# Patient Record
Sex: FEMALE | Race: AMERICAN INDIAN OR ALASKA NATIVE | HISPANIC OR LATINO
[De-identification: names, ages, dates, MRNs, and addresses within clinical notes are randomized per-mention and may not be internally consistent; named-entity substitution may affect disease eponyms.]

---

## 2017-01-04 ENCOUNTER — APPOINTMENT (OUTPATIENT)
Dept: SPINE | Facility: CLINIC | Age: 67
End: 2017-01-04

## 2017-01-04 ENCOUNTER — OUTPATIENT (OUTPATIENT)
Dept: OUTPATIENT SERVICES | Facility: HOSPITAL | Age: 67
LOS: 1 days | End: 2017-01-04
Payer: COMMERCIAL

## 2017-01-04 VITALS
HEIGHT: 60 IN | DIASTOLIC BLOOD PRESSURE: 83 MMHG | BODY MASS INDEX: 31.41 KG/M2 | SYSTOLIC BLOOD PRESSURE: 136 MMHG | OXYGEN SATURATION: 98 % | WEIGHT: 160 LBS | HEART RATE: 83 BPM

## 2017-01-04 DIAGNOSIS — M54.2 CERVICALGIA: ICD-10-CM

## 2017-01-04 DIAGNOSIS — G89.29 CERVICALGIA: ICD-10-CM

## 2017-01-04 PROCEDURE — 72040 X-RAY EXAM NECK SPINE 2-3 VW: CPT

## 2017-01-04 PROCEDURE — 72040 X-RAY EXAM NECK SPINE 2-3 VW: CPT | Mod: 26

## 2017-01-21 ENCOUNTER — OUTPATIENT (OUTPATIENT)
Dept: OUTPATIENT SERVICES | Facility: HOSPITAL | Age: 67
LOS: 1 days | End: 2017-01-21
Payer: MEDICARE

## 2017-01-21 PROCEDURE — 72125 CT NECK SPINE W/O DYE: CPT | Mod: 26

## 2017-01-21 PROCEDURE — 72125 CT NECK SPINE W/O DYE: CPT

## 2017-01-21 PROCEDURE — 76377 3D RENDER W/INTRP POSTPROCES: CPT | Mod: 26

## 2017-02-21 ENCOUNTER — APPOINTMENT (OUTPATIENT)
Dept: SPINE | Facility: CLINIC | Age: 67
End: 2017-02-21

## 2017-02-21 VITALS — SYSTOLIC BLOOD PRESSURE: 130 MMHG | DIASTOLIC BLOOD PRESSURE: 86 MMHG | HEART RATE: 80 BPM

## 2017-02-21 DIAGNOSIS — G95.20 UNSPECIFIED CORD COMPRESSION: ICD-10-CM

## 2017-02-21 DIAGNOSIS — M25.78 OSTEOPHYTE, VERTEBRAE: ICD-10-CM

## 2017-02-21 DIAGNOSIS — M48.02 SPINAL STENOSIS, CERVICAL REGION: ICD-10-CM

## 2017-02-21 DIAGNOSIS — M54.12 RADICULOPATHY, CERVICAL REGION: ICD-10-CM

## 2018-01-01 ENCOUNTER — EMERGENCY (EMERGENCY)
Facility: HOSPITAL | Age: 68
LOS: 1 days | Discharge: HOME | End: 2018-01-01
Attending: EMERGENCY MEDICINE | Admitting: EMERGENCY MEDICINE
Payer: MEDICARE

## 2018-01-01 VITALS
SYSTOLIC BLOOD PRESSURE: 158 MMHG | RESPIRATION RATE: 16 BRPM | WEIGHT: 164.91 LBS | TEMPERATURE: 98 F | HEART RATE: 78 BPM | OXYGEN SATURATION: 96 % | DIASTOLIC BLOOD PRESSURE: 89 MMHG

## 2018-01-01 VITALS
HEART RATE: 72 BPM | OXYGEN SATURATION: 99 % | DIASTOLIC BLOOD PRESSURE: 77 MMHG | RESPIRATION RATE: 18 BRPM | SYSTOLIC BLOOD PRESSURE: 135 MMHG

## 2018-01-01 DIAGNOSIS — R42 DIZZINESS AND GIDDINESS: ICD-10-CM

## 2018-01-01 DIAGNOSIS — I10 ESSENTIAL (PRIMARY) HYPERTENSION: ICD-10-CM

## 2018-01-01 DIAGNOSIS — Z88.0 ALLERGY STATUS TO PENICILLIN: ICD-10-CM

## 2018-01-01 DIAGNOSIS — Z79.899 OTHER LONG TERM (CURRENT) DRUG THERAPY: ICD-10-CM

## 2018-01-01 LAB
ALBUMIN SERPL ELPH-MCNC: 4.2 G/DL — SIGNIFICANT CHANGE UP (ref 3.3–5)
ALP SERPL-CCNC: 76 U/L — SIGNIFICANT CHANGE UP (ref 40–120)
ALT FLD-CCNC: 21 U/L — SIGNIFICANT CHANGE UP (ref 10–45)
ANION GAP SERPL CALC-SCNC: 16 MMOL/L — SIGNIFICANT CHANGE UP (ref 5–17)
APTT BLD: 27 SEC — LOW (ref 27.5–37.4)
AST SERPL-CCNC: 28 U/L — SIGNIFICANT CHANGE UP (ref 10–40)
BASOPHILS NFR BLD AUTO: 0.4 % — SIGNIFICANT CHANGE UP (ref 0–2)
BILIRUB SERPL-MCNC: 0.2 MG/DL — SIGNIFICANT CHANGE UP (ref 0.2–1.2)
BUN SERPL-MCNC: 11 MG/DL — SIGNIFICANT CHANGE UP (ref 7–23)
CALCIUM SERPL-MCNC: 9.7 MG/DL — SIGNIFICANT CHANGE UP (ref 8.4–10.5)
CHLORIDE SERPL-SCNC: 92 MMOL/L — LOW (ref 96–108)
CK MB CFR SERPL CALC: 6.1 NG/ML — SIGNIFICANT CHANGE UP (ref 0–6.7)
CK SERPL-CCNC: 313 U/L — HIGH (ref 25–170)
CO2 SERPL-SCNC: 23 MMOL/L — SIGNIFICANT CHANGE UP (ref 22–31)
CREAT SERPL-MCNC: 0.57 MG/DL — SIGNIFICANT CHANGE UP (ref 0.5–1.3)
EOSINOPHIL NFR BLD AUTO: 2.6 % — SIGNIFICANT CHANGE UP (ref 0–6)
GLUCOSE SERPL-MCNC: 111 MG/DL — HIGH (ref 70–99)
HCT VFR BLD CALC: 38.6 % — SIGNIFICANT CHANGE UP (ref 34.5–45)
HGB BLD-MCNC: 12.9 G/DL — SIGNIFICANT CHANGE UP (ref 11.5–15.5)
INR BLD: 0.96 — SIGNIFICANT CHANGE UP (ref 0.88–1.16)
LIDOCAIN IGE QN: 42 U/L — SIGNIFICANT CHANGE UP (ref 7–60)
LYMPHOCYTES # BLD AUTO: 50.4 % — HIGH (ref 13–44)
MAGNESIUM SERPL-MCNC: 2.1 MG/DL — SIGNIFICANT CHANGE UP (ref 1.6–2.6)
MCHC RBC-ENTMCNC: 30.6 PG — SIGNIFICANT CHANGE UP (ref 27–34)
MCHC RBC-ENTMCNC: 33.4 G/DL — SIGNIFICANT CHANGE UP (ref 32–36)
MCV RBC AUTO: 91.5 FL — SIGNIFICANT CHANGE UP (ref 80–100)
MONOCYTES NFR BLD AUTO: 14.5 % — HIGH (ref 2–14)
NEUTROPHILS NFR BLD AUTO: 32.1 % — LOW (ref 43–77)
PLATELET # BLD AUTO: 327 K/UL — SIGNIFICANT CHANGE UP (ref 150–400)
POTASSIUM SERPL-MCNC: 3.5 MMOL/L — SIGNIFICANT CHANGE UP (ref 3.5–5.3)
POTASSIUM SERPL-SCNC: 3.5 MMOL/L — SIGNIFICANT CHANGE UP (ref 3.5–5.3)
PROT SERPL-MCNC: 7.6 G/DL — SIGNIFICANT CHANGE UP (ref 6–8.3)
PROTHROM AB SERPL-ACNC: 10.7 SEC — SIGNIFICANT CHANGE UP (ref 9.8–12.7)
RBC # BLD: 4.22 M/UL — SIGNIFICANT CHANGE UP (ref 3.8–5.2)
RBC # FLD: 13.1 % — SIGNIFICANT CHANGE UP (ref 10.3–16.9)
SODIUM SERPL-SCNC: 131 MMOL/L — LOW (ref 135–145)
TROPONIN T SERPL-MCNC: <0.01 NG/ML — SIGNIFICANT CHANGE UP (ref 0–0.01)
WBC # BLD: 2.3 K/UL — LOW (ref 3.8–10.5)
WBC # FLD AUTO: 2.3 K/UL — LOW (ref 3.8–10.5)

## 2018-01-01 PROCEDURE — 83735 ASSAY OF MAGNESIUM: CPT

## 2018-01-01 PROCEDURE — 93010 ELECTROCARDIOGRAM REPORT: CPT

## 2018-01-01 PROCEDURE — 99284 EMERGENCY DEPT VISIT MOD MDM: CPT | Mod: 25

## 2018-01-01 PROCEDURE — 80053 COMPREHEN METABOLIC PANEL: CPT

## 2018-01-01 PROCEDURE — 85025 COMPLETE CBC W/AUTO DIFF WBC: CPT

## 2018-01-01 PROCEDURE — 96374 THER/PROPH/DIAG INJ IV PUSH: CPT

## 2018-01-01 PROCEDURE — 82553 CREATINE MB FRACTION: CPT

## 2018-01-01 PROCEDURE — 99285 EMERGENCY DEPT VISIT HI MDM: CPT | Mod: 25

## 2018-01-01 PROCEDURE — 85610 PROTHROMBIN TIME: CPT

## 2018-01-01 PROCEDURE — 93005 ELECTROCARDIOGRAM TRACING: CPT

## 2018-01-01 PROCEDURE — 85730 THROMBOPLASTIN TIME PARTIAL: CPT

## 2018-01-01 PROCEDURE — 83690 ASSAY OF LIPASE: CPT

## 2018-01-01 PROCEDURE — 82550 ASSAY OF CK (CPK): CPT

## 2018-01-01 PROCEDURE — 36415 COLL VENOUS BLD VENIPUNCTURE: CPT

## 2018-01-01 PROCEDURE — 84484 ASSAY OF TROPONIN QUANT: CPT

## 2018-01-01 RX ORDER — ONDANSETRON 8 MG/1
4 TABLET, FILM COATED ORAL ONCE
Qty: 0 | Refills: 0 | Status: COMPLETED | OUTPATIENT
Start: 2018-01-01 | End: 2018-01-01

## 2018-01-01 RX ORDER — SODIUM CHLORIDE 9 MG/ML
1000 INJECTION INTRAMUSCULAR; INTRAVENOUS; SUBCUTANEOUS ONCE
Qty: 0 | Refills: 0 | Status: COMPLETED | OUTPATIENT
Start: 2018-01-01 | End: 2018-01-01

## 2018-01-01 RX ADMIN — SODIUM CHLORIDE 1000 MILLILITER(S): 9 INJECTION INTRAMUSCULAR; INTRAVENOUS; SUBCUTANEOUS at 04:08

## 2018-01-01 RX ADMIN — ONDANSETRON 4 MILLIGRAM(S): 8 TABLET, FILM COATED ORAL at 04:07

## 2018-01-01 NOTE — ED PROVIDER NOTE - PROGRESS NOTE DETAILS
BP improved without intervention, pt with steady gait, no focal neuro deficits, no chest pain. recommend PMD f/u  I have discussed the discharge plan with the patient. The patient agrees with the plan, as discussed.  The patient understands Emergency Department diagnosis is a preliminary diagnosis often based on limited information and that the patient must adhere to the follow-up plan as discussed.  The patient understands that if the symptoms worsen the patient may return to the Emergency Department at any time for further evaluation and treatment.

## 2018-01-01 NOTE — ED PROVIDER NOTE - OBJECTIVE STATEMENT
67F hx htn, c/o feeling lightheaded. pt states she awoke to noise and wasn't feeling well.  states was nauseate 67F hx htn, c/o feeling lightheaded. pt states she awoke to noise and wasn't feeling well.  states was nauseated.  checked her BP and the systolic was 160. pt states was nervous about her elevated BP.  no chest pain. no SOB. no vomiting. no HA.  no numbness/weakness.  states takes norvasc 5mg qam.

## 2018-01-01 NOTE — ED ADULT NURSE NOTE - CHPI ED SYMPTOMS NEG
no shortness of breath/no chills/no fever/no back pain/no chest pain/no syncope/no diaphoresis/no cough/no vomiting

## 2018-01-01 NOTE — ED PROVIDER NOTE - MEDICAL DECISION MAKING DETAILS
concern regarding elevated BP, no chest pain, no SOB, doubt ACS, lightheaded, nauseated, no abd pain  -check labs, ekg, ivf, zofran, /85

## 2018-01-01 NOTE — ED ADULT NURSE NOTE - OBJECTIVE STATEMENT
pt took her blood pressure before bed tonight and it was 160 systolic.  pt usually runs in the 120s.  pt also c/o dizziness and nausea.  no pain.  no syncope.

## 2018-12-28 ENCOUNTER — EMERGENCY (EMERGENCY)
Facility: HOSPITAL | Age: 68
LOS: 1 days | Discharge: HOME | End: 2018-12-28
Attending: EMERGENCY MEDICINE | Admitting: EMERGENCY MEDICINE
Payer: MEDICARE

## 2018-12-28 VITALS
SYSTOLIC BLOOD PRESSURE: 143 MMHG | RESPIRATION RATE: 16 BRPM | OXYGEN SATURATION: 98 % | HEART RATE: 86 BPM | TEMPERATURE: 98 F | DIASTOLIC BLOOD PRESSURE: 88 MMHG

## 2018-12-28 DIAGNOSIS — E87.1 HYPO-OSMOLALITY AND HYPONATREMIA: ICD-10-CM

## 2018-12-28 DIAGNOSIS — R42 DIZZINESS AND GIDDINESS: ICD-10-CM

## 2018-12-28 DIAGNOSIS — Z79.899 OTHER LONG TERM (CURRENT) DRUG THERAPY: ICD-10-CM

## 2018-12-28 DIAGNOSIS — I10 ESSENTIAL (PRIMARY) HYPERTENSION: ICD-10-CM

## 2018-12-28 DIAGNOSIS — Z88.0 ALLERGY STATUS TO PENICILLIN: ICD-10-CM

## 2018-12-28 LAB
BASOPHILS NFR BLD AUTO: 0.5 % — SIGNIFICANT CHANGE UP (ref 0–2)
EOSINOPHIL NFR BLD AUTO: 1.4 % — SIGNIFICANT CHANGE UP (ref 0–6)
HCT VFR BLD CALC: 39 % — SIGNIFICANT CHANGE UP (ref 34.5–45)
HGB BLD-MCNC: 12.8 G/DL — SIGNIFICANT CHANGE UP (ref 11.5–15.5)
LYMPHOCYTES # BLD AUTO: 29.2 % — SIGNIFICANT CHANGE UP (ref 13–44)
MCHC RBC-ENTMCNC: 30.6 PG — SIGNIFICANT CHANGE UP (ref 27–34)
MCHC RBC-ENTMCNC: 32.8 G/DL — SIGNIFICANT CHANGE UP (ref 32–36)
MCV RBC AUTO: 93.3 FL — SIGNIFICANT CHANGE UP (ref 80–100)
MONOCYTES NFR BLD AUTO: 8.6 % — SIGNIFICANT CHANGE UP (ref 2–14)
NEUTROPHILS NFR BLD AUTO: 60.3 % — SIGNIFICANT CHANGE UP (ref 43–77)
PLATELET # BLD AUTO: 378 K/UL — SIGNIFICANT CHANGE UP (ref 150–400)
RBC # BLD: 4.18 M/UL — SIGNIFICANT CHANGE UP (ref 3.8–5.2)
RBC # FLD: 13 % — SIGNIFICANT CHANGE UP (ref 10.3–16.9)
WBC # BLD: 4.3 K/UL — SIGNIFICANT CHANGE UP (ref 3.8–10.5)
WBC # FLD AUTO: 4.3 K/UL — SIGNIFICANT CHANGE UP (ref 3.8–10.5)

## 2018-12-28 PROCEDURE — 82550 ASSAY OF CK (CPK): CPT

## 2018-12-28 PROCEDURE — 71045 X-RAY EXAM CHEST 1 VIEW: CPT | Mod: 26

## 2018-12-28 PROCEDURE — 85025 COMPLETE CBC W/AUTO DIFF WBC: CPT

## 2018-12-28 PROCEDURE — 82553 CREATINE MB FRACTION: CPT

## 2018-12-28 PROCEDURE — 80053 COMPREHEN METABOLIC PANEL: CPT

## 2018-12-28 PROCEDURE — 84484 ASSAY OF TROPONIN QUANT: CPT

## 2018-12-28 PROCEDURE — 99285 EMERGENCY DEPT VISIT HI MDM: CPT | Mod: 25

## 2018-12-28 PROCEDURE — 93010 ELECTROCARDIOGRAM REPORT: CPT

## 2018-12-28 PROCEDURE — 99283 EMERGENCY DEPT VISIT LOW MDM: CPT | Mod: 25

## 2018-12-28 PROCEDURE — 36415 COLL VENOUS BLD VENIPUNCTURE: CPT

## 2018-12-28 PROCEDURE — 93005 ELECTROCARDIOGRAM TRACING: CPT

## 2018-12-28 NOTE — ED PROVIDER NOTE - CARE PLAN
Principal Discharge DX:	Essential hypertension Principal Discharge DX:	Essential hypertension  Secondary Diagnosis:	Hyponatremia

## 2018-12-28 NOTE — ED PROVIDER NOTE - PROGRESS NOTE DETAILS
mildly hyponatremic with mildly elevated CK, likely dehydration, will hydrate w 1 L NS and advise repeat testing this week by pcp . patient refusing to stay for NS, highly anxious, admits to large quantity of H20, advised decreasing quantity and f/u for B.P. and sodium recheck.

## 2018-12-28 NOTE — ED PROVIDER NOTE - NSFOLLOWUPINSTRUCTIONS_ED_ALL_ED_FT
As discussed f/u with your primary care doctor for blood pressure recheck and sodium recheck. As you have been drinking large quantities of water, cut amount in half as this may be contributing to low sodium.

## 2018-12-28 NOTE — ED ADULT NURSE NOTE - NSIMPLEMENTINTERV_GEN_ALL_ED
Implemented All Universal Safety Interventions:  [place] to call system. Call bell, personal items and telephone within reach. Instruct patient to call for assistance. Room bathroom lighting operational. Non-slip footwear when patient is off stretcher. Physically safe environment: no spills, clutter or unnecessary equipment. Stretcher in lowest position, wheels locked, appropriate side rails in place.

## 2018-12-28 NOTE — ED PROVIDER NOTE - MEDICAL DECISION MAKING DETAILS
Pt with hot and flushed feeling with HTN, no H/A no CP, now assymptomatic, high suspician pt was anxious during episode, B.P. improving, EKG wnl , if trop negative as expected will D/C , advised f/u with pcp for B.P. recheck next week and discussed emergent return instructions

## 2018-12-28 NOTE — ED PROVIDER NOTE - OBJECTIVE STATEMENT
69 yo , reports at home she took her B.P. and it was 160's/ 90's , she felt hot and flushed at the time, slightly lightheaded, no CP , no Headache, reports feeling anxious at the time, reports B.P. taken yesterday and it was in the 120's, denies eating salty foods, no cough or cold symptoms, no n/v/d , no dysuria, no black or bloody stools.

## 2018-12-28 NOTE — ED ADULT NURSE NOTE - OBJECTIVE STATEMENT
67 y/o female c/o HTN. Pt reports home blood pressure was 160/83. Pt was concerned called 911. pt denies dizziness, HA, numbness, change of vision. pt speaks clear, MAEx4, ambulates steady, denies cp or sob. unlabored breathing. Abd soft nt nd. Skin dry, warm.

## 2018-12-28 NOTE — ED ADULT NURSE NOTE - CARDIO WDL
Adequate: hears normal conversation without difficulty Normal rate, regular rhythm, normal S1, S2 heart sounds heard.

## 2018-12-29 VITALS
RESPIRATION RATE: 18 BRPM | SYSTOLIC BLOOD PRESSURE: 135 MMHG | OXYGEN SATURATION: 98 % | DIASTOLIC BLOOD PRESSURE: 78 MMHG | HEART RATE: 78 BPM

## 2018-12-29 LAB
ALBUMIN SERPL ELPH-MCNC: 4.4 G/DL — SIGNIFICANT CHANGE UP (ref 3.3–5)
ALP SERPL-CCNC: 79 U/L — SIGNIFICANT CHANGE UP (ref 40–120)
ALT FLD-CCNC: 15 U/L — SIGNIFICANT CHANGE UP (ref 10–45)
ANION GAP SERPL CALC-SCNC: 17 MMOL/L — SIGNIFICANT CHANGE UP (ref 5–17)
AST SERPL-CCNC: 17 U/L — SIGNIFICANT CHANGE UP (ref 10–40)
BILIRUB SERPL-MCNC: 0.2 MG/DL — SIGNIFICANT CHANGE UP (ref 0.2–1.2)
BUN SERPL-MCNC: 10 MG/DL — SIGNIFICANT CHANGE UP (ref 7–23)
CALCIUM SERPL-MCNC: 9.8 MG/DL — SIGNIFICANT CHANGE UP (ref 8.4–10.5)
CHLORIDE SERPL-SCNC: 89 MMOL/L — LOW (ref 96–108)
CK MB CFR SERPL CALC: 3.3 NG/ML — SIGNIFICANT CHANGE UP (ref 0–6.7)
CK SERPL-CCNC: 187 U/L — HIGH (ref 25–170)
CO2 SERPL-SCNC: 24 MMOL/L — SIGNIFICANT CHANGE UP (ref 22–31)
CREAT SERPL-MCNC: 0.51 MG/DL — SIGNIFICANT CHANGE UP (ref 0.5–1.3)
GLUCOSE SERPL-MCNC: 124 MG/DL — HIGH (ref 70–99)
POTASSIUM SERPL-MCNC: 3.9 MMOL/L — SIGNIFICANT CHANGE UP (ref 3.5–5.3)
POTASSIUM SERPL-SCNC: 3.9 MMOL/L — SIGNIFICANT CHANGE UP (ref 3.5–5.3)
PROT SERPL-MCNC: 7.3 G/DL — SIGNIFICANT CHANGE UP (ref 6–8.3)
SODIUM SERPL-SCNC: 130 MMOL/L — LOW (ref 135–145)
TROPONIN T SERPL-MCNC: <0.01 NG/ML — SIGNIFICANT CHANGE UP (ref 0–0.01)

## 2018-12-29 RX ORDER — SODIUM CHLORIDE 9 MG/ML
1000 INJECTION INTRAMUSCULAR; INTRAVENOUS; SUBCUTANEOUS ONCE
Qty: 0 | Refills: 0 | Status: DISCONTINUED | OUTPATIENT
Start: 2018-12-29 | End: 2018-12-29

## 2018-12-30 PROBLEM — I10 ESSENTIAL (PRIMARY) HYPERTENSION: Chronic | Status: ACTIVE | Noted: 2018-01-01

## 2018-12-31 NOTE — ED ADULT TRIAGE NOTE - NS ED NURSE BANDS TYPE
Continue same dose and recheck INR  In one week. Thank you  
Noted  Forwarded to [facility].    Anticoag Track is updated    
Notification received from [facility] 
Name band;

## 2019-01-03 ENCOUNTER — APPOINTMENT (OUTPATIENT)
Dept: OTOLARYNGOLOGY | Facility: CLINIC | Age: 69
End: 2019-01-03
Payer: MEDICARE

## 2019-01-03 VITALS
DIASTOLIC BLOOD PRESSURE: 83 MMHG | OXYGEN SATURATION: 95 % | RESPIRATION RATE: 16 BRPM | HEART RATE: 58 BPM | TEMPERATURE: 98.6 F | SYSTOLIC BLOOD PRESSURE: 136 MMHG

## 2019-01-03 DIAGNOSIS — R04.0 EPISTAXIS: ICD-10-CM

## 2019-01-03 PROCEDURE — 30901 CONTROL OF NOSEBLEED: CPT | Mod: RT

## 2019-01-03 PROCEDURE — 99203 OFFICE O/P NEW LOW 30 MIN: CPT | Mod: 25

## 2019-01-08 ENCOUNTER — EMERGENCY (EMERGENCY)
Facility: HOSPITAL | Age: 69
LOS: 1 days | Discharge: HOME | End: 2019-01-08
Attending: EMERGENCY MEDICINE | Admitting: EMERGENCY MEDICINE
Payer: MEDICARE

## 2019-01-08 VITALS
RESPIRATION RATE: 18 BRPM | SYSTOLIC BLOOD PRESSURE: 144 MMHG | DIASTOLIC BLOOD PRESSURE: 90 MMHG | TEMPERATURE: 97 F | OXYGEN SATURATION: 99 % | HEART RATE: 98 BPM

## 2019-01-08 VITALS
OXYGEN SATURATION: 100 % | RESPIRATION RATE: 18 BRPM | DIASTOLIC BLOOD PRESSURE: 82 MMHG | TEMPERATURE: 98 F | HEART RATE: 83 BPM | SYSTOLIC BLOOD PRESSURE: 121 MMHG

## 2019-01-08 DIAGNOSIS — Z79.899 OTHER LONG TERM (CURRENT) DRUG THERAPY: ICD-10-CM

## 2019-01-08 DIAGNOSIS — I10 ESSENTIAL (PRIMARY) HYPERTENSION: ICD-10-CM

## 2019-01-08 DIAGNOSIS — Z88.0 ALLERGY STATUS TO PENICILLIN: ICD-10-CM

## 2019-01-08 DIAGNOSIS — Z91.018 ALLERGY TO OTHER FOODS: ICD-10-CM

## 2019-01-08 PROCEDURE — 99284 EMERGENCY DEPT VISIT MOD MDM: CPT | Mod: 25

## 2019-01-08 PROCEDURE — 93005 ELECTROCARDIOGRAM TRACING: CPT

## 2019-01-08 PROCEDURE — 93010 ELECTROCARDIOGRAM REPORT: CPT

## 2019-01-08 PROCEDURE — 99283 EMERGENCY DEPT VISIT LOW MDM: CPT | Mod: 25

## 2019-01-08 NOTE — ED ADULT TRIAGE NOTE - ARRIVAL INFO ADDITIONAL COMMENTS
pt states she checked her bp tonight because she was hot and it was 180/80.  denies headache or chest pain.

## 2019-01-08 NOTE — ED PROVIDER NOTE - ATTENDING CONTRIBUTION TO CARE
I have reviewed all pertinent clinical data, and I agree with the documentation, care, and plan executed by MERARI Ordonez.

## 2019-01-08 NOTE — ED ADULT NURSE NOTE - OBJECTIVE STATEMENT
67 y/o female with hx of HTN arrived to [facility] ER reporting elevated blood pressure at home after feeling flushed. Pt verbalized that her blood pressure was 180s/100s. Upon assessment, abdomen soft, lung fields WNL, breathing is equal and unlabored, pulses palpable, no visible acute injuries noted. Pt denies chest pain, headache, blurred vision, slurred speech, nausea, vomiting, diarrhea, fever, chills, LOC, SOB, weakness, fatigue, recent travel, recent injury, and palpitations. Care in progress.

## 2019-01-08 NOTE — ED PROVIDER NOTE - OBJECTIVE STATEMENT
reports BP elevated 180 systolic tonight and over the last month sometimes similarly elevated at night -> currently takes norvasc 5 mg No CP no SOB

## 2019-01-08 NOTE — ED ADULT TRIAGE NOTE - BANDS:
Patient: Noe Barnes Sr.    Procedure Summary     Date:  11/26/18 Room / Location:  [facility] ENDOSCOPY 2 / [facility] END[facility]COPY    Anesthesia Start:  1036 Anesthesia Stop:  1054    Procedure:  COLONOSCOPY (N/A ) Diagnosis:       Hx of adenomatous polyp of colon      (Hx of adenomatous polyp of colon [Z86.010])    Surgeon:  Ger Thomas MD Provider:  Omar Tran CRNA    Anesthesia Type:  MAC ASA Status:  3          Anesthesia Type: MAC  Last vitals  BP       Temp   97.4 °F (36.3 °C) (11/26/18 0933)   Pulse   78 (11/26/18 0933)   Resp   18 (11/26/18 0933)     SpO2   94 % (11/26/18 0933)     Post Anesthesia Care and Evaluation    Patient location during evaluation: bedside  Patient participation: complete - patient participated  Level of consciousness: awake and alert  Pain score: 0  Pain management: adequate  Airway patency: patent  Anesthetic complications: No anesthetic complications  PONV Status: none  Cardiovascular status: acceptable  Respiratory status: acceptable  Hydration status: acceptable      
Allergy;

## 2019-01-17 ENCOUNTER — APPOINTMENT (OUTPATIENT)
Dept: OTOLARYNGOLOGY | Facility: CLINIC | Age: 69
End: 2019-01-17
Payer: MEDICARE

## 2019-01-17 VITALS
DIASTOLIC BLOOD PRESSURE: 84 MMHG | RESPIRATION RATE: 17 BRPM | TEMPERATURE: 97.6 F | SYSTOLIC BLOOD PRESSURE: 140 MMHG | OXYGEN SATURATION: 99 % | HEART RATE: 88 BPM

## 2019-01-17 PROCEDURE — 99213 OFFICE O/P EST LOW 20 MIN: CPT

## 2019-01-17 NOTE — HISTORY OF PRESENT ILLNESS
[de-identified] : 68 years old female patient with history of right sided epistaxis.  Patient is present today in the office with right epistaxis improved.

## 2019-01-17 NOTE — REVIEW OF SYSTEMS
[Patient Intake Form Reviewed] : Patient intake form was reviewed [As Noted in HPI] : as noted in HPI [Nasal Congestion] : nasal congestion [Nose Bleeds] : nose bleeds [Negative] : Heme/Lymph

## 2019-01-17 NOTE — PHYSICAL EXAM
[] : septum deviated bilaterally [Midline] : trachea located in midline position [Normal] : no rashes [de-identified] : right epistaxis.  Bacitracin and cotton ball ointment was inserted into her nostrils

## 2019-01-17 NOTE — CONSULT LETTER
[Please see my note below.] : Please see my note below. [Consult Closing:] : Thank you very much for allowing me to participate in the care of this patient.  If you have any questions, please do not hesitate to contact me. [Sincerely,] : Sincerely, [FreeTextEntry3] : Nicholas Valentine MD, FACS\par Professor of Otolaryngology, [employer] at [facility]\par Director, [facility], [place] [facility] [place]\par , [facility] Line, [facility]\par \par

## 2019-06-03 ENCOUNTER — APPOINTMENT (OUTPATIENT)
Dept: OTOLARYNGOLOGY | Facility: CLINIC | Age: 69
End: 2019-06-03
Payer: MEDICARE

## 2019-06-03 VITALS
DIASTOLIC BLOOD PRESSURE: 83 MMHG | HEART RATE: 99 BPM | OXYGEN SATURATION: 98 % | SYSTOLIC BLOOD PRESSURE: 121 MMHG | TEMPERATURE: 97.9 F

## 2019-06-03 PROCEDURE — G0268 REMOVAL OF IMPACTED WAX MD: CPT

## 2019-06-03 PROCEDURE — 92550 TYMPANOMETRY & REFLEX THRESH: CPT

## 2019-06-03 PROCEDURE — 99213 OFFICE O/P EST LOW 20 MIN: CPT | Mod: 25

## 2019-06-03 PROCEDURE — 92557 COMPREHENSIVE HEARING TEST: CPT

## 2019-06-03 NOTE — PHYSICAL EXAM
[Normal] : no rashes [] : septum deviated bilaterally [de-identified] : right epistaxis.  Bacitracin and cotton ball ointment was inserted into her nostrils

## 2019-06-03 NOTE — REVIEW OF SYSTEMS
[Patient Intake Form Reviewed] : Patient intake form was reviewed [As Noted in HPI] : as noted in HPI [Hearing Loss] : hearing loss [Dizziness] : dizziness [Vertigo] : vertigo [Negative] : Heme/Lymph

## 2019-06-03 NOTE — HISTORY OF PRESENT ILLNESS
[de-identified] : 69 years old female patient with history of bilateral clogged ears for the past couple months.  Patient is present today in the office with bilateral cerumen impaction.  Patient C/O occasional dizziness episodes for the past couple of weeks

## 2019-06-03 NOTE — REASON FOR VISIT
[Subsequent Evaluation] : a subsequent evaluation for [FreeTextEntry2] : bilateral clogged ears for the past couple months

## 2019-06-05 ENCOUNTER — EMERGENCY (EMERGENCY)
Facility: HOSPITAL | Age: 69
LOS: 1 days | Discharge: HOME | End: 2019-06-05
Attending: EMERGENCY MEDICINE | Admitting: EMERGENCY MEDICINE
Payer: MEDICARE

## 2019-06-05 VITALS
SYSTOLIC BLOOD PRESSURE: 156 MMHG | OXYGEN SATURATION: 99 % | HEIGHT: 60 IN | WEIGHT: 164.91 LBS | DIASTOLIC BLOOD PRESSURE: 65 MMHG | TEMPERATURE: 98 F | RESPIRATION RATE: 18 BRPM | HEART RATE: 84 BPM

## 2019-06-05 DIAGNOSIS — Z88.0 ALLERGY STATUS TO PENICILLIN: ICD-10-CM

## 2019-06-05 DIAGNOSIS — I10 ESSENTIAL (PRIMARY) HYPERTENSION: ICD-10-CM

## 2019-06-05 DIAGNOSIS — R42 DIZZINESS AND GIDDINESS: ICD-10-CM

## 2019-06-05 DIAGNOSIS — Z91.018 ALLERGY TO OTHER FOODS: ICD-10-CM

## 2019-06-05 DIAGNOSIS — Z79.899 OTHER LONG TERM (CURRENT) DRUG THERAPY: ICD-10-CM

## 2019-06-05 PROCEDURE — 99284 EMERGENCY DEPT VISIT MOD MDM: CPT | Mod: 25

## 2019-06-06 LAB
ALBUMIN SERPL ELPH-MCNC: 4.2 G/DL — SIGNIFICANT CHANGE UP (ref 3.3–5)
ALP SERPL-CCNC: 96 U/L — SIGNIFICANT CHANGE UP (ref 40–120)
ALT FLD-CCNC: 16 U/L — SIGNIFICANT CHANGE UP (ref 10–45)
ANION GAP SERPL CALC-SCNC: 10 MMOL/L — SIGNIFICANT CHANGE UP (ref 5–17)
APPEARANCE UR: CLEAR — SIGNIFICANT CHANGE UP
APTT BLD: 27.1 SEC — LOW (ref 27.5–36.3)
AST SERPL-CCNC: 22 U/L — SIGNIFICANT CHANGE UP (ref 10–40)
BILIRUB SERPL-MCNC: <0.2 MG/DL — SIGNIFICANT CHANGE UP (ref 0.2–1.2)
BILIRUB UR-MCNC: NEGATIVE — SIGNIFICANT CHANGE UP
BUN SERPL-MCNC: 12 MG/DL — SIGNIFICANT CHANGE UP (ref 7–23)
CALCIUM SERPL-MCNC: 9.8 MG/DL — SIGNIFICANT CHANGE UP (ref 8.4–10.5)
CHLORIDE SERPL-SCNC: 96 MMOL/L — SIGNIFICANT CHANGE UP (ref 96–108)
CO2 SERPL-SCNC: 26 MMOL/L — SIGNIFICANT CHANGE UP (ref 22–31)
COLOR SPEC: YELLOW — SIGNIFICANT CHANGE UP
CREAT SERPL-MCNC: 0.5 MG/DL — SIGNIFICANT CHANGE UP (ref 0.5–1.3)
DIFF PNL FLD: NEGATIVE — SIGNIFICANT CHANGE UP
GLUCOSE SERPL-MCNC: 111 MG/DL — HIGH (ref 70–99)
GLUCOSE UR QL: NEGATIVE — SIGNIFICANT CHANGE UP
HCT VFR BLD CALC: 40.7 % — SIGNIFICANT CHANGE UP (ref 34.5–45)
HGB BLD-MCNC: 13.4 G/DL — SIGNIFICANT CHANGE UP (ref 11.5–15.5)
INR BLD: 1.03 — SIGNIFICANT CHANGE UP (ref 0.88–1.16)
KETONES UR-MCNC: NEGATIVE — SIGNIFICANT CHANGE UP
LEUKOCYTE ESTERASE UR-ACNC: ABNORMAL
MCHC RBC-ENTMCNC: 31.1 PG — SIGNIFICANT CHANGE UP (ref 27–34)
MCHC RBC-ENTMCNC: 32.9 GM/DL — SIGNIFICANT CHANGE UP (ref 32–36)
MCV RBC AUTO: 94.4 FL — SIGNIFICANT CHANGE UP (ref 80–100)
NITRITE UR-MCNC: NEGATIVE — SIGNIFICANT CHANGE UP
NRBC # BLD: 0 /100 WBCS — SIGNIFICANT CHANGE UP (ref 0–0)
PH UR: 7 — SIGNIFICANT CHANGE UP (ref 5–8)
PLATELET # BLD AUTO: 373 K/UL — SIGNIFICANT CHANGE UP (ref 150–400)
POTASSIUM SERPL-MCNC: 4 MMOL/L — SIGNIFICANT CHANGE UP (ref 3.5–5.3)
POTASSIUM SERPL-SCNC: 4 MMOL/L — SIGNIFICANT CHANGE UP (ref 3.5–5.3)
PROT SERPL-MCNC: 7.5 G/DL — SIGNIFICANT CHANGE UP (ref 6–8.3)
PROT UR-MCNC: NEGATIVE MG/DL — SIGNIFICANT CHANGE UP
PROTHROM AB SERPL-ACNC: 11.7 SEC — SIGNIFICANT CHANGE UP (ref 10–12.9)
RBC # BLD: 4.31 M/UL — SIGNIFICANT CHANGE UP (ref 3.8–5.2)
RBC # FLD: 12.9 % — SIGNIFICANT CHANGE UP (ref 10.3–14.5)
SODIUM SERPL-SCNC: 132 MMOL/L — LOW (ref 135–145)
SP GR SPEC: 1.01 — SIGNIFICANT CHANGE UP (ref 1–1.03)
UROBILINOGEN FLD QL: 0.2 E.U./DL — SIGNIFICANT CHANGE UP
WBC # BLD: 4.12 K/UL — SIGNIFICANT CHANGE UP (ref 3.8–10.5)
WBC # FLD AUTO: 4.12 K/UL — SIGNIFICANT CHANGE UP (ref 3.8–10.5)

## 2019-06-06 PROCEDURE — 80053 COMPREHEN METABOLIC PANEL: CPT

## 2019-06-06 PROCEDURE — 85610 PROTHROMBIN TIME: CPT

## 2019-06-06 PROCEDURE — 81001 URINALYSIS AUTO W/SCOPE: CPT

## 2019-06-06 PROCEDURE — 85730 THROMBOPLASTIN TIME PARTIAL: CPT

## 2019-06-06 PROCEDURE — 87086 URINE CULTURE/COLONY COUNT: CPT

## 2019-06-06 PROCEDURE — 85027 COMPLETE CBC AUTOMATED: CPT

## 2019-06-06 PROCEDURE — 36415 COLL VENOUS BLD VENIPUNCTURE: CPT

## 2019-06-06 PROCEDURE — 99283 EMERGENCY DEPT VISIT LOW MDM: CPT | Mod: 25

## 2019-06-06 RX ORDER — SODIUM CHLORIDE 9 MG/ML
1000 INJECTION INTRAMUSCULAR; INTRAVENOUS; SUBCUTANEOUS ONCE
Refills: 0 | Status: COMPLETED | OUTPATIENT
Start: 2019-06-06 | End: 2019-06-06

## 2019-06-06 RX ADMIN — SODIUM CHLORIDE 1000 MILLILITER(S): 9 INJECTION INTRAMUSCULAR; INTRAVENOUS; SUBCUTANEOUS at 01:22

## 2019-06-06 NOTE — ED PROVIDER NOTE - NSFOLLOWUPINSTRUCTIONS_ED_ALL_ED_FT
RETURN TO THE ER FOR ANY CHEST PAIN, SHORTNESS OF BREATH, FACE/ARM/LEG WEAKNESS, DIFFICULTY WALKING. OTHERWISE, TAKE AMLODIPINE AS DIRECTED BY YOUR REGULAR DOCTOR AND FOLLOW UP WITH YOUR REGULAR DOCTOR IN 1-2 WEEKS.     Hypertension    Hypertension, commonly called high blood pressure, is when the force of blood pumping through your arteries is too strong. Hypertension forces your heart to work harder to pump blood. Your arteries may become narrow or stiff. Having untreated or uncontrolled hypertension for a long period of time can cause heart attack, stroke, kidney disease, and other problems. If started on a medication, take exactly as prescribed by your health care professional. Maintain a healthy lifestyle and follow up with your primary care physician.    SEEK IMMEDIATE MEDICAL CARE IF YOU HAVE ANY OF THE FOLLOWING SYMPTOMS: severe headache, confusion, chest pain, abdominal pain, vomiting, or shortness of breath.

## 2019-06-06 NOTE — ED PROVIDER NOTE - PHYSICAL EXAMINATION
gen: no acute distress, comfortable, conversant  HEENT: oropharynx clear  Neck: supple, no meningismus, no bruit noted bl carotid  CV: rrr no m/r/g 2+ radial pulse  Resp: ctab, no w/c/r  Abd: nontender, no rebound/guarding  Ext: no edema, pedal pulses 2+  Neuro: alert and oriented, cn grossly intact, strength equal in all 4 ext, sensation intact to light touch f/a/l, nl coordination, gait steady  fnf/hts intact bl, gait steady cerebellar exam wnl no nystagmus

## 2019-06-06 NOTE — ED PROVIDER NOTE - OBJECTIVE STATEMENT
69F with hx of HTN presenting with complaint of dizziness, light-headedness, restless feeling no vertigo sensation no headache no chest pain, no shortness of breath, no nausea, no vomiting, no abd pain, no edema to lower ext, pt states she feels fine now but gets sx of restless feeling intermittently. Pt noted bp to be high is unclear if that is the cause of light-headed feeling. States bp was 150/90.

## 2019-06-06 NOTE — ED PROVIDER NOTE - CLINICAL SUMMARY MEDICAL DECISION MAKING FREE TEXT BOX
69F with known HTN presenting with dizzy, restless feeling for 1-2 days. Vital signs notable for mild HTN, CV/Neuro exam wnl including cerebellar/gait no sign of cerebellar issues, doubt stroke, doubt ACS no ekg changes signifying ischemia, no chest pain, no shortness of breath, pt reports positional dizziness only. no sob, lungs clear to auscultation, given IV fluids with significant improvement bp not elevated to consider hypertensive emergency. Pt is overall well appearing nl labs will dc home to f/u with PMD re: hypertension, recommend to continue dose of amlodipine 5 mg at home.

## 2019-06-06 NOTE — ED ADULT NURSE NOTE - NSIMPLEMENTINTERV_GEN_ALL_ED
Implemented All Universal Safety Interventions:  [place] to call system. Call bell, personal items and telephone within reach. Instruct patient to call for assistance. Room bathroom lighting operational. Non-slip footwear when patient is off stretcher. Physically safe environment: no spills, clutter or unnecessary equipment. Stretcher in lowest position, wheels locked, appropriate side rails in place.

## 2019-06-07 LAB
CULTURE RESULTS: NO GROWTH — SIGNIFICANT CHANGE UP
SPECIMEN SOURCE: SIGNIFICANT CHANGE UP

## 2019-06-20 VITALS
OXYGEN SATURATION: 96 % | RESPIRATION RATE: 18 BRPM | DIASTOLIC BLOOD PRESSURE: 76 MMHG | HEART RATE: 84 BPM | TEMPERATURE: 98 F | SYSTOLIC BLOOD PRESSURE: 141 MMHG

## 2019-06-20 RX ORDER — CHLORHEXIDINE GLUCONATE 213 G/1000ML
1 SOLUTION TOPICAL ONCE
Refills: 0 | Status: DISCONTINUED | OUTPATIENT
Start: 2019-06-21 | End: 2019-06-21

## 2019-06-20 NOTE — H&P ADULT - NSHPLABSRESULTS_GEN_ALL_CORE
13.2   4.55  )-----------( 392      ( 21 Jun 2019 14:59 )             40.0               PT/INR - ( 21 Jun 2019 14:59 )   PT: 12.1 sec;   INR: 1.07          PTT - ( 21 Jun 2019 14:59 )  PTT:30.3 sec              EKG: NSR at 76 bpm. No ST changes. TWI AVL. Q wave II, V3, I, AVL.

## 2019-06-20 NOTE — H&P ADULT - ASSESSMENT
70 y/o female with PMH HTN, HLD (myalgias with statins), and hiatal hernia who presents for cardiac cath due to patient's risk factors, abnormal EKG and abnormal stress echo.    ASA III                   Mallampati III    Risks & benefits of procedure and alternative therapy have been explained to the patient including but not limited to: allergic reaction, bleeding w/possible need for blood transfusion, infection, renal and vascular compromise, limb damage, arrhythmia, stroke, vessel dissection/perforation, Myocardial infarction, emergent CABG. Informed consent obtained and in chart.     Hgb/HCT 13.2/40-normal. Patient denies bleeding, BRBPR, melena, hematuria, hematemesis. ASA  mg PO x 1 and Plavix 600 mg PO x 1 given prior to procedure.

## 2019-06-20 NOTE — H&P ADULT - HISTORY OF PRESENT ILLNESS
68 y/o female with PMH HTN and HLD (myalgias with statins) presented to Dr. Janny Billings for abnormal EKG revealing possible inferior/anterior wall MI per MD note. Exercise Stress Echo 4/18/19 apical mid septal hypokinesis after stopping exercise at 5 min d/t fatigue, which resolved with rest, per MD note. Per report from patient, she had hx of leg pains but had normal leg U/S. Patient denies any fevers, chills, chest pain, SOB, orthopnea, PND, palpitations, LE edema, N/V, hematochezia, melena.   In light of patient's risk factors, abnormal EKG, and abnormal stress echo, she is now referred to [facility] for recommended cardiac catheterization with possible intervention. 70 y/o female with PMH HTN, HLD (myalgias with statins), and hiatal hernia presented to Dr. Janny Billings for abnormal EKG revealing possible inferior/anterior wall MI per MD note. Exercise Stress Echo 4/18/19 apical mid septal hypokinesis after stopping exercise at 5 min d/t fatigue, which resolved with rest, per MD note. Per report from patient, she had hx of leg pains but had normal leg U/S. Patient denies any fevers, chills, chest pain, SOB, orthopnea, PND, palpitations, LE edema, N/V, hematochezia, melena.   In light of patient's risk factors, abnormal EKG, and abnormal stress echo, she is now referred to [facility] for recommended cardiac catheterization with possible intervention.

## 2019-06-21 ENCOUNTER — OUTPATIENT (OUTPATIENT)
Dept: OUTPATIENT SERVICES | Facility: HOSPITAL | Age: 69
LOS: 1 days | Discharge: NURSING, REHAB OR LONG-TERM CARE | End: 2019-06-21
Payer: MEDICARE

## 2019-06-21 LAB
ALBUMIN SERPL ELPH-MCNC: 4.4 G/DL — SIGNIFICANT CHANGE UP (ref 3.3–5)
ALP SERPL-CCNC: 90 U/L — SIGNIFICANT CHANGE UP (ref 40–120)
ALT FLD-CCNC: 14 U/L — SIGNIFICANT CHANGE UP (ref 10–45)
ANION GAP SERPL CALC-SCNC: 11 MMOL/L — SIGNIFICANT CHANGE UP (ref 5–17)
APTT BLD: 30.3 SEC — SIGNIFICANT CHANGE UP (ref 27.5–36.3)
AST SERPL-CCNC: 18 U/L — SIGNIFICANT CHANGE UP (ref 10–40)
BASOPHILS # BLD AUTO: 0.03 K/UL — SIGNIFICANT CHANGE UP (ref 0–0.2)
BASOPHILS NFR BLD AUTO: 0.7 % — SIGNIFICANT CHANGE UP (ref 0–2)
BILIRUB SERPL-MCNC: 0.3 MG/DL — SIGNIFICANT CHANGE UP (ref 0.2–1.2)
BUN SERPL-MCNC: 12 MG/DL — SIGNIFICANT CHANGE UP (ref 7–23)
CALCIUM SERPL-MCNC: 10 MG/DL — SIGNIFICANT CHANGE UP (ref 8.4–10.5)
CHLORIDE SERPL-SCNC: 98 MMOL/L — SIGNIFICANT CHANGE UP (ref 96–108)
CHOLEST SERPL-MCNC: 242 MG/DL — HIGH (ref 10–199)
CK MB CFR SERPL CALC: 2.2 NG/ML — SIGNIFICANT CHANGE UP (ref 0–6.7)
CK SERPL-CCNC: 121 U/L — SIGNIFICANT CHANGE UP (ref 25–170)
CO2 SERPL-SCNC: 25 MMOL/L — SIGNIFICANT CHANGE UP (ref 22–31)
CREAT SERPL-MCNC: 0.6 MG/DL — SIGNIFICANT CHANGE UP (ref 0.5–1.3)
EOSINOPHIL # BLD AUTO: 0.01 K/UL — SIGNIFICANT CHANGE UP (ref 0–0.5)
EOSINOPHIL NFR BLD AUTO: 0.2 % — SIGNIFICANT CHANGE UP (ref 0–6)
GLUCOSE SERPL-MCNC: 114 MG/DL — HIGH (ref 70–99)
HCT VFR BLD CALC: 40 % — SIGNIFICANT CHANGE UP (ref 34.5–45)
HDLC SERPL-MCNC: 75 MG/DL — SIGNIFICANT CHANGE UP
HGB BLD-MCNC: 13.2 G/DL — SIGNIFICANT CHANGE UP (ref 11.5–15.5)
IMM GRANULOCYTES NFR BLD AUTO: 0.2 % — SIGNIFICANT CHANGE UP (ref 0–1.5)
INR BLD: 1.07 — SIGNIFICANT CHANGE UP (ref 0.88–1.16)
LIPID PNL WITH DIRECT LDL SERPL: 150 MG/DL — HIGH
LYMPHOCYTES # BLD AUTO: 1.27 K/UL — SIGNIFICANT CHANGE UP (ref 1–3.3)
LYMPHOCYTES # BLD AUTO: 27.9 % — SIGNIFICANT CHANGE UP (ref 13–44)
MCHC RBC-ENTMCNC: 31 PG — SIGNIFICANT CHANGE UP (ref 27–34)
MCHC RBC-ENTMCNC: 33 GM/DL — SIGNIFICANT CHANGE UP (ref 32–36)
MCV RBC AUTO: 93.9 FL — SIGNIFICANT CHANGE UP (ref 80–100)
MONOCYTES # BLD AUTO: 0.32 K/UL — SIGNIFICANT CHANGE UP (ref 0–0.9)
MONOCYTES NFR BLD AUTO: 7 % — SIGNIFICANT CHANGE UP (ref 2–14)
NEUTROPHILS # BLD AUTO: 2.91 K/UL — SIGNIFICANT CHANGE UP (ref 1.8–7.4)
NEUTROPHILS NFR BLD AUTO: 64 % — SIGNIFICANT CHANGE UP (ref 43–77)
NRBC # BLD: 0 /100 WBCS — SIGNIFICANT CHANGE UP (ref 0–0)
PLATELET # BLD AUTO: 392 K/UL — SIGNIFICANT CHANGE UP (ref 150–400)
POTASSIUM SERPL-MCNC: 3.9 MMOL/L — SIGNIFICANT CHANGE UP (ref 3.5–5.3)
POTASSIUM SERPL-SCNC: 3.9 MMOL/L — SIGNIFICANT CHANGE UP (ref 3.5–5.3)
PROT SERPL-MCNC: 7.7 G/DL — SIGNIFICANT CHANGE UP (ref 6–8.3)
PROTHROM AB SERPL-ACNC: 12.1 SEC — SIGNIFICANT CHANGE UP (ref 10–12.9)
RBC # BLD: 4.26 M/UL — SIGNIFICANT CHANGE UP (ref 3.8–5.2)
RBC # FLD: 13.2 % — SIGNIFICANT CHANGE UP (ref 10.3–14.5)
SODIUM SERPL-SCNC: 134 MMOL/L — LOW (ref 135–145)
TOTAL CHOLESTEROL/HDL RATIO MEASUREMENT: 3.2 RATIO — LOW (ref 3.3–7.1)
TRIGL SERPL-MCNC: 87 MG/DL — SIGNIFICANT CHANGE UP (ref 10–149)
WBC # BLD: 4.55 K/UL — SIGNIFICANT CHANGE UP (ref 3.8–10.5)
WBC # FLD AUTO: 4.55 K/UL — SIGNIFICANT CHANGE UP (ref 3.8–10.5)

## 2019-06-21 PROCEDURE — 82550 ASSAY OF CK (CPK): CPT

## 2019-06-21 PROCEDURE — 80053 COMPREHEN METABOLIC PANEL: CPT

## 2019-06-21 PROCEDURE — 80061 LIPID PANEL: CPT

## 2019-06-21 PROCEDURE — 93010 ELECTROCARDIOGRAM REPORT: CPT

## 2019-06-21 PROCEDURE — C1894: CPT

## 2019-06-21 PROCEDURE — 85025 COMPLETE CBC W/AUTO DIFF WBC: CPT

## 2019-06-21 PROCEDURE — 93458 L HRT ARTERY/VENTRICLE ANGIO: CPT

## 2019-06-21 PROCEDURE — 93458 L HRT ARTERY/VENTRICLE ANGIO: CPT | Mod: 26

## 2019-06-21 PROCEDURE — C1887: CPT

## 2019-06-21 PROCEDURE — 93005 ELECTROCARDIOGRAM TRACING: CPT

## 2019-06-21 PROCEDURE — 36415 COLL VENOUS BLD VENIPUNCTURE: CPT

## 2019-06-21 PROCEDURE — 85730 THROMBOPLASTIN TIME PARTIAL: CPT

## 2019-06-21 PROCEDURE — 85610 PROTHROMBIN TIME: CPT

## 2019-06-21 PROCEDURE — 82553 CREATINE MB FRACTION: CPT

## 2019-06-21 PROCEDURE — C1769: CPT

## 2019-06-21 RX ORDER — ASPIRIN/CALCIUM CARB/MAGNESIUM 324 MG
325 TABLET ORAL ONCE
Refills: 0 | Status: COMPLETED | OUTPATIENT
Start: 2019-06-21 | End: 2019-06-21

## 2019-06-21 RX ORDER — SODIUM CHLORIDE 9 MG/ML
500 INJECTION INTRAMUSCULAR; INTRAVENOUS; SUBCUTANEOUS
Refills: 0 | Status: DISCONTINUED | OUTPATIENT
Start: 2019-06-21 | End: 2019-06-21

## 2019-06-21 RX ORDER — CLOPIDOGREL BISULFATE 75 MG/1
600 TABLET, FILM COATED ORAL ONCE
Refills: 0 | Status: COMPLETED | OUTPATIENT
Start: 2019-06-21 | End: 2019-06-21

## 2019-06-21 RX ADMIN — CLOPIDOGREL BISULFATE 600 MILLIGRAM(S): 75 TABLET, FILM COATED ORAL at 15:41

## 2019-06-21 RX ADMIN — Medication 325 MILLIGRAM(S): at 15:41

## 2019-06-21 RX ADMIN — SODIUM CHLORIDE 50 MILLILITER(S): 9 INJECTION INTRAMUSCULAR; INTRAVENOUS; SUBCUTANEOUS at 15:39

## 2019-06-21 NOTE — PROGRESS NOTE ADULT - SUBJECTIVE AND OBJECTIVE BOX
Interventional Cardiology PA SDA Discharge Note    Patient without complaints.     Afebrile, VSS    Ext:    				Right          Radial :    hematoma,     bleeding, dressing; C/D/I      Pulses:    intact RAD to baseline     A/P:      70 y/o female with PMH HTN, HLD (myalgias with statins), and hiatal hernia presented to Dr. Janny Billings for abnormal EKG revealing possible inferior/anterior wall MI per MD note. Exercise Stress Echo 4/18/19 apical mid septal hypokinesis after stopping exercise at 5 min d/t fatigue, which resolved with rest, per MD note. Per report from patient, she had hx of leg pains but had normal leg U/S. Patient denies any fevers, chills, chest pain, SOB, orthopnea, PND, palpitations, LE edema, N/V, hematochezia, melena. Pt now s/p diagnostic cath showing            1.	Stable for discharge as per attending Dr. Berrios after bed rest, pt voids, wrist stable and 30 minutes of ambulation.  2.	Follow-up with PMD/Cardiologist Dr. Billings in 1-2 weeks  3.	Discharged forms signed and copies in chart Interventional Cardiology MERARI ARIASA Discharge Note    Patient without complaints.     Afebrile, VSS    Ext:    				Right          Radial :  no  hematoma, no    bleeding, dressing; C/D/I      Pulses:    intact RAD to baseline     A/P:      70 y/o female with PMH HTN, HLD (myalgias with statins), and hiatal hernia presented to Dr. Janny Billings for abnormal EKG revealing possible inferior/anterior wall MI per MD note. Exercise Stress Echo 4/18/19 apical mid septal hypokinesis after stopping exercise at 5 min d/t fatigue, which resolved with rest, per MD note. Per report from patient, she had hx of leg pains but had normal leg U/S. Patient denies any fevers, chills, chest pain, SOB, orthopnea, PND, palpitations, LE edema, N/V, hematochezia, melena. Pt now s/p diagnostic cath 6/21/19 showing normal coronaries, radial access. Pt will continue medical management.             1.	Stable for discharge as per attending Dr. Berrios after bed rest, pt voids, wrist stable and 30 minutes of ambulation.  2.	Follow-up with PMD/Cardiologist Dr. Billings in 1-2 weeks  3.	Discharged forms signed and copies in chart

## 2019-07-16 ENCOUNTER — EMERGENCY (EMERGENCY)
Facility: HOSPITAL | Age: 69
LOS: 1 days | Discharge: HOME | End: 2019-07-16
Attending: EMERGENCY MEDICINE | Admitting: EMERGENCY MEDICINE
Payer: MEDICARE

## 2019-07-16 VITALS
OXYGEN SATURATION: 98 % | TEMPERATURE: 98 F | SYSTOLIC BLOOD PRESSURE: 135 MMHG | DIASTOLIC BLOOD PRESSURE: 72 MMHG | RESPIRATION RATE: 18 BRPM | HEART RATE: 72 BPM

## 2019-07-16 VITALS
RESPIRATION RATE: 18 BRPM | TEMPERATURE: 98 F | DIASTOLIC BLOOD PRESSURE: 85 MMHG | SYSTOLIC BLOOD PRESSURE: 143 MMHG | HEIGHT: 60 IN | OXYGEN SATURATION: 98 % | HEART RATE: 81 BPM | WEIGHT: 160.06 LBS

## 2019-07-16 PROBLEM — E78.5 HYPERLIPIDEMIA, UNSPECIFIED: Chronic | Status: ACTIVE | Noted: 2019-06-20

## 2019-07-16 LAB
ALBUMIN SERPL ELPH-MCNC: 4.2 G/DL — SIGNIFICANT CHANGE UP (ref 3.3–5)
ALP SERPL-CCNC: 83 U/L — SIGNIFICANT CHANGE UP (ref 40–120)
ALT FLD-CCNC: 15 U/L — SIGNIFICANT CHANGE UP (ref 10–45)
ANION GAP SERPL CALC-SCNC: 11 MMOL/L — SIGNIFICANT CHANGE UP (ref 5–17)
APPEARANCE UR: CLEAR — SIGNIFICANT CHANGE UP
APTT BLD: 29.4 SEC — SIGNIFICANT CHANGE UP (ref 27.5–36.3)
AST SERPL-CCNC: 19 U/L — SIGNIFICANT CHANGE UP (ref 10–40)
BASOPHILS # BLD AUTO: 0.02 K/UL — SIGNIFICANT CHANGE UP (ref 0–0.2)
BASOPHILS NFR BLD AUTO: 0.4 % — SIGNIFICANT CHANGE UP (ref 0–2)
BILIRUB SERPL-MCNC: 0.2 MG/DL — SIGNIFICANT CHANGE UP (ref 0.2–1.2)
BILIRUB UR-MCNC: NEGATIVE — SIGNIFICANT CHANGE UP
BUN SERPL-MCNC: 14 MG/DL — SIGNIFICANT CHANGE UP (ref 7–23)
CALCIUM SERPL-MCNC: 10 MG/DL — SIGNIFICANT CHANGE UP (ref 8.4–10.5)
CHLORIDE SERPL-SCNC: 94 MMOL/L — LOW (ref 96–108)
CK SERPL-CCNC: 100 U/L — SIGNIFICANT CHANGE UP (ref 25–170)
CO2 SERPL-SCNC: 27 MMOL/L — SIGNIFICANT CHANGE UP (ref 22–31)
COLOR SPEC: YELLOW — SIGNIFICANT CHANGE UP
CREAT SERPL-MCNC: 0.66 MG/DL — SIGNIFICANT CHANGE UP (ref 0.5–1.3)
DIFF PNL FLD: NEGATIVE — SIGNIFICANT CHANGE UP
EOSINOPHIL # BLD AUTO: 0.09 K/UL — SIGNIFICANT CHANGE UP (ref 0–0.5)
EOSINOPHIL NFR BLD AUTO: 2 % — SIGNIFICANT CHANGE UP (ref 0–6)
GLUCOSE SERPL-MCNC: 105 MG/DL — HIGH (ref 70–99)
GLUCOSE UR QL: NEGATIVE — SIGNIFICANT CHANGE UP
HCT VFR BLD CALC: 38 % — SIGNIFICANT CHANGE UP (ref 34.5–45)
HGB BLD-MCNC: 12.6 G/DL — SIGNIFICANT CHANGE UP (ref 11.5–15.5)
IMM GRANULOCYTES NFR BLD AUTO: 0.2 % — SIGNIFICANT CHANGE UP (ref 0–1.5)
INR BLD: 1.09 — SIGNIFICANT CHANGE UP (ref 0.88–1.16)
KETONES UR-MCNC: NEGATIVE — SIGNIFICANT CHANGE UP
LEUKOCYTE ESTERASE UR-ACNC: NEGATIVE — SIGNIFICANT CHANGE UP
LYMPHOCYTES # BLD AUTO: 1.49 K/UL — SIGNIFICANT CHANGE UP (ref 1–3.3)
LYMPHOCYTES # BLD AUTO: 33.3 % — SIGNIFICANT CHANGE UP (ref 13–44)
MCHC RBC-ENTMCNC: 30.8 PG — SIGNIFICANT CHANGE UP (ref 27–34)
MCHC RBC-ENTMCNC: 33.2 GM/DL — SIGNIFICANT CHANGE UP (ref 32–36)
MCV RBC AUTO: 92.9 FL — SIGNIFICANT CHANGE UP (ref 80–100)
MONOCYTES # BLD AUTO: 0.54 K/UL — SIGNIFICANT CHANGE UP (ref 0–0.9)
MONOCYTES NFR BLD AUTO: 12.1 % — SIGNIFICANT CHANGE UP (ref 2–14)
NEUTROPHILS # BLD AUTO: 2.32 K/UL — SIGNIFICANT CHANGE UP (ref 1.8–7.4)
NEUTROPHILS NFR BLD AUTO: 52 % — SIGNIFICANT CHANGE UP (ref 43–77)
NITRITE UR-MCNC: NEGATIVE — SIGNIFICANT CHANGE UP
NRBC # BLD: 0 /100 WBCS — SIGNIFICANT CHANGE UP (ref 0–0)
PH UR: 7 — SIGNIFICANT CHANGE UP (ref 5–8)
PLATELET # BLD AUTO: 351 K/UL — SIGNIFICANT CHANGE UP (ref 150–400)
POTASSIUM SERPL-MCNC: 3.8 MMOL/L — SIGNIFICANT CHANGE UP (ref 3.5–5.3)
POTASSIUM SERPL-SCNC: 3.8 MMOL/L — SIGNIFICANT CHANGE UP (ref 3.5–5.3)
PROT SERPL-MCNC: 7.1 G/DL — SIGNIFICANT CHANGE UP (ref 6–8.3)
PROT UR-MCNC: NEGATIVE MG/DL — SIGNIFICANT CHANGE UP
PROTHROM AB SERPL-ACNC: 12.4 SEC — SIGNIFICANT CHANGE UP (ref 10–12.9)
RBC # BLD: 4.09 M/UL — SIGNIFICANT CHANGE UP (ref 3.8–5.2)
RBC # FLD: 12.9 % — SIGNIFICANT CHANGE UP (ref 10.3–14.5)
SODIUM SERPL-SCNC: 132 MMOL/L — LOW (ref 135–145)
SP GR SPEC: <=1.005 — SIGNIFICANT CHANGE UP (ref 1–1.03)
TROPONIN T SERPL-MCNC: <0.01 NG/ML — SIGNIFICANT CHANGE UP (ref 0–0.01)
UROBILINOGEN FLD QL: 0.2 E.U./DL — SIGNIFICANT CHANGE UP
WBC # BLD: 4.47 K/UL — SIGNIFICANT CHANGE UP (ref 3.8–10.5)
WBC # FLD AUTO: 4.47 K/UL — SIGNIFICANT CHANGE UP (ref 3.8–10.5)

## 2019-07-16 PROCEDURE — 99284 EMERGENCY DEPT VISIT MOD MDM: CPT | Mod: 25

## 2019-07-16 PROCEDURE — 82550 ASSAY OF CK (CPK): CPT

## 2019-07-16 PROCEDURE — 81003 URINALYSIS AUTO W/O SCOPE: CPT

## 2019-07-16 PROCEDURE — 36415 COLL VENOUS BLD VENIPUNCTURE: CPT

## 2019-07-16 PROCEDURE — 85730 THROMBOPLASTIN TIME PARTIAL: CPT

## 2019-07-16 PROCEDURE — 80053 COMPREHEN METABOLIC PANEL: CPT

## 2019-07-16 PROCEDURE — 93005 ELECTROCARDIOGRAM TRACING: CPT

## 2019-07-16 PROCEDURE — 85610 PROTHROMBIN TIME: CPT

## 2019-07-16 PROCEDURE — 96361 HYDRATE IV INFUSION ADD-ON: CPT

## 2019-07-16 PROCEDURE — 96374 THER/PROPH/DIAG INJ IV PUSH: CPT

## 2019-07-16 PROCEDURE — 93010 ELECTROCARDIOGRAM REPORT: CPT

## 2019-07-16 PROCEDURE — 84484 ASSAY OF TROPONIN QUANT: CPT

## 2019-07-16 PROCEDURE — 85025 COMPLETE CBC W/AUTO DIFF WBC: CPT

## 2019-07-16 RX ORDER — ONDANSETRON 8 MG/1
4 TABLET, FILM COATED ORAL ONCE
Refills: 0 | Status: COMPLETED | OUTPATIENT
Start: 2019-07-16 | End: 2019-07-16

## 2019-07-16 RX ORDER — SODIUM CHLORIDE 9 MG/ML
1000 INJECTION INTRAMUSCULAR; INTRAVENOUS; SUBCUTANEOUS ONCE
Refills: 0 | Status: COMPLETED | OUTPATIENT
Start: 2019-07-16 | End: 2019-07-16

## 2019-07-16 RX ORDER — MECLIZINE HCL 12.5 MG
25 TABLET ORAL ONCE
Refills: 0 | Status: COMPLETED | OUTPATIENT
Start: 2019-07-16 | End: 2019-07-16

## 2019-07-16 RX ORDER — MECLIZINE HCL 12.5 MG
1 TABLET ORAL
Qty: 15 | Refills: 0
Start: 2019-07-16

## 2019-07-16 RX ADMIN — SODIUM CHLORIDE 1000 MILLILITER(S): 9 INJECTION INTRAMUSCULAR; INTRAVENOUS; SUBCUTANEOUS at 04:19

## 2019-07-16 RX ADMIN — Medication 25 MILLIGRAM(S): at 03:09

## 2019-07-16 RX ADMIN — ONDANSETRON 4 MILLIGRAM(S): 8 TABLET, FILM COATED ORAL at 03:21

## 2019-07-16 RX ADMIN — SODIUM CHLORIDE 1000 MILLILITER(S): 9 INJECTION INTRAMUSCULAR; INTRAVENOUS; SUBCUTANEOUS at 03:11

## 2019-07-16 NOTE — ED PROVIDER NOTE - PROGRESS NOTE DETAILS
pt feels much better after IVfs and meds, repeat exam normal, nonfocal, pt ambulatory without difficulty. Pt is stable for DC. After discussion of the results, pt agreeable to the discharge plan.  At this time, the evidence for any other entities in the differential is insufficient to justify any further testing. This was discussed and explained to the patient. It was advised that persistent or worsening symptoms would require further evaluation. This was discussed with the patient and family using shared decision making. ED evaluation and management discussed with the patient and family (if available) in detail.  Close PMD follow up encouraged.  Strict ED return instructions discussed in detail and patient given the opportunity to ask any questions about their discharge diagnosis and instructions. Patient verbalized understanding. pt feels much better after IVfs and meds, repeat exam normal, nonfocal, pt ambulatory without difficulty. Pt is stable for DC. After discussion of the results, pt agreeable to the discharge plan.  At this time, the evidence for any other entities in the differential is insufficient to justify any further testing. This was discussed and explained to the patient. It was advised that persistent or worsening symptoms would require further evaluation. This was discussed with the patient and family using shared decision making. ED evaluation and management discussed with the patient in detail.  Close PMD follow up encouraged.  Strict ED return instructions discussed in detail and patient given the opportunity to ask any questions about their discharge diagnosis and instructions. Patient verbalized understanding.

## 2019-07-16 NOTE — ED PROVIDER NOTE - NSFOLLOWUPINSTRUCTIONS_ED_ALL_ED_FT
Please follow up with your primary care physician and ENT. If you have any problem getting an appointment this week, please call the ED Referral Coordinator at [phone].  Return to the Emergency Department if you have any new or worsening symptoms, or for any other concerns. Please read below for further information.    Vertigo  Image   Vertigo means that you feel like you are moving when you are not. Vertigo can also make you feel like things around you are moving when they are not. This feeling can come and go at any time. Vertigo often goes away on its own.    Follow these instructions at home:  Avoid making fast movements.  Avoid driving.  Avoid using heavy machinery.  Avoid doing any task or activity that might cause danger to you or other people if you would have a vertigo attack while you are doing it.  Sit down right away if you feel dizzy or have trouble with your balance.  Take over-the-counter and prescription medicines only as told by your doctor.  Follow instructions from your doctor about which positions or movements you should avoid.  Drink enough fluid to keep your pee (urine) clear or pale yellow.  Keep all follow-up visits as told by your doctor. This is important.  Contact a doctor if:  Medicine does not help your vertigo.  You have a fever.  Your problems get worse or you have new symptoms.  Your family or friends see changes in your behavior.  You feel sick to your stomach (nauseous) or you throw up (vomit).  You have a “pins and needles” feeling or you are numb in part of your body.  Get help right away if:  You have trouble moving or talking.  You are always dizzy.  You pass out (faint).  You get very bad headaches.  You feel weak or have trouble using your hands, arms, or legs.  You have changes in your hearing.  You have changes in your seeing (vision).  You get a stiff neck.  Bright light starts to bother you.  This information is not intended to replace advice given to you by your health care provider. Make sure you discuss any questions you have with your health care provider.    Hyponatremia  Image   Hyponatremia is when the amount of salt (sodium) in your blood is too low. When salt levels are low, your cells absorb extra water and they swell. The swelling happens throughout the body, but it mostly affects the brain.    Follow these instructions at home:  Take medicines only as told by your doctor. Many medicines can make this condition worse. Talk with your doctor about any medicines that you are currently taking.  Carefully follow a recommended diet as told by your doctor.  Carefully follow instructions from your doctor about fluid restrictions.  Keep all follow-up visits as told by your doctor. This is important.  Do not drink alcohol.  Contact a doctor if:  You feel sicker to your stomach (nauseous).  You feel more confused.  You feel more tired (fatigued).  Your headache gets worse.  You feel weaker.  Your symptoms go away and then they come back.  You have trouble following the diet instructions.  Get help right away if:  You start to twitch and shake (have a seizure).  You pass out (faint).  You keep having watery poop (diarrhea).  You keep throwing up (vomiting).  This information is not intended to replace advice given to you by your health care provider. Make sure you discuss any questions you have with your health care provider.

## 2019-07-16 NOTE — ED PROVIDER NOTE - CARE PROVIDER_API CALL
Vanessa Mireles)  Otolaryngology  [address] [place] [address], [address]  [place], [place] [address]  Phone: [phone]  Fax: [phone]  Follow Up Time:

## 2019-07-16 NOTE — ED PROVIDER NOTE - CLINICAL SUMMARY MEDICAL DECISION MAKING FREE TEXT BOX
pt with dizziness after changing positions on the couch, associated with some nausea and ear discomfort. Suspect vertigo vs dehydration/electrolyte abnormality, no focal neuro deficits, clinical picture not c/w ACS or CVA - pt with recent negative cardiac cath. plan: labs, hydration, meclizine, reassess.

## 2019-07-16 NOTE — ED PROVIDER NOTE - PHYSICAL EXAMINATION
GEN: Well appearing, well nourished, awake, alert, oriented to person, place, time/situation and in no apparent distress.  ENT: Airway patent, Nasal mucosa clear. Mouth with somewhat dry mucosa.  EYES: Clear bilaterally. perrl, eomi, left beating horizontal nystagmus noted.  RESPIRATORY: Breathing comfortably with normal RR.  CARDIAC: Regular rate and rhythm  ABDOMEN: Soft, nontender, +bowel sounds, no rebound, rigidity, or guarding.  MSK: Range of motion is not limited, no deformities noted.  NEURO: Alert and oriented x 3. Cn 2-12 intact. Strength 5/5 and sensation intact in all 4 extremities. no pronator drift. FTN normal. Neg Rhomberg. Gait normal.   SKIN: Skin normal color for race, warm, dry and intact. No evidence of rash.  PSYCH: Alert and oriented to person, place, time/situation. normal mood and affect. no apparent risk to self or others. GEN: Well appearing, well nourished, awake, alert, oriented to person, place, time/situation and in no apparent distress.  ENT: Airway patent, Nasal mucosa clear. Mouth with somewhat dry mucosa. TMs with mild injection bilaterally, no MAITE, no OM/OE.   EYES: Clear bilaterally. perrl, eomi, left beating horizontal nystagmus noted.  RESPIRATORY: Breathing comfortably with normal RR.  CARDIAC: Regular rate and rhythm  ABDOMEN: Soft, nontender, +bowel sounds, no rebound, rigidity, or guarding.  MSK: Range of motion is not limited, no deformities noted.  NEURO: Alert and oriented x 3. Cn 2-12 intact. Strength 5/5 and sensation intact in all 4 extremities. no pronator drift. FTN normal. Neg Rhomberg. Gait normal.   SKIN: Skin normal color for race, warm, dry and intact. No evidence of rash.  PSYCH: Alert and oriented to person, place, time/situation. normal mood and affect. no apparent risk to self or others.

## 2019-07-16 NOTE — ED PROVIDER NOTE - OBJECTIVE STATEMENT
69F with a h/o HTn and hyperlipidemia, recent negative cardiac cath at  one month ago, who p/w dizziness/LH associated with left ear discomfort. Pt states she was lying on the couch watching TV when she moved and sx started. She sat up and sx got worse, she felt warm/flushed and she was worried she might pass out so she called her neighbor and then called 911. No f/c, no cp/sob, no syncope, no n/v, no trauma or fall. Pt admits to drinking a lot of water lately but complains of a dry mouth.

## 2019-07-16 NOTE — ED ADULT NURSE NOTE - CHPI ED NUR SYMPTOMS NEG
no fever/no chest pain/no chills/no nausea/no syncope/no back pain/no congestion/no diaphoresis/no shortness of breath/no vomiting

## 2019-07-20 DIAGNOSIS — R42 DIZZINESS AND GIDDINESS: ICD-10-CM

## 2019-07-20 DIAGNOSIS — E87.1 HYPO-OSMOLALITY AND HYPONATREMIA: ICD-10-CM

## 2019-07-20 DIAGNOSIS — I10 ESSENTIAL (PRIMARY) HYPERTENSION: ICD-10-CM

## 2019-07-20 DIAGNOSIS — Z79.899 OTHER LONG TERM (CURRENT) DRUG THERAPY: ICD-10-CM

## 2019-08-19 ENCOUNTER — EMERGENCY (EMERGENCY)
Facility: HOSPITAL | Age: 69
LOS: 1 days | Discharge: HOME | End: 2019-08-19
Attending: EMERGENCY MEDICINE | Admitting: EMERGENCY MEDICINE
Payer: MEDICARE

## 2019-08-19 VITALS
DIASTOLIC BLOOD PRESSURE: 74 MMHG | WEIGHT: 167.99 LBS | HEART RATE: 81 BPM | RESPIRATION RATE: 16 BRPM | HEIGHT: 62 IN | SYSTOLIC BLOOD PRESSURE: 125 MMHG | TEMPERATURE: 98 F | OXYGEN SATURATION: 98 %

## 2019-08-19 VITALS
DIASTOLIC BLOOD PRESSURE: 78 MMHG | OXYGEN SATURATION: 99 % | HEART RATE: 76 BPM | SYSTOLIC BLOOD PRESSURE: 115 MMHG | RESPIRATION RATE: 16 BRPM

## 2019-08-19 LAB
ALBUMIN SERPL ELPH-MCNC: 4.5 G/DL — SIGNIFICANT CHANGE UP (ref 3.3–5)
ALP SERPL-CCNC: 88 U/L — SIGNIFICANT CHANGE UP (ref 40–120)
ALT FLD-CCNC: 12 U/L — SIGNIFICANT CHANGE UP (ref 10–45)
ANION GAP SERPL CALC-SCNC: 11 MMOL/L — SIGNIFICANT CHANGE UP (ref 5–17)
APTT BLD: 29.9 SEC — SIGNIFICANT CHANGE UP (ref 27.5–36.3)
AST SERPL-CCNC: 18 U/L — SIGNIFICANT CHANGE UP (ref 10–40)
BASOPHILS # BLD AUTO: 0.01 K/UL — SIGNIFICANT CHANGE UP (ref 0–0.2)
BASOPHILS NFR BLD AUTO: 0.3 % — SIGNIFICANT CHANGE UP (ref 0–2)
BILIRUB SERPL-MCNC: 0.2 MG/DL — SIGNIFICANT CHANGE UP (ref 0.2–1.2)
BUN SERPL-MCNC: 12 MG/DL — SIGNIFICANT CHANGE UP (ref 7–23)
CALCIUM SERPL-MCNC: 9.8 MG/DL — SIGNIFICANT CHANGE UP (ref 8.4–10.5)
CHLORIDE SERPL-SCNC: 100 MMOL/L — SIGNIFICANT CHANGE UP (ref 96–108)
CK SERPL-CCNC: 126 U/L — SIGNIFICANT CHANGE UP (ref 25–170)
CO2 SERPL-SCNC: 28 MMOL/L — SIGNIFICANT CHANGE UP (ref 22–31)
CREAT SERPL-MCNC: 0.57 MG/DL — SIGNIFICANT CHANGE UP (ref 0.5–1.3)
EOSINOPHIL # BLD AUTO: 0.1 K/UL — SIGNIFICANT CHANGE UP (ref 0–0.5)
EOSINOPHIL NFR BLD AUTO: 2.8 % — SIGNIFICANT CHANGE UP (ref 0–6)
GLUCOSE SERPL-MCNC: 112 MG/DL — HIGH (ref 70–99)
HCT VFR BLD CALC: 41.9 % — SIGNIFICANT CHANGE UP (ref 34.5–45)
HGB BLD-MCNC: 13.6 G/DL — SIGNIFICANT CHANGE UP (ref 11.5–15.5)
IMM GRANULOCYTES NFR BLD AUTO: 0.3 % — SIGNIFICANT CHANGE UP (ref 0–1.5)
INR BLD: 1.04 — SIGNIFICANT CHANGE UP (ref 0.88–1.16)
LYMPHOCYTES # BLD AUTO: 1.2 K/UL — SIGNIFICANT CHANGE UP (ref 1–3.3)
LYMPHOCYTES # BLD AUTO: 34 % — SIGNIFICANT CHANGE UP (ref 13–44)
MCHC RBC-ENTMCNC: 30.9 PG — SIGNIFICANT CHANGE UP (ref 27–34)
MCHC RBC-ENTMCNC: 32.5 GM/DL — SIGNIFICANT CHANGE UP (ref 32–36)
MCV RBC AUTO: 95.2 FL — SIGNIFICANT CHANGE UP (ref 80–100)
MONOCYTES # BLD AUTO: 0.41 K/UL — SIGNIFICANT CHANGE UP (ref 0–0.9)
MONOCYTES NFR BLD AUTO: 11.6 % — SIGNIFICANT CHANGE UP (ref 2–14)
NEUTROPHILS # BLD AUTO: 1.8 K/UL — SIGNIFICANT CHANGE UP (ref 1.8–7.4)
NEUTROPHILS NFR BLD AUTO: 51 % — SIGNIFICANT CHANGE UP (ref 43–77)
NRBC # BLD: 0 /100 WBCS — SIGNIFICANT CHANGE UP (ref 0–0)
NT-PROBNP SERPL-SCNC: 21 PG/ML — SIGNIFICANT CHANGE UP (ref 0–300)
PLATELET # BLD AUTO: 389 K/UL — SIGNIFICANT CHANGE UP (ref 150–400)
POTASSIUM SERPL-MCNC: 3.8 MMOL/L — SIGNIFICANT CHANGE UP (ref 3.5–5.3)
POTASSIUM SERPL-SCNC: 3.8 MMOL/L — SIGNIFICANT CHANGE UP (ref 3.5–5.3)
PROT SERPL-MCNC: 7.6 G/DL — SIGNIFICANT CHANGE UP (ref 6–8.3)
PROTHROM AB SERPL-ACNC: 11.8 SEC — SIGNIFICANT CHANGE UP (ref 10–12.9)
RBC # BLD: 4.4 M/UL — SIGNIFICANT CHANGE UP (ref 3.8–5.2)
RBC # FLD: 12.8 % — SIGNIFICANT CHANGE UP (ref 10.3–14.5)
SODIUM SERPL-SCNC: 139 MMOL/L — SIGNIFICANT CHANGE UP (ref 135–145)
TROPONIN T SERPL-MCNC: <0.01 NG/ML — SIGNIFICANT CHANGE UP (ref 0–0.01)
WBC # BLD: 3.53 K/UL — LOW (ref 3.8–10.5)
WBC # FLD AUTO: 3.53 K/UL — LOW (ref 3.8–10.5)

## 2019-08-19 PROCEDURE — 93005 ELECTROCARDIOGRAM TRACING: CPT

## 2019-08-19 PROCEDURE — 99285 EMERGENCY DEPT VISIT HI MDM: CPT | Mod: 25

## 2019-08-19 PROCEDURE — 84484 ASSAY OF TROPONIN QUANT: CPT

## 2019-08-19 PROCEDURE — 99283 EMERGENCY DEPT VISIT LOW MDM: CPT | Mod: 25

## 2019-08-19 PROCEDURE — 82550 ASSAY OF CK (CPK): CPT

## 2019-08-19 PROCEDURE — 71045 X-RAY EXAM CHEST 1 VIEW: CPT | Mod: 26

## 2019-08-19 PROCEDURE — 80053 COMPREHEN METABOLIC PANEL: CPT

## 2019-08-19 PROCEDURE — 85730 THROMBOPLASTIN TIME PARTIAL: CPT

## 2019-08-19 PROCEDURE — 36415 COLL VENOUS BLD VENIPUNCTURE: CPT

## 2019-08-19 PROCEDURE — 83880 ASSAY OF NATRIURETIC PEPTIDE: CPT

## 2019-08-19 PROCEDURE — 71045 X-RAY EXAM CHEST 1 VIEW: CPT

## 2019-08-19 PROCEDURE — 93010 ELECTROCARDIOGRAM REPORT: CPT

## 2019-08-19 PROCEDURE — 85610 PROTHROMBIN TIME: CPT

## 2019-08-19 PROCEDURE — 85025 COMPLETE CBC W/AUTO DIFF WBC: CPT

## 2019-08-19 RX ORDER — SODIUM CHLORIDE 9 MG/ML
1000 INJECTION INTRAMUSCULAR; INTRAVENOUS; SUBCUTANEOUS ONCE
Refills: 0 | Status: COMPLETED | OUTPATIENT
Start: 2019-08-19 | End: 2019-08-19

## 2019-08-19 RX ORDER — MECLIZINE HCL 12.5 MG
25 TABLET ORAL ONCE
Refills: 0 | Status: COMPLETED | OUTPATIENT
Start: 2019-08-19 | End: 2019-08-19

## 2019-08-19 RX ADMIN — Medication 25 MILLIGRAM(S): at 03:26

## 2019-08-19 RX ADMIN — SODIUM CHLORIDE 1000 MILLILITER(S): 9 INJECTION INTRAMUSCULAR; INTRAVENOUS; SUBCUTANEOUS at 03:26

## 2019-08-19 NOTE — ED PROVIDER NOTE - CLINICAL SUMMARY MEDICAL DECISION MAKING FREE TEXT BOX
Pt with above PMHx with dizziness/LH and chronic ear pain, she has been worked up for these sx in the past, recent neg cath, EKG neg, no neuro deficits. hx and exam c/w vertigo. WIll check labs, CXR, ivfs given meclizine, reassess.

## 2019-08-19 NOTE — ED ADULT NURSE NOTE - NSIMPLEMENTINTERV_GEN_ALL_ED
Implemented All Universal Safety Interventions:  [place] to call system. Call bell, personal items and telephone within reach. Instruct patient to call for assistance. Room bathroom lighting operational. Non-slip footwear when patient is off stretcher. Physically safe environment: no spills, clutter or unnecessary equipment. Stretcher in lowest position, wheels locked, appropriate side rails in place.

## 2019-08-19 NOTE — ED PROVIDER NOTE - NSFOLLOWUPINSTRUCTIONS_ED_ALL_ED_FT
Please follow up with your primary care physician and ENT. If you have any problem getting an appointment this week, please call the ED Referral Coordinator at [phone].  Return to the Emergency Department if you have any new or worsening symptoms, or for any other concerns. Please read below for further information.    Vertigo  Image   Vertigo means that you feel like you are moving when you are not. Vertigo can also make you feel like things around you are moving when they are not. This feeling can come and go at any time. Vertigo often goes away on its own.    Follow these instructions at home:  Avoid making fast movements.  Avoid driving.  Avoid using heavy machinery.  Avoid doing any task or activity that might cause danger to you or other people if you would have a vertigo attack while you are doing it.  Sit down right away if you feel dizzy or have trouble with your balance.  Take over-the-counter and prescription medicines only as told by your doctor.  Follow instructions from your doctor about which positions or movements you should avoid.  Drink enough fluid to keep your pee (urine) clear or pale yellow.  Keep all follow-up visits as told by your doctor. This is important.  Contact a doctor if:  Medicine does not help your vertigo.  You have a fever.  Your problems get worse or you have new symptoms.  Your family or friends see changes in your behavior.  You feel sick to your stomach (nauseous) or you throw up (vomit).  You have a “pins and needles” feeling or you are numb in part of your body.  Get help right away if:  You have trouble moving or talking.  You are always dizzy.  You pass out (faint).  You get very bad headaches.  You feel weak or have trouble using your hands, arms, or legs.  You have changes in your hearing.  You have changes in your seeing (vision).  You get a stiff neck.  Bright light starts to bother you.  This information is not intended to replace advice given to you by your health care provider. Make sure you discuss any questions you have with your health care provider.

## 2019-08-19 NOTE — ED PROVIDER NOTE - PROGRESS NOTE DETAILS
Pt feels better. No second set needed as pt with no CP, unlikely acs given negative cath 2 months ago. VSS, suspect vertigo. Pt was advised to f/u with ENT (she has yet to follow up). Pt is stable for DC. After discussion of the results, pt agreeable to the discharge plan.  At this time, the evidence for any other entities in the differential is insufficient to justify any further testing. This was discussed and explained to the patient. It was advised that persistent or worsening symptoms would require further evaluation. This was discussed with the patient and family using shared decision making. ED evaluation and management discussed with the patient and family (if available) in detail.  Close PMD follow up encouraged.  Strict ED return instructions discussed in detail and patient given the opportunity to ask any questions about their discharge diagnosis and instructions. Patient verbalized understanding.

## 2019-08-19 NOTE — ED PROVIDER NOTE - PHYSICAL EXAMINATION
GEN: Well appearing, well nourished, awake, alert, oriented to person, place, time/situation and in no apparent distress.  ENT: Airway patent, Nasal mucosa clear. Mouth with somewhat dry mucosa. TM clear bilaterally, Left ear canal with wax, no canal debris or inflammation no mastoid TTP. No meningismus.  EYES: Clear bilaterally. perrl, eomi, mild left beating horizontal nystagmus.   RESPIRATORY: Breathing comfortably with normal RR.  CARDIAC: Regular rate and rhythm  ABDOMEN: Soft, nontender, +bowel sounds, no rebound, rigidity, or guarding.  MSK: Range of motion is not limited, no deformities noted.  NEURO: Alert and oriented x 3. Cn 2-12 intact. Strength 5/5 and sensation intact in all 4 extremities. no pronator drift. FTN normal. Gait normal.   SKIN: Skin normal color for race, warm, dry and intact. No evidence of rash.  PSYCH: Alert and oriented to person, place, time/situation. normal mood and affect. no apparent risk to self or others.

## 2019-08-19 NOTE — ED PROVIDER NOTE - CARE PROVIDER_API CALL
Vanessa Mireles)  Otolaryngology  [address] [place] [address], [address]  [place], [place] [address]  Phone: [phone]  Fax: [phone]  Follow Up Time:

## 2019-08-19 NOTE — ED PROVIDER NOTE - OBJECTIVE STATEMENT
69F with a h/o HTn and hyperlipidemia, recent negative cardiac cath at  several months ago, who p/w dizziness/LH associated with chronic left ear discomfort. Pt was sleeping and she moved and sx started, associated with feeling warm/flushed and LH and a dry mouth. Pt reports hx of similar sx - she states meclizine worked for her in the past but then she got nervous bc she read in the side effects that it can cause dizziness. No f/c, no cp/sob, no syncope, no n/v, no trauma or fall. She put OTC ear drops in her left ear for discomfort. PMD is Dr. price.

## 2019-08-23 ENCOUNTER — EMERGENCY (EMERGENCY)
Facility: HOSPITAL | Age: 69
LOS: 1 days | Discharge: HOME | End: 2019-08-23
Attending: EMERGENCY MEDICINE | Admitting: EMERGENCY MEDICINE
Payer: MEDICARE

## 2019-08-23 VITALS
TEMPERATURE: 98 F | DIASTOLIC BLOOD PRESSURE: 88 MMHG | HEART RATE: 81 BPM | SYSTOLIC BLOOD PRESSURE: 127 MMHG | OXYGEN SATURATION: 98 % | RESPIRATION RATE: 14 BRPM

## 2019-08-23 VITALS
HEART RATE: 101 BPM | SYSTOLIC BLOOD PRESSURE: 161 MMHG | TEMPERATURE: 98 F | WEIGHT: 160.06 LBS | OXYGEN SATURATION: 97 % | DIASTOLIC BLOOD PRESSURE: 96 MMHG | RESPIRATION RATE: 16 BRPM | HEIGHT: 60 IN

## 2019-08-23 LAB
ALBUMIN SERPL ELPH-MCNC: 4.2 G/DL — SIGNIFICANT CHANGE UP (ref 3.3–5)
ALP SERPL-CCNC: 94 U/L — SIGNIFICANT CHANGE UP (ref 40–120)
ALT FLD-CCNC: 22 U/L — SIGNIFICANT CHANGE UP (ref 10–45)
ANION GAP SERPL CALC-SCNC: 11 MMOL/L — SIGNIFICANT CHANGE UP (ref 5–17)
APPEARANCE UR: CLEAR — SIGNIFICANT CHANGE UP
AST SERPL-CCNC: 23 U/L — SIGNIFICANT CHANGE UP (ref 10–40)
BACTERIA # UR AUTO: SIGNIFICANT CHANGE UP /HPF
BASOPHILS # BLD AUTO: 0.02 K/UL — SIGNIFICANT CHANGE UP (ref 0–0.2)
BASOPHILS NFR BLD AUTO: 0.5 % — SIGNIFICANT CHANGE UP (ref 0–2)
BILIRUB SERPL-MCNC: 0.2 MG/DL — SIGNIFICANT CHANGE UP (ref 0.2–1.2)
BILIRUB UR-MCNC: NEGATIVE — SIGNIFICANT CHANGE UP
BUN SERPL-MCNC: 10 MG/DL — SIGNIFICANT CHANGE UP (ref 7–23)
CALCIUM SERPL-MCNC: 9.8 MG/DL — SIGNIFICANT CHANGE UP (ref 8.4–10.5)
CHLORIDE SERPL-SCNC: 97 MMOL/L — SIGNIFICANT CHANGE UP (ref 96–108)
CO2 SERPL-SCNC: 27 MMOL/L — SIGNIFICANT CHANGE UP (ref 22–31)
COLOR SPEC: YELLOW — SIGNIFICANT CHANGE UP
CREAT SERPL-MCNC: 0.54 MG/DL — SIGNIFICANT CHANGE UP (ref 0.5–1.3)
DIFF PNL FLD: NEGATIVE — SIGNIFICANT CHANGE UP
EOSINOPHIL # BLD AUTO: 0.01 K/UL — SIGNIFICANT CHANGE UP (ref 0–0.5)
EOSINOPHIL NFR BLD AUTO: 0.3 % — SIGNIFICANT CHANGE UP (ref 0–6)
EPI CELLS # UR: ABNORMAL /HPF (ref 0–5)
GLUCOSE SERPL-MCNC: 129 MG/DL — HIGH (ref 70–99)
GLUCOSE UR QL: NEGATIVE — SIGNIFICANT CHANGE UP
HCT VFR BLD CALC: 42.4 % — SIGNIFICANT CHANGE UP (ref 34.5–45)
HGB BLD-MCNC: 13.7 G/DL — SIGNIFICANT CHANGE UP (ref 11.5–15.5)
IMM GRANULOCYTES NFR BLD AUTO: 0.3 % — SIGNIFICANT CHANGE UP (ref 0–1.5)
KETONES UR-MCNC: NEGATIVE — SIGNIFICANT CHANGE UP
LEUKOCYTE ESTERASE UR-ACNC: ABNORMAL
LYMPHOCYTES # BLD AUTO: 0.56 K/UL — LOW (ref 1–3.3)
LYMPHOCYTES # BLD AUTO: 14.4 % — SIGNIFICANT CHANGE UP (ref 13–44)
MCHC RBC-ENTMCNC: 30.6 PG — SIGNIFICANT CHANGE UP (ref 27–34)
MCHC RBC-ENTMCNC: 32.3 GM/DL — SIGNIFICANT CHANGE UP (ref 32–36)
MCV RBC AUTO: 94.6 FL — SIGNIFICANT CHANGE UP (ref 80–100)
MONOCYTES # BLD AUTO: 0.4 K/UL — SIGNIFICANT CHANGE UP (ref 0–0.9)
MONOCYTES NFR BLD AUTO: 10.3 % — SIGNIFICANT CHANGE UP (ref 2–14)
NEUTROPHILS # BLD AUTO: 2.89 K/UL — SIGNIFICANT CHANGE UP (ref 1.8–7.4)
NEUTROPHILS NFR BLD AUTO: 74.2 % — SIGNIFICANT CHANGE UP (ref 43–77)
NITRITE UR-MCNC: NEGATIVE — SIGNIFICANT CHANGE UP
NRBC # BLD: 0 /100 WBCS — SIGNIFICANT CHANGE UP (ref 0–0)
PH UR: 7 — SIGNIFICANT CHANGE UP (ref 5–8)
PLATELET # BLD AUTO: 379 K/UL — SIGNIFICANT CHANGE UP (ref 150–400)
POTASSIUM SERPL-MCNC: 3.8 MMOL/L — SIGNIFICANT CHANGE UP (ref 3.5–5.3)
POTASSIUM SERPL-SCNC: 3.8 MMOL/L — SIGNIFICANT CHANGE UP (ref 3.5–5.3)
PROT SERPL-MCNC: 7.4 G/DL — SIGNIFICANT CHANGE UP (ref 6–8.3)
PROT UR-MCNC: NEGATIVE MG/DL — SIGNIFICANT CHANGE UP
RBC # BLD: 4.48 M/UL — SIGNIFICANT CHANGE UP (ref 3.8–5.2)
RBC # FLD: 12.7 % — SIGNIFICANT CHANGE UP (ref 10.3–14.5)
RBC CASTS # UR COMP ASSIST: < 5 /HPF — SIGNIFICANT CHANGE UP
SODIUM SERPL-SCNC: 135 MMOL/L — SIGNIFICANT CHANGE UP (ref 135–145)
SP GR SPEC: 1.01 — SIGNIFICANT CHANGE UP (ref 1–1.03)
TROPONIN T SERPL-MCNC: <0.01 NG/ML — SIGNIFICANT CHANGE UP (ref 0–0.01)
UROBILINOGEN FLD QL: 0.2 E.U./DL — SIGNIFICANT CHANGE UP
WBC # BLD: 3.89 K/UL — SIGNIFICANT CHANGE UP (ref 3.8–10.5)
WBC # FLD AUTO: 3.89 K/UL — SIGNIFICANT CHANGE UP (ref 3.8–10.5)
WBC UR QL: < 5 /HPF — SIGNIFICANT CHANGE UP

## 2019-08-23 PROCEDURE — 82962 GLUCOSE BLOOD TEST: CPT

## 2019-08-23 PROCEDURE — 70450 CT HEAD/BRAIN W/O DYE: CPT

## 2019-08-23 PROCEDURE — 99284 EMERGENCY DEPT VISIT MOD MDM: CPT

## 2019-08-23 PROCEDURE — 85025 COMPLETE CBC W/AUTO DIFF WBC: CPT

## 2019-08-23 PROCEDURE — 84484 ASSAY OF TROPONIN QUANT: CPT

## 2019-08-23 PROCEDURE — 80053 COMPREHEN METABOLIC PANEL: CPT

## 2019-08-23 PROCEDURE — 81001 URINALYSIS AUTO W/SCOPE: CPT

## 2019-08-23 PROCEDURE — 70450 CT HEAD/BRAIN W/O DYE: CPT | Mod: 26

## 2019-08-23 PROCEDURE — 99284 EMERGENCY DEPT VISIT MOD MDM: CPT | Mod: 25

## 2019-08-23 PROCEDURE — 36415 COLL VENOUS BLD VENIPUNCTURE: CPT

## 2019-08-23 RX ORDER — MECLIZINE HCL 12.5 MG
1 TABLET ORAL
Qty: 15 | Refills: 0
Start: 2019-08-23

## 2019-08-23 RX ORDER — IBUPROFEN 200 MG
600 TABLET ORAL ONCE
Refills: 0 | Status: COMPLETED | OUTPATIENT
Start: 2019-08-23 | End: 2019-08-23

## 2019-08-23 RX ORDER — SODIUM CHLORIDE 9 MG/ML
1000 INJECTION INTRAMUSCULAR; INTRAVENOUS; SUBCUTANEOUS ONCE
Refills: 0 | Status: COMPLETED | OUTPATIENT
Start: 2019-08-23 | End: 2019-08-23

## 2019-08-23 RX ADMIN — Medication 600 MILLIGRAM(S): at 13:09

## 2019-08-23 RX ADMIN — Medication 600 MILLIGRAM(S): at 11:50

## 2019-08-23 RX ADMIN — SODIUM CHLORIDE 1000 MILLILITER(S): 9 INJECTION INTRAMUSCULAR; INTRAVENOUS; SUBCUTANEOUS at 11:30

## 2019-08-23 NOTE — ED ADULT NURSE NOTE - CHPI ED NUR SYMPTOMS NEG
no loss of consciousness/no change in level of consciousness/no fever/no nausea/no confusion/no vomiting/no blurred vision

## 2019-08-23 NOTE — ED PROVIDER NOTE - OBJECTIVE STATEMENT
68 y/o female with hx of HTN, HLD, recent normal cardiac cath 6/19 c/o dizziness x 1 wk. pt states seen here for same a few days ago and no relief with meclizine. no ha or visual changes. no neck or back pain. pt notes episode of chest discomfort yesterday resolved on its own. no abd pain, n/v. no leg pain or swelling. no further complaints.

## 2019-08-23 NOTE — ED PROVIDER NOTE - ENMT, MLM
Airway patent, Nasal mucosa clear. Mouth with normal mucosa. Throat has no vesicles, no oropharyngeal exudates and uvula is midline. TM's pearly gray and intact b/l

## 2019-08-23 NOTE — ED ADULT TRIAGE NOTE - CHIEF COMPLAINT QUOTE
Pt BIBA from home CO intermittent Dizziness.  FSBG and EKG in progress.  Pt states "I was seen here last week and they gave me Meclizine which helped, also my Norvasc dose was increased from 5mg to 10 mg yesterday or the day before."  Pt denies N/V/D, SOB, Fevers, CP.  Speech clear, no facial droop, strength and ROM intact X4 Ext, ambulating with steady gait.

## 2019-08-23 NOTE — ED PROVIDER NOTE - ATTENDING CONTRIBUTION TO CARE
I discussed the plan of care of the patient directly with the PA and examined the patient while in the Emergency Department. I agree with the HPI and PE as documented by the PA.  Pt is a 68yo f, h/o htn, hld, with multiple visits for dizziness, who p/w dizziness and generalized weakness. + slight chest pain yesterday which is now resolved. No sob, palp, syncope, nausea, sweating... Had normal cardiac cath in June '19.   Labs wnl. EKG non-ischemic. Will obtain ct head to r/o ich/ infarct. Will continue to monitor. I discussed the plan of care of the patient directly with the PA and examined the patient while in the Emergency Department. I agree with the HPI and PE as documented by the PA.  Pt is a 70yo f, h/o htn, hld, with multiple visits for dizziness, who p/w dizziness and generalized weakness. + slight chest pain yesterday which is now resolved. No associated sob, palp, syncope, nausea, sweating... Had normal cardiac cath in June '19. PE as noted by PA. + s1, s2, rrr. Lungs cta b/l. Neuro exam non-focal. Labs wnl. EKG non-ischemic. Ct head neg for bleed/ infarct. Do not suspect cva/ cerebellar hemorrhage/ dissection. Pt has appt scheduled with her pcp and ent in 4 days. ED evaluation and management discussed with the patient and family in detail.  Close follow up encouraged as scheduled. Strict ED return instructions discussed in detail and patient given the opportunity to ask any questions about their discharge diagnosis and instructions. Patient verbalized understanding.

## 2019-08-23 NOTE — ED PROVIDER NOTE - CLINICAL SUMMARY MEDICAL DECISION MAKING FREE TEXT BOX
dizziness. pt well appearing. VSS. ecg non ischemic. pt seen for same this past week. neuro exam intact. labs noted. troponin negative. no improvement with fluids. ct scan done and no acute pathology. will d/c home to f/u with pmd and pt reports ENT appt on 8/27. pt requesting meclizine refill.

## 2019-08-23 NOTE — ED PROVIDER NOTE - NSFOLLOWUPINSTRUCTIONS_ED_ALL_ED_FT
Follow up with your primary care physician this week.           Dizziness    WHAT YOU NEED TO KNOW:    Dizziness is a feeling of being off balance or unsteady. Common causes of dizziness are an inner ear fluid imbalance or a lack of oxygen in your blood. Dizziness may be acute (lasts 3 days or less) or chronic (lasts longer than 3 days). You may have dizzy spells that last from seconds to a few hours.     DISCHARGE INSTRUCTIONS:    Return to the emergency department if:     You have a headache and a stiff neck.      You have shaking chills and a fever.       You vomit over and over with no relief.       Your vomit or bowel movements are red or black.       You have pain in your chest, back, or abdomen.       You have numbness, especially in your face, arms, or legs.       You have trouble moving your arms or legs.       You are confused.     Contact your healthcare provider if:     You have a fever.       Your symptoms do not get better with treatment.       You have questions or concerns about your condition or care.     Manage your symptoms:     Do not drive or operate heavy machinery when you are dizzy.       Get up slowly from sitting or lying down.       Drink plenty of liquids. Liquids help prevent dehydration. Ask how much liquid to drink each day and which liquids are best for you.    Follow up with your healthcare provider as directed: Write down your questions so you remember to ask them during your visits.        © Copyright [employer] 2019 All illustrations and images included in CareNotes are the copyrighted property of [employer] or [email or web address].      back to top                      © Copyright [employer] 2019

## 2019-08-23 NOTE — ED ADULT NURSE NOTE - OBJECTIVE STATEMENT
c/c pt was in [facility] a week ago with c/c lightheadedness, feeling anxious, and pain in cervical region, also complains of L ear pain. pt had been prescribed Norvasc 5 mg and meclizine , and states she takes it as prescribed. complains of intermittent gen weakness, dizziness.

## 2019-08-24 DIAGNOSIS — R42 DIZZINESS AND GIDDINESS: ICD-10-CM

## 2019-08-24 DIAGNOSIS — G89.29 OTHER CHRONIC PAIN: ICD-10-CM

## 2019-08-24 DIAGNOSIS — H92.02 OTALGIA, LEFT EAR: ICD-10-CM

## 2019-08-27 ENCOUNTER — APPOINTMENT (OUTPATIENT)
Dept: OTOLARYNGOLOGY | Facility: CLINIC | Age: 69
End: 2019-08-27

## 2019-08-27 DIAGNOSIS — Z79.899 OTHER LONG TERM (CURRENT) DRUG THERAPY: ICD-10-CM

## 2019-08-27 DIAGNOSIS — R42 DIZZINESS AND GIDDINESS: ICD-10-CM

## 2019-08-27 DIAGNOSIS — I10 ESSENTIAL (PRIMARY) HYPERTENSION: ICD-10-CM

## 2019-10-21 ENCOUNTER — APPOINTMENT (OUTPATIENT)
Dept: OTOLARYNGOLOGY | Facility: CLINIC | Age: 69
End: 2019-10-21
Payer: MEDICARE

## 2019-10-21 VITALS
HEART RATE: 88 BPM | SYSTOLIC BLOOD PRESSURE: 116 MMHG | DIASTOLIC BLOOD PRESSURE: 76 MMHG | OXYGEN SATURATION: 98 % | TEMPERATURE: 97.9 F

## 2019-10-21 PROCEDURE — 69210 REMOVE IMPACTED EAR WAX UNI: CPT

## 2019-10-21 PROCEDURE — 99213 OFFICE O/P EST LOW 20 MIN: CPT | Mod: 25

## 2019-10-21 NOTE — HISTORY OF PRESENT ILLNESS
[de-identified] : 69 years old female patient with history of right sided epistaxis.  Patient is present today in the office with right epistaxis improved.

## 2019-10-21 NOTE — REVIEW OF SYSTEMS
[Patient Intake Form Reviewed] : Patient intake form was reviewed [Hearing Loss] : hearing loss [Dizziness] : dizziness [Vertigo] : vertigo [As Noted in HPI] : as noted in HPI [Nasal Congestion] : nasal congestion [Nose Bleeds] : nose bleeds [Negative] : Heme/Lymph

## 2019-10-21 NOTE — CONSULT LETTER
[Consult Closing:] : Thank you very much for allowing me to participate in the care of this patient.  If you have any questions, please do not hesitate to contact me. [Please see my note below.] : Please see my note below. [Sincerely,] : Sincerely, [FreeTextEntry3] : Nicholas Valentine MD, FACS\par Professor of Otolaryngology, [facility] [employer] at [facility]\par Director, [facility], [place] [facility]\par , Head [facility] Neck Service Line, [facility]\par \par

## 2019-10-21 NOTE — PHYSICAL EXAM
[] : septum deviated bilaterally [Normal] : no rashes [de-identified] : ceruemn removed AU [de-identified] : right epistaxis.  Bacitracin and cotton ball ointment was inserted into her nostrils

## 2019-10-21 NOTE — PROCEDURE
[Epistaxis] : evaluation of epistaxis [Nasal Septum Mucosa Bleeding] : no bleeding [Nasal Septum Mucosa Bleeding Right] : bleeding on the right [] : Removal of Cerumen [FreeTextEntry5] :  bilateral cerumen impaction removed

## 2019-12-12 ENCOUNTER — APPOINTMENT (OUTPATIENT)
Dept: OTOLARYNGOLOGY | Facility: CLINIC | Age: 69
End: 2019-12-12
Payer: MEDICARE

## 2019-12-12 VITALS
BODY MASS INDEX: 31.41 KG/M2 | OXYGEN SATURATION: 100 % | WEIGHT: 160 LBS | TEMPERATURE: 98.1 F | DIASTOLIC BLOOD PRESSURE: 76 MMHG | SYSTOLIC BLOOD PRESSURE: 113 MMHG | HEIGHT: 60 IN | HEART RATE: 88 BPM

## 2019-12-12 PROCEDURE — 99214 OFFICE O/P EST MOD 30 MIN: CPT

## 2019-12-12 NOTE — HISTORY OF PRESENT ILLNESS
[de-identified] : 68 yo woman with dizziness on and off for a year. worse with motion no inciting event. referred by dr ruffin. she feels it is moderate in severity. not present all days

## 2019-12-12 NOTE — PHYSICAL EXAM
[Midline] : trachea located in midline position [Normal] : no rashes [] : Burrton-Hallpike test is negative

## 2019-12-23 ENCOUNTER — APPOINTMENT (OUTPATIENT)
Dept: OTOLARYNGOLOGY | Facility: CLINIC | Age: 69
End: 2019-12-23

## 2020-01-13 ENCOUNTER — APPOINTMENT (OUTPATIENT)
Dept: OTOLARYNGOLOGY | Facility: CLINIC | Age: 70
End: 2020-01-13
Payer: MEDICARE

## 2020-01-13 VITALS
OXYGEN SATURATION: 99 % | SYSTOLIC BLOOD PRESSURE: 122 MMHG | TEMPERATURE: 98.1 F | HEART RATE: 76 BPM | DIASTOLIC BLOOD PRESSURE: 84 MMHG

## 2020-01-13 PROCEDURE — 99213 OFFICE O/P EST LOW 20 MIN: CPT

## 2020-01-13 PROCEDURE — 92537 CALORIC VSTBLR TEST W/REC: CPT

## 2020-01-13 PROCEDURE — 92540 BASIC VESTIBULAR EVALUATION: CPT

## 2020-01-13 NOTE — DATA REVIEWED
[de-identified] : vng some torsional and directional nystagmus with motion [de-identified] : mri unremarkable x tmj

## 2020-01-13 NOTE — HISTORY OF PRESENT ILLNESS
[de-identified] : followup 70 yo woman with nonspecific dizziness - she cannot describe it well, but it leaves her off balance - had mri and vng and is here to review results. moderate in severity. no inciting event.  nonsmoker

## 2020-05-18 ENCOUNTER — APPOINTMENT (OUTPATIENT)
Dept: OTOLARYNGOLOGY | Facility: CLINIC | Age: 70
End: 2020-05-18
Payer: MEDICARE

## 2020-05-18 VITALS
TEMPERATURE: 98.7 F | SYSTOLIC BLOOD PRESSURE: 128 MMHG | HEART RATE: 90 BPM | DIASTOLIC BLOOD PRESSURE: 77 MMHG | OXYGEN SATURATION: 97 %

## 2020-05-18 PROCEDURE — 99213 OFFICE O/P EST LOW 20 MIN: CPT

## 2020-05-18 NOTE — REVIEW OF SYSTEMS
[Patient Intake Form Reviewed] : Patient intake form was reviewed [As Noted in HPI] : as noted in HPI [Negative] : Ear

## 2020-05-18 NOTE — HISTORY OF PRESENT ILLNESS
[de-identified] : followup 69 yo woman with dizziness- she reports that it is positional and mild but unchanged from when last seen 4 mo ago . I had recommended vestibular rehab but she never did it. She feels the dizziness is moderate in severity,. no inciting event.

## 2020-07-24 ENCOUNTER — APPOINTMENT (OUTPATIENT)
Dept: OTOLARYNGOLOGY | Facility: CLINIC | Age: 70
End: 2020-07-24
Payer: MEDICARE

## 2020-07-24 VITALS
HEART RATE: 93 BPM | SYSTOLIC BLOOD PRESSURE: 135 MMHG | TEMPERATURE: 98 F | DIASTOLIC BLOOD PRESSURE: 87 MMHG | OXYGEN SATURATION: 98 %

## 2020-07-24 DIAGNOSIS — H60.8X2 OTHER OTITIS EXTERNA, LEFT EAR: ICD-10-CM

## 2020-07-24 PROCEDURE — 99213 OFFICE O/P EST LOW 20 MIN: CPT

## 2020-07-24 NOTE — HISTORY OF PRESENT ILLNESS
[de-identified] : followup 71 yo woman with left ear pain. -mari wishes to see what  can be done for the pain. it has been present for months. it is lateral in the eac.

## 2020-10-30 ENCOUNTER — APPOINTMENT (OUTPATIENT)
Dept: PULMONOLOGY | Facility: CLINIC | Age: 70
End: 2020-10-30
Payer: MEDICARE

## 2020-10-30 ENCOUNTER — OUTPATIENT (OUTPATIENT)
Dept: OUTPATIENT SERVICES | Facility: HOSPITAL | Age: 70
LOS: 1 days | End: 2020-10-30
Payer: MEDICARE

## 2020-10-30 VITALS
BODY MASS INDEX: 33.96 KG/M2 | OXYGEN SATURATION: 97 % | TEMPERATURE: 98 F | SYSTOLIC BLOOD PRESSURE: 120 MMHG | HEIGHT: 60 IN | RESPIRATION RATE: 12 BRPM | HEART RATE: 89 BPM | DIASTOLIC BLOOD PRESSURE: 70 MMHG | WEIGHT: 173 LBS

## 2020-10-30 PROCEDURE — 99204 OFFICE O/P NEW MOD 45 MIN: CPT

## 2020-10-30 PROCEDURE — 71046 X-RAY EXAM CHEST 2 VIEWS: CPT | Mod: 26

## 2020-10-30 PROCEDURE — 71046 X-RAY EXAM CHEST 2 VIEWS: CPT

## 2020-10-30 PROCEDURE — 99072 ADDL SUPL MATRL&STAF TM PHE: CPT

## 2020-10-30 NOTE — REVIEW OF SYSTEMS
[Fever] : no fever [Chills] : no chills [Dry Eyes] : no dry eyes [Eye Irritation] : no eye irritation [Cough] : no cough [Sputum] : no sputum [Chest Discomfort] : no chest discomfort [Orthopnea] : no orthopnea [Hay Fever] : no hay fever [Nasal Discharge] : no nasal discharge [Headache] : no headache [Dizziness] : no dizziness [TextBox_151] : rest of ROS negative

## 2020-10-30 NOTE — HISTORY OF PRESENT ILLNESS
[TextBox_4] : 70 year old female, never smoker with h/o allergic rhinitis was referred to pulmonary clinic by Dr. Headley  for pulmonary evaluation before retinal surgery.\par Patient denies cough, SOB, chest pain or pulmonary illness in last 1 year. She is able to go three flights of stairs without any difficulty in breathing. Patient denies snoring, witnessed apnea, early morning headache or choking at night time or excessive daytime sleepiness. ESS score is 4.\par

## 2020-10-30 NOTE — REASON FOR VISIT
[Consultation] : a consultation [Pre-op Risk Stratification] : pre-op risk stratification [TextBox_13] : Megha Headley MD (Phone [phone])

## 2020-10-30 NOTE — DISCUSSION/SUMMARY
[FreeTextEntry1] : Patient has good functional status. She denied any pulmonary symptoms. She denied history of previous pulmonary illness or admission to hospital for pulmonary illness. Pulmonary exam was normal with no hypoxia on ambulation. CXR did not reveal any acute parenchymal abnormality with questionable left side pleural effusion. Repeat CXR done today showed persistent left side CP blunting, this is secondary to her hiatal hernia.\par Pre-Op Pulmonary examination:\par Patient may undergo retinal surgery. Her ARISCAT score 15. She has low risk (1.6% risk) of in-hospital post-op pulmonary complications (composite including respiratory failure, respiratory infection, pleural effusion, atelectasis, pneumothorax, bronchospasm, aspiration pneumonitis).\par Recommendation:\par - Avoid Opioids during surgery\par - Extubate in upright position\par - Would recommend to extubate once patient is awake\par - BIPAP may be required after extubation\par

## 2020-10-30 NOTE — PHYSICAL EXAM
[No Acute Distress] : no acute distress [Well Nourished] : well nourished [Normal Oropharynx] : normal oropharynx [III] : Mallampati Class: III [Normal Appearance] : normal appearance [No Neck Mass] : no neck mass [Normal Rate/Rhythm] : normal rate/rhythm [Normal S1, S2] : normal s1, s2 [No Resp Distress] : no resp distress [Clear to Auscultation Bilaterally] : clear to auscultation bilaterally [No Abnormalities] : no abnormalities [Benign] : benign [Not Tender] : not tender [Normal Gait] : normal gait [Gait - Sufficient For Exercise Testing] : gait sufficient for exercise testing [No Clubbing] : no clubbing [No Edema] : no edema [Normal Color/ Pigmentation] : normal color/ pigmentation [No Rash] : no rash [No Focal Deficits] : no focal deficits [No Motor Deficits] : no motor deficits [Oriented x3] : oriented x3 [Normal Affect] : normal affect

## 2020-10-30 NOTE — CONSULT LETTER
[Dear  ___] : Dear  [unfilled], [Consult Letter:] : I had the pleasure of evaluating your patient, [unfilled]. [Please see my note below.] : Please see my note below. [Consult Closing:] : Thank you very much for allowing me to participate in the care of this patient.  If you have any questions, please do not hesitate to contact me. [FreeTextEntry3] : Sincerely\par \par Osorio Tracey MD [facility]\par , [facility] [employer]\par Associate , Pulmonary and Critical Care Fellowship\par Pulmonary and Critical Care\par [facility]\par Phone: [phone]\par

## 2020-11-05 NOTE — ED ADULT NURSE NOTE - NS ED PATIENT SAFETY CONCERN
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  Ran out of meds due to appt non compliance.  Behavioral contract signed; she says she will come to follow up appts.\par OK to take levothyroxine with food; I would rather she take it with food than not take it at all.  Suggested she could take thyroxine at bedtime also (I hope she is not eating at bedtime!)\par restart levothyroxine 125mcg;  on the 137 dose, TSH was low (probably when she was taking it consistently)\par Obesity BMI 44.\par carb guide given, limit carb servings to half cup.  Avoid sugared drinks. Reviewed how to read nutrition label.\par OK to drink Boost as a meal substitute but do not recommend to have it as a meal supplement (too many extra calories).  Eat more non starchy vegetables.\par Advised to stop eating by 8pm.\par increase physical activity, goal 30 min/day.  Suggested walking or an exercise video at home.\par flu vaccine given, R deltoid\par RTO 3 months No

## 2020-12-14 ENCOUNTER — APPOINTMENT (OUTPATIENT)
Dept: SURGERY | Facility: CLINIC | Age: 70
End: 2020-12-14
Payer: MEDICARE

## 2020-12-14 VITALS
DIASTOLIC BLOOD PRESSURE: 84 MMHG | HEIGHT: 60 IN | OXYGEN SATURATION: 99 % | BODY MASS INDEX: 33.77 KG/M2 | HEART RATE: 82 BPM | WEIGHT: 172 LBS | SYSTOLIC BLOOD PRESSURE: 128 MMHG | TEMPERATURE: 97.2 F

## 2020-12-14 DIAGNOSIS — R04.0 EPISTAXIS: ICD-10-CM

## 2020-12-14 PROCEDURE — 99204 OFFICE O/P NEW MOD 45 MIN: CPT

## 2020-12-14 PROCEDURE — 99072 ADDL SUPL MATRL&STAF TM PHE: CPT

## 2020-12-14 NOTE — ASSESSMENT
[FreeTextEntry1] : Ms. 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  We will plan for an upper GI series, endoscopy with Bravo capsule placement, and esophageal manometry.  She is planned for an endoscopy to evaluate the thickening of the cardia of the stomach.  Following these tests, we will plan for a robotic-assisted paraesophageal hernia repair at the patient's convenience.

## 2020-12-14 NOTE — PHYSICAL EXAM
[Calm] : calm [de-identified] : NAD, comfortable [de-identified] : NCAT, no scleral icterus [de-identified] : Supple, no JVD or cervical lymphadenopathy [de-identified] : CTAB [de-identified] : S1S2 normal, RRR [de-identified] : +BS soft NT ND.  No hepatosplenomegaly. [de-identified] : No clubbing, cyanosis, or edema. [de-identified] : Warm, dry. [de-identified] : A&Ox3

## 2020-12-14 NOTE — DATA REVIEWED
[FreeTextEntry1] : CT chest (10/13/2020) - quite large left diaphragmatic hernia resulting in a high left diaphragm and secondary chronic compressive atelectasis in the left lung base as well as the medial right lung base.  Essentially the entire stomach and most of the transverse colon (along with moderate diverticulosis) are within this hernia sac that results in some smooth mass effect on the posterior left atrium and ventricle.  No confluent pulmonary infiltrates or pleural effusions.  Small right posterior medial fat-containing Bochdalek hernia with associated localized bronchiectasis.  Hepatic and left renal cysts do not require additional imaging follow up.\par \par CT abdomen (10/16/2020) - large left diaphragmatic hernia containing the entire stomach and most of the transverse colon.  Perceived circumferential thickening of the gastric fundus/cardiac region.  Recommend GI consultation with possible EGD.  Moderate colonic diverticulosis, including within the transverse colon that is within the large diaphragmatic hernia.  Large left renal cyst and tiny hepatic cysts do not require additional imaging follow up.  Bilateral adrenal hyperplasia and small benign right adrenal adenoma.  Multilevel degenerative disc disease with variable posterior disc osteophyte complex formation, disc height narrowing with vacuum effect, and mild grade 1 L3 upon L4 and L4 upon L5 anterolistheses.

## 2020-12-14 NOTE — CONSULT LETTER
[FreeTextEntry1] : 2020\par \par \par \par Megha Headley M.D.\par [facility]\par [address] [place] [address]\[place] [address]\par Telephone #:  [phone]\par \par \par Re:  Crystal Katz\par :  1950\par \par \par Dear Dr. Headley:\par \par I had the opportunity to see Ms. Katz today for evaluation and management of a paraesophageal hernia.  She stated the hernia has been present for "years".  She stated her only significant symptom is gas after eating fatty foods.  She denied chest pain, dysphagia, nausea, or heartburn.  She stated Mylanta improves the gassy feeling.\par \par On physical examination, her height is 5 feet, her weight is 172 pounds, and BMI 33.59.  Her temperature is 97.2 °F, blood pressure is 128/84, heart rate 82, and O2 saturation is 99% on room air.  In general, she is a well-dressed, well-nourished woman who appears her stated age and is in no acute distress.  She is calm, alert and oriented x 3 and calm.  HEENT exam demonstrates no scleral icterus and a normocephalic atraumatic appearance.  Her neck is supple without cervical lymphadenopathy.  Her lungs are clear to auscultation bilaterally.  Heart sounds S1 S2 are normal with a regular rate and rhythm.  Her abdomen has audible bowel sounds, is soft, non-distended, and non-tender.  There is no hepatosplenomegaly.  Her extremities are warm and dry without clubbing, cyanosis or edema.\par \par I reviewed the images and report of the CT chest that was performed on 2020, which demonstrated a quite large left diaphragmatic hernia resulting in a high left diaphragm and secondary chronic compressive atelectasis in the left lung base as well as the medial right lung base.  Essentially the entire stomach and most of the transverse colon (along with moderate diverticulosis) are within this hernia sac that results in some smooth mass effect on the posterior left atrium and ventricle.  No confluent pulmonary infiltrates or pleural effusions.  Small right posterior medial fat-containing Bochdalek hernia with associated localized bronchiectasis.  Hepatic and left renal cysts do not require additional imaging follow up.\par \par I reviewed the images and report of the CT abdomen that was performed on 2020, which demonstrated a large left diaphragmatic hernia containing the entire stomach and most of the transverse colon. Perceived circumferential thickening of the gastric fundus/cardiac region. Recommend GI consultation with possible EGD. Moderate colonic diverticulosis, including within the transverse colon that is within the large diaphragmatic hernia. Large left renal cyst and tiny hepatic cysts do not require additional imaging follow up. Bilateral adrenal hyperplasia and small benign right adrenal adenoma. Multilevel degenerative disc disease with variable posterior disc osteophyte complex formation, disc height narrowing with vacuum effect, and mild grade 1 L3 upon L4 and L4 upon L5 anterolistheses.\par \par In summary, Ms. 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  We will plan for an upper GI series, endoscopy with Bravo capsule placement, and esophageal manometry.  She is planned for an endoscopy to evaluate the thickening of the cardia of the stomach.  Following these tests, we will plan for a robotic-assisted paraesophageal hernia repair at the patient's convenience.\par \par Thank you for the opportunity to care for this patient. Please do not hesitate to contact me in the event that you have any questions or concerns about the care of this patient.\par \par Sincerely,\par \par \par \par Heidi Sheehan M.D.

## 2020-12-14 NOTE — REASON FOR VISIT
[Consultation] : a consultation visit [FreeTextEntry1] : Consultation requested by: Dr. Megha Headley

## 2020-12-14 NOTE — HISTORY OF PRESENT ILLNESS
[de-identified] : Ms. Katz presented today for evaluation and management of a paraesophageal hernia.  She stated the hernia has been present for "years".  She stated her only significant symptom is gas after eating fatty foods.  She denied chest pain, dysphagia, nausea, or heartburn.  She stated Mylanta improves the gassy feeling.

## 2020-12-14 NOTE — REVIEW OF SYSTEMS
[Eye Pain] : no eye pain [Red Eyes] : eyes not red [Eyesight Problems] : no eyesight problems [Discharge From Eyes] : purulent discharge from the eyes [Dry Eyes] : dryness of the eyes [Eyes Itch] : no itching of the eyes [Earache] : no earache [Loss Of Hearing] : no hearing loss [Nosebleeds] : nosebleeds [Nasal Discharge] : no nasal discharge [Sore Throat] : sore throat [Hoarseness] : no hoarseness [Abdominal Pain] : no abdominal pain [Vomiting] : no vomiting [Constipation] : constipation [Diarrhea] : no diarrhea [Heartburn] : no heartburn [Melena] : no melena [Arthralgias] : arthralgias [Joint Pain] : joint pain [Joint Swelling] : no joint swelling [Joint Stiffness] : no joint stiffness [Limb Pain] : no limb pain [Limb Swelling] : no limb swelling [Skin Lesions] : no skin lesions [Skin Wound] : no skin wound [Itching] : itching [Change In A Mole] : no change in a mole [Breast Pain] : no breast pain [Breast Lump] : no breast lump [Confused] : no confusion [Convulsions] : no convulsions [Dizziness] : dizziness [Fainting] : no fainting [Limb Weakness] : no limb weakness [Difficulty Walking] : no difficulty walking [Negative] : Heme/Lymph

## 2021-01-15 ENCOUNTER — APPOINTMENT (OUTPATIENT)
Dept: BARIATRICS | Facility: CLINIC | Age: 71
End: 2021-01-15
Payer: MEDICARE

## 2021-01-15 VITALS
TEMPERATURE: 97.1 F | HEIGHT: 60 IN | SYSTOLIC BLOOD PRESSURE: 128 MMHG | BODY MASS INDEX: 33.77 KG/M2 | OXYGEN SATURATION: 98 % | WEIGHT: 172 LBS | DIASTOLIC BLOOD PRESSURE: 85 MMHG | HEART RATE: 82 BPM

## 2021-01-15 PROCEDURE — 99214 OFFICE O/P EST MOD 30 MIN: CPT

## 2021-01-15 PROCEDURE — 99072 ADDL SUPL MATRL&STAF TM PHE: CPT

## 2021-01-22 ENCOUNTER — LABORATORY RESULT (OUTPATIENT)
Age: 71
End: 2021-01-22

## 2021-01-25 ENCOUNTER — OUTPATIENT (OUTPATIENT)
Dept: OUTPATIENT SERVICES | Facility: HOSPITAL | Age: 71
LOS: 1 days | Discharge: HOME | End: 2021-01-25
Payer: MEDICARE

## 2021-01-25 ENCOUNTER — RESULT REVIEW (OUTPATIENT)
Age: 71
End: 2021-01-25

## 2021-01-25 PROCEDURE — 43239 EGD BIOPSY SINGLE/MULTIPLE: CPT

## 2021-01-25 PROCEDURE — 91035 G-ESOPH REFLX TST W/ELECTROD: CPT

## 2021-01-25 PROCEDURE — 88305 TISSUE EXAM BY PATHOLOGIST: CPT | Mod: 26

## 2021-01-25 PROCEDURE — 91013 ESOPHGL MOTIL W/STIM/PERFUS: CPT | Mod: 26

## 2021-01-25 PROCEDURE — 88305 TISSUE EXAM BY PATHOLOGIST: CPT

## 2021-01-25 PROCEDURE — 91035 G-ESOPH REFLX TST W/ELECTROD: CPT | Mod: 26

## 2021-01-25 PROCEDURE — 91010 ESOPHAGUS MOTILITY STUDY: CPT | Mod: 26

## 2021-01-26 LAB — SURGICAL PATHOLOGY STUDY: SIGNIFICANT CHANGE UP

## 2021-02-11 ENCOUNTER — APPOINTMENT (OUTPATIENT)
Dept: RADIOLOGY | Facility: HOSPITAL | Age: 71
End: 2021-02-11

## 2021-02-22 ENCOUNTER — APPOINTMENT (OUTPATIENT)
Dept: SURGERY | Facility: CLINIC | Age: 71
End: 2021-02-22
Payer: MEDICARE

## 2021-02-22 VITALS
TEMPERATURE: 97.1 F | HEIGHT: 60 IN | DIASTOLIC BLOOD PRESSURE: 83 MMHG | SYSTOLIC BLOOD PRESSURE: 122 MMHG | OXYGEN SATURATION: 97 % | HEART RATE: 97 BPM | WEIGHT: 172 LBS | BODY MASS INDEX: 33.77 KG/M2

## 2021-02-22 PROCEDURE — 99213 OFFICE O/P EST LOW 20 MIN: CPT

## 2021-02-22 PROCEDURE — 99072 ADDL SUPL MATRL&STAF TM PHE: CPT

## 2021-02-22 NOTE — REVIEW OF SYSTEMS
[Discharge From Eyes] : purulent discharge from the eyes [Dry Eyes] : dryness of the eyes [Nosebleeds] : nosebleeds [Sore Throat] : sore throat [Constipation] : constipation [Arthralgias] : arthralgias [Joint Pain] : joint pain [Itching] : itching [Dizziness] : dizziness [Negative] : Heme/Lymph [Eye Pain] : no eye pain [Red Eyes] : eyes not red [Eyesight Problems] : no eyesight problems [Eyes Itch] : no itching of the eyes [Earache] : no earache [Loss Of Hearing] : no hearing loss [Nasal Discharge] : no nasal discharge [Hoarseness] : no hoarseness [Abdominal Pain] : no abdominal pain [Vomiting] : no vomiting [Diarrhea] : no diarrhea [Heartburn] : no heartburn [Melena] : no melena [Joint Swelling] : no joint swelling [Joint Stiffness] : no joint stiffness [Limb Pain] : no limb pain [Limb Swelling] : no limb swelling [Skin Lesions] : no skin lesions [Skin Wound] : no skin wound [Change In A Mole] : no change in a mole [Breast Pain] : no breast pain [Breast Lump] : no breast lump [Confused] : no confusion [Convulsions] : no convulsions [Fainting] : no fainting [Limb Weakness] : no limb weakness [Difficulty Walking] : no difficulty walking

## 2021-02-22 NOTE — DATA REVIEWED
[FreeTextEntry1] : CT chest (10/13/2020) - quite large left diaphragmatic hernia resulting in a high left diaphragm and secondary chronic compressive atelectasis in the left lung base as well as the medial right lung base.  Essentially the entire stomach and most of the transverse colon (along with moderate diverticulosis) are within this hernia sac that results in some smooth mass effect on the posterior left atrium and ventricle.  No confluent pulmonary infiltrates or pleural effusions.  Small right posterior medial fat-containing Bochdalek hernia with associated localized bronchiectasis.  Hepatic and left renal cysts do not require additional imaging follow up.\par \par CT abdomen (10/16/2020) - large left diaphragmatic hernia containing the entire stomach and most of the transverse colon.  Perceived circumferential thickening of the gastric fundus/cardiac region.  Recommend GI consultation with possible EGD.  Moderate colonic diverticulosis, including within the transverse colon that is within the large diaphragmatic hernia.  Large left renal cyst and tiny hepatic cysts do not require additional imaging follow up.  Bilateral adrenal hyperplasia and small benign right adrenal adenoma.  Multilevel degenerative disc disease with variable posterior disc osteophyte complex formation, disc height narrowing with vacuum effect, and mild grade 1 L3 upon L4 and L4 upon L5 anterolistheses.\par \par EGD (1/25/2021) - possible large paraesophageal hernia.  Irregular SC line, biopsied.  Bravo capsule deployed.\par \par Bravo pH study (1/25/2021) - DeMeester score 3.8 (normal < 14.72).  Weak GE reflux.\par \par Esophageal manometry (1/25/2021) - normal GE junction relaxation.  Frequent failed peristalsis of esophageal body (60%).

## 2021-02-22 NOTE — PHYSICAL EXAM
[Calm] : calm [de-identified] : NAD, comfortable [de-identified] : NCAT, no scleral icterus [de-identified] : A&Ox3

## 2021-02-22 NOTE — ASSESSMENT
[FreeTextEntry1] : Ms. Katz is a 71-year-old woman with a paraesophageal hernia associated with minimal GERD on Bravo pH study and esophageal dysmotility noted by failed peristalsis on esophageal manometry.  We will plan for an upper GI series, as that is the final test that was not yet performed.  Given that she has a large paraesophageal hernia containing colon and stomach, she does require surgical reduction and repair of the hernia.  However, her esophageal manometry demonstrated frequent failed peristalsis, so at most a partial fundoplication is indicated.  We will plan for a robotic-assisted paraesophageal hernia repair at the patient's convenience.

## 2021-02-22 NOTE — HISTORY OF PRESENT ILLNESS
[de-identified] : Ms. Katz presented previously for evaluation and management of a paraesophageal hernia.  She stated the hernia has been present for "years".  She stated her only significant symptom is gas after eating fatty foods.  She denied chest pain, dysphagia, nausea, or heartburn.  She stated Mylanta improves the gassy feeling.

## 2021-02-22 NOTE — CONSULT LETTER
[FreeTextEntry1] : 2021\par \par \par \par Megha Headley M.D.\par [facility] [place]\par [address] [place] [address]\[place] [address]\par Telephone #:  [phone]\par \par \par Re:  Crystal Katz\par :  1950\par \par \par Dear Dr. Headley:\par \par I had the opportunity to see Ms. Katz today for follow up and discussion of the results of the upper GI testing that has been performed to evaluate her known paraesophageal hernia.  She stated the hernia has been present for "years".  She stated her only significant symptom is gas after eating fatty foods.  She denied chest pain, dysphagia, nausea, or heartburn.  She stated Mylanta improves the gassy feeling.\par \par On physical examination, her height is 5 feet, her weight is 172 pounds, and BMI 33.59.  Her temperature is 97.1 °F, blood pressure is 122/83, heart rate 97, and O2 saturation is 97% on room air.  In general, she is a well-dressed, well-nourished woman who appears her stated age and is in no acute distress.  She is calm, alert and oriented x 3 and calm.  HEENT exam demonstrates no scleral icterus and a normocephalic atraumatic appearance.  Her extremities are warm and dry without clubbing, cyanosis or edema.\par \par I reviewed the images and report of the CT chest that was performed on 2020, which demonstrated a quite large left diaphragmatic hernia resulting in a high left diaphragm and secondary chronic compressive atelectasis in the left lung base as well as the medial right lung base.  Essentially the entire stomach and most of the transverse colon (along with moderate diverticulosis) are within this hernia sac that results in some smooth mass effect on the posterior left atrium and ventricle.  No confluent pulmonary infiltrates or pleural effusions.  Small right posterior medial fat-containing Bochdalek hernia with associated localized bronchiectasis.  Hepatic and left renal cysts do not require additional imaging follow up.\par \par I reviewed the images and report of the CT abdomen that was performed on 2020, which demonstrated a large left diaphragmatic hernia containing the entire stomach and most of the transverse colon. Perceived circumferential thickening of the gastric fundus/cardiac region. Recommend GI consultation with possible EGD. Moderate colonic diverticulosis, including within the transverse colon that is within the large diaphragmatic hernia. Large left renal cyst and tiny hepatic cysts do not require additional imaging follow up. Bilateral adrenal hyperplasia and small benign right adrenal adenoma. Multilevel degenerative disc disease with variable posterior disc osteophyte complex formation, disc height narrowing with vacuum effect, and mild grade 1 L3 upon L4 and L4 upon L5 anterolistheses.\par \par I reviewed the report of the endoscopy that was performed on 2021, which demonstrated a possible large paraesophageal hernia.  Irregular SC line, biopsied.  Bravo capsule deployed.\par \par I reviewed the Bravo pH study that was performed on 2021, which demonstrated a DeMeester score 3.8 (normal < 14.72).  Weak GE reflux.\par \par I reviewed the esophageal manometry that was performed on 2021, which demonstrated normal GE junction relaxation.  Frequent failed peristalsis of esophageal body (60%).\par \par In summary, Ms. Katz is a 71-year-old woman with a paraesophageal hernia associated with minimal GERD on Bravo pH study and esophageal dysmotility noted by failed peristalsis on esophageal manometry.  We will plan for an upper GI series, as that is the final test that was not yet performed.  Given that she has a large paraesophageal hernia containing colon and stomach, she does require surgical reduction and repair of the hernia.  However, her esophageal manometry demonstrated frequent failed peristalsis, so at most a partial fundoplication is indicated.  We will plan for a robotic-assisted paraesophageal hernia repair at the patient's convenience.\par \par Thank you for the opportunity to care for this patient. Please do not hesitate to contact me in the event that you have any questions or concerns about the care of this patient.\par \par Sincerely,\par \par \par \par Heidi Sheehan M.D.

## 2021-03-02 ENCOUNTER — RESULT REVIEW (OUTPATIENT)
Age: 71
End: 2021-03-02

## 2021-03-02 ENCOUNTER — APPOINTMENT (OUTPATIENT)
Dept: RADIOLOGY | Facility: HOSPITAL | Age: 71
End: 2021-03-02
Payer: MEDICARE

## 2021-03-02 ENCOUNTER — OUTPATIENT (OUTPATIENT)
Dept: OUTPATIENT SERVICES | Facility: HOSPITAL | Age: 71
LOS: 1 days | End: 2021-03-02
Payer: MEDICARE

## 2021-03-02 PROCEDURE — 74240 X-RAY XM UPR GI TRC 1CNTRST: CPT | Mod: 26

## 2021-03-02 PROCEDURE — 74240 X-RAY XM UPR GI TRC 1CNTRST: CPT

## 2021-03-05 ENCOUNTER — APPOINTMENT (OUTPATIENT)
Dept: OTOLARYNGOLOGY | Facility: CLINIC | Age: 71
End: 2021-03-05
Payer: MEDICARE

## 2021-03-05 VITALS
HEART RATE: 99 BPM | HEIGHT: 59 IN | DIASTOLIC BLOOD PRESSURE: 81 MMHG | RESPIRATION RATE: 13 BRPM | WEIGHT: 165 LBS | TEMPERATURE: 98.3 F | BODY MASS INDEX: 33.26 KG/M2 | OXYGEN SATURATION: 99 % | SYSTOLIC BLOOD PRESSURE: 124 MMHG

## 2021-03-05 DIAGNOSIS — R42 DIZZINESS AND GIDDINESS: ICD-10-CM

## 2021-03-05 DIAGNOSIS — H61.22 IMPACTED CERUMEN, LEFT EAR: ICD-10-CM

## 2021-03-05 PROCEDURE — 99214 OFFICE O/P EST MOD 30 MIN: CPT

## 2021-03-05 PROCEDURE — 99072 ADDL SUPL MATRL&STAF TM PHE: CPT

## 2021-03-05 NOTE — DATA REVIEWED
[de-identified] : vng mild posnal nystagmus [de-identified] : brain mri recent mri reviewed with pt - l tmj

## 2021-03-05 NOTE — ASSESSMENT
[FreeTextEntry1] : cerumen removed\par tmj soft diet cannot take nsaids - >>see dentist - reviewed with her\par dizziness vestibular rehab recommended again\par she agreed this time again\par many questions answered\par rtc 3 mo

## 2021-03-05 NOTE — PROCEDURE
[Cerumen Impaction] : Cerumen Impaction [Same] : same as the Pre Op Dx. [] : Removal of Cerumen [FreeTextEntry6] : l copious cerumen impaction removed atraumatically with suction

## 2021-03-05 NOTE — PHYSICAL EXAM
[de-identified] : l>r [de-identified] : l copious cerumen impaction removed atraumatically with suction - tmm normal [Normal] : no rashes

## 2021-03-05 NOTE — HISTORY OF PRESENT ILLNESS
[de-identified] : 72 yo woman with left headache and dizziness. She has had extensive workup and v rehab has been ordered twice but she has not gone. She has been found to have tmj on mri and her head pain has been attributed to this. She is being treated with acupuncture for cspine problems. She is currently using debrox on her left ear for presumed cerumen impaction.

## 2021-03-17 ENCOUNTER — APPOINTMENT (OUTPATIENT)
Dept: OTOLARYNGOLOGY | Facility: CLINIC | Age: 71
End: 2021-03-17
Payer: MEDICARE

## 2021-03-17 ENCOUNTER — APPOINTMENT (OUTPATIENT)
Dept: OTOLARYNGOLOGY | Facility: CLINIC | Age: 71
End: 2021-03-17

## 2021-03-17 VITALS
SYSTOLIC BLOOD PRESSURE: 113 MMHG | OXYGEN SATURATION: 98 % | TEMPERATURE: 98 F | HEART RATE: 82 BPM | DIASTOLIC BLOOD PRESSURE: 75 MMHG

## 2021-03-17 DIAGNOSIS — R07.0 PAIN IN THROAT: ICD-10-CM

## 2021-03-17 DIAGNOSIS — J39.2 OTHER DISEASES OF PHARYNX: ICD-10-CM

## 2021-03-17 PROCEDURE — 99072 ADDL SUPL MATRL&STAF TM PHE: CPT

## 2021-03-17 PROCEDURE — 99213 OFFICE O/P EST LOW 20 MIN: CPT | Mod: 25

## 2021-03-17 PROCEDURE — 31575 DIAGNOSTIC LARYNGOSCOPY: CPT

## 2021-03-17 RX ORDER — MECLIZINE HYDROCHLORIDE 25 MG/1
25 TABLET ORAL 3 TIMES DAILY
Qty: 30 | Refills: 0 | Status: COMPLETED | COMMUNITY
Start: 2021-03-05 | End: 2021-03-17

## 2021-03-17 NOTE — PHYSICAL EXAM
[] : septum deviated bilaterally [de-identified] : ceruemn removed AU [de-identified] : right epistaxis.  Bacitracin and cotton ball ointment was inserted into her nostrils  [Normal] : mucosa is normal [Midline] : trachea located in midline position

## 2021-03-17 NOTE — CONSULT LETTER
[Please see my note below.] : Please see my note below. [Consult Closing:] : Thank you very much for allowing me to participate in the care of this patient.  If you have any questions, please do not hesitate to contact me. [Sincerely,] : Sincerely, [FreeTextEntry3] : Nicholas Valentine MD, FACS\par Professor of Otolaryngology, [employer] at [facility]\par Director, [facility], [facility]\par , [facility], [facility]\par \par

## 2021-03-17 NOTE — REASON FOR VISIT
[Subsequent Evaluation] : a subsequent evaluation for [FreeTextEntry2] : Recurrent throat pain and throat dryness for the past couple of weeks.

## 2021-03-17 NOTE — REVIEW OF SYSTEMS
[Patient Intake Form Reviewed] : Patient intake form was reviewed [Hearing Loss] : hearing loss [As Noted in HPI] : as noted in HPI [Throat Pain] : throat pain [Throat Dryness] : throat dryness [Negative] : Heme/Lymph [Dizziness] : no dizziness [Vertigo] : no vertigo [Nasal Congestion] : no nasal congestion [Nose Bleeds] : no nose bleeds

## 2021-03-17 NOTE — PROCEDURE
[Epistaxis] : evaluation of epistaxis [Nasal Septum Mucosa Bleeding] : no bleeding [Nasal Septum Mucosa Bleeding Right] : bleeding on the right [Image(s) Captured] : image(s) captured and filed [Flexible Endoscope] : examined with the flexible endoscope [Serial Number: ___] : Serial Number: [unfilled] [Glottis Arytenoid Cartilages Erythema] : bilateral arytenoid ~M erythema [de-identified] : GERD injury.  Laryngitis

## 2021-03-17 NOTE — HISTORY OF PRESENT ILLNESS
[de-identified] : 71 years old female patient with history of  Recurrent throat pain and throat dryness for the past couple of weeks. .  Patient is present today with GERD injury.  Laryngitis

## 2021-04-29 ENCOUNTER — APPOINTMENT (OUTPATIENT)
Dept: PULMONOLOGY | Facility: CLINIC | Age: 71
End: 2021-04-29
Payer: MEDICARE

## 2021-04-29 VITALS
WEIGHT: 168 LBS | HEART RATE: 85 BPM | HEIGHT: 59 IN | OXYGEN SATURATION: 98 % | TEMPERATURE: 97.9 F | DIASTOLIC BLOOD PRESSURE: 85 MMHG | SYSTOLIC BLOOD PRESSURE: 119 MMHG | BODY MASS INDEX: 33.87 KG/M2

## 2021-04-29 PROCEDURE — 99214 OFFICE O/P EST MOD 30 MIN: CPT

## 2021-04-29 PROCEDURE — 99072 ADDL SUPL MATRL&STAF TM PHE: CPT

## 2021-04-29 NOTE — REVIEW OF SYSTEMS
[Fever] : no fever [Chills] : no chills [Dry Eyes] : no dry eyes [Eye Irritation] : no eye irritation [Cough] : no cough [Sputum] : no sputum [Chest Discomfort] : no chest discomfort [Orthopnea] : no orthopnea [Hay Fever] : no hay fever [Nasal Discharge] : no nasal discharge [Dysuria] : no dysuria [Headache] : no headache [Dizziness] : no dizziness [TextBox_151] : rest of ROS negative

## 2021-04-29 NOTE — PHYSICAL EXAM
[No Acute Distress] : no acute distress [Well Nourished] : well nourished [Normal Oropharynx] : normal oropharynx [Low Lying Soft Palate] : low lying soft palate [III] : Mallampati Class: III [Normal Appearance] : normal appearance [No Neck Mass] : no neck mass [Normal Rate/Rhythm] : normal rate/rhythm [Normal S1, S2] : normal s1, s2 [No Resp Distress] : no resp distress [Clear to Auscultation Bilaterally] : clear to auscultation bilaterally [No Abnormalities] : no abnormalities [Benign] : benign [Not Tender] : not tender [Normal Gait] : normal gait [Gait - Sufficient For Exercise Testing] : gait sufficient for exercise testing [No Clubbing] : no clubbing [No Edema] : no edema [Normal Color/ Pigmentation] : normal color/ pigmentation [No Rash] : no rash [No Focal Deficits] : no focal deficits [No Motor Deficits] : no motor deficits [Oriented x3] : oriented x3 [Normal Affect] : normal affect

## 2021-04-29 NOTE — HISTORY OF PRESENT ILLNESS
[TextBox_4] : 70 year old female, never smoker with h/o allergic rhinitis was referred to pulmonary clinic by Dr. Headley  for pulmonary evaluation before retinal surgery.\par Patient denies cough, SOB, chest pain or pulmonary illness in last 1 year. She is able to go three flights of stairs without any difficulty in breathing. Patient denies snoring, witnessed apnea, early morning headache or choking at night time or excessive daytime sleepiness. ESS score is 4.\par \par 4/29/21:\par Patient came today for follow up before surgery for hernia. Patient is c/o SOB while exertion while going above the stairs.

## 2021-04-29 NOTE — DISCUSSION/SUMMARY
[FreeTextEntry1] : Patient has good functional status. She denied any pulmonary symptoms. She denied history of previous pulmonary illness or admission to hospital for pulmonary illness. Pulmonary exam was normal with no hypoxia on ambulation today and in past. CXR did not reveal any acute parenchymal abnormality with persistent left side CP blunting, this is secondary to her hiatal hernia.\par \par Pre-Op Pulmonary examination:\par Patient may undergo hernia surgery. Her ARISCAT score 25. She has intermediate risk of in-hospital post-op pulmonary complications (composite including respiratory failure, respiratory infection, pleural effusion, atelectasis, pneumothorax, bronchospasm, aspiration pneumonitis).\par Recommendation:\par - Patient might have undiagnosed sleep apnea. \par - Avoid Opioids during surgery\par - Extubate in upright position\par - Would recommend to extubate once patient is awake\par - BIPAP may be required after extubation\par

## 2021-05-10 ENCOUNTER — APPOINTMENT (OUTPATIENT)
Dept: OTOLARYNGOLOGY | Facility: CLINIC | Age: 71
End: 2021-05-10
Payer: MEDICARE

## 2021-05-10 VITALS
HEART RATE: 83 BPM | DIASTOLIC BLOOD PRESSURE: 74 MMHG | WEIGHT: 160 LBS | SYSTOLIC BLOOD PRESSURE: 111 MMHG | HEIGHT: 59 IN | TEMPERATURE: 97.3 F | BODY MASS INDEX: 32.25 KG/M2 | OXYGEN SATURATION: 98 %

## 2021-05-10 DIAGNOSIS — M26.649 ARTHRITIS OF UNSPECIFIED TEMPOROMANDIBULAR JOINT: ICD-10-CM

## 2021-05-10 PROCEDURE — 99072 ADDL SUPL MATRL&STAF TM PHE: CPT

## 2021-05-10 PROCEDURE — 99213 OFFICE O/P EST LOW 20 MIN: CPT

## 2021-05-10 PROCEDURE — 92550 TYMPANOMETRY & REFLEX THRESH: CPT

## 2021-05-10 PROCEDURE — 92557 COMPREHENSIVE HEARING TEST: CPT

## 2021-05-10 NOTE — ASSESSMENT
[FreeTextEntry1] : 71F who returns for L otalgia. On exam + L TMJ\par \par Plan:\par - dental referral\par - soft foods, NSAIDs, warm compresses, avoid bruxism\par rtc for any further ear issues

## 2021-05-10 NOTE — REVIEW OF SYSTEMS
SCRIBE #1 NOTE: I, Dina Clayton, am scribing for, and in the presence of, Mansoor Shabazz MD. I have scribed the entire note.        History      Chief Complaint   Patient presents with    Cough     cough and nasal congestion x 1 week       Review of patient's allergies indicates:  No Known Allergies     HPI   HPI     1/30/2019, 8:40 AM  History obtained from the father     History of Present Illness: Jhonatan Nunez is a 10 y.o. male patient who presents to the Emergency Department for cough which onset 1.5 weeks ago. Sxs are constant and moderate in severity. There are no mitigating or exacerbating factors noted. Associated sxs include congestion. Father denies any fever, chills, n/v/d, abd pain, SOB, sore throat, dizziness, HA, and all other sxs at this time. No further complaints or concerns at this time.         Arrival mode: Personal Transport     Pediatrician: Ryan Welch MD    Immunizations: UTD      Past Medical History:  History reviewed. No pertinent medical history.       Past Surgical History:  History reviewed. No pertinent surgical history.       Family History:  Family History   Problem Relation Age of Onset    Cancer Neg Hx         Social History:  Pediatric History   Patient Guardian Status    Mother:  Zoey Nunez     Other Topics Concern    Unknown   Social History Narrative    Unknown        ROS     Review of Systems   Constitutional: Negative for chills, diaphoresis and fever.   HENT: Positive for congestion. Negative for rhinorrhea and sore throat.    Respiratory: Positive for cough. Negative for choking and shortness of breath.    Cardiovascular: Negative for chest pain.   Gastrointestinal: Negative for abdominal pain, diarrhea, nausea and vomiting.   Genitourinary: Negative for dysuria, frequency and hematuria.   Musculoskeletal: Negative for back pain and neck pain.   Skin: Negative for rash.   Neurological: Negative for dizziness, weakness and headaches.   Hematological:  Does not bruise/bleed easily.   All other systems reviewed and are negative.      Physical Exam         Initial Vitals [01/30/19 0839]   BP Pulse Resp Temp SpO2   (!) 146/76 104 18 98.4 °F (36.9 °C) 100 %      MAP       --         Physical Exam  Vital signs and nursing notes reviewed.  Constitutional: Patient is in no acute distress. Patient is active. Non-toxic. Well-hydrated. Well-appearing. Patient is attentive and interactive. Patient is appropriate for age. No evidence of lethargy or irritability.  Head: Normocephalic and atraumatic.  Ears: Bilateral TMs are unremarkable.  Nose and Throat: Moist mucous membranes. Symmetric palate. Posterior pharynx is clear without exudates. No palatal petechiae.  Eyes: PERRL. Conjunctivae are normal. No scleral icterus.  Neck: Supple. No cervical lymphadenopathy. No meningismus.  Cardiovascular: Regular rate and rhythm. No murmurs. Well perfused.  Pulmonary/Chest: No respiratory distress. No retraction, nasal flaring, or grunting. Breath sounds are clear bilaterally. No stridor, wheezing, or rales.   Abdominal: Soft. Non-distended. No crying or grimacing with deep abd palpation. Bowel sounds are normal.  Musculoskeletal: Moves all extremities. Brisk cap refill.  Skin: Warm and dry. No bruising, petechiae, or purpura. No rash  Neurological: Alert and interactive. Age appropriate behavior.      ED Course      Procedures  ED Vital Signs:  Vitals:    01/30/19 0839   BP: (!) 146/76   Pulse: 104   Resp: 18   Temp: 98.4 °F (36.9 °C)   TempSrc: Oral   SpO2: 100%   Weight: 42.2 kg (93 lb 0.6 oz)         Imaging Results:  Imaging Results          X-Ray Chest PA And Lateral (Final result)  Result time 01/30/19 08:54:14    Final result by STEPHANY Goodman Sr., MD (01/30/19 08:54:14)                 Impression:      Normal study.      Electronically signed by: Chad Goodman MD  Date:    01/30/2019  Time:    08:54             Narrative:    EXAMINATION:  XR CHEST PA AND LATERAL    CLINICAL  HISTORY:  cough;    COMPARISON:  None    FINDINGS:  The size and contour of the heart are normal. The lungs are clear. There is no pneumothorax or pleural effusion.                                   The Emergency Provider reviewed the vital signs and test results, which are outlined above.    ED Discussion    Medications - No data to display    9:06 AM: Reassessed pt at this time.  Father states his condition has improved at this time. Discussed with father all pertinent ED information and results. Discussed pt dx and plan of tx. Gave father all f/u and return to the ED instructions. All questions and concerns were addressed at this time. Father expresses understanding of information and instructions, and is comfortable with plan to discharge. Pt is stable for discharge.    I have discussed with the patient and/or family/caretaker that currently the patient is stable with no signs of a serious bacterial infection including meningitis, pneumonia, or pyelonephritis., or other infectious, respiratory, cardiac, toxic, or other EMC.   However, serious infection may be present in a mild, early form, and the patient may develop a worse infection over the next few days. Family/caretaker should bring their child back to ED immediately if there are any mental status changes, persistent vomiting, new rash, difficulty breathing, or any other change in the child's condition that concerns them.    Pre-hypertension/Hypertension: The pt has been informed that they may have pre-hypertension or hypertension based on a blood pressure reading in the ED. I recommend that the pt call the PCP listed on their discharge instructions or a physician of their choice this week to arrange f/u for further evaluation of possible pre-hypertension or hypertension.     Current Discharge Medication List      START taking these medications    Details   dextromethorphan (DELSYM 12 HOUR) 30 mg/5 mL liquid Take 5 mLs (30 mg total) by mouth 2 (two) times  daily. for 10 days  Qty: 60 mL, Refills: 0             Follow-up Information     Ryan Welch MD.    Specialty:  Pediatrics  Contact information:  [address] [facility] [address]  [address]  [facility] [id number] [phone]                       This SmartLink is deprecated. Use AVSMEDLIST instead to display the medication list for a patient.       Medical Decision Making    MDM  Number of Diagnoses or Management Options  Cough:   Nasal congestion:   Viral syndrome:      Amount and/or Complexity of Data Reviewed  Tests in the radiology section of CPT®: reviewed and ordered              Scribe Attestation:   Scribe #1: I performed the above scribed service and the documentation accurately describes the services I performed. I attest to the accuracy of the note.    Attending:   Physician Attestation Statement for Scribe #1: I, Mansoor Shabazz MD, personally performed the services described in this documentation, as scribed by Dina Clayton in my presence, and it is both accurate and complete.        Clinical Impression:        ICD-10-CM ICD-9-CM   1. Cough R05 786.2   2. Nasal congestion R09.81 478.19   3. Viral syndrome B34.9 079.99       Disposition:   Disposition: Discharged  Condition: Stable           Mansoor Shabazz MD  01/30/19 0915     [As Noted in HPI] : as noted in HPI [FreeTextEntry1] : all other ROS negative

## 2021-05-10 NOTE — HISTORY OF PRESENT ILLNESS
[de-identified] : 71F who we originally saw for headache and dizziness. Previous MRI showed L TMJ. Patient now complains that her left ear continues to bother her including the area behind the ear. She denies any other ENT complaints. No pertinent FH/SH.

## 2021-05-18 ENCOUNTER — LABORATORY RESULT (OUTPATIENT)
Age: 71
End: 2021-05-18

## 2021-05-20 ENCOUNTER — TRANSCRIPTION ENCOUNTER (OUTPATIENT)
Age: 71
End: 2021-05-20

## 2021-05-20 VITALS
HEIGHT: 59 IN | RESPIRATION RATE: 16 BRPM | TEMPERATURE: 98 F | HEART RATE: 89 BPM | WEIGHT: 167.99 LBS | SYSTOLIC BLOOD PRESSURE: 112 MMHG | OXYGEN SATURATION: 98 % | DIASTOLIC BLOOD PRESSURE: 75 MMHG

## 2021-05-20 NOTE — ASU PATIENT PROFILE, ADULT - AS SC BRADEN ACTIVITY
Render Post Care In The Note?: yes Consent: The patient's consent was obtained including but not limited to risks of crusting, scabbing, blistering, scarring, darker or lighter pigmentary change, recurrence, incomplete removal and infection. Duration Of Freeze Thaw-Cycle (Seconds): 0 Detail Level: Zone Price (Use Numbers Only, No Special Characters Or $): 0.00 Post-Care Instructions: I reviewed with the patient in detail post-care instructions. Patient is to wear sunprotection, and avoid picking at any of the treated lesions. Pt may apply Vaseline to crusted or scabbing areas. (3) walks occasionally

## 2021-05-20 NOTE — ASU PATIENT PROFILE, ADULT - PMH
Constipation    Diaphragmatic hernia without obstruction or gangrene    Gastric reflux    HTN (hypertension)    Hyperlipidemia

## 2021-05-21 ENCOUNTER — APPOINTMENT (OUTPATIENT)
Dept: THORACIC SURGERY | Facility: HOSPITAL | Age: 71
End: 2021-05-21

## 2021-05-21 ENCOUNTER — RESULT REVIEW (OUTPATIENT)
Age: 71
End: 2021-05-21

## 2021-05-21 ENCOUNTER — INPATIENT (INPATIENT)
Facility: HOSPITAL | Age: 71
LOS: 12 days | Discharge: HOME HEALTH | DRG: 327 | End: 2021-06-03
Attending: THORACIC SURGERY (CARDIOTHORACIC VASCULAR SURGERY) | Admitting: THORACIC SURGERY (CARDIOTHORACIC VASCULAR SURGERY)
Payer: MEDICARE

## 2021-05-21 DIAGNOSIS — Z98.890 OTHER SPECIFIED POSTPROCEDURAL STATES: Chronic | ICD-10-CM

## 2021-05-21 LAB
ALBUMIN SERPL ELPH-MCNC: 3.8 G/DL — SIGNIFICANT CHANGE UP (ref 3.3–5)
ALP SERPL-CCNC: 77 U/L — SIGNIFICANT CHANGE UP (ref 40–120)
ALT FLD-CCNC: 603 U/L — HIGH (ref 10–45)
ANION GAP SERPL CALC-SCNC: 14 MMOL/L — SIGNIFICANT CHANGE UP (ref 5–17)
APTT BLD: 23.4 SEC — LOW (ref 27.5–35.5)
AST SERPL-CCNC: 448 U/L — HIGH (ref 10–40)
BASE EXCESS BLDA CALC-SCNC: -4 MMOL/L — LOW (ref -2–3)
BASOPHILS # BLD AUTO: 0 K/UL — SIGNIFICANT CHANGE UP (ref 0–0.2)
BASOPHILS NFR BLD AUTO: 0 % — SIGNIFICANT CHANGE UP (ref 0–2)
BILIRUB SERPL-MCNC: 0.6 MG/DL — SIGNIFICANT CHANGE UP (ref 0.2–1.2)
BUN SERPL-MCNC: 11 MG/DL — SIGNIFICANT CHANGE UP (ref 7–23)
CA-I BLDA-SCNC: 1.26 MMOL/L — SIGNIFICANT CHANGE UP (ref 1.15–1.33)
CALCIUM SERPL-MCNC: 9.7 MG/DL — SIGNIFICANT CHANGE UP (ref 8.4–10.5)
CHLORIDE SERPL-SCNC: 101 MMOL/L — SIGNIFICANT CHANGE UP (ref 96–108)
CO2 BLDA-SCNC: 24 MMOL/L — SIGNIFICANT CHANGE UP (ref 19–24)
CO2 SERPL-SCNC: 22 MMOL/L — SIGNIFICANT CHANGE UP (ref 22–31)
COHGB MFR BLDA: 1.3 % — SIGNIFICANT CHANGE UP
CREAT SERPL-MCNC: 0.67 MG/DL — SIGNIFICANT CHANGE UP (ref 0.5–1.3)
EOSINOPHIL # BLD AUTO: 0 K/UL — SIGNIFICANT CHANGE UP (ref 0–0.5)
EOSINOPHIL NFR BLD AUTO: 0 % — SIGNIFICANT CHANGE UP (ref 0–6)
GAS PNL BLDA: SIGNIFICANT CHANGE UP
GIANT PLATELETS BLD QL SMEAR: PRESENT — SIGNIFICANT CHANGE UP
GLUCOSE SERPL-MCNC: 173 MG/DL — HIGH (ref 70–99)
HCO3 BLDA-SCNC: 22 MMOL/L — SIGNIFICANT CHANGE UP (ref 21–28)
HCT VFR BLD CALC: 34.3 % — LOW (ref 34.5–45)
HGB BLD-MCNC: 11 G/DL — LOW (ref 11.5–15.5)
HGB BLDA-MCNC: 11.7 G/DL — SIGNIFICANT CHANGE UP (ref 11.7–16.1)
HYPOCHROMIA BLD QL: SLIGHT — SIGNIFICANT CHANGE UP
INR BLD: 1.11 — SIGNIFICANT CHANGE UP (ref 0.88–1.16)
LYMPHOCYTES # BLD AUTO: 0.21 K/UL — LOW (ref 1–3.3)
LYMPHOCYTES # BLD AUTO: 2.6 % — LOW (ref 13–44)
MAGNESIUM SERPL-MCNC: 1.7 MG/DL — SIGNIFICANT CHANGE UP (ref 1.6–2.6)
MANUAL SMEAR VERIFICATION: SIGNIFICANT CHANGE UP
MCHC RBC-ENTMCNC: 29.3 PG — SIGNIFICANT CHANGE UP (ref 27–34)
MCHC RBC-ENTMCNC: 32.1 GM/DL — SIGNIFICANT CHANGE UP (ref 32–36)
MCV RBC AUTO: 91.5 FL — SIGNIFICANT CHANGE UP (ref 80–100)
METHGB MFR BLDA: 1.1 % — SIGNIFICANT CHANGE UP
MONOCYTES # BLD AUTO: 0.9 K/UL — SIGNIFICANT CHANGE UP (ref 0–0.9)
MONOCYTES NFR BLD AUTO: 11.3 % — SIGNIFICANT CHANGE UP (ref 2–14)
NEUTROPHILS # BLD AUTO: 6.7 K/UL — SIGNIFICANT CHANGE UP (ref 1.8–7.4)
NEUTROPHILS NFR BLD AUTO: 81.8 % — HIGH (ref 43–77)
NEUTS BAND # BLD: 2.6 % — SIGNIFICANT CHANGE UP (ref 0–8)
OXYHGB MFR BLDA: 97.7 % — HIGH (ref 90–95)
PCO2 BLDA: 44 MMHG — HIGH (ref 32–35)
PH BLDA: 7.31 — LOW (ref 7.35–7.45)
PHOSPHATE SERPL-MCNC: 3.3 MG/DL — SIGNIFICANT CHANGE UP (ref 2.5–4.5)
PLAT MORPH BLD: ABNORMAL
PLATELET # BLD AUTO: 282 K/UL — SIGNIFICANT CHANGE UP (ref 150–400)
PO2 BLDA: 257 MMHG — HIGH (ref 83–108)
POLYCHROMASIA BLD QL SMEAR: SLIGHT — SIGNIFICANT CHANGE UP
POTASSIUM BLDA-SCNC: 4 MMOL/L — SIGNIFICANT CHANGE UP (ref 3.5–5.1)
POTASSIUM SERPL-MCNC: 4.2 MMOL/L — SIGNIFICANT CHANGE UP (ref 3.5–5.3)
POTASSIUM SERPL-SCNC: 4.2 MMOL/L — SIGNIFICANT CHANGE UP (ref 3.5–5.3)
PROT SERPL-MCNC: 6.3 G/DL — SIGNIFICANT CHANGE UP (ref 6–8.3)
PROTHROM AB SERPL-ACNC: 13.2 SEC — SIGNIFICANT CHANGE UP (ref 10.6–13.6)
RBC # BLD: 3.75 M/UL — LOW (ref 3.8–5.2)
RBC # FLD: 13.9 % — SIGNIFICANT CHANGE UP (ref 10.3–14.5)
RBC BLD AUTO: ABNORMAL
SAO2 % BLDA: 100 % — HIGH (ref 94–98)
SODIUM BLDA-SCNC: 136 MMOL/L — SIGNIFICANT CHANGE UP (ref 136–145)
SODIUM SERPL-SCNC: 137 MMOL/L — SIGNIFICANT CHANGE UP (ref 135–145)
VARIANT LYMPHS # BLD: 1.7 % — SIGNIFICANT CHANGE UP (ref 0–6)
WBC # BLD: 7.94 K/UL — SIGNIFICANT CHANGE UP (ref 3.8–10.5)
WBC # FLD AUTO: 7.94 K/UL — SIGNIFICANT CHANGE UP (ref 3.8–10.5)

## 2021-05-21 PROCEDURE — 99291 CRITICAL CARE FIRST HOUR: CPT

## 2021-05-21 PROCEDURE — 44015 INSERT NEEDLE CATH BOWEL: CPT

## 2021-05-21 PROCEDURE — 43107 REMOVAL OF ESOPHAGUS: CPT

## 2021-05-21 PROCEDURE — S2900 ROBOTIC SURGICAL SYSTEM: CPT | Mod: NC

## 2021-05-21 PROCEDURE — 43235 EGD DIAGNOSTIC BRUSH WASH: CPT

## 2021-05-21 PROCEDURE — 43281 LAP PARAESOPHAG HERN REPAIR: CPT

## 2021-05-21 PROCEDURE — 71045 X-RAY EXAM CHEST 1 VIEW: CPT | Mod: 26

## 2021-05-21 PROCEDURE — 43107 REMOVAL OF ESOPHAGUS: CPT | Mod: 80

## 2021-05-21 PROCEDURE — 44015 INSERT NEEDLE CATH BOWEL: CPT | Mod: 80

## 2021-05-21 PROCEDURE — 88309 TISSUE EXAM BY PATHOLOGIST: CPT | Mod: 26

## 2021-05-21 RX ORDER — CEFAZOLIN SODIUM 1 G
2000 VIAL (EA) INJECTION EVERY 8 HOURS
Refills: 0 | Status: DISCONTINUED | OUTPATIENT
Start: 2021-05-21 | End: 2021-05-21

## 2021-05-21 RX ORDER — SODIUM CHLORIDE 9 MG/ML
1000 INJECTION INTRAMUSCULAR; INTRAVENOUS; SUBCUTANEOUS
Refills: 0 | Status: DISCONTINUED | OUTPATIENT
Start: 2021-05-21 | End: 2021-05-23

## 2021-05-21 RX ORDER — PANTOPRAZOLE SODIUM 20 MG/1
40 TABLET, DELAYED RELEASE ORAL DAILY
Refills: 0 | Status: DISCONTINUED | OUTPATIENT
Start: 2021-05-21 | End: 2021-06-03

## 2021-05-21 RX ORDER — HEPARIN SODIUM 5000 [USP'U]/ML
5000 INJECTION INTRAVENOUS; SUBCUTANEOUS EVERY 8 HOURS
Refills: 0 | Status: DISCONTINUED | OUTPATIENT
Start: 2021-05-21 | End: 2021-06-03

## 2021-05-21 RX ORDER — BUPIVACAINE 13.3 MG/ML
20 INJECTION, SUSPENSION, LIPOSOMAL INFILTRATION ONCE
Refills: 0 | Status: DISCONTINUED | OUTPATIENT
Start: 2021-05-21 | End: 2021-05-21

## 2021-05-21 RX ORDER — CEFTRIAXONE 500 MG/1
INJECTION, POWDER, FOR SOLUTION INTRAMUSCULAR; INTRAVENOUS
Refills: 0 | Status: COMPLETED | OUTPATIENT
Start: 2021-05-21 | End: 2021-05-22

## 2021-05-21 RX ORDER — ACETAMINOPHEN 500 MG
1000 TABLET ORAL ONCE
Refills: 0 | Status: COMPLETED | OUTPATIENT
Start: 2021-05-21 | End: 2021-05-21

## 2021-05-21 RX ORDER — VANCOMYCIN HCL 1 G
1000 VIAL (EA) INTRAVENOUS EVERY 12 HOURS
Refills: 0 | Status: COMPLETED | OUTPATIENT
Start: 2021-05-21 | End: 2021-05-22

## 2021-05-21 RX ORDER — CEFTRIAXONE 500 MG/1
2000 INJECTION, POWDER, FOR SOLUTION INTRAMUSCULAR; INTRAVENOUS ONCE
Refills: 0 | Status: COMPLETED | OUTPATIENT
Start: 2021-05-21 | End: 2021-05-21

## 2021-05-21 RX ORDER — CEFTRIAXONE 500 MG/1
2000 INJECTION, POWDER, FOR SOLUTION INTRAMUSCULAR; INTRAVENOUS EVERY 24 HOURS
Refills: 0 | Status: COMPLETED | OUTPATIENT
Start: 2021-05-22 | End: 2021-05-22

## 2021-05-21 RX ORDER — CEFTRIAXONE 500 MG/1
INJECTION, POWDER, FOR SOLUTION INTRAMUSCULAR; INTRAVENOUS
Refills: 0 | Status: DISCONTINUED | OUTPATIENT
Start: 2021-05-21 | End: 2021-05-21

## 2021-05-21 RX ORDER — SODIUM CHLORIDE 9 MG/ML
1000 INJECTION, SOLUTION INTRAVENOUS
Refills: 0 | Status: DISCONTINUED | OUTPATIENT
Start: 2021-05-21 | End: 2021-05-23

## 2021-05-21 RX ORDER — MAGNESIUM SULFATE 500 MG/ML
2 VIAL (ML) INJECTION ONCE
Refills: 0 | Status: COMPLETED | OUTPATIENT
Start: 2021-05-21 | End: 2021-05-21

## 2021-05-21 RX ADMIN — Medication 250 MILLIGRAM(S): at 20:42

## 2021-05-21 RX ADMIN — HEPARIN SODIUM 5000 UNIT(S): 5000 INJECTION INTRAVENOUS; SUBCUTANEOUS at 20:40

## 2021-05-21 RX ADMIN — SODIUM CHLORIDE 100 MILLILITER(S): 9 INJECTION, SOLUTION INTRAVENOUS at 22:27

## 2021-05-21 RX ADMIN — Medication 1000 MILLIGRAM(S): at 22:25

## 2021-05-21 RX ADMIN — Medication 50 GRAM(S): at 22:27

## 2021-05-21 RX ADMIN — Medication 400 MILLIGRAM(S): at 20:39

## 2021-05-21 RX ADMIN — CEFTRIAXONE 100 MILLIGRAM(S): 500 INJECTION, POWDER, FOR SOLUTION INTRAMUSCULAR; INTRAVENOUS at 20:39

## 2021-05-21 NOTE — H&P ADULT - NSHPPHYSICALEXAM_GEN_ALL_CORE
Vital Signs Last 24 Hrs  T(C): 36.6 (20 May 2021 16:41), Max: 36.6 (20 May 2021 16:41)  T(F): 97.8 (20 May 2021 16:41), Max: 97.8 (20 May 2021 16:41)  HR: 89 (20 May 2021 16:41) (89 - 89)  BP: 112/75 (20 May 2021 16:41) (112/75 - 112/75)  BP(mean): --  RR: 16 (20 May 2021 16:41) (16 - 16)  SpO2: 98% (20 May 2021 16:41) (98% - 98%)    Gen: NAD, resting comfortably in bed  Pulm: Good inspiratory effort, nonlabored breathing  C/V: NSR  Abd: Soft, non tender non distended  Extrem: WWP, no edema

## 2021-05-21 NOTE — H&P ADULT - ASSESSMENT
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  Mild GERD on BRAVO but was found to have some esophageal dysmotility, presents today for robotic-assisted repair of paraesophageal hernia  with Partial fundoplication.     Plan:    - Booked and consented for surgery today

## 2021-05-21 NOTE — BRIEF OPERATIVE NOTE - OPERATION/FINDINGS
Under GA, supine position, trocars were inserted, robot docked, RT position, started by reducing the contents of the hiatal hernia, stomach, omentum and transverse colon, contents were severely adherent in the mediastinum, sac was dissected with combination of blunt and sharp dissection done by vessel sealer, then pars flaccida was entered and the right corina of the diaphragm was identified, short gastrics were taken also and left corina pf diaphragm was identified as well, pen madan was inserted and wrapped around the abdominal esophageus, then hiatal hernia repair with Quill sutures and 2 sutures of silk, then upon preparing for Partial fundoplication, an esophageal perforation was noted, Thoracic surgery team was consulted, Dr. Alberto and Dr. Pinto came to the OR, EGD was done confirmed esophogeal tear a few centimeters above the GE junction, involving most of the circumference, not amenable to primary repair, decision is made to purse Marbella esophagectomy, details to follow in Thoracic brief op note Under GA, supine position, trocars were inserted, robot docked, RT position, started by reducing the contents of the hiatal hernia, stomach, omentum and transverse colon, contents were severely adherent in the mediastinum, sac was dissected with combination of blunt and sharp dissection done by vessel sealer, then pars flaccida was entered and the right corina of the diaphragm was identified, short gastrics were taken also and left corina of diaphragm was identified as well, pen madan was inserted and wrapped around the abdominal esophageus, then hiatal hernia repair with Quill sutures and 2 sutures of silk, then upon preparing for Partial fundoplication, an esophageal perforation was noted, Thoracic surgery team was consulted, Dr. Alberto and Dr. Pinto came to the OR, EGD was done confirmed esophogeal tear a few centimeters above the GE junction, involving most of the circumference, not amenable to primary repair, decision is made to purse Marbella esophagectomy, details to follow in Thoracic brief op note Under GA, supine position, trocars were inserted, robot docked, RT position, started by reducing the contents of the hiatal hernia, stomach, omentum and transverse colon, contents were severely adherent in the mediastinum, sac was dissected with combination of blunt and sharp dissection done by vessel sealer, then pars flaccida was entered and the right corina of the diaphragm was identified, short gastrics were taken also and left corina of diaphragm was identified as well, pen madan was inserted and wrapped around the abdominal esophageus, then hiatal hernia repair with Quill sutures and 2 sutures of silk, then upon preparing for Partial fundoplication, an esophageal perforation was noted, Thoracic surgery team was consulted, Dr. Alberto and Dr. Pinto came to the OR, EGD was done confirmed esophogeal tear a few centimeters above the GE junction, involving most of the circumference, not amenable to primary repair, decision is made to pursue Marbella esophagectomy, details to follow in Thoracic brief op note Under GA, supine position, trocars were inserted, robot docked, RT position, started by reducing the contents of the hiatal hernia, stomach, omentum and transverse colon, contents were severely adherent in the mediastinum, sac was dissected with combination of blunt and sharp dissection done by vessel sealer, then pars flaccida was entered and the right corina of the diaphragm was identified, short gastrics were taken also and left corina of diaphragm was identified as well, pen madan was inserted and wrapped around the abdominal esophageus, then hiatal hernia repair with Quill sutures and 2 sutures of silk, then upon preparing for Partial fundoplication, an esophageal perforation was noted, Thoracic surgery team was consulted, Dr. Alberto and Dr. Pinto came to the OR, EGD was done confirmed esophogeal tear a few centimeters above the GE junction, involving most of the circumference, not amenable to primary repair.  After discussion with patient's daughter via telephone, decision was made to pursue esophagectomy.  Details to follow in Thoracic brief op note.

## 2021-05-21 NOTE — H&P ADULT - HISTORY OF PRESENT ILLNESS
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  Mild GERD on BRAVO but was found to have some esophageal dysmotility, presents today for robotic-assisted repair of paraesophageal hernia  with Partial fundoplication.

## 2021-05-21 NOTE — BRIEF OPERATIVE NOTE - NSICDXBRIEFPREOP_GEN_ALL_CORE_FT
PRE-OP DIAGNOSIS:  Paraesophageal hernia 21-May-2021 17:20:55  Kathie Scherer  
PRE-OP DIAGNOSIS:  Esophageal perforation 21-May-2021 19:45:29  Silvia Miller

## 2021-05-21 NOTE — H&P ADULT - NSICDXPASTMEDICALHX_GEN_ALL_CORE_FT
PAST MEDICAL HISTORY:  Constipation     Diaphragmatic hernia without obstruction or gangrene     Gastric reflux     HTN (hypertension)     Hyperlipidemia

## 2021-05-21 NOTE — PROGRESS NOTE ADULT - SUBJECTIVE AND OBJECTIVE BOX
CTICU  CRITICAL  CARE  attending     Hand off received 					   Pertinent clinical, laboratory, radiographic, hemodynamic, echocardiographic, respiratory data, microbiologic data and chart were reviewed and analyzed frequently throughout the course of the day and night  Patient seen and examined with CTS/ SH attending at bedside    Pt is a 71y , Female, admitted to ICU s/p esophagectomy today;    circumferential esophageal tear just proximal to the GE junction robotic paraesophageal hernia repair;      Gen Surg Op note:  · Operative Findings	Under GA, supine position, trocars were inserted, robot docked, RT position, started by reducing the contents of the hiatal hernia, stomach, omentum and transverse colon, contents were severely adherent in the mediastinum, sac was dissected with combination of blunt and sharp dissection done by vessel sealer, then pars flaccida was entered and the right corina of the diaphragm was identified, short gastrics were taken also and left corina of diaphragm was identified as well, pen madan was inserted and wrapped around the abdominal esophageus, then hiatal hernia repair with Quill sutures and 2 sutures of silk, then upon preparing for Partial fundoplication, an esophageal perforation was noted, Thoracic surgery team was consulted, Dr. Alberto and Dr. Pinto came to the OR, EGD was done confirmed esophogeal tear a few centimeters above the GE junction, involving most of the circumference, not amenable to primary repair, decision is made to purse Marbella esophagectomy, details to follow in Thoracic brief op note        FAMILY HISTORY:  No pertinent family history in first degree relatives    PAST MEDICAL & SURGICAL HISTORY:  HTN (hypertension)    Hyperlipidemia    Gastric reflux    Diaphragmatic hernia without obstruction or gangrene    Constipation    H/O colonoscopy  x2    History of coronary angiogram      Patient is a 71y old  Female who presents with a chief complaint of Paraesophageal hernia repair (21 May 2021 11:00)      14 system review was unremarkable    Vital signs, hemodynamic and respiratory parameters were reviewed from the bedside nursing flowsheet.  ICU Vital Signs Last 24 Hrs  T(C): 36.1 (22 May 2021 01:01), Max: 36.1 (22 May 2021 01:01)  T(F): 97 (22 May 2021 01:01), Max: 97 (22 May 2021 01:01)  HR: 79 (22 May 2021 05:00) (79 - 98)  BP: --  BP(mean): --  ABP: 109/80 (22 May 2021 05:00) (109/80 - 130/65)  ABP(mean): 93 (22 May 2021 05:00) (84 - 102)  RR: 17 (22 May 2021 05:00) (15 - 18)  SpO2: 93% (22 May 2021 05:00) (93% - 95%)    Adult Advanced Hemodynamics Last 24 Hrs  CVP(mm Hg): --  CVP(cm H2O): --  CO: --  CI: --  PA: --  PA(mean): --  PCWP: --  SVR: --  SVRI: --  PVR: --  PVRI: --, ABG - ( 22 May 2021 03:32 )  pH, Arterial: 7.42  pH, Blood: x     /  pCO2: 39    /  pO2: 68    / HCO3: 25    / Base Excess: 0.8   /  SaO2: 97.4                Intake and output was reviewed and the fluid balance was calculated  Daily     Daily   I&O's Summary    21 May 2021 07:01  -  22 May 2021 06:09  --------------------------------------------------------  IN: 1450 mL / OUT: 905 mL / NET: 545 mL        All lines and drain sites were assessed  [facility]      POCT Blood Glucose.: 140 mg/dL (21 May 2021 16:35)    No acute change in mental status  Auscultation of the chest reveals equal bs  Abdomen is soft  Extremities are warm and well perfused  Wounds appear clean and unremarkable  Antibiotics are periop    labs  CBC Full  -  ( 22 May 2021 03:44 )  WBC Count : 6.68 K/uL  RBC Count : 3.68 M/uL  Hemoglobin : 10.8 g/dL  Hematocrit : 33.5 %  Platelet Count - Automated : 238 K/uL  Mean Cell Volume : 91.0 fl  Mean Cell Hemoglobin : 29.3 pg  Mean Cell Hemoglobin Concentration : 32.2 gm/dL  Auto Neutrophil # : x  Auto Lymphocyte # : x  Auto Monocyte # : x  Auto Eosinophil # : x  Auto Basophil # : x  Auto Neutrophil % : x  Auto Lymphocyte % : x  Auto Monocyte % : x  Auto Eosinophil % : x  Auto Basophil % : x    05-22    136  |  100  |  9   ----------------------------<  163<H>  3.6   |  25  |  0.51    Ca    9.1      22 May 2021 03:44  Phos  2.5     05-22  Mg     2.2     05-22    TPro  6.1  /  Alb  3.8  /  TBili  0.4  /  DBili  x   /  AST  482<H>  /  ALT  614<H>  /  AlkPhos  78  05-22    PT/INR - ( 22 May 2021 03:44 )   PT: 13.3 sec;   INR: 1.11          PTT - ( 22 May 2021 03:44 )  PTT:25.8 sec  The current medications were reviewed   MEDICATIONS  (STANDING):  cefTRIAXone   IVPB      cefTRIAXone   IVPB 2000 milliGRAM(s) IV Intermittent every 24 hours  heparin   Injectable 5000 Unit(s) SubCutaneous every 8 hours  lactated ringers. 1000 milliLiter(s) (100 mL/Hr) IV Continuous <Continuous>  pantoprazole  Injectable 40 milliGRAM(s) IV Push daily  sodium chloride 0.9%. 1000 milliLiter(s) (10 mL/Hr) IV Continuous <Continuous>  vancomycin  IVPB 1000 milliGRAM(s) IV Intermittent every 12 hours    MEDICATIONS  (PRN):       PROBLEM LIST/ ASSESSMENT:  HEALTH ISSUES - PROBLEM Dx:      ,   Patient is a 71y old  Female who presents  for  Paraesophageal hernia repair (21 May 2021 11:00)     s/p trans hiatal esophageal resection;       My plan includes :  close hemodynamic, ventilatory and drain monitoring and management per post op routine    Monitor for arrhythmias and monitor parameters for organ perfusion  monitor neurologic status  Head of the bed should remain elevated to 45 deg .   chest PT and IS will be encouraged  monitor adequacy of oxygenation and ventilation and attempt to wean oxygen  Nutritional goals will be met using po eventually , ensure adequate caloric intake and montior the same  Stress ulcer and VTE prophylaxis will be achieved    Glycemic control is satisfactory  Electrolytes have been repleted as necessary and wound care has been carried out. Pain control has been achieved.   agressive physical therapy and early mobility and ambulation goals will be met   The family was updated about the course and plan  CRITICAL CARE TIME SPENT in evaluation and management, reassessments, review and interpretation of labs and x-rays, ventilator and hemodynamic management, formulating a plan and coordinating care: __55___ MIN.  Time does not include procedural time.  CTICU ATTENDING     					    Olivier Rocha MD

## 2021-05-21 NOTE — BRIEF OPERATIVE NOTE - NSICDXBRIEFPOSTOP_GEN_ALL_CORE_FT
POST-OP DIAGNOSIS:  Paraesophageal hernia 21-May-2021 17:21:05  Kathie Scherer  
POST-OP DIAGNOSIS:  Esophageal perforation 21-May-2021 19:45:59  Silvia Miller

## 2021-05-21 NOTE — BRIEF OPERATIVE NOTE - NSICDXBRIEFPROCEDURE_GEN_ALL_CORE_FT
PROCEDURES:  Repair, hernia, paraesophageal, robot-assisted, using da Alee Xi 21-May-2021 17:18:33  Kathie Scherer  
PROCEDURES:  Transhiatal esophagectomy 21-May-2021 19:44:57  Silvia Miller

## 2021-05-21 NOTE — BRIEF OPERATIVE NOTE - COMMENTS
Dr. Pinto was the first assistant for this case including but not limited to anastomoses    I was present for this procedure and participated as first assistant as described by the PA above, unless otherwise noted below.

## 2021-05-22 ENCOUNTER — RESULT REVIEW (OUTPATIENT)
Age: 71
End: 2021-05-22

## 2021-05-22 LAB
ALBUMIN SERPL ELPH-MCNC: 3.8 G/DL — SIGNIFICANT CHANGE UP (ref 3.3–5)
ALP SERPL-CCNC: 78 U/L — SIGNIFICANT CHANGE UP (ref 40–120)
ALT FLD-CCNC: 614 U/L — HIGH (ref 10–45)
ANION GAP SERPL CALC-SCNC: 11 MMOL/L — SIGNIFICANT CHANGE UP (ref 5–17)
APTT BLD: 25.8 SEC — LOW (ref 27.5–35.5)
AST SERPL-CCNC: 482 U/L — HIGH (ref 10–40)
BILIRUB SERPL-MCNC: 0.4 MG/DL — SIGNIFICANT CHANGE UP (ref 0.2–1.2)
BUN SERPL-MCNC: 9 MG/DL — SIGNIFICANT CHANGE UP (ref 7–23)
CALCIUM SERPL-MCNC: 9.1 MG/DL — SIGNIFICANT CHANGE UP (ref 8.4–10.5)
CHLORIDE SERPL-SCNC: 100 MMOL/L — SIGNIFICANT CHANGE UP (ref 96–108)
CO2 SERPL-SCNC: 25 MMOL/L — SIGNIFICANT CHANGE UP (ref 22–31)
CREAT SERPL-MCNC: 0.51 MG/DL — SIGNIFICANT CHANGE UP (ref 0.5–1.3)
GAS PNL BLDA: SIGNIFICANT CHANGE UP
GAS PNL BLDA: SIGNIFICANT CHANGE UP
GLUCOSE SERPL-MCNC: 163 MG/DL — HIGH (ref 70–99)
HCT VFR BLD CALC: 33.5 % — LOW (ref 34.5–45)
HGB BLD-MCNC: 10.8 G/DL — LOW (ref 11.5–15.5)
INR BLD: 1.11 — SIGNIFICANT CHANGE UP (ref 0.88–1.16)
MAGNESIUM SERPL-MCNC: 2.2 MG/DL — SIGNIFICANT CHANGE UP (ref 1.6–2.6)
MCHC RBC-ENTMCNC: 29.3 PG — SIGNIFICANT CHANGE UP (ref 27–34)
MCHC RBC-ENTMCNC: 32.2 GM/DL — SIGNIFICANT CHANGE UP (ref 32–36)
MCV RBC AUTO: 91 FL — SIGNIFICANT CHANGE UP (ref 80–100)
NRBC # BLD: 0 /100 WBCS — SIGNIFICANT CHANGE UP (ref 0–0)
PHOSPHATE SERPL-MCNC: 2.5 MG/DL — SIGNIFICANT CHANGE UP (ref 2.5–4.5)
PLATELET # BLD AUTO: 238 K/UL — SIGNIFICANT CHANGE UP (ref 150–400)
POTASSIUM SERPL-MCNC: 3.6 MMOL/L — SIGNIFICANT CHANGE UP (ref 3.5–5.3)
POTASSIUM SERPL-SCNC: 3.6 MMOL/L — SIGNIFICANT CHANGE UP (ref 3.5–5.3)
PROT SERPL-MCNC: 6.1 G/DL — SIGNIFICANT CHANGE UP (ref 6–8.3)
PROTHROM AB SERPL-ACNC: 13.3 SEC — SIGNIFICANT CHANGE UP (ref 10.6–13.6)
RBC # BLD: 3.68 M/UL — LOW (ref 3.8–5.2)
RBC # FLD: 14 % — SIGNIFICANT CHANGE UP (ref 10.3–14.5)
SODIUM SERPL-SCNC: 136 MMOL/L — SIGNIFICANT CHANGE UP (ref 135–145)
WBC # BLD: 6.68 K/UL — SIGNIFICANT CHANGE UP (ref 3.8–10.5)
WBC # FLD AUTO: 6.68 K/UL — SIGNIFICANT CHANGE UP (ref 3.8–10.5)

## 2021-05-22 PROCEDURE — 71045 X-RAY EXAM CHEST 1 VIEW: CPT | Mod: 26,77

## 2021-05-22 PROCEDURE — 71045 X-RAY EXAM CHEST 1 VIEW: CPT | Mod: 26

## 2021-05-22 PROCEDURE — 99291 CRITICAL CARE FIRST HOUR: CPT

## 2021-05-22 PROCEDURE — 99292 CRITICAL CARE ADDL 30 MIN: CPT

## 2021-05-22 RX ORDER — FENTANYL CITRATE 50 UG/ML
12.5 INJECTION INTRAVENOUS ONCE
Refills: 0 | Status: DISCONTINUED | OUTPATIENT
Start: 2021-05-22 | End: 2021-05-22

## 2021-05-22 RX ORDER — ACETAMINOPHEN 500 MG
1000 TABLET ORAL ONCE
Refills: 0 | Status: COMPLETED | OUTPATIENT
Start: 2021-05-22 | End: 2021-05-22

## 2021-05-22 RX ADMIN — HEPARIN SODIUM 5000 UNIT(S): 5000 INJECTION INTRAVENOUS; SUBCUTANEOUS at 13:57

## 2021-05-22 RX ADMIN — CEFTRIAXONE 100 MILLIGRAM(S): 500 INJECTION, POWDER, FOR SOLUTION INTRAMUSCULAR; INTRAVENOUS at 21:37

## 2021-05-22 RX ADMIN — Medication 250 MILLIGRAM(S): at 07:25

## 2021-05-22 RX ADMIN — HEPARIN SODIUM 5000 UNIT(S): 5000 INJECTION INTRAVENOUS; SUBCUTANEOUS at 07:25

## 2021-05-22 RX ADMIN — SODIUM CHLORIDE 100 MILLILITER(S): 9 INJECTION, SOLUTION INTRAVENOUS at 07:25

## 2021-05-22 RX ADMIN — Medication 1000 MILLIGRAM(S): at 16:17

## 2021-05-22 RX ADMIN — PANTOPRAZOLE SODIUM 40 MILLIGRAM(S): 20 TABLET, DELAYED RELEASE ORAL at 11:32

## 2021-05-22 RX ADMIN — Medication 1000 MILLIGRAM(S): at 04:43

## 2021-05-22 RX ADMIN — FENTANYL CITRATE 12.5 MICROGRAM(S): 50 INJECTION INTRAVENOUS at 20:16

## 2021-05-22 RX ADMIN — HEPARIN SODIUM 5000 UNIT(S): 5000 INJECTION INTRAVENOUS; SUBCUTANEOUS at 21:37

## 2021-05-22 RX ADMIN — Medication 400 MILLIGRAM(S): at 04:22

## 2021-05-22 RX ADMIN — FENTANYL CITRATE 12.5 MICROGRAM(S): 50 INJECTION INTRAVENOUS at 20:30

## 2021-05-22 RX ADMIN — Medication 400 MILLIGRAM(S): at 14:57

## 2021-05-22 NOTE — PROGRESS NOTE ADULT - ASSESSMENT
70 y/o female with PMH HTN, HLD, LHC, and esophageal dysmotility 2/2 paraesophageal hernia, now s/p transhiatal esophagectomy. Admitted to CTICU post-operatively.     - care per CTICU team

## 2021-05-22 NOTE — PROGRESS NOTE ADULT - SUBJECTIVE AND OBJECTIVE BOX
POST-OP DAY: 1 S/P Transhiatal esophagectomy      SUBJECTIVE:   Patient seen and examined bedside by chief resident.  Out of bed into chair   Denies N/V/CP/SOB   AROBF   Voiding    cefTRIAXone   IVPB      cefTRIAXone   IVPB 2000 milliGRAM(s) IV Intermittent every 24 hours  heparin   Injectable 5000 Unit(s) SubCutaneous every 8 hours    MEDICATIONS  (PRN):      I&O's Detail    21 May 2021 07:01  -  22 May 2021 07:00  --------------------------------------------------------  IN:    IV PiggyBack: 350 mL    Lactated Ringers: 1200 mL    sodium chloride 0.9%: 120 mL  Total IN: 1670 mL    OUT:    Indwelling Catheter - Urethral (mL): 1130 mL    Jejunostomy Tube (mL): 50 mL    Nasogastric/Oral tube (mL): 0 mL  Total OUT: 1180 mL    Total NET: 490 mL      22 May 2021 07:01  -  22 May 2021 15:05  --------------------------------------------------------  IN:    Enteral Tube Flush: 20 mL    Enteral Tube Flush: 20 mL    Lactated Ringers: 800 mL    sodium chloride 0.9%: 80 mL  Total IN: 920 mL    OUT:    Indwelling Catheter - Urethral (mL): 625 mL    Jejunostomy Tube (mL): 20 mL    Nasogastric/Oral tube (mL): 20 mL  Total OUT: 665 mL    Total NET: 255 mL          Vital Signs Last 24 Hrs  T(C): 36.1 (22 May 2021 13:41), Max: 36.3 (22 May 2021 05:01)  T(F): 97 (22 May 2021 13:41), Max: 97.4 (22 May 2021 05:01)  HR: 95 (22 May 2021 14:00) (74 - 98)  BP: 153/72 (22 May 2021 14:00) (144/66 - 153/72)  BP(mean): 104 (22 May 2021 14:00) (96 - 104)  RR: 19 (22 May 2021 14:00) (15 - 20)  SpO2: 95% (22 May 2021 14:00) (91% - 96%)    General: NAD, resting comfortably in bed  C/V: NSR  Neck: incision c/d/i, soft  Pulm: Nonlabored breathing, no respiratory distress  Abd: soft, NT/ND, incision c/d/i w/o rebounding or guarding, NGT sutured in place w minimal output  Extrem: WWP, no edema, SCDs in place    LABS:                        10.8   6.68  )-----------( 238      ( 22 May 2021 03:44 )             33.5     05-22    136  |  100  |  9   ----------------------------<  163<H>  3.6   |  25  |  0.51    Ca    9.1      22 May 2021 03:44  Phos  2.5     05-22  Mg     2.2     05-22    TPro  6.1  /  Alb  3.8  /  TBili  0.4  /  DBili  x   /  AST  482<H>  /  ALT  614<H>  /  AlkPhos  78  05-22    PT/INR - ( 22 May 2021 03:44 )   PT: 13.3 sec;   INR: 1.11          PTT - ( 22 May 2021 03:44 )  PTT:25.8 sec      RADIOLOGY & ADDITIONAL STUDIES:

## 2021-05-22 NOTE — PROGRESS NOTE ADULT - SUBJECTIVE AND OBJECTIVE BOX
INTERVAL HPI/OVERNIGHT EVENTS:    POD#1 Emergent Transhiatal Esophagectomy    72yo Female Hx HTN, HLD (myalgias with statins), Known Paraesophageal hernia w/sxs bloating after fatty meals/Mild GERD on BRAVO but was found to have some esophageal dysmotility - to OR for elective robotic-assisted repair of paraesophageal hernia  with Partial fundoplication.      To OR with general surgery - per Operative note :   after reduction of contents of hiatal hernia - contents noted to be severely adherent in mediastinum  blunt and sharp dissection performed - structures/anatomy identified - on preparation for partial fundoplication an esophageal perforation was noted - thoracic consulted urgently intraop     EGD performed - esophageal tear above GE junction involving most of the circumference  purse [place] esophagectomy performed    Intraop: 3L Crystalloid; 500 albumin given  Ceftriaxone Abx prophylaxis ordered      arrived to ICU extubated and in stable condition; NGT in place - reported to be coiled in mouth this am     PMHx includes but is not limited to:   HTN (hypertension)  Hyperlipidemia  Gastric reflux  Diaphragmatic hernia without obstruction or gangrene  Constipation    ICU Vital Signs Last 24 Hrs  T(C): 36.3 (22 May 2021 05:01), Max: 36.3 (22 May 2021 05:01)  T(F): 97.4 (22 May 2021 05:01), Max: 97.4 (22 May 2021 05:01)  HR: 93 (22 May 2021 08:00) (74 - 98) sinus   ABP: 112/87 (22 May 2021 08:00) (109/80 - 130/65)  ABP(mean): 100 (22 May 2021 08:00) (84 - 102)  RR: 20 (22 May 2021 08:00) (15 - 20)  SpO2: 91% (22 May 2021 08:00) (91% - 95%) 3L NC    Qtts: LR 100cc/hr    I&O's Summary    21 May 2021 07:01  -  22 May 2021 07:00  --------------------------------------------------------  IN: 1670 mL / OUT: 1180 mL / NET: 490 mL    22 May 2021 07:01  -  22 May 2021 08:44  --------------------------------------------------------  IN: 220 mL / OUT: 150 mL / NET: 70 mL    Physical Exam    Heart - regular (-)rub/gallop  Lungs - CTA anterior - no rhonchi/wheeze  Abd - (+)BS soft NTND (-)r/r/g  Ext - warm to touch; no cyanosis/clubbing  Neuro - alert/oriented and interactive - nonfocal   Skin - no rash     LABS:                        10.8   6.68  )-----------( 238      ( 22 May 2021 03:44 )             33.5     05-22    136  |  100  |  9   ----------------------------<  163<H>  3.6   |  25  |  0.51    Ca    9.1      22 May 2021 03:44  Phos  2.5     05-22  Mg     2.2     05-22    TPro  6.1  /  Alb  3.8  /  TBili  0.4  /  DBili  x   /  AST  482<H>  /  ALT  614<H>  /  AlkPhos  78  05-22    PT/INR - ( 22 May 2021 03:44 )   PT: 13.3 sec;   INR: 1.11     PTT - ( 22 May 2021 03:44 )  PTT:25.8 sec    ABG - ( 22 May 2021 03:32 ) 7.42/39/68/97    RADIOLOGY & ADDITIONAL STUDIES: reviewed     Patient with near circumferential esophageal tear above GE junction noted during repair of Paraesophageal hernia now POD#1 emergent transhiatal esophagectomy    1. GI  complete NPO  NGT to remain on ILWS; IVF at 100cc/h for now  complete periop Abx prophylaxis - to discuss with thoracic any need for extended Abx course based on operative findings  thoracic to reposition NGT (coiled in mouth) wish to avoid any disruption and compromise of repair/suture line   pain well controlled per patient - to cont to assess  Protonix IV daily prophylaxis  defer timing of esophagram to thoracic    up and ambulate ; incentive spirometry  Glycemic control - serum 163  Monitor LFT - stable on repeat    DVT prophylaxis    d/w patient/staff and thoracic     I have spent/provided stated minutes of critical care time to this patient: 90

## 2021-05-23 LAB
ALBUMIN SERPL ELPH-MCNC: 3.6 G/DL — SIGNIFICANT CHANGE UP (ref 3.3–5)
ALP SERPL-CCNC: 84 U/L — SIGNIFICANT CHANGE UP (ref 40–120)
ALT FLD-CCNC: 497 U/L — HIGH (ref 10–45)
ANION GAP SERPL CALC-SCNC: 10 MMOL/L — SIGNIFICANT CHANGE UP (ref 5–17)
APTT BLD: 28.2 SEC — SIGNIFICANT CHANGE UP (ref 27.5–35.5)
AST SERPL-CCNC: 251 U/L — HIGH (ref 10–40)
BILIRUB SERPL-MCNC: 0.4 MG/DL — SIGNIFICANT CHANGE UP (ref 0.2–1.2)
BUN SERPL-MCNC: 8 MG/DL — SIGNIFICANT CHANGE UP (ref 7–23)
CALCIUM SERPL-MCNC: 9.5 MG/DL — SIGNIFICANT CHANGE UP (ref 8.4–10.5)
CHLORIDE SERPL-SCNC: 96 MMOL/L — SIGNIFICANT CHANGE UP (ref 96–108)
CO2 SERPL-SCNC: 29 MMOL/L — SIGNIFICANT CHANGE UP (ref 22–31)
CREAT SERPL-MCNC: 0.48 MG/DL — LOW (ref 0.5–1.3)
GLUCOSE SERPL-MCNC: 114 MG/DL — HIGH (ref 70–99)
HCT VFR BLD CALC: 34.5 % — SIGNIFICANT CHANGE UP (ref 34.5–45)
HGB BLD-MCNC: 11.1 G/DL — LOW (ref 11.5–15.5)
INR BLD: 1.15 — SIGNIFICANT CHANGE UP (ref 0.88–1.16)
MAGNESIUM SERPL-MCNC: 2.1 MG/DL — SIGNIFICANT CHANGE UP (ref 1.6–2.6)
MCHC RBC-ENTMCNC: 29.8 PG — SIGNIFICANT CHANGE UP (ref 27–34)
MCHC RBC-ENTMCNC: 32.2 GM/DL — SIGNIFICANT CHANGE UP (ref 32–36)
MCV RBC AUTO: 92.5 FL — SIGNIFICANT CHANGE UP (ref 80–100)
NRBC # BLD: 0 /100 WBCS — SIGNIFICANT CHANGE UP (ref 0–0)
PHOSPHATE SERPL-MCNC: 2 MG/DL — LOW (ref 2.5–4.5)
PLATELET # BLD AUTO: 222 K/UL — SIGNIFICANT CHANGE UP (ref 150–400)
POTASSIUM SERPL-MCNC: 3.5 MMOL/L — SIGNIFICANT CHANGE UP (ref 3.5–5.3)
POTASSIUM SERPL-SCNC: 3.5 MMOL/L — SIGNIFICANT CHANGE UP (ref 3.5–5.3)
PROT SERPL-MCNC: 6.4 G/DL — SIGNIFICANT CHANGE UP (ref 6–8.3)
PROTHROM AB SERPL-ACNC: 13.7 SEC — HIGH (ref 10.6–13.6)
RBC # BLD: 3.73 M/UL — LOW (ref 3.8–5.2)
RBC # FLD: 14.3 % — SIGNIFICANT CHANGE UP (ref 10.3–14.5)
SODIUM SERPL-SCNC: 135 MMOL/L — SIGNIFICANT CHANGE UP (ref 135–145)
WBC # BLD: 6.54 K/UL — SIGNIFICANT CHANGE UP (ref 3.8–10.5)
WBC # FLD AUTO: 6.54 K/UL — SIGNIFICANT CHANGE UP (ref 3.8–10.5)

## 2021-05-23 PROCEDURE — 76604 US EXAM CHEST: CPT | Mod: 26

## 2021-05-23 PROCEDURE — 99292 CRITICAL CARE ADDL 30 MIN: CPT

## 2021-05-23 PROCEDURE — 99291 CRITICAL CARE FIRST HOUR: CPT

## 2021-05-23 PROCEDURE — 71045 X-RAY EXAM CHEST 1 VIEW: CPT | Mod: 26

## 2021-05-23 RX ORDER — POTASSIUM PHOSPHATE, MONOBASIC POTASSIUM PHOSPHATE, DIBASIC 236; 224 MG/ML; MG/ML
30 INJECTION, SOLUTION INTRAVENOUS ONCE
Refills: 0 | Status: COMPLETED | OUTPATIENT
Start: 2021-05-23 | End: 2021-05-23

## 2021-05-23 RX ORDER — HYDRALAZINE HCL 50 MG
10 TABLET ORAL ONCE
Refills: 0 | Status: COMPLETED | OUTPATIENT
Start: 2021-05-23 | End: 2021-05-23

## 2021-05-23 RX ORDER — AMLODIPINE BESYLATE 2.5 MG/1
5 TABLET ORAL DAILY
Refills: 0 | Status: DISCONTINUED | OUTPATIENT
Start: 2021-05-23 | End: 2021-05-23

## 2021-05-23 RX ORDER — SODIUM CHLORIDE 9 MG/ML
3 INJECTION INTRAMUSCULAR; INTRAVENOUS; SUBCUTANEOUS EVERY 8 HOURS
Refills: 0 | Status: DISCONTINUED | OUTPATIENT
Start: 2021-05-23 | End: 2021-06-03

## 2021-05-23 RX ORDER — SODIUM CHLORIDE 9 MG/ML
1000 INJECTION, SOLUTION INTRAVENOUS
Refills: 0 | Status: DISCONTINUED | OUTPATIENT
Start: 2021-05-23 | End: 2021-05-24

## 2021-05-23 RX ADMIN — Medication 10 MILLIGRAM(S): at 21:58

## 2021-05-23 RX ADMIN — SODIUM CHLORIDE 3 MILLILITER(S): 9 INJECTION INTRAMUSCULAR; INTRAVENOUS; SUBCUTANEOUS at 21:58

## 2021-05-23 RX ADMIN — PANTOPRAZOLE SODIUM 40 MILLIGRAM(S): 20 TABLET, DELAYED RELEASE ORAL at 12:06

## 2021-05-23 RX ADMIN — HEPARIN SODIUM 5000 UNIT(S): 5000 INJECTION INTRAVENOUS; SUBCUTANEOUS at 21:57

## 2021-05-23 RX ADMIN — HEPARIN SODIUM 5000 UNIT(S): 5000 INJECTION INTRAVENOUS; SUBCUTANEOUS at 06:51

## 2021-05-23 RX ADMIN — POTASSIUM PHOSPHATE, MONOBASIC POTASSIUM PHOSPHATE, DIBASIC 83.33 MILLIMOLE(S): 236; 224 INJECTION, SOLUTION INTRAVENOUS at 06:51

## 2021-05-23 RX ADMIN — HEPARIN SODIUM 5000 UNIT(S): 5000 INJECTION INTRAVENOUS; SUBCUTANEOUS at 14:16

## 2021-05-23 NOTE — PROGRESS NOTE ADULT - SUBJECTIVE AND OBJECTIVE BOX
SUBJECTIVE: Patient seen and examined bedside by chief resident.    heparin   Injectable 5000 Unit(s) SubCutaneous every 8 hours    MEDICATIONS  (PRN):      I&O's Detail    21 May 2021 07:01  -  22 May 2021 07:00  --------------------------------------------------------  IN:    IV PiggyBack: 350 mL    Lactated Ringers: 1200 mL    sodium chloride 0.9%: 120 mL  Total IN: 1670 mL    OUT:    Indwelling Catheter - Urethral (mL): 1130 mL    Jejunostomy Tube (mL): 50 mL    Nasogastric/Oral tube (mL): 0 mL  Total OUT: 1180 mL    Total NET: 490 mL      22 May 2021 07:01  -  23 May 2021 06:32  --------------------------------------------------------  IN:    Enteral Tube Flush: 120 mL    Enteral Tube Flush: 120 mL    Lactated Ringers: 1550 mL    sodium chloride 0.9%: 230 mL  Total IN: 2020 mL    OUT:    Indwelling Catheter - Urethral (mL): 1830 mL    Jejunostomy Tube (mL): 120 mL    Nasogastric/Oral tube (mL): 220 mL  Total OUT: 2170 mL    Total NET: -150 mL          Vital Signs Last 24 Hrs  T(C): 36.4 (23 May 2021 01:01), Max: 36.4 (23 May 2021 01:01)  T(F): 97.6 (23 May 2021 01:01), Max: 97.6 (23 May 2021 01:01)  HR: 96 (23 May 2021 06:00) (84 - 102)  BP: 137/65 (23 May 2021 06:00) (125/69 - 168/83)  BP(mean): 94 (23 May 2021 06:00) (88 - 113)  RR: 19 (23 May 2021 07:00) (17 - 20)  SpO2: 91% (23 May 2021 06:00) (91% - 96%)    General: NAD, resting comfortably in bed  C/V: Regular rate  Pulm: Nonlabored breathing, no respiratory distress  Abd:   Extrem: WWP, no edema, SCDs in place    LABS:                        11.1   6.54  )-----------( 222      ( 23 May 2021 03:50 )             34.5     05-23    135  |  96  |  8   ----------------------------<  114<H>  3.5   |  29  |  0.48<L>    Ca    9.5      23 May 2021 03:50  Phos  2.0     05-23  Mg     2.1     05-23    TPro  6.4  /  Alb  3.6  /  TBili  0.4  /  DBili  x   /  AST  251<H>  /  ALT  497<H>  /  AlkPhos  84  05-23    PT/INR - ( 23 May 2021 03:50 )   PT: 13.7 sec;   INR: 1.15          PTT - ( 23 May 2021 03:50 )  PTT:28.2 sec      RADIOLOGY & ADDITIONAL STUDIES:     SUBJECTIVE: Patient seen and examined bedside by chief resident, endorses NGT discomfort    heparin   Injectable 5000 Unit(s) SubCutaneous every 8 hours    MEDICATIONS  (PRN):    I&O's Detail    21 May 2021 07:01  -  22 May 2021 07:00  --------------------------------------------------------  IN:    IV PiggyBack: 350 mL    Lactated Ringers: 1200 mL    sodium chloride 0.9%: 120 mL  Total IN: 1670 mL    OUT:    Indwelling Catheter - Urethral (mL): 1130 mL    Jejunostomy Tube (mL): 50 mL    Nasogastric/Oral tube (mL): 0 mL  Total OUT: 1180 mL    Total NET: 490 mL      22 May 2021 07:01  -  23 May 2021 06:32  --------------------------------------------------------  IN:    Enteral Tube Flush: 120 mL    Enteral Tube Flush: 120 mL    Lactated Ringers: 1550 mL    sodium chloride 0.9%: 230 mL  Total IN: 2020 mL    OUT:    Indwelling Catheter - Urethral (mL): 1830 mL    Jejunostomy Tube (mL): 120 mL    Nasogastric/Oral tube (mL): 220 mL  Total OUT: 2170 mL    Total NET: -150 mL          Vital Signs Last 24 Hrs  T(C): 36.4 (23 May 2021 01:01), Max: 36.4 (23 May 2021 01:01)  T(F): 97.6 (23 May 2021 01:01), Max: 97.6 (23 May 2021 01:01)  HR: 96 (23 May 2021 06:00) (84 - 102)  BP: 137/65 (23 May 2021 06:00) (125/69 - 168/83)  BP(mean): 94 (23 May 2021 06:00) (88 - 113)  RR: 19 (23 May 2021 07:00) (17 - 20)  SpO2: 91% (23 May 2021 06:00) (91% - 96%)    General: NAD, resting comfortably in bed  C/V: Regular rate  Pulm: Nonlabored breathing, no respiratory distress  Abd: soft, non tender, no rebound guarding or rigidity  Extrem: WWP, no edema, SCDs in place    LABS:                        11.1   6.54  )-----------( 222      ( 23 May 2021 03:50 )             34.5     05-23    135  |  96  |  8   ----------------------------<  114<H>  3.5   |  29  |  0.48<L>    Ca    9.5      23 May 2021 03:50  Phos  2.0     05-23  Mg     2.1     05-23    TPro  6.4  /  Alb  3.6  /  TBili  0.4  /  DBili  x   /  AST  251<H>  /  ALT  497<H>  /  AlkPhos  84  05-23    PT/INR - ( 23 May 2021 03:50 )   PT: 13.7 sec;   INR: 1.15          PTT - ( 23 May 2021 03:50 )  PTT:28.2 sec      RADIOLOGY & ADDITIONAL STUDIES:

## 2021-05-23 NOTE — PROGRESS NOTE ADULT - SUBJECTIVE AND OBJECTIVE BOX
INTERVAL HPI/OVERNIGHT EVENTS:    POD#2 Emergent Transhiatal Esophagectomy    72yo Female Hx HTN, HLD (myalgias with statins), Known Paraesophageal hernia w/sxs bloating after fatty meals/Mild GERD on BRAVO but was found to have some esophageal dysmotility - to OR for elective robotic-assisted repair of paraesophageal hernia  with Partial fundoplication.      To OR with general surgery - per Operative note :   after reduction of contents of hiatal hernia - contents noted to be severely adherent in mediastinum  blunt and sharp dissection performed - structures/anatomy identified - on preparation for partial fundoplication an esophageal perforation was noted - thoracic consulted urgently intraop     EGD performed - esophageal tear above GE junction involving most of the circumference  purse [place] esophagectomy performed    Intraop: 3L Crystalloid; 500 albumin given  Ceftriaxone Abx prophylaxis ordered      arrived to ICU extubated and in stable condition; NGT in place   no acute events overnight - remains NPO    PMHx includes but is not limited to:   HTN (hypertension)  Hyperlipidemia  Gastric reflux  Diaphragmatic hernia without obstruction or gangrene  Constipation    ICU Vital Signs Last 24 Hrs  T(C): 36.4 (23 May 2021 10:00), Max: 36.4 (23 May 2021 01:01)  T(F): 97.6 (23 May 2021 10:00), Max: 97.6 (23 May 2021 01:01)  HR: 98 (23 May 2021 09:00) (84 - 102) sinus   BP: 125/64 (23 May 2021 09:00) (125/64 - 168/83)  BP(mean): 85 (23 May 2021 09:00) (85 - 113)  ABP: 138/126 (22 May 2021 12:00) (116/91 - 138/126)  ABP(mean): 131 (22 May 2021 12:00) (101 - 131)  RR: 18 (23 May 2021 09:00) (17 - 19)  SpO2: 94% (23 May 2021 09:00) (91% - 96%) 2L NC    Qtts:   LR 50cc/hour    I&O's Summary    22 May 2021 07:01  -  23 May 2021 07:00  --------------------------------------------------------  IN: 2080 mL / OUT: 2265 mL / NET: -185 mL    23 May 2021 07:01  -  23 May 2021 09:42  --------------------------------------------------------  IN: 60 mL / OUT: 45 mL / NET: 15 mL    Physical Exam    Heart - regular (-)rub/gallop  Lungs - CTA anterior - no rhonchi/wheeze  Abd - (+)BS soft NTND (-)r/r/g  Ext - warm to touch; no cyanosis/clubbing  Neuro - alert/oriented and interactive - nonfocal   Skin - no rash     LABS:                        11.1   6.54  )-----------( 222      ( 23 May 2021 03:50 )             34.5     05-23    135  |  96  |  8   ----------------------------<  114<H>  3.5   |  29  |  0.48<L>    Ca    9.5      23 May 2021 03:50  Phos  2.0     05-23  Mg     2.1     05-23    TPro  6.4  /  Alb  3.6  /  TBili  0.4  /  DBili  x   /  AST  251<H>  /  ALT  497<H>  /  AlkPhos  84  05-23    PT/INR - ( 23 May 2021 03:50 )   PT: 13.7 sec;   INR: 1.15     PTT - ( 23 May 2021 03:50 )  PTT:28.2 sec    ABG - ( 22 May 2021 03:32 ) 7.42/39/68/97    RADIOLOGY & ADDITIONAL STUDIES: reviewed     Patient with near circumferential esophageal tear above GE junction noted during repair of Paraesophageal hernia now POD#2 emergent transhiatal esophagectomy    1. GI  complete NPO  NGT to remain on ILWS; IVF at 100cc/h for now  complete periop Abx prophylaxis   thoracic repositioned NGT 5/23  pain well controlled per patient - to cont to assess - intermittent IV ofirmev  Protonix IV daily prophylaxis  defer timing of esophagram to thoracic    up and ambulate ; incentive spirometry  Glycemic control - serum 114  Monitor LFT - trending down     DVT prophylaxis    d/w patient/staff and thoracic     anticipate transfer to floor later today    I have spent/provided stated minutes of critical care time to this patient: 90

## 2021-05-24 PROBLEM — K44.9 DIAPHRAGMATIC HERNIA WITHOUT OBSTRUCTION OR GANGRENE: Chronic | Status: ACTIVE | Noted: 2021-05-20

## 2021-05-24 PROBLEM — K21.9 GASTRO-ESOPHAGEAL REFLUX DISEASE WITHOUT ESOPHAGITIS: Chronic | Status: ACTIVE | Noted: 2021-05-20

## 2021-05-24 PROBLEM — K59.00 CONSTIPATION, UNSPECIFIED: Chronic | Status: ACTIVE | Noted: 2021-05-20

## 2021-05-24 LAB
ANION GAP SERPL CALC-SCNC: 11 MMOL/L — SIGNIFICANT CHANGE UP (ref 5–17)
BUN SERPL-MCNC: 12 MG/DL — SIGNIFICANT CHANGE UP (ref 7–23)
CALCIUM SERPL-MCNC: 9.6 MG/DL — SIGNIFICANT CHANGE UP (ref 8.4–10.5)
CHLORIDE SERPL-SCNC: 100 MMOL/L — SIGNIFICANT CHANGE UP (ref 96–108)
CO2 SERPL-SCNC: 26 MMOL/L — SIGNIFICANT CHANGE UP (ref 22–31)
CREAT SERPL-MCNC: 0.45 MG/DL — LOW (ref 0.5–1.3)
GLUCOSE SERPL-MCNC: 101 MG/DL — HIGH (ref 70–99)
HCT VFR BLD CALC: 33.4 % — LOW (ref 34.5–45)
HGB BLD-MCNC: 10.6 G/DL — LOW (ref 11.5–15.5)
MAGNESIUM SERPL-MCNC: 2.3 MG/DL — SIGNIFICANT CHANGE UP (ref 1.6–2.6)
MCHC RBC-ENTMCNC: 29.5 PG — SIGNIFICANT CHANGE UP (ref 27–34)
MCHC RBC-ENTMCNC: 31.7 GM/DL — LOW (ref 32–36)
MCV RBC AUTO: 93 FL — SIGNIFICANT CHANGE UP (ref 80–100)
NRBC # BLD: 0 /100 WBCS — SIGNIFICANT CHANGE UP (ref 0–0)
PLATELET # BLD AUTO: 250 K/UL — SIGNIFICANT CHANGE UP (ref 150–400)
POTASSIUM SERPL-MCNC: 3.8 MMOL/L — SIGNIFICANT CHANGE UP (ref 3.5–5.3)
POTASSIUM SERPL-SCNC: 3.8 MMOL/L — SIGNIFICANT CHANGE UP (ref 3.5–5.3)
RBC # BLD: 3.59 M/UL — LOW (ref 3.8–5.2)
RBC # FLD: 14.2 % — SIGNIFICANT CHANGE UP (ref 10.3–14.5)
SODIUM SERPL-SCNC: 137 MMOL/L — SIGNIFICANT CHANGE UP (ref 135–145)
WBC # BLD: 6.21 K/UL — SIGNIFICANT CHANGE UP (ref 3.8–10.5)
WBC # FLD AUTO: 6.21 K/UL — SIGNIFICANT CHANGE UP (ref 3.8–10.5)

## 2021-05-24 PROCEDURE — 71045 X-RAY EXAM CHEST 1 VIEW: CPT | Mod: 26

## 2021-05-24 RX ORDER — SODIUM CHLORIDE 9 MG/ML
1000 INJECTION, SOLUTION INTRAVENOUS
Refills: 0 | Status: DISCONTINUED | OUTPATIENT
Start: 2021-05-24 | End: 2021-05-25

## 2021-05-24 RX ORDER — ACETAMINOPHEN 500 MG
1000 TABLET ORAL ONCE
Refills: 0 | Status: COMPLETED | OUTPATIENT
Start: 2021-05-24 | End: 2021-05-24

## 2021-05-24 RX ADMIN — SODIUM CHLORIDE 3 MILLILITER(S): 9 INJECTION INTRAMUSCULAR; INTRAVENOUS; SUBCUTANEOUS at 21:14

## 2021-05-24 RX ADMIN — SODIUM CHLORIDE 3 MILLILITER(S): 9 INJECTION INTRAMUSCULAR; INTRAVENOUS; SUBCUTANEOUS at 13:08

## 2021-05-24 RX ADMIN — HEPARIN SODIUM 5000 UNIT(S): 5000 INJECTION INTRAVENOUS; SUBCUTANEOUS at 22:17

## 2021-05-24 RX ADMIN — HEPARIN SODIUM 5000 UNIT(S): 5000 INJECTION INTRAVENOUS; SUBCUTANEOUS at 05:52

## 2021-05-24 RX ADMIN — SODIUM CHLORIDE 50 MILLILITER(S): 9 INJECTION, SOLUTION INTRAVENOUS at 14:53

## 2021-05-24 RX ADMIN — Medication 400 MILLIGRAM(S): at 17:39

## 2021-05-24 RX ADMIN — Medication 1000 MILLIGRAM(S): at 17:50

## 2021-05-24 RX ADMIN — SODIUM CHLORIDE 3 MILLILITER(S): 9 INJECTION INTRAMUSCULAR; INTRAVENOUS; SUBCUTANEOUS at 05:52

## 2021-05-24 RX ADMIN — HEPARIN SODIUM 5000 UNIT(S): 5000 INJECTION INTRAVENOUS; SUBCUTANEOUS at 13:08

## 2021-05-24 RX ADMIN — PANTOPRAZOLE SODIUM 40 MILLIGRAM(S): 20 TABLET, DELAYED RELEASE ORAL at 13:08

## 2021-05-24 NOTE — PROGRESS NOTE ADULT - SUBJECTIVE AND OBJECTIVE BOX
Patient discussed on morning rounds with Dr. Pinto    Operation / Date: 5/21/21 transhiatal esophagectomy after esophageal perforation during hiatal hernia repair     SUBJECTIVE ASSESSMENT:  71y Female assessed at bedside this morning. Patient states she does not have significant pain but notes a little discomfort. Denies nausea, vomiting. Is using IS (pulling 500 cc). Denies fever, chills, nausea, vomiting. No BM yet, +flatus, no abdominal pain.     Vital Signs Last 24 Hrs  T(C): 36.9 (24 May 2021 13:32), Max: 36.9 (24 May 2021 13:32)  T(F): 98.5 (24 May 2021 13:32), Max: 98.5 (24 May 2021 13:32)  HR: 92 (24 May 2021 08:42) (92 - 111)  BP: 119/72 (24 May 2021 08:42) (119/72 - 170/81)  BP(mean): 88 (24 May 2021 08:42) (88 - 115)  RR: 17 (24 May 2021 08:42) (16 - 18)  SpO2: 94% (24 May 2021 08:42) (93% - 98%)  I&O's Detail    23 May 2021 07:01  -  24 May 2021 07:00  --------------------------------------------------------  IN:    Enteral Tube Flush: 80 mL    Enteral Tube Flush: 80 mL    IV PiggyBack: 332 mL    Jevity 1.5: 140 mL    Lactated Ringers: 50 mL    Lactated Ringers: 1050 mL    sodium chloride 0.9%: 40 mL  Total IN: 1772 mL    OUT:    Indwelling Catheter - Urethral (mL): 185 mL    Jejunostomy Tube (mL): 0 mL    Nasogastric/Oral tube (mL): 500 mL    Voided (mL): 650 mL  Total OUT: 1335 mL    Total NET: 437 mL      24 May 2021 07:01  -  24 May 2021 14:16  --------------------------------------------------------  IN:    Jevity 1.5: 10 mL  Total IN: 10 mL    OUT:  Total OUT: 0 mL    Total NET: 10 mL    CHEST TUBE:  No  LISETH DRAIN:  No.  EPICARDIAL WIRES: No.  TIE DOWNS: retention sutures in place  JOY: No.    Physical Exam  CONSTITUTIONAL: Well appearing in NAD assessed sitting at bedside   NEURO: A&OX3. No focal deficits noted, moving bilateral upper and lower extremities                    CV: RRR, no murmurs, rubs, gallops  RESPIRATORY: Clear to auscultation bilateral posterior lung fields, no wheezes, rales, rhonchi   GI: +BS, NT/ND. J tube in place and NGT in place   MUSCULOSKELETAL: No peripheral edema or calf tenderness. Full strength and ROM bilateral upper and lower extremities   VASCULAR: Bilateral distal pulses 2+  INCISIONS: neck incision clean, dry, intact with retention sutures in place. Abdominal incisions clean, dry, intact.     LABS:                        10.6   6.21  )-----------( 250      ( 24 May 2021 05:57 )             33.4       COUMADIN:  No    PT/INR - ( 23 May 2021 03:50 )   PT: 13.7 sec;   INR: 1.15          PTT - ( 23 May 2021 03:50 )  PTT:28.2 sec    05-24    137  |  100  |  12  ----------------------------<  101<H>  3.8   |  26  |  0.45<L>    Ca    9.6      24 May 2021 05:57  Phos  2.0     05-23  Mg     2.3     05-24    TPro  6.4  /  Alb  3.6  /  TBili  0.4  /  DBili  x   /  AST  251<H>  /  ALT  497<H>  /  AlkPhos  84  05-23      MEDICATIONS  (STANDING):  heparin   Injectable 5000 Unit(s) SubCutaneous every 8 hours  lactated ringers. 1000 milliLiter(s) (50 mL/Hr) IV Continuous <Continuous>  pantoprazole  Injectable 40 milliGRAM(s) IV Push daily  sodium chloride 0.9% lock flush 3 milliLiter(s) IV Push every 8 hours    MEDICATIONS  (PRN):    RADIOLOGY & ADDITIONAL TESTS:  < from: Xray Chest 1 View- PORTABLE-Routine (Xray Chest 1 View- PORTABLE-Routine in AM.) (05.24.21 @ 05:56) >  INTERPRETATION:  Chest x-ray    Indication: Status post esophagectomy    An AP view of the chest is compared to the prior study dated 5/23/2021. Enteric tube is again noted with the tip overlying the lower mediastinum. Stable heart size. Pulmonary congestion. Small effusions, left greater than right. No new consolidation. Small subcutaneous emphysema left chest wall.      IMPRESSION: Pulmonary congestion and small effusions, left greater than right.    < end of copied text >

## 2021-05-24 NOTE — PROGRESS NOTE ADULT - ASSESSMENT
70 y/o female with PMH HTN, HLD (myalgias with statins), and paraesophageal hernia resulting in mild GERD and esophageal dysmotility. She was evaluated as an outpatient by general surgery for elective repair and on 5/21 presented for her RA repair of paraesophageal hernia with partial  fundoplication. Intraop course complicated by esophageal perforation for which Dr. Alberto and Dr. Pinto were consulted for urgently. EGD was preformed and tear was not amenable to primary repair. They proceeded with an uncomplicated transhiatal esophagectomy. Post operatively she was brought to the CTICU extubated. Trickle feeds were started POD2 and she was transferred to [facility].     POD3 today, patient is feeling well, ome changes in her voice, ENT performed Laryngoscopy at bedside let true vocal cords hypomobile or immobile possibly 2/2 post surgical edema or nerve injury during the procedure    Vitals are stable, abdomen is soft  WBC WNL    Discussed with attending and chief resident:    - Continue management as planned by primary team  - Surgery team 4 will continue to FU

## 2021-05-24 NOTE — PROGRESS NOTE ADULT - ASSESSMENT
A/P:    72 y/o female with PMH HTN, HLD (myalgias with statins), and paraesophageal hernia resulting in mild GERD and esophageal dysmotility. She was evaluated as an outpatient by general surgery for elective repair and on 5/21 presented for her RA repair of paraesophageal hernia with partial fundoplication. Intraop     , previous cath for abnormal stress test that was normal in 6/2019 and Paraesophageal hernia  that has been there for a long time and the only symptom the patient had is feeling bloated after fatty meals, denies dysphagia, chest pain, SOB or asthma.  Mild GERD on BRAVO but was found to have some esophageal dysmotility, presents today for robotic-assisted repair of paraesophageal hernia  with Partial fundoplication.     70yo Female Hx HTN, HLD (myalgias with statins), Known Paraesophageal hernia w/sxs bloating after fatty meals/Mild GERD on BRAVO but was found to have some esophageal dysmotility - to OR for elective robotic-assisted repair of paraesophageal hernia  with Partial fundoplication.      To OR with general surgery - per Operative note :   after reduction of contents of hiatal hernia - contents noted to be severely adherent in mediastinum  blunt and sharp dissection performed - structures/anatomy identified - on preparation for partial fundoplication an esophageal perforation was noted - thoracic consulted urgently intraop     EGD performed - esophageal tear above GE junction involving most of the circumference  purse [place] esophagectomy performed    70yo Female Hx HTN, HLD (myalgias with statins), Known Paraesophageal hernia w/sxs bloating after fatty meals/Mild GERD on BRAVO but was found to have some esophageal dysmotility –   Admitted for elective jaime-assisted repair of para esophageal hernia  with Partial fundoplication.    5/21: OR with general surgery : After reduction of contents of hiatal hernia - contents noted to be severely adherent in mediastinum. Blunt/sharp dissection performed in prep for partial fundoplication an esophageal perforation was noted - thoracic consulted intraop   EGD: esophageal tear above GE junction involve most circumference  purse [place] esophagectomy performed  Intraop: 3L Crystalloid; 500 albumin given. Ceftriaxone prophylaxis. Arrived extubated/stable  5/22: NGT in place – repositioned.  05/23/21:  Neg. 150 cc.     Neurovascular:   - No delirium. Pain well controlled with current regimen.  -Continue tylenol PRN    Cardiovascular:   - POD_ s/p _  -Hemodynamically stable. HR controlled (X-X)  - Hx of HTN, BP controlled (X-X)  - ASA, Plavix, BB, statin  - EKG. TTE. Cardiac Panel. Lipid Panel. BNP.      Respiratory:   - 02 Sat = 98% on RA.  -If on oxygen wean to RA from for O2 Sat > 93%.  -Encourage C+DB and Use of IS 10x / hr while awake.  -CXR.    GI:   - Stable.  -NPO after MN.  -Continue GI PPX with protonix  -PO Diet.    Renal / :  - BUN/Cr stable at X/X  -Continue to monitor renal function.  -Monitor I/O's.  - Replete electrolytes PRN    Endocrine:    -A1c.  -TSH.    Hematologic:  -H/H stable at X/X with no obvious signs of bleeding  -Coagulation Panel.    ID:  -Pt remains afebrile with no elevation in WBC or signs of infection  -Continue to monitor CBC  -Observe for SIRS/Sepsis Syndrome.    Prophylaxis:  -DVT prophylaxis with 5000 SubQ Heparin q8h.  -SCD's    Disposition:  -Home when medically appropriate.   A/P:    72 y/o female with PMH HTN, HLD (myalgias with statins), and paraesophageal hernia resulting in mild GERD and esophageal dysmotility. She was evaluated as an outpatient by general surgery for elective repair and on 5/21 presented for her RA repair of paraesophageal hernia with partial fundoplication. Intraop course complicated by     , previous cath for abnormal stress test that was normal in 6/2019 and Paraesophageal hernia  that has been there for a long time and the only symptom the patient had is feeling bloated after fatty meals, denies dysphagia, chest pain, SOB or asthma.  Mild GERD on BRAVO but was found to have some esophageal dysmotility, presents today for robotic-assisted repair of paraesophageal hernia  with Partial fundoplication.     70yo Female Hx HTN, HLD (myalgias with statins), Known Paraesophageal hernia w/sxs bloating after fatty meals/Mild GERD on BRAVO but was found to have some esophageal dysmotility - to OR for elective robotic-assisted repair of paraesophageal hernia  with Partial fundoplication.      To OR with general surgery - per Operative note :   after reduction of contents of hiatal hernia - contents noted to be severely adherent in mediastinum  blunt and sharp dissection performed - structures/anatomy identified - on preparation for partial fundoplication an esophageal perforation was noted - thoracic consulted urgently intraop     EGD performed - esophageal tear above GE junction involving most of the circumference  purse [place] esophagectomy performed    70yo Female Hx HTN, HLD (myalgias with statins), Known Paraesophageal hernia w/sxs bloating after fatty meals/Mild GERD on BRAVO but was found to have some esophageal dysmotility –   Admitted for elective jaime-assisted repair of para esophageal hernia  with Partial fundoplication.    5/21: OR with general surgery : After reduction of contents of hiatal hernia - contents noted to be severely adherent in mediastinum. Blunt/sharp dissection performed in prep for partial fundoplication an esophageal perforation was noted - thoracic consulted intraop   EGD: esophageal tear above GE junction involve most circumference  purse [place] esophagectomy performed  Intraop: 3L Crystalloid; 500 albumin given. Ceftriaxone prophylaxis. Arrived extubated/stable  5/22: NGT in place – repositioned.  05/23/21:  Neg. 150 cc.     Neurovascular:   - No delirium. Pain well controlled with current regimen.  -Continue tylenol PRN    Cardiovascular:   - POD_ s/p _  -Hemodynamically stable. HR controlled (X-X)  - Hx of HTN, BP controlled (X-X)  - ASA, Plavix, BB, statin  - EKG. TTE. Cardiac Panel. Lipid Panel. BNP.      Respiratory:   - 02 Sat = 98% on RA.  -If on oxygen wean to RA from for O2 Sat > 93%.  -Encourage C+DB and Use of IS 10x / hr while awake.  -CXR.    GI:   - Stable.  -NPO after MN.  -Continue GI PPX with protonix  -PO Diet.    Renal / :  - BUN/Cr stable at X/X  -Continue to monitor renal function.  -Monitor I/O's.  - Replete electrolytes PRN    Endocrine:    -A1c.  -TSH.    Hematologic:  -H/H stable at X/X with no obvious signs of bleeding  -Coagulation Panel.    ID:  -Pt remains afebrile with no elevation in WBC or signs of infection  -Continue to monitor CBC  -Observe for SIRS/Sepsis Syndrome.    Prophylaxis:  -DVT prophylaxis with 5000 SubQ Heparin q8h.  -SCD's    Disposition:  -Home when medically appropriate.   A/P:    72 y/o female with PMH HTN, HLD (myalgias with statins), and paraesophageal hernia resulting in mild GERD and esophageal dysmotility. She was evaluated as an outpatient by general surgery for elective repair and on 5/21 presented for her RA repair of paraesophageal hernia with partial  fundoplication. Intraop course complicated by esophageal perforation for which Dr. Alberto and Dr. Pinto were consulted for urgently. EGD was preformed and tear was not amenable to primary repair. They proceeded with an uncomplicated transhiatal esophagectomy. Post operatively she was brought to the CTICU extubated. Trickle feeds were started POD2 and she was transferred to [facility].     Neurovascular:   - No delirium. Pain well controlled with current regimen.  -Continue tylenol PRN    Cardiovascular:   -Hemodynamically stable. HR controlled ()  - Hx of HTN, BP controlled (121//81)    HEENT:  - Post op voice changes: ENT consulted for laryngoscopy     Respiratory:   - 02 Sat = 93% on RA.  -If on oxygen wean to RA from for O2 Sat > 93%.  -Encourage C+DB and Use of IS 10x / hr while awake.  -CXR with small L effusion     GI:   - POD3 s/p emergent transhiatal esophagectomy after esophageal perforation in OR   - Continue tube feeds through J tube, titrating to goal per nutrition   - NGT remains in place per throacic protocol   -Continue GI PPX with protonix    Renal / :  - BUN/Cr stable at 12/0.45  -Continue to monitor renal function.  -Monitor I/O's.  - Replete electrolytes PRN    Endocrine:    - no known hx diabetes or thyroid disease     Hematologic:  -H/H stable at 10.6/33.4 with no obvious signs of bleeding  -Coagulation Panel.    ID:  -Pt remains afebrile with no elevation in WBC or signs of infection  -Continue to monitor CBC  -Observe for SIRS/Sepsis Syndrome.    Prophylaxis:  -DVT prophylaxis with 5000 SubQ Heparin q8h.  -SCD's    Disposition:  -Home when medically appropriate.

## 2021-05-24 NOTE — DIETITIAN INITIAL EVALUATION ADULT. - OTHER INFO
71F with PMH HTN, HLD (myalgias with statins), and paraesophageal hernia resulting in mild GERD and esophageal dysmotility. She was evaluated as an outpatient by general surgery for elective repair and on 5/21 presented for her RA repair of paraesophageal hernia with partial  fundoplication. Intraop course complicated by esophageal perforation for which Dr. Alberto and Dr. Pinto were consulted for urgently. EGD was preformed and tear was not amenable to primary repair. They proceeded with an uncomplicated transhiatal esophagectomy. Post operatively she was brought to the CTICU extubated. Trickle feeds were started POD2 and she was transferred to [facility]. Spoke with team this morning for TF advancement, placed order for verification. See recs below. Pt currently with NGT to suction, 500mL brown output noted, feeds running at 10ml/hr this morning, pt noting throat discomfort when using IS, feeling the need for more suctioning, RN aware. Otherwise denies n/v/d/c, skin with midline neck incision/abdomen incision, shauna 20. Discussed purpose of TF to meet needs at this time. Reports wt has been stable. Will follow per protocol, see recs below.

## 2021-05-24 NOTE — DIETITIAN INITIAL EVALUATION ADULT. - OTHER CALCULATIONS
Ideal body weight used for calculations as pt >120% of IBW. (177% IBW). Nutrient needs based on [facility] standards of care for maintenance in adults, adjusted for age, post-op needs

## 2021-05-24 NOTE — DIETITIAN INITIAL EVALUATION ADULT. - NUTRITION DIAGNOSIS
SW utilized Language Soraida Green  to reach out to patient. He did not answer. JUAN M did not leave a VM.     EMILIAON Jasmine     yes...

## 2021-05-24 NOTE — PROGRESS NOTE ADULT - SUBJECTIVE AND OBJECTIVE BOX
SUBJECTIVE:  Minimal discomfort patient was sitting in chair when I saw her at the bedside  Endorses some change in her voice    MEDICATIONS  (STANDING):  heparin   Injectable 5000 Unit(s) SubCutaneous every 8 hours  lactated ringers. 1000 milliLiter(s) (50 mL/Hr) IV Continuous <Continuous>  pantoprazole  Injectable 40 milliGRAM(s) IV Push daily  sodium chloride 0.9% lock flush 3 milliLiter(s) IV Push every 8 hours    MEDICATIONS  (PRN):      Vital Signs Last 24 Hrs  T(C): 36.9 (24 May 2021 13:32), Max: 36.9 (24 May 2021 13:32)  T(F): 98.5 (24 May 2021 13:32), Max: 98.5 (24 May 2021 13:32)  HR: 94 (24 May 2021 13:19) (92 - 111)  BP: 122/76 (24 May 2021 13:19) (119/72 - 170/81)  BP(mean): 93 (24 May 2021 13:19) (88 - 115)  RR: 17 (24 May 2021 13:19) (16 - 17)  SpO2: 92% (24 May 2021 13:19) (92% - 96%)    Physical Exam:  General: NAD, resting comfortably in bed  Neck: Incision is c/d/i, sutures in place  Pulmonary: Nonlabored breathing, no respiratory distress  Cardiovascular: NSR  Abdominal: soft, mild discomfort around inciaons which are c/d/i  Extremities: WWP, normal strength  Neuro: A/O x 3, CNs II-XII grossly intact, no focal deficits, normal motor/sensation  Pulses: palpable distal pulses    I&O's Summary    23 May 2021 07:01  -  24 May 2021 07:00  --------------------------------------------------------  IN: 1772 mL / OUT: 1335 mL / NET: 437 mL    24 May 2021 07:01  -  24 May 2021 16:59  --------------------------------------------------------  IN: 10 mL / OUT: 150 mL / NET: -140 mL        LABS:                        10.6   6.21  )-----------( 250      ( 24 May 2021 05:57 )             33.4     05-24    137  |  100  |  12  ----------------------------<  101<H>  3.8   |  26  |  0.45<L>    Ca    9.6      24 May 2021 05:57  Phos  2.0     05-23  Mg     2.3     05-24    TPro  6.4  /  Alb  3.6  /  TBili  0.4  /  DBili  x   /  AST  251<H>  /  ALT  497<H>  /  AlkPhos  84  05-23    PT/INR - ( 23 May 2021 03:50 )   PT: 13.7 sec;   INR: 1.15          PTT - ( 23 May 2021 03:50 )  PTT:28.2 sec    CAPILLARY BLOOD GLUCOSE        LIVER FUNCTIONS - ( 23 May 2021 03:50 )  Alb: 3.6 g/dL / Pro: 6.4 g/dL / ALK PHOS: 84 U/L / ALT: 497 U/L / AST: 251 U/L / GGT: x             RADIOLOGY & ADDITIONAL STUDIES:       SUBJECTIVE:  Minimal discomfort patient was sitting in chair when I saw her at the bedside  Endorses some change in her voice    MEDICATIONS  (STANDING):  heparin   Injectable 5000 Unit(s) SubCutaneous every 8 hours  lactated ringers. 1000 milliLiter(s) (50 mL/Hr) IV Continuous <Continuous>  pantoprazole  Injectable 40 milliGRAM(s) IV Push daily  sodium chloride 0.9% lock flush 3 milliLiter(s) IV Push every 8 hours    MEDICATIONS  (PRN):      Vital Signs Last 24 Hrs  T(C): 36.9 (24 May 2021 13:32), Max: 36.9 (24 May 2021 13:32)  T(F): 98.5 (24 May 2021 13:32), Max: 98.5 (24 May 2021 13:32)  HR: 94 (24 May 2021 13:19) (92 - 111)  BP: 122/76 (24 May 2021 13:19) (119/72 - 170/81)  BP(mean): 93 (24 May 2021 13:19) (88 - 115)  RR: 17 (24 May 2021 13:19) (16 - 17)  SpO2: 92% (24 May 2021 13:19) (92% - 96%)    Physical Exam:  General: NAD, resting comfortably in bed  Neck: Incision is c/d/i, sutures in place  Pulmonary: Nonlabored breathing, no respiratory distress  Cardiovascular: NSR  Abdominal: soft, mild discomfort around incisions which are c/d/i  Extremities: WWP, normal strength  Neuro: A/O x 3 no focal deficits, normal motor/sensation  Pulses: palpable distal pulses    I&O's Summary    23 May 2021 07:01  -  24 May 2021 07:00  --------------------------------------------------------  IN: 1772 mL / OUT: 1335 mL / NET: 437 mL    24 May 2021 07:01  -  24 May 2021 16:59  --------------------------------------------------------  IN: 10 mL / OUT: 150 mL / NET: -140 mL        LABS:                        10.6   6.21  )-----------( 250      ( 24 May 2021 05:57 )             33.4     05-24    137  |  100  |  12  ----------------------------<  101<H>  3.8   |  26  |  0.45<L>    Ca    9.6      24 May 2021 05:57  Phos  2.0     05-23  Mg     2.3     05-24    TPro  6.4  /  Alb  3.6  /  TBili  0.4  /  DBili  x   /  AST  251<H>  /  ALT  497<H>  /  AlkPhos  84  05-23    PT/INR - ( 23 May 2021 03:50 )   PT: 13.7 sec;   INR: 1.15          PTT - ( 23 May 2021 03:50 )  PTT:28.2 sec    CAPILLARY BLOOD GLUCOSE        LIVER FUNCTIONS - ( 23 May 2021 03:50 )  Alb: 3.6 g/dL / Pro: 6.4 g/dL / ALK PHOS: 84 U/L / ALT: 497 U/L / AST: 251 U/L / GGT: x             RADIOLOGY & ADDITIONAL STUDIES:

## 2021-05-24 NOTE — CONSULT NOTE ADULT - SUBJECTIVE AND OBJECTIVE BOX
HPI: 71y Female PMH HTN, HLD (myalgias with statins), previous cath for abnormal stress test that was normal in 6/2019 and Paraesophageal hernia  that has been there for a long time and the only symptom the patient had is feeling bloated after fatty meals, denies dysphagia, chest pain, SOB or asthma. Mild GERD on BRAVO but was found to have some esophageal dysmotility now s/p robotic assisted paraesophageal hernia repair c/b esophageal perforation requiring transhiatal esophagectomy 5/21. Pt now complaining of subjective voice changes, ENT consulted for evaluation of vocal cords.       Allergies    Nuts (Hives)  penicillin (Hives)  pollen....sneeze, dry nose; itching (Other)    Intolerances    statins (Muscle Pain)      PAST MEDICAL & SURGICAL HISTORY:  HTN (hypertension)    Hyperlipidemia    Gastric reflux    Diaphragmatic hernia without obstruction or gangrene    Constipation    H/O colonoscopy  x2    History of coronary angiogram        FAMILY HISTORY:  No pertinent family history in first degree relatives        MEDICATIONS:  Antiinfectives:       Hematologic/Anticoagulation:  heparin   Injectable 5000 Unit(s) SubCutaneous every 8 hours      Pain medications/Neuro:      IV fluids:  lactated ringers. 1000 milliLiter(s) IV Continuous <Continuous>  sodium chloride 0.9% lock flush 3 milliLiter(s) IV Push every 8 hours      Endocrine Medications:       All other standing medications:   pantoprazole  Injectable 40 milliGRAM(s) IV Push daily      All other PRN medications:      Vital Signs Last 24 Hrs  T(C): 36.1 (24 May 2021 10:03), Max: 36.4 (23 May 2021 21:40)  T(F): 97 (24 May 2021 10:03), Max: 97.6 (23 May 2021 21:40)  HR: 92 (24 May 2021 08:42) (92 - 111)  BP: 119/72 (24 May 2021 08:42) (119/72 - 170/81)  BP(mean): 88 (24 May 2021 08:42) (84 - 115)  RR: 17 (24 May 2021 08:42) (15 - 18)  SpO2: 94% (24 May 2021 08:42) (93% - 98%)    LABS:  CBC-    05-24    137  |  100  |  12  ----------------------------<  101<H>  3.8   |  26  |  0.45<L>    Ca    9.6      24 May 2021 05:57  Phos  2.0     05-23  Mg     2.3     05-24    TPro  6.4  /  Alb  3.6  /  TBili  0.4  /  DBili  x   /  AST  251<H>  /  ALT  497<H>  /  AlkPhos  84  05-23    Coagulation Studies-  PT/INR - ( 23 May 2021 03:50 )   PT: 13.7 sec;   INR: 1.15          PTT - ( 23 May 2021 03:50 )  PTT:28.2 sec  Endocrine Panel-  --  --  9.6 mg/dL  --  --  9.5 mg/dL        PHYSICAL EXAM:    ENT EXAM-   Constitutional: Well-developed, well-nourished.  No hoarseness.     Head:  normocephalic, atraumatic.   Ears:  Ear canals both clear.  Tympanic membranes both intact; no effusion or retraction.  Nose:  Septum intact, midline, deviated.  Inferior turbinates normal bilateral  OC/OP:  Tonsils present/absent. Floor of mouth, buccal mucosa, lips, hard palate, soft palate, uvula, posterior pharyngeal wall normal.  Mucosa moist.  Neck:  Trachea midline.  Thyroid, parotid and submandibular glands normal.  Lymph:  No cervical adenopathy.  Facial Plastics:     MULTISYSTEM EXAM-  Neuro/Psych:  A&O x 3.  Mood stable.  Affect bright.  Cranial nerves: 2-12 grossly intact bilaterally.  Eyes:  EOMI, no nystagmus.  Pulm:  No dyspnea, non-labored breathing  Cardiovascular: Carotid pulses 2+ bilaterally.  No periphreal edema.  Skin:  No rash or lesions on exposed skin of head/neck        Laryngoscopy Findings:   ***          HPI: 71y Female PMH HTN, HLD (myalgias with statins), previous cath for abnormal stress test that was normal in 6/2019 and Paraesophageal hernia  that has been there for a long time and the only symptom the patient had is feeling bloated after fatty meals, denies dysphagia, chest pain, SOB or asthma. Mild GERD on BRAVO but was found to have some esophageal dysmotility now s/p robotic assisted paraesophageal hernia repair c/b esophageal perforation requiring transhiatal esophagectomy 5/21. Pt now complaining of subjective voice changes, ENT consulted for evaluation of vocal cords.       Allergies    Nuts (Hives)  penicillin (Hives)  pollen....sneeze, dry nose; itching (Other)    Intolerances    statins (Muscle Pain)      PAST MEDICAL & SURGICAL HISTORY:  HTN (hypertension)    Hyperlipidemia    Gastric reflux    Diaphragmatic hernia without obstruction or gangrene    Constipation    H/O colonoscopy  x2    History of coronary angiogram        FAMILY HISTORY:  No pertinent family history in first degree relatives        MEDICATIONS:  Antiinfectives:       Hematologic/Anticoagulation:  heparin   Injectable 5000 Unit(s) SubCutaneous every 8 hours      Pain medications/Neuro:      IV fluids:  lactated ringers. 1000 milliLiter(s) IV Continuous <Continuous>  sodium chloride 0.9% lock flush 3 milliLiter(s) IV Push every 8 hours      Endocrine Medications:       All other standing medications:   pantoprazole  Injectable 40 milliGRAM(s) IV Push daily      All other PRN medications:      Vital Signs Last 24 Hrs  T(C): 36.1 (24 May 2021 10:03), Max: 36.4 (23 May 2021 21:40)  T(F): 97 (24 May 2021 10:03), Max: 97.6 (23 May 2021 21:40)  HR: 92 (24 May 2021 08:42) (92 - 111)  BP: 119/72 (24 May 2021 08:42) (119/72 - 170/81)  BP(mean): 88 (24 May 2021 08:42) (84 - 115)  RR: 17 (24 May 2021 08:42) (15 - 18)  SpO2: 94% (24 May 2021 08:42) (93% - 98%)    LABS:  CBC-    05-24    137  |  100  |  12  ----------------------------<  101<H>  3.8   |  26  |  0.45<L>    Ca    9.6      24 May 2021 05:57  Phos  2.0     05-23  Mg     2.3     05-24    TPro  6.4  /  Alb  3.6  /  TBili  0.4  /  DBili  x   /  AST  251<H>  /  ALT  497<H>  /  AlkPhos  84  05-23    Coagulation Studies-  PT/INR - ( 23 May 2021 03:50 )   PT: 13.7 sec;   INR: 1.15          PTT - ( 23 May 2021 03:50 )  PTT:28.2 sec  Endocrine Panel-  --  --  9.6 mg/dL  --  --  9.5 mg/dL        PHYSICAL EXAM:    ENT EXAM-   Constitutional: Well-developed, well-nourished.  Voice hoarse.     Head:  normocephalic, atraumatic.   Nose:  Septum intact. NGT secured in L nasal cavity  OC/OP:  Floor of mouth, buccal mucosa, lips, hard palate, soft palate, uvula, posterior pharyngeal wall normal.  Mucosa moist.  Neck:  gauze dressing c/d/i  Lymph:  No cervical adenopathy.    MULTISYSTEM EXAM-  Neuro/Psych:  A&O x 3.   Cranial nerves: 2-12 grossly intact bilaterally.  Eyes:  EOMI, no nystagmus.  Pulm:  No dyspnea, non-labored breathing  Cardiovascular: Carotid pulses 2+ bilaterally.  No periphreal edema.  Skin:  No rash or lesions on exposed skin of head/neck        Laryngoscopy Findings:   LARYNGOSCOPY EXAM:     -Verbal consent was obtained from patient prior to procedure.    Indication: hoarseness    Flexible laryngoscopy was performed and revealed the following:    -- Nasopharynx had no mass or exudate.    -- Base of tongue was symmetric and not enlarged.    -- Vallecula was clear    -- Epiglottis, both aryepiglottic folds and both false vocal folds were normal    -- Arytenoids both without edema and erythema     -- Right true vocal cord mobile. Left true vocal cord and arytenoid hypomobile vs immobile     -- Post cricoid area with NGT entering esophagus.    The patient tolerated the procedure well.

## 2021-05-24 NOTE — CONSULT NOTE ADULT - ASSESSMENT
71y Female PMH HTN, HLD (myalgias with statins), previous cath for abnormal stress test that was normal in 6/2019 and Paraesophageal hernia now s/p robotic assisted paraesophageal hernia repair c/b esophageal perforation requiring transhiatal esophagectomy 5/21    ***    Will discuss case with Dr. Chi Sunshine ENT at [phone] with any questions/concerns.   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 Mireles and update team with final recommendations  [facility] at [phone] with any questions/concerns.   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 Patel to monitor for return of cord function vs possible intervention  Case discussed with Dr. Chi Sunshine ENT at [phone] with any questions/concerns.

## 2021-05-24 NOTE — DIETITIAN INITIAL EVALUATION ADULT. - ENTERAL
Recommend Jevity 1.5 @ 47ml/hr x18 hrs via Jtube to provide: 846mL TV, 1269kcal, 54g pro, 642ml h2o (1.25g/kg pro/IBW). Start at 10ml and increase by 10ml q4hrs to goal as tolerated, monitor for s/sx of intolerance, keep HOB >45 degrees for feeding

## 2021-05-25 LAB
ANION GAP SERPL CALC-SCNC: 12 MMOL/L — SIGNIFICANT CHANGE UP (ref 5–17)
BUN SERPL-MCNC: 12 MG/DL — SIGNIFICANT CHANGE UP (ref 7–23)
CALCIUM SERPL-MCNC: 9.4 MG/DL — SIGNIFICANT CHANGE UP (ref 8.4–10.5)
CHLORIDE SERPL-SCNC: 101 MMOL/L — SIGNIFICANT CHANGE UP (ref 96–108)
CO2 SERPL-SCNC: 27 MMOL/L — SIGNIFICANT CHANGE UP (ref 22–31)
CREAT SERPL-MCNC: 0.41 MG/DL — LOW (ref 0.5–1.3)
GLUCOSE SERPL-MCNC: 89 MG/DL — SIGNIFICANT CHANGE UP (ref 70–99)
HCT VFR BLD CALC: 35.8 % — SIGNIFICANT CHANGE UP (ref 34.5–45)
HGB BLD-MCNC: 11.1 G/DL — LOW (ref 11.5–15.5)
MAGNESIUM SERPL-MCNC: 2.1 MG/DL — SIGNIFICANT CHANGE UP (ref 1.6–2.6)
MCHC RBC-ENTMCNC: 29.4 PG — SIGNIFICANT CHANGE UP (ref 27–34)
MCHC RBC-ENTMCNC: 31 GM/DL — LOW (ref 32–36)
MCV RBC AUTO: 94.7 FL — SIGNIFICANT CHANGE UP (ref 80–100)
NRBC # BLD: 0 /100 WBCS — SIGNIFICANT CHANGE UP (ref 0–0)
PLATELET # BLD AUTO: 289 K/UL — SIGNIFICANT CHANGE UP (ref 150–400)
POTASSIUM SERPL-MCNC: 3.6 MMOL/L — SIGNIFICANT CHANGE UP (ref 3.5–5.3)
POTASSIUM SERPL-SCNC: 3.6 MMOL/L — SIGNIFICANT CHANGE UP (ref 3.5–5.3)
RBC # BLD: 3.78 M/UL — LOW (ref 3.8–5.2)
RBC # FLD: 14.4 % — SIGNIFICANT CHANGE UP (ref 10.3–14.5)
SODIUM SERPL-SCNC: 140 MMOL/L — SIGNIFICANT CHANGE UP (ref 135–145)
WBC # BLD: 5.17 K/UL — SIGNIFICANT CHANGE UP (ref 3.8–10.5)
WBC # FLD AUTO: 5.17 K/UL — SIGNIFICANT CHANGE UP (ref 3.8–10.5)

## 2021-05-25 PROCEDURE — 71045 X-RAY EXAM CHEST 1 VIEW: CPT | Mod: 26

## 2021-05-25 PROCEDURE — 76604 US EXAM CHEST: CPT | Mod: 26

## 2021-05-25 RX ORDER — POTASSIUM CHLORIDE 20 MEQ
40 PACKET (EA) ORAL ONCE
Refills: 0 | Status: DISCONTINUED | OUTPATIENT
Start: 2021-05-25 | End: 2021-05-25

## 2021-05-25 RX ORDER — TETRACAINE/BENZOCAINE/BUTAMBEN 2%-14%-2%
1 OINTMENT (GRAM) TOPICAL EVERY 4 HOURS
Refills: 0 | Status: DISCONTINUED | OUTPATIENT
Start: 2021-05-25 | End: 2021-06-03

## 2021-05-25 RX ORDER — ACETAMINOPHEN 500 MG
1000 TABLET ORAL ONCE
Refills: 0 | Status: COMPLETED | OUTPATIENT
Start: 2021-05-25 | End: 2021-05-25

## 2021-05-25 RX ORDER — SODIUM CHLORIDE 9 MG/ML
1000 INJECTION, SOLUTION INTRAVENOUS
Refills: 0 | Status: DISCONTINUED | OUTPATIENT
Start: 2021-05-25 | End: 2021-05-26

## 2021-05-25 RX ADMIN — PANTOPRAZOLE SODIUM 40 MILLIGRAM(S): 20 TABLET, DELAYED RELEASE ORAL at 12:32

## 2021-05-25 RX ADMIN — Medication 400 MILLIGRAM(S): at 13:02

## 2021-05-25 RX ADMIN — HEPARIN SODIUM 5000 UNIT(S): 5000 INJECTION INTRAVENOUS; SUBCUTANEOUS at 22:24

## 2021-05-25 RX ADMIN — SODIUM CHLORIDE 3 MILLILITER(S): 9 INJECTION INTRAMUSCULAR; INTRAVENOUS; SUBCUTANEOUS at 21:16

## 2021-05-25 RX ADMIN — SODIUM CHLORIDE 3 MILLILITER(S): 9 INJECTION INTRAMUSCULAR; INTRAVENOUS; SUBCUTANEOUS at 05:37

## 2021-05-25 RX ADMIN — Medication 1 SPRAY(S): at 18:21

## 2021-05-25 RX ADMIN — HEPARIN SODIUM 5000 UNIT(S): 5000 INJECTION INTRAVENOUS; SUBCUTANEOUS at 05:44

## 2021-05-25 RX ADMIN — SODIUM CHLORIDE 3 MILLILITER(S): 9 INJECTION INTRAMUSCULAR; INTRAVENOUS; SUBCUTANEOUS at 14:25

## 2021-05-25 RX ADMIN — HEPARIN SODIUM 5000 UNIT(S): 5000 INJECTION INTRAVENOUS; SUBCUTANEOUS at 14:25

## 2021-05-25 RX ADMIN — Medication 1000 MILLIGRAM(S): at 14:25

## 2021-05-25 NOTE — PROGRESS NOTE ADULT - SUBJECTIVE AND OBJECTIVE BOX
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 1000 milliLiter(s) (40 mL/Hr) IV Continuous <Continuous>  pantoprazole  Injectable 40 milliGRAM(s) IV Push daily  sodium chloride 0.9% lock flush 3 milliLiter(s) IV Push every 8 hours    MEDICATIONS  (PRN):      Vital Signs Last 24 Hrs  T(C): 36.3 (25 May 2021 09:23), Max: 37.1 (24 May 2021 21:45)  T(F): 97.4 (25 May 2021 09:23), Max: 98.7 (24 May 2021 21:45)  HR: 92 (25 May 2021 09:13) (84 - 94)  BP: 139/76 (25 May 2021 09:13) (122/76 - 146/77)  BP(mean): 95 (25 May 2021 09:13) (92 - 105)  RR: 20 (25 May 2021 05:23) (17 - 20)  SpO2: 95% (25 May 2021 09:13) (92% - 96%)    Physical Exam:  General: NAD, resting comfortably in bed  Neck: Incision is c/d/i, sutures in place  Pulmonary: Nonlabored breathing, no respiratory distress  Cardiovascular: NSR  Abdominal: soft, mild discomfort around incisions which are c/d/i  Extremities: WWP, normal strength  Neuro: A/O x 3 no focal deficits, normal motor/sensation  Pulses: palpable distal pulses    I&O's Summary    24 May 2021 07:01  -  25 May 2021 07:00  --------------------------------------------------------  IN: 2540 mL / OUT: 1150 mL / NET: 1390 mL    25 May 2021 07:01  -  25 May 2021 10:19  --------------------------------------------------------  IN: 40 mL / OUT: 0 mL / NET: 40 mL        LABS:                        11.1   5.17  )-----------( 289      ( 25 May 2021 06:27 )             35.8     05-25    140  |  101  |  12  ----------------------------<  89  3.6   |  27  |  0.41<L>    Ca    9.4      25 May 2021 06:27  Mg     2.1     05-25          CAPILLARY BLOOD GLUCOSE            RADIOLOGY & ADDITIONAL STUDIES:

## 2021-05-25 NOTE — PROGRESS NOTE ADULT - ASSESSMENT
70 y/o female with PMH HTN, HLD (myalgias with statins), and paraesophageal hernia resulting in mild GERD and esophageal dysmotility. She was evaluated as an outpatient by general surgery for elective repair and on 5/21 presented for her RA repair of paraesophageal hernia with partial  fundoplication. Intraop course complicated by esophageal perforation for which Dr. Alberto and Dr. Pinto were consulted for urgently. EGD was preformed and tear was not amenable to primary repair. They proceeded with an uncomplicated transhiatal esophagectomy. Post operatively she was brought to the CTICU extubated. Trickle feeds were started POD2 and she was transferred to [facility]. POD3 patient tube feeds to goal, continuing IV hydration    Neurovascular:   - No delirium. Pain well controlled with current regimen.  - Continue tylenol PRN    Cardiovascular:   - Hemodynamically stable. HR controlled ()  - Hx of HTN,       - Home amlodipine restarted    HEENT:  - Post op voice changes: ENT consulted yesterday, hypomobile vs immobile LVC, NTD     -plan for Formal S/S later this week prior to barium swallow study  Respiratory:   - 02 Sat = 96% on RA.  -If on oxygen wean to RA from for O2 Sat > 93%.  -Encourage C+DB and Use of IS 10x / hr while awake.  -CXR with small L effusion     GI:   - POD4 s/p emergent transhiatal esophagectomy after esophageal perforation in OR       - Continue tube feeds through J tube, feeds to goal  - NGT remains in place per thoracic protocol   - Continue GI PPX with protonix    Renal / :  - BUN/Cr stable   - Continue to monitor renal function.  - Monitor I/O's.  - Replete electrolytes PRN    Endocrine:    - no known hx diabetes or thyroid disease     Hematologic:  -H/H stable at with no obvious signs of bleeding    ID:  -Pt remains afebrile with no elevation in WBC or signs of infection  -Continue to monitor CBC  -Observe for SIRS/Sepsis Syndrome.    Prophylaxis:  -DVT prophylaxis with 5000 SubQ Heparin q8h.  -SCD's    Disposition:  -Home when medically appropriate.   72 y/o female with PMH HTN, HLD (myalgias with statins), and paraesophageal hernia resulting in mild GERD and esophageal dysmotility. She was evaluated as an outpatient by general surgery for elective repair and on 5/21 presented for her RA repair of paraesophageal hernia with partial  fundoplication. Intraop course complicated by esophageal perforation for which Dr. Alberto and Dr. Pinto were consulted for urgently. EGD was preformed and tear was not amenable to primary repair. They proceeded with an uncomplicated transhiatal esophagectomy. Post operatively she was brought to the CTICU extubated. Trickle feeds were started POD2 and she was transferred to [facility]. POD3 patient tube feeds to goal, continuing IV hydration    Neurovascular:   - No delirium. Pain well controlled with current regimen.  - Continue tylenol PRN    Cardiovascular:   - Hemodynamically stable.   - Hx of HTN,       - Home amlodipine restarted    HEENT:  - Post op voice changes: ENT consulted yesterday, hypomobile vs immobile LVC, NTD     -plan for Formal S/S later this week prior to barium swallow study      Respiratory:   - 02 Sat = 96% on RA.  -If on oxygen wean to RA from for O2 Sat > 93%.  -Encourage C+DB and Use of IS 10x / hr while awake.  -CXR with small L effusion       - Plan for bilateral ultrasound today to rule out effusion    GI:   - POD4 s/p emergent transhiatal esophagectomy after esophageal perforation in OR       - Continue tube feeds through J tube, feeds to goal  - NGT remains in place per thoracic protocol   - Continue GI PPX with protonix    Renal / :  - BUN/Cr stable   - Continue to monitor renal function.  - Monitor I/O's.  - Replete electrolytes PRN    Endocrine:    - no known hx diabetes or thyroid disease     Hematologic:  -H/H stable at with no obvious signs of bleeding    ID:  -Pt remains afebrile with no elevation in WBC or signs of infection  -Continue to monitor CBC  -Observe for SIRS/Sepsis Syndrome.    Prophylaxis:  -DVT prophylaxis with 5000 SubQ Heparin q8h.  -SCD's    Disposition:  -Home when medically appropriate.

## 2021-05-25 NOTE — PROGRESS NOTE ADULT - SUBJECTIVE AND OBJECTIVE BOX
Patient discussed on morning rounds with Dr. Perla    Operation / Date: 5/21/21 transhiatal esophagectomy after esophageal perforation during hiatal hernia repair     SUBJECTIVE ASSESSMENT:  71y Female seen and examined this AM, patient states minimal abdominal pain at J tube site otherwise feels comfortable. Patient moving bowls this AM. Patient denies dizziness, vision changes, chest pain, palpitations, shortness of breath, cough, n/v/d, extremity swelling, calf tenderness.         Vital Signs Last 24 Hrs  T(C): 36.3 (25 May 2021 09:23), Max: 37.1 (24 May 2021 21:45)  T(F): 97.4 (25 May 2021 09:23), Max: 98.7 (24 May 2021 21:45)  HR: 92 (25 May 2021 09:13) (84 - 94)  BP: 139/76 (25 May 2021 09:13) (122/76 - 146/77)  BP(mean): 95 (25 May 2021 09:13) (92 - 105)  RR: 20 (25 May 2021 05:23) (17 - 20)  SpO2: 95% (25 May 2021 09:13) (92% - 96%)  I&O's Detail    24 May 2021 07:01  -  25 May 2021 07:00  --------------------------------------------------------  IN:    Enteral Tube Flush: 120 mL    Enteral Tube Flush: 120 mL    Jevity 1.5: 400 mL    Lactated Ringers: 500 mL    Lactated Ringers: 1400 mL  Total IN: 2540 mL    OUT:    Nasogastric/Oral tube (mL): 550 mL    Voided (mL): 600 mL  Total OUT: 1150 mL    Total NET: 1390 mL      25 May 2021 07:01  -  25 May 2021 10:45  --------------------------------------------------------  IN:    Jevity 1.5: 40 mL  Total IN: 40 mL    OUT:  Total OUT: 0 mL    Total NET: 40 mL          CHEST TUBE:  Yes/No. AIR LEAKS: Yes/No. Suction / H2O SEAL.   LISETH DRAIN:  Yes/No.  EPICARDIAL WIRES: Yes/No.  TIE DOWNS: Yes/No.  JOY: Yes/No.    PHYSICAL EXAM:    General:     Neurological:    Cardiovascular:    Respiratory:    Gastrointestinal:    Extremities:    Vascular:    Incision Sites:    LABS:                        11.1   5.17  )-----------( 289      ( 25 May 2021 06:27 )             35.8       COUMADIN:  Yes/No. REASON: .        05-25    140  |  101  |  12  ----------------------------<  89  3.6   |  27  |  0.41<L>    Ca    9.4      25 May 2021 06:27  Mg     2.1     05-25            MEDICATIONS  (STANDING):  heparin   Injectable 5000 Unit(s) SubCutaneous every 8 hours  lactated ringers. 1000 milliLiter(s) (40 mL/Hr) IV Continuous <Continuous>  pantoprazole  Injectable 40 milliGRAM(s) IV Push daily  sodium chloride 0.9% lock flush 3 milliLiter(s) IV Push every 8 hours    MEDICATIONS  (PRN):        RADIOLOGY & ADDITIONAL TESTS:     Patient discussed on morning rounds with Dr. Perla    Operation / Date: 5/21/21 transhiatal esophagectomy after esophageal perforation during hiatal hernia repair     SUBJECTIVE ASSESSMENT:  71y Female seen and examined this AM, patient states minimal abdominal pain at J tube site otherwise feels comfortable. Patient moving bowls this AM. Patient denies dizziness, vision changes, chest pain, palpitations, shortness of breath, cough, n/v/d, extremity swelling, calf tenderness.         Vital Signs Last 24 Hrs  T(C): 36.3 (25 May 2021 09:23), Max: 37.1 (24 May 2021 21:45)  T(F): 97.4 (25 May 2021 09:23), Max: 98.7 (24 May 2021 21:45)  HR: 92 (25 May 2021 09:13) (84 - 94)  BP: 139/76 (25 May 2021 09:13) (122/76 - 146/77)  BP(mean): 95 (25 May 2021 09:13) (92 - 105)  RR: 20 (25 May 2021 05:23) (17 - 20)  SpO2: 95% (25 May 2021 09:13) (92% - 96%)  I&O's Detail    24 May 2021 07:01  -  25 May 2021 07:00  --------------------------------------------------------  IN:    Enteral Tube Flush: 120 mL    Enteral Tube Flush: 120 mL    Jevity 1.5: 400 mL    Lactated Ringers: 500 mL    Lactated Ringers: 1400 mL  Total IN: 2540 mL    OUT:    Nasogastric/Oral tube (mL): 550 mL    Voided (mL): 600 mL  Total OUT: 1150 mL    Total NET: 1390 mL      25 May 2021 07:01  -  25 May 2021 10:45  --------------------------------------------------------  IN:    Jevity 1.5: 40 mL  Total IN: 40 mL    OUT:  Total OUT: 0 mL    Total NET: 40 mL      CHEST TUBE:  No.   LISETH DRAIN:  No.  EPICARDIAL WIRES: No.  TIE DOWNS: No.  JOY: No.    PHYSICAL EXAM:  Neuro: A+O x 3, non-focal, speech soft  HEENT: PERRL, EOMI, oral mucosa pink and moist, NGT tube in place, well secured   Neck: supple, no JVD  CV: regular rate, regular rhythm, +S1S2, no murmurs or rub  Pulm/chest: lung sounds CTA and equal bilaterally, no accessory muscle use noted  Abd: soft, NT, ND, +BS, J tube in place,   Ext: SLOAN x 4, no C/C/E,   Skin: warm, well perfused, no rashes     LABS:                        11.1   5.17  )-----------( 289      ( 25 May 2021 06:27 )             35.8     05-25    140  |  101  |  12  ----------------------------<  89  3.6   |  27  |  0.41<L>    Ca    9.4      25 May 2021 06:27  Mg     2.1     05-25          MEDICATIONS  (STANDING):  heparin   Injectable 5000 Unit(s) SubCutaneous every 8 hours  lactated ringers. 1000 milliLiter(s) (40 mL/Hr) IV Continuous <Continuous>  pantoprazole  Injectable 40 milliGRAM(s) IV Push daily  sodium chloride 0.9% lock flush 3 milliLiter(s) IV Push every 8 hours

## 2021-05-25 NOTE — PROGRESS NOTE ADULT - ASSESSMENT
72 y/o female with PMH HTN, HLD (myalgias with statins), and paraesophageal hernia resulting in mild GERD and esophageal dysmotility. She was evaluated as an outpatient by general surgery for elective repair and on 5/21 presented for her RA repair of paraesophageal hernia with partial  fundoplication. Intraop course complicated by esophageal perforation for which Dr. Alberto and Dr. Pinto were consulted for urgently. EGD was preformed and tear was not amenable to primary repair. They proceeded with an uncomplicated transhiatal esophagectomy. Post operatively she was brought to the CTICU extubated. Trickle feeds were started POD2 and she was transferred to [facility].     POD4 today, patient is feeling well, ome changes in her voice, ENT performed Laryngoscopy at bedside 5/25/2021 left true vocal cords hypomobile or immobile possibly 2/2 post surgical edema or nerve injury during the procedure    Vitals are stable, abdomen is soft  WBC WNL    Discussed with attending and chief resident:    - Recommend holding off UGI study until improving of TVC paralysis  - Continue management as planned by primary team  - Surgery team 4 will continue to FU

## 2021-05-26 LAB
ANION GAP SERPL CALC-SCNC: 11 MMOL/L — SIGNIFICANT CHANGE UP (ref 5–17)
BUN SERPL-MCNC: 13 MG/DL — SIGNIFICANT CHANGE UP (ref 7–23)
CALCIUM SERPL-MCNC: 9.6 MG/DL — SIGNIFICANT CHANGE UP (ref 8.4–10.5)
CHLORIDE SERPL-SCNC: 103 MMOL/L — SIGNIFICANT CHANGE UP (ref 96–108)
CO2 SERPL-SCNC: 28 MMOL/L — SIGNIFICANT CHANGE UP (ref 22–31)
CREAT SERPL-MCNC: 0.43 MG/DL — LOW (ref 0.5–1.3)
GLUCOSE SERPL-MCNC: 100 MG/DL — HIGH (ref 70–99)
HCT VFR BLD CALC: 34.1 % — LOW (ref 34.5–45)
HGB BLD-MCNC: 10.6 G/DL — LOW (ref 11.5–15.5)
MAGNESIUM SERPL-MCNC: 2.1 MG/DL — SIGNIFICANT CHANGE UP (ref 1.6–2.6)
MCHC RBC-ENTMCNC: 29.5 PG — SIGNIFICANT CHANGE UP (ref 27–34)
MCHC RBC-ENTMCNC: 31.1 GM/DL — LOW (ref 32–36)
MCV RBC AUTO: 95 FL — SIGNIFICANT CHANGE UP (ref 80–100)
NRBC # BLD: 0 /100 WBCS — SIGNIFICANT CHANGE UP (ref 0–0)
PLATELET # BLD AUTO: 327 K/UL — SIGNIFICANT CHANGE UP (ref 150–400)
POTASSIUM SERPL-MCNC: 3.8 MMOL/L — SIGNIFICANT CHANGE UP (ref 3.5–5.3)
POTASSIUM SERPL-SCNC: 3.8 MMOL/L — SIGNIFICANT CHANGE UP (ref 3.5–5.3)
RBC # BLD: 3.59 M/UL — LOW (ref 3.8–5.2)
RBC # FLD: 14.2 % — SIGNIFICANT CHANGE UP (ref 10.3–14.5)
SODIUM SERPL-SCNC: 142 MMOL/L — SIGNIFICANT CHANGE UP (ref 135–145)
SURGICAL PATHOLOGY STUDY: SIGNIFICANT CHANGE UP
WBC # BLD: 4.56 K/UL — SIGNIFICANT CHANGE UP (ref 3.8–10.5)
WBC # FLD AUTO: 4.56 K/UL — SIGNIFICANT CHANGE UP (ref 3.8–10.5)

## 2021-05-26 PROCEDURE — 71045 X-RAY EXAM CHEST 1 VIEW: CPT | Mod: 26

## 2021-05-26 RX ORDER — SODIUM CHLORIDE 9 MG/ML
500 INJECTION, SOLUTION INTRAVENOUS
Refills: 0 | Status: DISCONTINUED | OUTPATIENT
Start: 2021-05-26 | End: 2021-06-03

## 2021-05-26 RX ORDER — POTASSIUM CHLORIDE 20 MEQ
10 PACKET (EA) ORAL ONCE
Refills: 0 | Status: COMPLETED | OUTPATIENT
Start: 2021-05-26 | End: 2021-05-26

## 2021-05-26 RX ORDER — ACETAMINOPHEN 500 MG
1000 TABLET ORAL ONCE
Refills: 0 | Status: COMPLETED | OUTPATIENT
Start: 2021-05-26 | End: 2021-05-26

## 2021-05-26 RX ADMIN — SODIUM CHLORIDE 3 MILLILITER(S): 9 INJECTION INTRAMUSCULAR; INTRAVENOUS; SUBCUTANEOUS at 05:22

## 2021-05-26 RX ADMIN — HEPARIN SODIUM 5000 UNIT(S): 5000 INJECTION INTRAVENOUS; SUBCUTANEOUS at 13:57

## 2021-05-26 RX ADMIN — Medication 50 MILLIEQUIVALENT(S): at 11:17

## 2021-05-26 RX ADMIN — SODIUM CHLORIDE 3 MILLILITER(S): 9 INJECTION INTRAMUSCULAR; INTRAVENOUS; SUBCUTANEOUS at 21:33

## 2021-05-26 RX ADMIN — SODIUM CHLORIDE 3 MILLILITER(S): 9 INJECTION INTRAMUSCULAR; INTRAVENOUS; SUBCUTANEOUS at 13:58

## 2021-05-26 RX ADMIN — Medication 400 MILLIGRAM(S): at 18:34

## 2021-05-26 RX ADMIN — HEPARIN SODIUM 5000 UNIT(S): 5000 INJECTION INTRAVENOUS; SUBCUTANEOUS at 05:24

## 2021-05-26 RX ADMIN — PANTOPRAZOLE SODIUM 40 MILLIGRAM(S): 20 TABLET, DELAYED RELEASE ORAL at 12:31

## 2021-05-26 RX ADMIN — HEPARIN SODIUM 5000 UNIT(S): 5000 INJECTION INTRAVENOUS; SUBCUTANEOUS at 21:33

## 2021-05-26 RX ADMIN — Medication 1000 MILLIGRAM(S): at 20:14

## 2021-05-26 NOTE — PROGRESS NOTE ADULT - ATTENDING COMMENTS
Asked by Dr. Sheehan to see patient. s/p esophagectomy for intraoperative esophageal tear. Generally agree with surgical housestaff. Defer post esophagectomy management to CTS.

## 2021-05-26 NOTE — PROGRESS NOTE ADULT - SUBJECTIVE AND OBJECTIVE BOX
Patient discussed on morning rounds with       Operation / Date:     SUBJECTIVE ASSESSMENT:  71y Female         Vital Signs Last 24 Hrs  T(C): 36.1 (26 May 2021 05:01), Max: 36.9 (25 May 2021 21:22)  T(F): 97 (26 May 2021 05:01), Max: 98.5 (25 May 2021 21:22)  HR: 96 (26 May 2021 05:26) (82 - 96)  BP: 142/70 (26 May 2021 05:26) (139/71 - 150/70)  BP(mean): 96 (26 May 2021 05:26) (95 - 102)  RR: 20 (26 May 2021 05:26) (17 - 20)  SpO2: 93% (26 May 2021 05:26) (90% - 95%)  I&O's Detail    25 May 2021 07:01  -  26 May 2021 07:00  --------------------------------------------------------  IN:    Enteral Tube Flush: 120 mL    Enteral Tube Flush: 120 mL    IV PiggyBack: 100 mL    Jevity 1.5: 825 mL    Lactated Ringers: 920 mL  Total IN: 2085 mL    OUT:    Nasogastric/Oral tube (mL): 700 mL    Oral Fluid: 0 mL    Voided (mL): 625 mL  Total OUT: 1325 mL    Total NET: 760 mL      26 May 2021 07:01  -  26 May 2021 09:04  --------------------------------------------------------  IN:    Jevity 1.5: 47 mL    Lactated Ringers: 40 mL  Total IN: 87 mL    OUT:  Total OUT: 0 mL    Total NET: 87 mL          CHEST TUBE:  Yes/No. AIR LEAKS: Yes/No. Suction / H2O SEAL.   LISETH DRAIN:  Yes/No.  EPICARDIAL WIRES: Yes/No.  TIE DOWNS: Yes/No.  JOY: Yes/No.    PHYSICAL EXAM:    General:     Neurological:    Cardiovascular:    Respiratory:    Gastrointestinal:    Extremities:    Vascular:    Incision Sites:    LABS:                        10.6   4.56  )-----------( 327      ( 26 May 2021 06:50 )             34.1       COUMADIN:  Yes/No. REASON: .        05-26    142  |  103  |  13  ----------------------------<  100<H>  3.8   |  28  |  0.43<L>    Ca    9.6      26 May 2021 06:50  Mg     2.1     05-26            MEDICATIONS  (STANDING):  heparin   Injectable 5000 Unit(s) SubCutaneous every 8 hours  lactated ringers. 1000 milliLiter(s) (40 mL/Hr) IV Continuous <Continuous>  pantoprazole  Injectable 40 milliGRAM(s) IV Push daily  sodium chloride 0.9% lock flush 3 milliLiter(s) IV Push every 8 hours    MEDICATIONS  (PRN):  tetracaine/benzocaine/butamben Spray 1 Spray(s) Topical every 4 hours PRN Sore Throat        RADIOLOGY & ADDITIONAL TESTS:     Patient discussed on morning rounds with Dr. Pinto    Operation / Date:  5/21/21 Called to OR for transhiatal esophagectomy after esophageal perforation during hiatal hernia repair with General Surgery ([place])    SUBJECTIVE ASSESSMENT:  Patient c/o hoarseness since waking up from surgery.  She ambulated once yesterday, not yet today.  She had a normal BM yesterday, pulling 1L on IS.  Has remained NPO, only getting J-tube feeds.        Vital Signs Last 24 Hrs  T(C): 36.1 (26 May 2021 05:01), Max: 36.9 (25 May 2021 21:22)  T(F): 97 (26 May 2021 05:01), Max: 98.5 (25 May 2021 21:22)  HR: 96 (26 May 2021 05:26) (82 - 96)  BP: 142/70 (26 May 2021 05:26) (139/71 - 150/70)  BP(mean): 96 (26 May 2021 05:26) (95 - 102)  RR: 20 (26 May 2021 05:26) (17 - 20)  SpO2: 93% (26 May 2021 05:26) (90% - 95%)  I&O's Detail    25 May 2021 07:01  -  26 May 2021 07:00  --------------------------------------------------------  IN:    Enteral Tube Flush: 120 mL    Enteral Tube Flush: 120 mL    IV PiggyBack: 100 mL    Jevity 1.5: 825 mL    Lactated Ringers: 920 mL  Total IN: 2085 mL    OUT:    Nasogastric/Oral tube (mL): 700 mL    Oral Fluid: 0 mL    Voided (mL): 625 mL  Total OUT: 1325 mL    Total NET: 760 mL      26 May 2021 07:01  -  26 May 2021 09:04  --------------------------------------------------------  IN:    Jevity 1.5: 47 mL    Lactated Ringers: 40 mL  Total IN: 87 mL    OUT:  Total OUT: 0 mL    Total NET: 87 mL        PHYSICAL EXAM:    GEN: NAD, looks comfortable  Psych: Mood appropriate  Neuro: A&Ox3.  No focal deficits.  Moving all extremities.   HEENT: No obvious abnormalities; Voice hoarse, but able to phonate some. Able to understand her clearly.   CV: S1S2, regular, no murmurs appreciated.  No carotid bruits.  No JVD  Lungs: Clear B/L.  No wheezing, rales or rhonchi  ABD: Soft, non-tender, non-distended.  +Bowel sounds  EXT: Warm and well perfused.  No peripheral edema noted  Musculoskeletal: Moving all extremities with normal ROM, no joint swelling  PV: Pedal pulses palpable  Incision Sites: Neck incision w/ visible sutures, clean and dry; Covered w/ dry gauze dressing.  Mid-abdomen incision clean and dry, open to air.      LABS:                        10.6   4.56  )-----------( 327      ( 26 May 2021 06:50 )             34.1         05-26    142  |  103  |  13  ----------------------------<  100<H>  3.8   |  28  |  0.43<L>    Ca    9.6      26 May 2021 06:50  Mg     2.1     05-26        MEDICATIONS  (STANDING):  heparin   Injectable 5000 Unit(s) SubCutaneous every 8 hours  lactated ringers. 1000 milliLiter(s) (40 mL/Hr) IV Continuous <Continuous>  pantoprazole  Injectable 40 milliGRAM(s) IV Push daily  sodium chloride 0.9% lock flush 3 milliLiter(s) IV Push every 8 hours    MEDICATIONS  (PRN):  tetracaine/benzocaine/butamben Spray 1 Spray(s) Topical every 4 hours PRN Sore Throat      RADIOLOGY & ADDITIONAL TESTS:    < from: Xray Chest 1 View- PORTABLE-Routine (Xray Chest 1 View- PORTABLE-Routine in AM.) (05.24.21 @ 05:56) >  IMPRESSION: Pulmonary congestion and small effusions, left greater than right.    < end of copied text >

## 2021-05-26 NOTE — PROGRESS NOTE ADULT - ASSESSMENT
This is a 70 y/o female with PMH HTN, HLD (myalgias with statins), and paraesophageal hernia resulting in mild GERD and esophageal dysmotility. She was evaluated as an outpatient by general surgery for elective repair and on 5/21/21 presented for her RA repair of paraesophageal hernia with partial  fundoplication. Intraop course complicated by esophageal perforation for which Dr. Alberto and Dr. Pinto were consulted urgently. EGD was preformed and tear was not amenable to primary repair. They proceeded with an uncomplicated transhiatal esophagectomy. Post operatively she was brought to the CTICU extubated. Trickle feeds were started via J-tube on POD2 and she was transferred to [facility]. POD3 patient tube feeds to goal, continuing IV hydration, maintaining NPO.  Today, POD5 patient remains NPO on tube feeds.  NGT to intermittent suction.  (Pulled back this am by Dr. Gutierrez).      Neurovascular:   - No delirium. Pain well controlled with current regimen.  - Continue tylenol - Order as needed.     Cardiovascular:   - Hemodynamically stable.   - Hx of HTN,       - Home amlodipine on hold while NPO.  If needed, ?Start something IV.  Hydral or BB.      HEENT:  - Post op voice changes: ENT consulted- hypomobile vs immobile LVC     -plan for Formal S/S later this week (?tomorrow) prior to barium swallow study on Friday. Repeat laryngoscopy ?tomorrow      Respiratory:   - 02 Sat = 96% on RA.  -CXR with small L effusion   -B/L U/S done yesterday, only small B/L effusions.  NTD.      GI:   - POD5 s/p emergent transhiatal esophagectomy after esophageal perforation in OR       - Continue tube feeds through J tube, feeds to goal  - NGT remains in place per thoracic protocol   - Continue GI PPX with protonix IV    Renal / :  - BUN/Cr stable   - Continue to monitor renal function.  - Monitor I/O's.  - Replete electrolytes PRN.  Will order K+ for 3.8.     Endocrine:    - no known hx diabetes or thyroid disease     Hematologic:  -H/H stable at with no obvious signs of bleeding    ID:  -Pt remains afebrile with no elevation in WBC or signs of infection    Prophylaxis:  -DVT prophylaxis with 5000 SubQ Heparin q8h.  -SCD's    Disposition:  -Home when medically cleared.

## 2021-05-26 NOTE — PROGRESS NOTE ADULT - ASSESSMENT
70 y/o female with PMH HTN, HLD (myalgias with statins), and paraesophageal hernia resulting in mild GERD and esophageal dysmotility. She was evaluated as an outpatient by general surgery for elective repair and on 5/21 presented for her RA repair of paraesophageal hernia with partial  fundoplication. Intraop course complicated by esophageal perforation for which Dr. Alberto and Dr. Pinto were consulted for urgently. EGD was preformed and tear was not amenable to primary repair. They proceeded with an uncomplicated transhiatal esophagectomy. Post operatively she was brought to the CTICU extubated. Trickle feeds were started POD2 and she was transferred to [facility].     POD5 today, patient is feeling well, voice is improving   5/24/2021 laryngoscopy: left true vocal cords hypomobile or immobile possibly 2/2 post surgical edema or nerve injury during the procedure    Vitals are stable, abdomen is soft  WBC WNL    Discussed with attending and chief resident:    - Recommend holding off UGI study until improving of TVC paralysis  - Continue management as planned by primary team  - Surgery team 4 will continue to FU   72 y/o female with PMH HTN, HLD (myalgias with statins), and paraesophageal hernia resulting in mild GERD and esophageal dysmotility. She was evaluated as an outpatient by general surgery for elective repair and on 5/21 presented for her RA repair of paraesophageal hernia with partial  fundoplication. Intraop course complicated by esophageal perforation for which Dr. Alberto and Dr. Pinto were consulted for urgently. EGD was preformed and tear was not amenable to primary repair. They proceeded with an uncomplicated transhiatal esophagectomy. Post operatively she was brought to the CTICU extubated. Trickle feeds were started POD2 and she was transferred to [facility].     POD5 today, patient is feeling well, voice is improving   5/24/2021 laryngoscopy: left true vocal cords hypomobile or immobile possibly 2/2 post surgical edema or nerve injury during the procedure    Vitals are stable, abdomen is soft  WBC WNL    Discussed with attending and chief resident:    - Continue management as planned by primary team  - Surgery team 4 will continue to FU

## 2021-05-26 NOTE — PROGRESS NOTE ADULT - SUBJECTIVE AND OBJECTIVE BOX
SUBJECTIVE:  No acute issues ON, POCUS by primary team showed bilateral pleural effusion small to be drained  Afebrile, still NPO, tolerating TF, voice is improving.      MEDICATIONS  (STANDING):  heparin   Injectable 5000 Unit(s) SubCutaneous every 8 hours  lactated ringers. 500 milliLiter(s) (10 mL/Hr) IV Continuous <Continuous>  pantoprazole  Injectable 40 milliGRAM(s) IV Push daily  sodium chloride 0.9% lock flush 3 milliLiter(s) IV Push every 8 hours    MEDICATIONS  (PRN):  tetracaine/benzocaine/butamben Spray 1 Spray(s) Topical every 4 hours PRN Sore Throat      Vital Signs Last 24 Hrs  T(C): 37 (26 May 2021 17:17), Max: 37 (26 May 2021 17:17)  T(F): 98.6 (26 May 2021 17:17), Max: 98.6 (26 May 2021 17:17)  HR: 101 (26 May 2021 17:00) (82 - 101)  BP: 136/72 (26 May 2021 17:00) (134/68 - 150/70)  BP(mean): 96 (26 May 2021 17:00) (94 - 100)  RR: 18 (26 May 2021 17:00) (17 - 20)  SpO2: 95% (26 May 2021 17:00) (90% - 95%)    Physical Exam:  General: NAD, resting comfortably in bed  Neck: Incision is c/d/i, sutures in place  Pulmonary: Nonlabored breathing, no respiratory distress  Cardiovascular: NSR  Abdominal: soft, mild discomfort around incisions which are c/d/i  Extremities: WWP, normal strength  Neuro: A/O x 3 no focal deficits, normal motor/sensation  Pulses: palpable distal pulses    I&O's Summary    25 May 2021 07:01  -  26 May 2021 07:00  --------------------------------------------------------  IN: 2085 mL / OUT: 1325 mL / NET: 760 mL    26 May 2021 07:01  -  26 May 2021 19:12  --------------------------------------------------------  IN: 609 mL / OUT: 625 mL / NET: -16 mL        LABS:                        10.6   4.56  )-----------( 327      ( 26 May 2021 06:50 )             34.1     05-26    142  |  103  |  13  ----------------------------<  100<H>  3.8   |  28  |  0.43<L>    Ca    9.6      26 May 2021 06:50  Mg     2.1     05-26          CAPILLARY BLOOD GLUCOSE            RADIOLOGY & ADDITIONAL STUDIES:

## 2021-05-27 LAB
ANION GAP SERPL CALC-SCNC: 7 MMOL/L — SIGNIFICANT CHANGE UP (ref 5–17)
BUN SERPL-MCNC: 12 MG/DL — SIGNIFICANT CHANGE UP (ref 7–23)
CALCIUM SERPL-MCNC: 9.5 MG/DL — SIGNIFICANT CHANGE UP (ref 8.4–10.5)
CHLORIDE SERPL-SCNC: 103 MMOL/L — SIGNIFICANT CHANGE UP (ref 96–108)
CO2 SERPL-SCNC: 28 MMOL/L — SIGNIFICANT CHANGE UP (ref 22–31)
CREAT SERPL-MCNC: 0.44 MG/DL — LOW (ref 0.5–1.3)
GLUCOSE SERPL-MCNC: 104 MG/DL — HIGH (ref 70–99)
HCT VFR BLD CALC: 32.8 % — LOW (ref 34.5–45)
HGB BLD-MCNC: 10.5 G/DL — LOW (ref 11.5–15.5)
MAGNESIUM SERPL-MCNC: 2.2 MG/DL — SIGNIFICANT CHANGE UP (ref 1.6–2.6)
MCHC RBC-ENTMCNC: 29.7 PG — SIGNIFICANT CHANGE UP (ref 27–34)
MCHC RBC-ENTMCNC: 32 GM/DL — SIGNIFICANT CHANGE UP (ref 32–36)
MCV RBC AUTO: 92.7 FL — SIGNIFICANT CHANGE UP (ref 80–100)
NRBC # BLD: 0 /100 WBCS — SIGNIFICANT CHANGE UP (ref 0–0)
PLATELET # BLD AUTO: 316 K/UL — SIGNIFICANT CHANGE UP (ref 150–400)
POTASSIUM SERPL-MCNC: 3.7 MMOL/L — SIGNIFICANT CHANGE UP (ref 3.5–5.3)
POTASSIUM SERPL-SCNC: 3.7 MMOL/L — SIGNIFICANT CHANGE UP (ref 3.5–5.3)
RBC # BLD: 3.54 M/UL — LOW (ref 3.8–5.2)
RBC # FLD: 14.5 % — SIGNIFICANT CHANGE UP (ref 10.3–14.5)
SARS-COV-2 RNA SPEC QL NAA+PROBE: SIGNIFICANT CHANGE UP
SODIUM SERPL-SCNC: 138 MMOL/L — SIGNIFICANT CHANGE UP (ref 135–145)
WBC # BLD: 5.24 K/UL — SIGNIFICANT CHANGE UP (ref 3.8–10.5)
WBC # FLD AUTO: 5.24 K/UL — SIGNIFICANT CHANGE UP (ref 3.8–10.5)

## 2021-05-27 PROCEDURE — 71045 X-RAY EXAM CHEST 1 VIEW: CPT | Mod: 26

## 2021-05-27 PROCEDURE — 74220 X-RAY XM ESOPHAGUS 1CNTRST: CPT | Mod: 26

## 2021-05-27 RX ORDER — POTASSIUM CHLORIDE 20 MEQ
40 PACKET (EA) ORAL ONCE
Refills: 0 | Status: COMPLETED | OUTPATIENT
Start: 2021-05-27 | End: 2021-05-27

## 2021-05-27 RX ADMIN — HEPARIN SODIUM 5000 UNIT(S): 5000 INJECTION INTRAVENOUS; SUBCUTANEOUS at 13:32

## 2021-05-27 RX ADMIN — SODIUM CHLORIDE 3 MILLILITER(S): 9 INJECTION INTRAMUSCULAR; INTRAVENOUS; SUBCUTANEOUS at 08:18

## 2021-05-27 RX ADMIN — PANTOPRAZOLE SODIUM 40 MILLIGRAM(S): 20 TABLET, DELAYED RELEASE ORAL at 11:02

## 2021-05-27 RX ADMIN — SODIUM CHLORIDE 3 MILLILITER(S): 9 INJECTION INTRAMUSCULAR; INTRAVENOUS; SUBCUTANEOUS at 21:00

## 2021-05-27 RX ADMIN — SODIUM CHLORIDE 3 MILLILITER(S): 9 INJECTION INTRAMUSCULAR; INTRAVENOUS; SUBCUTANEOUS at 13:32

## 2021-05-27 RX ADMIN — HEPARIN SODIUM 5000 UNIT(S): 5000 INJECTION INTRAVENOUS; SUBCUTANEOUS at 05:16

## 2021-05-27 RX ADMIN — Medication 40 MILLIEQUIVALENT(S): at 11:01

## 2021-05-27 RX ADMIN — HEPARIN SODIUM 5000 UNIT(S): 5000 INJECTION INTRAVENOUS; SUBCUTANEOUS at 21:00

## 2021-05-27 NOTE — PROGRESS NOTE ADULT - ASSESSMENT
70 y/o female with PMH HTN, HLD (myalgias with statins), and paraesophageal hernia resulting in mild GERD and esophageal dysmotility. She was evaluated as an outpatient by general surgery for elective repair and on 5/21 presented for her RA repair of paraesophageal hernia with partial  fundoplication. Intraop course complicated by esophageal perforation for which Dr. Alberto and Dr. Pinto were consulted for urgently. EGD was preformed and tear was not amenable to primary repair. They proceeded with an uncomplicated transhiatal esophagectomy. Post operatively she was brought to the CTICU extubated. Trickle feeds were started POD2 and she was transferred to [facility].       Bilateral pleural effusions, left side is larger than R on  USS    POD6 today, patient is feeling well, voice is improving, some sore throat   5/24/2021 laryngoscopy: left true vocal cords hypomobile or immobile possibly 2/2 post surgical edema or nerve injury during the procedure    Vitals are stable, abdomen is soft  WBC WNL    Discussed with attending and chief resident:    - Continue management as planned by primary team  - Surgery team 4 will continue to FU

## 2021-05-27 NOTE — PROGRESS NOTE ADULT - ATTENDING COMMENTS
Patient seen for Dr. Sheehan. She feels better without NGT. Still with some horseness of voice and projection issues. TOlerating tube feeds well.

## 2021-05-27 NOTE — PROGRESS NOTE ADULT - SUBJECTIVE AND OBJECTIVE BOX
SUBJECTIVE:  No acute issues ON, still complaining of some sore throat    MEDICATIONS  (STANDING):  heparin   Injectable 5000 Unit(s) SubCutaneous every 8 hours  lactated ringers. 500 milliLiter(s) (10 mL/Hr) IV Continuous <Continuous>  pantoprazole  Injectable 40 milliGRAM(s) IV Push daily  sodium chloride 0.9% lock flush 3 milliLiter(s) IV Push every 8 hours    MEDICATIONS  (PRN):  tetracaine/benzocaine/butamben Spray 1 Spray(s) Topical every 4 hours PRN Sore Throat      Vital Signs Last 24 Hrs  T(C): 36.7 (27 May 2021 14:00), Max: 37.4 (26 May 2021 21:38)  T(F): 98.1 (27 May 2021 14:00), Max: 99.3 (26 May 2021 21:38)  HR: 95 (27 May 2021 12:00) (80 - 101)  BP: 139/66 (27 May 2021 12:00) (131/77 - 163/68)  BP(mean): 95 (27 May 2021 12:00) (95 - 103)  RR: 18 (27 May 2021 12:00) (18 - 22)  SpO2: 91% (27 May 2021 12:00) (91% - 97%)    Physical Exam:  General: NAD, resting comfortably in bed  Neck: Incision is c/d/i, sutures in place  Pulmonary: Nonlabored breathing, no respiratory distress  Cardiovascular: NSR  Abdominal: soft, mild discomfort around incisions which are c/d/i  Extremities: WWP, normal strength  Neuro: A/O x 3 no focal deficits, normal motor/sensation  Pulses: palpable distal pulses    I&O's Summary    26 May 2021 07:01  -  27 May 2021 07:00  --------------------------------------------------------  IN: 2082 mL / OUT: 1525 mL / NET: 557 mL    27 May 2021 07:01  -  27 May 2021 15:54  --------------------------------------------------------  IN: 411 mL / OUT: 100 mL / NET: 311 mL        LABS:                        10.5   5.24  )-----------( 316      ( 27 May 2021 05:37 )             32.8     05-27    138  |  103  |  12  ----------------------------<  104<H>  3.7   |  28  |  0.44<L>    Ca    9.5      27 May 2021 05:37  Mg     2.2     05-27          CAPILLARY BLOOD GLUCOSE            RADIOLOGY & ADDITIONAL STUDIES:

## 2021-05-27 NOTE — PROGRESS NOTE ADULT - SUBJECTIVE AND OBJECTIVE BOX
Patient discussed on morning rounds with Dr. Pinto    Operation / Date:   5/21/21 Called to OR for transhiatal esophagectomy after esophageal perforation during hiatal hernia repair with General Surgery ([place])    SUBJECTIVE ASSESSMENT:  Patient feels well today, still hoarse but no new or worsening changes.  Ambulates, using her IS, NPO pending swallow study results.  Denies CP, SOB, palpitations, dizziness, N/V/D, fever/chills, cough.       Vital Signs Last 24 Hrs  T(C): 36.7 (27 May 2021 10:20), Max: 37.4 (26 May 2021 21:38)  T(F): 98 (27 May 2021 10:20), Max: 99.3 (26 May 2021 21:38)  HR: 95 (27 May 2021 12:00) (80 - 101)  BP: 139/66 (27 May 2021 12:00) (131/77 - 163/68)  BP(mean): 95 (27 May 2021 12:00) (95 - 103)  RR: 18 (27 May 2021 12:00) (18 - 22)  SpO2: 91% (27 May 2021 12:00) (91% - 97%)  I&O's Detail    26 May 2021 07:01  -  27 May 2021 07:00  --------------------------------------------------------  IN:    Enteral Tube Flush: 60 mL    Enteral Tube Flush: 270 mL    Free Water: 500 mL    IV PiggyBack: 100 mL    Jevity 1.5: 752 mL    Lactated Ringers: 120 mL    Lactated Ringers: 280 mL  Total IN: 2082 mL    OUT:    Nasogastric/Oral tube (mL): 900 mL    Voided (mL): 625 mL  Total OUT: 1525 mL    Total NET: 557 mL      27 May 2021 07:01  -  27 May 2021 13:50  --------------------------------------------------------  IN:    Enteral Tube Flush: 250 mL    Jevity 1.5: 141 mL    Lactated Ringers: 20 mL  Total IN: 411 mL    OUT:    Voided (mL): 100 mL  Total OUT: 100 mL    Total NET: 311 mL            PHYSICAL EXAM:  GEN: NAD, looks comfortable  Psych: Mood appropriate  Neuro: A&Ox3.  No focal deficits.  Moving all extremities.   HEENT: No obvious abnormalities; Voice hoarse, but able to phonate some. Able to understand her clearly.   CV: S1S2, regular, no murmurs appreciated.  No carotid bruits.  No JVD  Lungs: Clear B/L.  No wheezing, rales or rhonchi  ABD: Soft, non-tender, non-distended.  +Bowel sounds; +J-tube.  +NGT to intermittent suction.   EXT: Warm and well perfused.  No peripheral edema noted  Musculoskeletal: Moving all extremities with normal ROM, no joint swelling  PV: Pedal pulses palpable  Incision Sites: Neck incision w/ visible sutures, clean and dry; Covered w/ dry gauze dressing.  Mid-abdomen incision clean and dry, open to air.        LABS:                        10.5   5.24  )-----------( 316      ( 27 May 2021 05:37 )             32.8         05-27    138  |  103  |  12  ----------------------------<  104<H>  3.7   |  28  |  0.44<L>    Ca    9.5      27 May 2021 05:37  Mg     2.2     05-27            MEDICATIONS  (STANDING):  heparin   Injectable 5000 Unit(s) SubCutaneous every 8 hours  lactated ringers. 500 milliLiter(s) (10 mL/Hr) IV Continuous <Continuous>  pantoprazole  Injectable 40 milliGRAM(s) IV Push daily  sodium chloride 0.9% lock flush 3 milliLiter(s) IV Push every 8 hours    MEDICATIONS  (PRN):  tetracaine/benzocaine/butamben Spray 1 Spray(s) Topical every 4 hours PRN Sore Throat        RADIOLOGY & ADDITIONAL TESTS:    < from: Xray Chest 1 View- PORTABLE-Urgent (Xray Chest 1 View- PORTABLE-Urgent .) (05.27.21 @ 07:35) >  2 AP views of the chest are compared to the prior study dated 5/26/2021. Tip of gastrictube overlies the GE junction. Heart size is enlarged. Persistent moderate left and small right pleural effusions. Mild congestion. Probable atelectasis in the lung bases. No pneumothorax. Small pockets of soft tissue gas left supraclavicular region.      IMPRESSION:    Gastric tube somewhat high in position with the tip overlying the GE junction.    Persistent pleural effusions, left greater than right.    < end of copied text >

## 2021-05-27 NOTE — PROGRESS NOTE ADULT - ASSESSMENT
This is a 70 y/o female with PMH HTN, HLD (myalgias with statins), and paraesophageal hernia resulting in mild GERD and esophageal dysmotility. She was evaluated as an outpatient by general surgery for elective repair and on 5/21/21 presented for her RA repair of paraesophageal hernia with partial  fundoplication. Intraop course complicated by esophageal perforation for which Dr. Alberto and Dr. Pinto were consulted urgently. EGD was preformed and tear was not amenable to primary repair. They proceeded with an uncomplicated transhiatal esophagectomy. Post operatively she was brought to the CTICU extubated. Trickle feeds were started via J-tube on POD2 and she was transferred to [facility]. POD3 patient tube feeds to goal, continuing IV hydration, maintaining NPO.  POD5 patient remained NPO on tube feeds.  NGT to intermittent suction. Today, POD 6, s/p Barrium Esophagram, pending results.  Also S&S eval pending today.  Possible IR drainage of left pleural effusion?    Neurovascular:   - No delirium. Pain well controlled with current regimen.  - Continue tylenol - Order as needed.     Cardiovascular:   - Hemodynamically stable.   - Hx of HTN,       - Home amlodipine on hold while NPO.  If needed, ?Start something IV.  Hydral or BB.      HEENT:  - Post op voice changes: ENT consulted- hypomobile vs immobile LVC     -Needs out-patient F/U with ENT, Dr. Patel  -S&S eval today         Respiratory:   - 02 Sat = 96% on RA.  -CXR with small L effusion   -On U/S looks moderate and ?loculated. Put order for IR drainage today.  ??If she can't tolerate w/o sedation will do tomorrow and make NPO after midnight.      GI:   - POD5 s/p emergent transhiatal esophagectomy after esophageal perforation in OR       - Continue tube feeds through J tube, feeds to goal  - NGT remains in place per thoracic protocol   - Continue GI PPX with protonix IV  -S&S eval today.    -F/U official Xray esophagram today, ??If no leak will plan to start clears per Dr. Pinto.     Renal / :  - BUN/Cr stable   - Continue to monitor renal function.  - Monitor I/O's.  - Replete electrolytes PRN.  Will order K+ for 3.8.     Endocrine:    - no known hx diabetes or thyroid disease     Hematologic:  -H/H stable at with no obvious signs of bleeding    ID:  -Pt remains afebrile with no elevation in WBC or signs of infection    Prophylaxis:  -DVT prophylaxis with 5000 SubQ Heparin q8h.  -SCD's    Disposition:  -Home when medically cleared.

## 2021-05-28 ENCOUNTER — TRANSCRIPTION ENCOUNTER (OUTPATIENT)
Age: 71
End: 2021-05-28

## 2021-05-28 LAB
ANION GAP SERPL CALC-SCNC: 10 MMOL/L — SIGNIFICANT CHANGE UP (ref 5–17)
BUN SERPL-MCNC: 9 MG/DL — SIGNIFICANT CHANGE UP (ref 7–23)
CALCIUM SERPL-MCNC: 9.8 MG/DL — SIGNIFICANT CHANGE UP (ref 8.4–10.5)
CHLORIDE SERPL-SCNC: 102 MMOL/L — SIGNIFICANT CHANGE UP (ref 96–108)
CO2 SERPL-SCNC: 28 MMOL/L — SIGNIFICANT CHANGE UP (ref 22–31)
CREAT SERPL-MCNC: 0.47 MG/DL — LOW (ref 0.5–1.3)
GLUCOSE SERPL-MCNC: 101 MG/DL — HIGH (ref 70–99)
GRAM STN FLD: SIGNIFICANT CHANGE UP
HCT VFR BLD CALC: 34.7 % — SIGNIFICANT CHANGE UP (ref 34.5–45)
HGB BLD-MCNC: 10.6 G/DL — LOW (ref 11.5–15.5)
MAGNESIUM SERPL-MCNC: 2.2 MG/DL — SIGNIFICANT CHANGE UP (ref 1.6–2.6)
MCHC RBC-ENTMCNC: 29 PG — SIGNIFICANT CHANGE UP (ref 27–34)
MCHC RBC-ENTMCNC: 30.5 GM/DL — LOW (ref 32–36)
MCV RBC AUTO: 95.1 FL — SIGNIFICANT CHANGE UP (ref 80–100)
NRBC # BLD: 0 /100 WBCS — SIGNIFICANT CHANGE UP (ref 0–0)
PLATELET # BLD AUTO: 377 K/UL — SIGNIFICANT CHANGE UP (ref 150–400)
POTASSIUM SERPL-MCNC: 4.8 MMOL/L — SIGNIFICANT CHANGE UP (ref 3.5–5.3)
POTASSIUM SERPL-SCNC: 4.8 MMOL/L — SIGNIFICANT CHANGE UP (ref 3.5–5.3)
RBC # BLD: 3.65 M/UL — LOW (ref 3.8–5.2)
RBC # FLD: 14.6 % — HIGH (ref 10.3–14.5)
SODIUM SERPL-SCNC: 140 MMOL/L — SIGNIFICANT CHANGE UP (ref 135–145)
SPECIMEN SOURCE: SIGNIFICANT CHANGE UP
WBC # BLD: 5.74 K/UL — SIGNIFICANT CHANGE UP (ref 3.8–10.5)
WBC # FLD AUTO: 5.74 K/UL — SIGNIFICANT CHANGE UP (ref 3.8–10.5)

## 2021-05-28 PROCEDURE — 32557 INSERT CATH PLEURA W/ IMAGE: CPT | Mod: LT

## 2021-05-28 PROCEDURE — 71045 X-RAY EXAM CHEST 1 VIEW: CPT | Mod: 26

## 2021-05-28 PROCEDURE — 74018 RADEX ABDOMEN 1 VIEW: CPT | Mod: 26

## 2021-05-28 PROCEDURE — 99152 MOD SED SAME PHYS/QHP 5/>YRS: CPT

## 2021-05-28 RX ORDER — ACETAMINOPHEN 500 MG
1000 TABLET ORAL ONCE
Refills: 0 | Status: COMPLETED | OUTPATIENT
Start: 2021-05-28 | End: 2021-05-28

## 2021-05-28 RX ORDER — SODIUM CHLORIDE 0.65 %
1 AEROSOL, SPRAY (ML) NASAL THREE TIMES A DAY
Refills: 0 | Status: DISCONTINUED | OUTPATIENT
Start: 2021-05-28 | End: 2021-06-03

## 2021-05-28 RX ADMIN — HEPARIN SODIUM 5000 UNIT(S): 5000 INJECTION INTRAVENOUS; SUBCUTANEOUS at 22:30

## 2021-05-28 RX ADMIN — PANTOPRAZOLE SODIUM 40 MILLIGRAM(S): 20 TABLET, DELAYED RELEASE ORAL at 13:44

## 2021-05-28 RX ADMIN — SODIUM CHLORIDE 3 MILLILITER(S): 9 INJECTION INTRAMUSCULAR; INTRAVENOUS; SUBCUTANEOUS at 05:08

## 2021-05-28 RX ADMIN — Medication 400 MILLIGRAM(S): at 12:11

## 2021-05-28 RX ADMIN — Medication 1000 MILLIGRAM(S): at 23:28

## 2021-05-28 RX ADMIN — Medication 400 MILLIGRAM(S): at 22:25

## 2021-05-28 RX ADMIN — SODIUM CHLORIDE 3 MILLILITER(S): 9 INJECTION INTRAMUSCULAR; INTRAVENOUS; SUBCUTANEOUS at 15:26

## 2021-05-28 RX ADMIN — HEPARIN SODIUM 5000 UNIT(S): 5000 INJECTION INTRAVENOUS; SUBCUTANEOUS at 13:44

## 2021-05-28 RX ADMIN — SODIUM CHLORIDE 3 MILLILITER(S): 9 INJECTION INTRAMUSCULAR; INTRAVENOUS; SUBCUTANEOUS at 22:21

## 2021-05-28 RX ADMIN — Medication 1000 MILLIGRAM(S): at 13:44

## 2021-05-28 NOTE — CONSULT NOTE ADULT - ASSESSMENT
Assessment: 72 y/o female with PMH HTN, HLD (myalgias with statins), and paraesophageal hernia resulting in mild GERD and esophageal dysmotility. She was evaluated as an outpatient by general surgery for elective repair and on 5/21/21 presented for her RA repair of paraesophageal hernia with partial  fundoplication. Intraop course complicated by esophageal perforation for which Dr. Alberto and Dr. Pinto were consulted urgently. EGD was preformed and tear was not amenable to primary repair. They proceeded with an uncomplicated transhiatal esophagectomy. Imaging showing post-op left pleural effusion. Now for left chest tube placement. Case reviewed with Dr. Gabriel, plan for placement with sedation.           Recommendations: NPO at midnight prior to procedure with sedation. Morning subq heparin held        Communicated with: MERARI Hurd

## 2021-05-28 NOTE — DISCHARGE NOTE PROVIDER - NSDCCPCAREPLAN_GEN_ALL_CORE_FT
PRINCIPAL DISCHARGE DIAGNOSIS  Diagnosis: History of repair of hiatal hernia  Assessment and Plan of Treatment:

## 2021-05-28 NOTE — SWALLOW BEDSIDE ASSESSMENT ADULT - PHARYNGEAL PHASE
Hyolaryngeal excursion palpated during swallow trigger, appears brisk & timely. Subtle throat clear noted after cup sips thin.

## 2021-05-28 NOTE — DISCHARGE NOTE PROVIDER - HOSPITAL COURSE
This is a 70 y/o female with PMH HTN, HLD (myalgias with statins), and paraesophageal hernia resulting in mild GERD and esophageal dysmotility. She was evaluated as an outpatient by general surgery for elective repair and on 5/21/21 presented for her RA repair of paraesophageal hernia with partial  fundoplication. Intraop course complicated by esophageal perforation for which Dr. Alberto and Dr. Pinto were consulted urgently. EGD was preformed and tear was not amenable to primary repair. They proceeded with an uncomplicated transhiatal esophagectomy. Post operatively she was brought to the CTICU extubated. Trickle feeds were started via J-tube on POD2 and she was transferred to [facility]. POD3 patient tube feeds to goal, continuing IV hydration, maintaining NPO.  POD5 patient remained NPO on tube feeds.  NGT to intermittent suction. Today, POD 6, s/p Barium Esophagram, pending results. POD4 patient underwent IR procedure of left pigtail placement for pleural effusion. Speech and swallow assessed patient and cleared patient for clear to full liquid diet. Patient started on sips of clears.       This is a 72 y/o female with PMHx of HTN, HLD (myalgias with statins), and paraesophageal hernia resulting in mild GERD and esophageal dysmotility. She was evaluated as an outpatient by general surgery for elective repair and on 5/21/21 presented for her RA repair of paraesophageal hernia with partial  fundoplication. Intraop course complicated by esophageal perforation for which Dr. Alberto and Dr. Pinto were consulted urgently. EGD was preformed and tear was not amenable to primary repair. They proceeded with an uncomplicated transhiatal esophagectomy. Post operatively she was brought to the CTICU extubated. Trickle feeds were started via J-tube on POD2 and she was transferred to stepdown unit. POD3 patient tube feeds to goal, continuing IV hydration, maintaining NPO.  POD5 patient remained NPO on tube feeds.  NGT to intermittent suction. POD 6, s/p Barium Esophagram, no leak, and NGT was removed. POD7  speech and swallow evaluation performed and patient cleared for sips of clears/full liquids. POD7 patient also underwent IR procedure of left pigtail placement for pleural effusion. She was started on clear liquids, tolerated. POD8 minimal drainage noted from pigtail catheter, and CT Chest performed, revealing loculated left pleural effusion. Lytic therapy initiated q12h for 6 rounds and completed last dose on 6/1 in the morning. On 6/1/21, IR repositioned left pigtail catheter.  6/2/21 patient underwent awake bronchoscopy at bedside for collapsed right lower lobe..    **last updated 6/2/21 AM         This is a 72 y/o female with PMHx of HTN, HLD (myalgias with statins), and paraesophageal hernia resulting in mild GERD and esophageal dysmotility. She was evaluated as an outpatient by general surgery for elective repair and on 5/21/21 presented for her RA repair of paraesophageal hernia with partial  fundoplication. Intraop course complicated by esophageal perforation for which Dr. Alberto and Dr. Pinto were consulted urgently. EGD was performed and tear was not amenable to primary repair. They proceeded with an uncomplicated transhiatal esophagectomy. Post operatively she was brought to the CTICU extubated. Trickle feeds were started via J-tube on POD2 and she was transferred to stepdown unit. POD3 patient tube feeds to goal, continuing IV hydration, maintaining NPO. POD5 patient remained NPO on tube feeds. NGT to intermittent suction. POD 6, s/p Barium Esophagram, no leak, and NGT was removed. POD7 speech and swallow evaluation performed and patient cleared for sips of clears/full liquids. POD7 patient also underwent IR procedure of left pigtail placement for pleural effusion. She was started on clear liquids, tolerated. POD8 minimal drainage noted from pigtail catheter, and CT Chest performed, revealing loculated left pleural effusion. Lytic therapy initiated q12h for 6 rounds and completed last dose on 6/1 in the morning. On 6/1/21, IR repositioned left pigtail catheter.  6/2/21 patient underwent awake bronchoscopy at bedside for collapsed right lower lobe, put on high flow nasal cannula overnight. CXR with improvement 6/3/21. Left pigtail removed 6/3/21, post pull CXR stable. Patient weaned off supplemental oxygen 6/3/21 while maintaining oxygen saturations above 96%.     Over 35 minutes was spent with the patient reviewing the discharge material including medications, follow up appointments, recovery, concerning symptoms, and how to contact their health care providers if they have questions             This is a 72 y/o female with PMHx of HTN, HLD (myalgias with statins), and paraesophageal hernia resulting in mild GERD and esophageal dysmotility. She was evaluated as an outpatient by general surgery for elective repair and on 5/21/21 presented for her RA repair of paraesophageal hernia with partial  fundoplication. Intraop course complicated by esophageal perforation for which Dr. Alberto and Dr. Pinto were consulted urgently. EGD was performed and tear was not amenable to primary repair. They proceeded with an uncomplicated transhiatal esophagectomy. Post operatively she was brought to the CTICU extubated. Trickle feeds were started via J-tube on POD2 and she was transferred to stepdown unit. POD3 patient tube feeds to goal, continuing IV hydration, maintaining NPO. POD5 patient remained NPO on tube feeds. NGT to intermittent suction. POD 6, s/p Barium Esophagram, no leak, and NGT was removed. POD7 speech and swallow evaluation performed and patient cleared for sips of clears/full liquids. POD7 patient also underwent IR procedure of left pigtail placement for pleural effusion. She was started on clear liquids, tolerated. POD8 minimal drainage noted from pigtail catheter, and CT Chest performed, revealing loculated left pleural effusion. Lytic therapy initiated q12h for 6 rounds and completed last dose on 6/1 in the morning. On 6/1/21, IR repositioned left pigtail catheter.  6/2/21 patient underwent awake bronchoscopy at bedside for collapsed right lower lobe, put on high flow nasal cannula overnight. CXR with improvement 6/3/21. Left pigtail removed 6/3/21, post pull CXR stable. Patient weaned off supplemental oxygen 6/3/21 while maintaining oxygen saturations above 96%.  Patient setup for home tube feeds and supplemental clear liquid diet on discharge. Patient tolerating po clears, moving bowels, and ambulating without need of supplemental oxygen. Patient medically s table for discharge today.    Over 35 minutes was spent with the patient reviewing the discharge material including medications, follow up appointments, recovery, concerning symptoms, and how to contact their health care providers if they have questions

## 2021-05-28 NOTE — DISCHARGE NOTE PROVIDER - CARE PROVIDERS DIRECT ADDRESSES
[email or web address]

## 2021-05-28 NOTE — SWALLOW BEDSIDE ASSESSMENT ADULT - COMMENTS
Received awake & alert. Language skills are intact at the complex conversational level. Speech is intelligible, but voice is noted to be with a high pitch. Pt said, "I sound like a helium balloon."

## 2021-05-28 NOTE — DISCHARGE NOTE PROVIDER - NSDCCPTREATMENT_GEN_ALL_CORE_FT
PRINCIPAL PROCEDURE  Procedure: Repair, hernia, paraesophageal, robot-assisted, using da Alee Xi  Findings and Treatment:       SECONDARY PROCEDURE  Procedure: Transhiatal esophagectomy  Findings and Treatment:

## 2021-05-28 NOTE — PHYSICAL THERAPY INITIAL EVALUATION ADULT - PERTINENT HX OF CURRENT PROBLEM, REHAB EVAL
This is a 70 y/o female with PMH HTN, HLD (myalgias with statins), and paraesophageal hernia resulting in mild GERD and esophageal dysmotility. She was evaluated as an outpatient by general surgery for elective repair and on 5/21/21 presented for her RA repair of paraesophageal hernia with partial  fundoplication. Intraop course complicated by esophageal perforation for which Dr. Alberto and

## 2021-05-28 NOTE — PROGRESS NOTE ADULT - ASSESSMENT
This is a 72 y/o female with PMH HTN, HLD (myalgias with statins), and paraesophageal hernia resulting in mild GERD and esophageal dysmotility. She was evaluated as an outpatient by general surgery for elective repair and on 5/21/21 presented for her RA repair of paraesophageal hernia with partial  fundoplication. Intraop course complicated by esophageal perforation for which Dr. Alberto and Dr. Pinto were consulted urgently. EGD was preformed and tear was not amenable to primary repair. They proceeded with an uncomplicated transhiatal esophagectomy. Post operatively she was brought to the CTICU extubated. Trickle feeds were started via J-tube on POD2 and she was transferred to [facility]. POD3 patient tube feeds to goal, continuing IV hydration, maintaining NPO. POD5 patient remained NPO on tube feeds.  NGT to intermittent suction. POD 6, s/p Barrium Esophagram, revealing no leak, NGT removed. POD7 S&S eval performed and patient cleared for sips of clear/full liquids. POD7 patient also went for left pigtail insertion for pleural effusion by IR.    Neurovascular:   - No delirium. Pain well controlled with current regimen.  - Continue tylenol - Order as needed.     Cardiovascular:   - Hemodynamically stable.   - Hx of HTN:       - Home amlodipine on hold while NPO.  If needed, Start something IV. Hydral or BB. - consider restarting now that clear liquid diet initiated    HEENT:  - Post op voice changes: ENT consulted- hypomobile vs immobile LVC     -Needs out-patient F/U with ENT, Dr. Patel  -S&S eval today: cleared for clear to full liquids     Respiratory:   - 02 Sat = 96% on RA.  -CXR with small L effusion   -On U/S looks moderate and ?loculated. IR drainage of left loculated pleural effusion today - 160cc initially evacuated    GI:   - POD7 s/p emergent transhiatal esophagectomy after esophageal perforation in OR       - Continue tube feeds through J tube, feeds to goal  - NGT remains in place per thoracic protocol   - Continue GI PPX with protonix IV  -S&S eval today: clear to full liquids ok per S&S    -esophagram without leak    Renal / :  - BUN/Cr stable   - Continue to monitor renal function.  - Monitor I/O's.  - Replete electrolytes PRN.      Endocrine:    - no known hx diabetes or thyroid disease     Hematologic:  -H/H stable at with no obvious signs of bleeding    ID:  -Pt remains afebrile with no elevation in WBC or signs of infection    Prophylaxis:  -DVT prophylaxis with 5000 SubQ Heparin q8h.  -SCD's    Disposition:  -Home when medically cleared with tube feeds

## 2021-05-28 NOTE — CONSULT NOTE ADULT - SUBJECTIVE AND OBJECTIVE BOX
70 y/o female with PMH HTN, HLD (myalgias with statins), and paraesophageal hernia resulting in mild GERD and esophageal dysmotility. She was evaluated as an outpatient by general surgery for elective repair and on 5/21/21 presented for her RA repair of paraesophageal hernia with partial  fundoplication. Intraop course complicated by esophageal perforation for which Dr. Alberto and Dr. Pinto were consulted urgently. EGD was preformed and tear was not amenable to primary repair. They proceeded with an uncomplicated transhiatal esophagectomy. Imaging showing post-op left pleural effusion. IR consulted for left chest tube.      Clinical History: K44.9    No pertinent family history in first degree relatives    Handoff    MEWS Score    HTN (hypertension)    Hyperlipidemia    Gastric reflux    Diaphragmatic hernia without obstruction or gangrene    Constipation    Paraesophageal hernia    Esophageal perforation    Paraesophageal hernia    Esophageal perforation    Esophageal perforation    Repair, hernia, paraesophageal, robot-assisted, using da Alee Xi    Transhiatal esophagectomy    No significant past surgical history    H/O colonoscopy    History of coronary angiogram    SysAdmin_VstLnk        Meds:heparin   Injectable 5000 Unit(s) SubCutaneous every 8 hours  lactated ringers. 500 milliLiter(s) IV Continuous <Continuous>  pantoprazole  Injectable 40 milliGRAM(s) IV Push daily  sodium chloride 0.9% lock flush 3 milliLiter(s) IV Push every 8 hours  tetracaine/benzocaine/butamben Spray 1 Spray(s) Topical every 4 hours PRN      Allergies:Nuts (Hives)  penicillin (Hives)  pollen....sneeze, dry nose; itching (Other)  statins (Muscle Pain)        Labs:                           10.6   5.74  )-----------( 377      ( 28 May 2021 06:58 )             34.7       05-28    140  |  102  |  9   ----------------------------<  101<H>  4.8   |  28  |  0.47<L>    Ca    9.8      28 May 2021 06:58  Mg     2.2     05-28            Imaging Findings: left pleural effusion

## 2021-05-28 NOTE — DISCHARGE NOTE PROVIDER - NSDCFUADDAPPT_GEN_ALL_CORE_FT
-Please follow up with Dr. Alberto on 6/11/21 at 10am. The office is located at [facility], [facility], 4th floor. Call us with any questions  #[phone].    -Please follow up with Dr. Patel from ENT in two weeks. You will need to call the office to make an appointment.    -Please follow up with Dr. Sheehan from General Surgery in two weeks. You will need to call the office to make an appointment.

## 2021-05-28 NOTE — DISCHARGE NOTE PROVIDER - PROVIDER TOKENS
PROVIDER:[TOKEN:[[id number]],SCHEDULEDAPPT:[06/11/2021],SCHEDULEDAPPTTIME:[10:00 AM]],PROVIDER:[TOKEN:[[id number]],FOLLOWUP:[2 weeks]],PROVIDER:[TOKEN:[[id number]],FOLLOWUP:[2 weeks]]

## 2021-05-28 NOTE — DISCHARGE NOTE PROVIDER - CARE PROVIDER_API CALL
Musa Alberto (MD)  Surgery; Thoracic Surgery  [address] [place] [address], [place] [address]  Phone: [phone]  Fax: [phone]  Scheduled Appointment: 06/11/2021 10:00 AM    Mason Patel)  Otolaryngology  [address], [address] Floor, [place] [address]  Phone: [phone]  Fax: [phone]  Follow Up Time: 2 weeks    Heidi Sheehan)  Surgery  [address], [place] [address]  Phone: [phone]  Fax: [phone]  Follow Up Time: 2 weeks

## 2021-05-28 NOTE — PHYSICAL THERAPY INITIAL EVALUATION ADULT - ADDITIONAL COMMENTS
Pt states she lives alone in elevator building but daughter will be staying with her for the next few months. Pt was independent with ADL's and amb PTA>

## 2021-05-28 NOTE — PROGRESS NOTE ADULT - SUBJECTIVE AND OBJECTIVE BOX
Patient discussed on morning rounds with       Operation / Date: 5/21/21 Called to OR for transhiatal esophagectomy after esophageal perforation during hiatal hernia repair with General Surgery ([place])      SUBJECTIVE ASSESSMENT:  71y Female         Vital Signs Last 24 Hrs  T(C): 36.6 (28 May 2021 09:56), Max: 36.7 (27 May 2021 14:00)  T(F): 97.9 (28 May 2021 09:56), Max: 98.1 (27 May 2021 14:00)  HR: 84 (28 May 2021 10:00) (72 - 98)  BP: 140/81 (28 May 2021 10:00) (136/73 - 147/72)  BP(mean): 104 (28 May 2021 10:00) (97 - 104)  RR: 25 (28 May 2021 10:00) (18 - 25)  SpO2: 92% (28 May 2021 10:00) (92% - 98%)  I&O's Detail    27 May 2021 07:01  -  28 May 2021 07:00  --------------------------------------------------------  IN:    Enteral Tube Flush: 250 mL    Enteral Tube Flush: 250 mL    Jevity 1.5: 282 mL    Lactated Ringers: 130 mL  Total IN: 912 mL    OUT:    Voided (mL): 700 mL  Total OUT: 700 mL    Total NET: 212 mL      28 May 2021 07:01  -  28 May 2021 12:45  --------------------------------------------------------  IN:    Lactated Ringers: 10 mL  Total IN: 10 mL    OUT:    Chest Tube (mL): 190 mL    Voided (mL): 200 mL  Total OUT: 390 mL    Total NET: -380 mL          CHEST TUBE:  Yes/No. AIR LEAKS: Yes/No. Suction / H2O SEAL.   LISETH DRAIN:  Yes/No.  EPICARDIAL WIRES: Yes/No.  TIE DOWNS: Yes/No.  JOY: Yes/No.    PHYSICAL EXAM:    General:     Neurological:    Cardiovascular:    Respiratory:    Gastrointestinal:    Extremities:    Vascular:    Incision Sites:    LABS:                        10.6   5.74  )-----------( 377      ( 28 May 2021 06:58 )             34.7       COUMADIN:  Yes/No. REASON: .        05-28    140  |  102  |  9   ----------------------------<  101<H>  4.8   |  28  |  0.47<L>    Ca    9.8      28 May 2021 06:58  Mg     2.2     05-28            MEDICATIONS  (STANDING):  heparin   Injectable 5000 Unit(s) SubCutaneous every 8 hours  lactated ringers. 500 milliLiter(s) (10 mL/Hr) IV Continuous <Continuous>  pantoprazole  Injectable 40 milliGRAM(s) IV Push daily  sodium chloride 0.9% lock flush 3 milliLiter(s) IV Push every 8 hours    MEDICATIONS  (PRN):  tetracaine/benzocaine/butamben Spray 1 Spray(s) Topical every 4 hours PRN Sore Throat        RADIOLOGY & ADDITIONAL TESTS:     Patient discussed on morning rounds with Dr. Alberto      Operation / Date: 5/21/21 Called to OR for transhiatal esophagectomy after esophageal perforation during hiatal hernia repair with General Surgery ([place])      SUBJECTIVE ASSESSMENT:  71y Female. Patient feels overall well today. Patient went for left pigtail placement with IR 2/2 pleural effusion today. Post procedure CXR stable. Patient also had speech and swallow evaluation, cleared for sips of clear or full liquids per them.      Vital Signs Last 24 Hrs  T(C): 36.6 (28 May 2021 09:56), Max: 36.7 (27 May 2021 14:00)  T(F): 97.9 (28 May 2021 09:56), Max: 98.1 (27 May 2021 14:00)  HR: 84 (28 May 2021 10:00) (72 - 98)  BP: 140/81 (28 May 2021 10:00) (136/73 - 147/72)  BP(mean): 104 (28 May 2021 10:00) (97 - 104)  RR: 25 (28 May 2021 10:00) (18 - 25)  SpO2: 92% (28 May 2021 10:00) (92% - 98%)  I&O's Detail    27 May 2021 07:01  -  28 May 2021 07:00  --------------------------------------------------------  IN:    Enteral Tube Flush: 250 mL    Enteral Tube Flush: 250 mL    Jevity 1.5: 282 mL    Lactated Ringers: 130 mL  Total IN: 912 mL    OUT:    Voided (mL): 700 mL  Total OUT: 700 mL    Total NET: 212 mL      28 May 2021 07:01  -  28 May 2021 12:45  --------------------------------------------------------  IN:    Lactated Ringers: 10 mL  Total IN: 10 mL    OUT:    Chest Tube (mL): 190 mL    Voided (mL): 200 mL  Total OUT: 390 mL    Total NET: -380 mL        CHEST TUBE:  No.  LISETH DRAIN:  No.  EPICARDIAL WIRES: No.  TIE DOWNS: No.  JOY: No.    PHYSICAL EXAM:  GEN: NAD, looks comfortable  Psych: Mood appropriate  Neuro: A&Ox3.  No focal deficits.  Moving all extremities.   HEENT: No obvious abnormalities; Voice hoarse, but able to phonate some. Able to understand her clearly.   CV: S1S2, regular, no murmurs appreciated.  No carotid bruits.  No JVD  Lungs: Clear B/L.  No wheezing, rales or rhonchi  ABD: Soft, non-tender, non-distended.  +Bowel sounds; +J-tube. +NGT to intermittent suction.   EXT: Warm and well perfused.  No peripheral edema noted  Musculoskeletal: Moving all extremities with normal ROM, no joint swelling  PV: Pedal pulses palpable  Incision Sites: Neck incision w/ visible sutures, clean and dry; Covered w/ dry gauze dressing.  Mid-abdomen incision clean and dry, open to air.      LABS:                        10.6   5.74  )-----------( 377      ( 28 May 2021 06:58 )             34.7       COUMADIN: No.    05-28    140  |  102  |  9   ----------------------------<  101<H>  4.8   |  28  |  0.47<L>    Ca    9.8      28 May 2021 06:58  Mg     2.2     05-28      MEDICATIONS  (STANDING):  heparin   Injectable 5000 Unit(s) SubCutaneous every 8 hours  lactated ringers. 500 milliLiter(s) (10 mL/Hr) IV Continuous <Continuous>  pantoprazole  Injectable 40 milliGRAM(s) IV Push daily  sodium chloride 0.9% lock flush 3 milliLiter(s) IV Push every 8 hours    MEDICATIONS  (PRN):  tetracaine/benzocaine/butamben Spray 1 Spray(s) Topical every 4 hours PRN Sore Throat        RADIOLOGY & ADDITIONAL TESTS:  < from: IR Procedure. (05.28.21 @ 08:47) >    Findings:    Left pleural effusion seen on US imaging.   A 12 F catheter was successfully placed into the left pleural space as described above.  A permanent US image of the needle entering the pleural space was obtained.      IMPRESSION:  Placement of left chest pigtail catheter for treatment of pleural effusion.    < end of copied text >  < from: Xray Esophagram Single Contrast (05.27.21 @ 09:25) >  FINDINGS:   radiograph of the chest demonstrates enteric tube and intervalimprovement in bilateral pleural effusions compared to chest radiograph from 05/27/2021 at 7:14 AM.    A limited single-contrast esophagram was performed utilizing gastrografin followed by barium sulfate suspension. Swallowing and passage of contrastthrough the pharynx appeared normal at fluoroscopy. Patient is status post esophagectomy. No obstruction or extravasation of contrast. The pylorus appears to be located just inferior to the diaphragm, though the bilateral hemidiaphragms are partiallyobscured due to pleural effusions. NG-tube with tip overlying T11 vertebral body.    IMPRESSION:  Status post esophagectomy. No obstruction or extravasation of contrast.    < end of copied text >

## 2021-05-28 NOTE — PROGRESS NOTE ADULT - SUBJECTIVE AND OBJECTIVE BOX
SUBJECTIVE:  Patient is feeling better this am. No sore throat voice is improving  AVSS      MEDICATIONS  (STANDING):  heparin   Injectable 5000 Unit(s) SubCutaneous every 8 hours  lactated ringers. 500 milliLiter(s) (10 mL/Hr) IV Continuous <Continuous>  pantoprazole  Injectable 40 milliGRAM(s) IV Push daily  sodium chloride 0.9% lock flush 3 milliLiter(s) IV Push every 8 hours    MEDICATIONS  (PRN):  tetracaine/benzocaine/butamben Spray 1 Spray(s) Topical every 4 hours PRN Sore Throat      Vital Signs Last 24 Hrs  T(C): 36.6 (28 May 2021 05:01), Max: 36.7 (27 May 2021 10:20)  T(F): 97.9 (28 May 2021 05:01), Max: 98.1 (27 May 2021 14:00)  HR: 72 (28 May 2021 05:05) (72 - 98)  BP: 147/72 (28 May 2021 05:05) (136/73 - 147/72)  BP(mean): 103 (28 May 2021 05:05) (95 - 103)  RR: 22 (28 May 2021 05:05) (18 - 22)  SpO2: 95% (28 May 2021 05:05) (91% - 98%)    Physical Exam:  General: NAD, resting comfortably in bed  Neck: Incision is c/d/i, sutures in place  Pulmonary: Nonlabored breathing, no respiratory distress  Cardiovascular: NSR  Abdominal: soft, mild discomfort around incisions which are c/d/i  Extremities: WWP, normal strength  Neuro: A/O x 3 no focal deficits, normal motor/sensation  Pulses: palpable distal pulses    I&O's Summary    27 May 2021 07:01  -  28 May 2021 07:00  --------------------------------------------------------  IN: 912 mL / OUT: 700 mL / NET: 212 mL    28 May 2021 07:01  -  28 May 2021 09:06  --------------------------------------------------------  IN: 10 mL / OUT: 0 mL / NET: 10 mL        LABS:                        10.6   5.74  )-----------( 377      ( 28 May 2021 06:58 )             34.7     05-28    140  |  102  |  9   ----------------------------<  101<H>  4.8   |  28  |  0.47<L>    Ca    9.8      28 May 2021 06:58  Mg     2.2     05-28          CAPILLARY BLOOD GLUCOSE            RADIOLOGY & ADDITIONAL STUDIES:

## 2021-05-28 NOTE — DISCHARGE NOTE PROVIDER - NSDCFUADDINST_GEN_ALL_CORE_FT
-Walk daily as tolerated and use your incentive spirometer every hour.    -No driving or strenuous activity/exercise for 6 weeks, or until  cleared by your surgeon.    -Gently clean your incisions with anti-bacterial soap and water, pat  dry.  You may leave them open to air.    -Call your doctor if you have shortness of breath, chest pain not  relieved by pain medication, dizziness, fever >101.5, or increased  redness or drainage from incisions.

## 2021-05-28 NOTE — DISCHARGE NOTE PROVIDER - NSDCMRMEDTOKEN_GEN_ALL_CORE_FT
amLODIPine 5 mg oral tablet: 1 tab(s) orally once a day  Flonase 50 mcg/inh nasal spray:   meclizine 25 mg oral tablet: 1 tab(s) orally every 8 hours, As Needed -for dizziness   Vitamin B12: orally once a day   amLODIPine 5 mg oral tablet: 1 tab(s) orally once a day  Flonase 50 mcg/inh nasal spray:   meclizine 25 mg oral tablet: 1 tab(s) orally every 8 hours, As Needed -for dizziness   Tube Feeding Modality: Jejunostomy; Jevity 1.5Cal (Jevity1.5); Total Volume for 24 hours (mL): 846; total number of cans: 4; intermittent starting tube feed rate (mL per hours): 47; unit goal tube feed rate (mL per hour): 47, tube feeding hours ON: 18, tu: Tube Feeding Modality: Jejunostomy; Jevity 1.5Cal (Jevity1.5); Total Volume for 24 hours (mL): 846; total number of cans: 4; intermittent starting tube feed rate (mL per hours): 47; unit goal tube feed rate (mL per hour): 47, tube feeding hours ON: 18, tube feeding OFF (hours): 6; tube feed start time 22:00  Vitamin B12: orally once a day

## 2021-05-28 NOTE — DISCHARGE NOTE PROVIDER - NSDCQMPCI_CARD_ALL_CORE
Physical Therapy Daily Treatment    Visit Count: 5  Plan of Care: 9/3/2019 Through: 10/29/2019  Insurance Information: MEDICARE  AUTHORIZATION NEEDED: NO, FOLLOW MEDICARE GUIDELINES     THRESHOLD AMOUNT: $2040.00     PT AND ST COMBINED MET:$1240.96           OT SEPARATE MET:$0    Precautions: NA    SUBJECTIVE   Pt reports he underwent fluoro-guided injection with Dr. Ochoa. Pt indicates marked improvement in L hip pain. He reports he was able to walk longer, ascend/descend stairs better and generally has experienced minimal pain about his lateral hip.    Pt does continue to c/o pain about proximal lateral thigh.    Current Pain (0-10 scale): 1 - anterolateral thigh   Functional Change: NA    OBJECTIVE   Pt is moderately tender about L lateral quad.    Treatment   Manual Therapy:   STM L ITB, vastus lateralis  Manually assisted posterolateral hip stretching    Skilled input: verbal instruction/cues, tactile instruction/cues    Home Program:   Ball Flexion rolling  Posterolateral hip stretching    Writer verbally educated the patient and received verbal consent from the patient on hand placement, positioning of patient, and techniques to be performed today including clothing adjustments for techniques, therapist position for techniques, hand placement and palpation for techniques as described above and how they are pertinent to the patient's plan of care.      Suggestions for next session as indicated:     ASSESSMENT   Pt has responded well to manual therapy, TrP release and recent injection. He will continue with HEP and gym program at this time.    Pain after treatment (patient reported, 0-10 scale): 2  Result of above outlined education: Verbalizes understanding and Demonstrates understanding     Plan:  D/C to indep HEP.    Goals: To be obtained by end of this plan of care:  1. Patient will be independent with progressed and modified home exercise program MET  2. Decrease pain/symptoms to 4/10 MET  through  improvements listed above patient will:  3. Be able to ambulate for 20 minutes with minimal pain/difficulty MET  4. Be able to complete sit-stand transfers with minimal pain/difficulty MET  5. Oswestry: Patient will complete form to reflect an improved score from initial score of 29%% to less than or equal to 15% to indicate patient reported improvement in function/disability/impairment (minimal detectable change: 12%). NOT MET    THERAPY DAILY BILLING   Insurance: MEDICARE 2. [employer]    Evaluation Procedures:  No evaluation codes were used on this date of service    Timed Procedures:  Manual Therapy, 25 minutes    Untimed Procedures:  No untimed codes were used on this date of service    Total Treatment Time: 25 minutes    Physical Therapy Discharge Summary Addendum:  Date: 9/26/2019  Total Number of Visits: 5  Referred by: Eladio JIMENEZ MD  Medical Diagnosis (from order):   Diagnosis Information      Diagnosis    722.83 (ICD-9-CM) - M96.1 (ICD-10-CM) - Postlaminectomy syndrome of lumbar region    721.3 (ICD-9-CM) - M47.816 (ICD-10-CM) - Lumbar spondylosis    726.5 (ICD-9-CM) - M70.62 (ICD-10-CM) - Greater trochanteric bursitis of left hip    355.1 (ICD-9-CM) - G57.12 (ICD-10-CM) - Meralgia paresthetica of left side    729.1 (ICD-9-CM) - M79.18 (ICD-10-CM) - Myofascial muscle pain                Please see below daily treatment note for last known status.  Status of goals: per status in last daily treatment note    Discharge Measures:   Treatment Category: Hip, Non-Surgical  Outcome Measure:   Lower Extremity Functional Scale: Initial Score: 0; Discharge Outcome Score: 43  Primary Clinician: Artemio Woody   No

## 2021-05-28 NOTE — PHYSICAL THERAPY INITIAL EVALUATION ADULT - GENERAL OBSERVATIONS, REHAB EVAL
Pt received semi supine in bed +2L NC +feeding tube +EKG +chest tube to wall suction. PT received consent to treat from BERTRAND Morton. Pt was left OOB in chair with call bell in reach, vSS, and in NAD. FIM gait 5.

## 2021-05-28 NOTE — SWALLOW BEDSIDE ASSESSMENT ADULT - SLP PERTINENT HISTORY OF CURRENT PROBLEM
70 yo W s/p transhiatal esophagectomy 2/2 esoph perf during repair of paraesophageal hernia w partial fundoplication. Now w paretic L TVF, s/p j-tube placement

## 2021-05-28 NOTE — PROGRESS NOTE ADULT - ASSESSMENT
72 y/o female with PMH HTN, HLD (myalgias with statins), and paraesophageal hernia resulting in mild GERD and esophageal dysmotility. She was evaluated as an outpatient by general surgery for elective repair and on 5/21 presented for her RA repair of paraesophageal hernia with partial  fundoplication. Intraop course complicated by esophageal perforation for which Dr. Alberto and Dr. Pinto were consulted for urgently. EGD was preformed and tear was not amenable to primary repair. They proceeded with an uncomplicated transhiatal esophagectomy. Post operatively she was brought to the CTICU extubated. Trickle feeds were started POD2 and she was transferred to [facility].       Bilateral pleural effusions, left side is larger than R on  USS    POD7  today, patient is feeling well, voice is improving, some sore throat   5/24/2021 laryngoscopy: left true vocal cords hypomobile or immobile possibly 2/2 post surgical edema or nerve injury during the procedure  NGT is out since yesterday, patient feels better without it.    Vitals are stable, abdomen is soft  WBC WNL    Discussed with attending and chief resident:    - Continue management as planned by primary team  - Surgery team 4 will continue to FU

## 2021-05-28 NOTE — SWALLOW BEDSIDE ASSESSMENT ADULT - SWALLOW EVAL: DIAGNOSIS
Essentially functional oropharyngeal swallow for sips of thin liquid; however Pt remains at increased risk of aspiration in setting of paretic L TVF

## 2021-05-28 NOTE — SWALLOW BEDSIDE ASSESSMENT ADULT - SWALLOW EVAL: PROGNOSIS
Good, anticipate return to oral diet over the next several weeks and w clearance from CT/GI re esophageal motility

## 2021-05-29 LAB
ALBUMIN SERPL ELPH-MCNC: 3.3 G/DL — SIGNIFICANT CHANGE UP (ref 3.3–5)
ALP SERPL-CCNC: 167 U/L — HIGH (ref 40–120)
ALT FLD-CCNC: 128 U/L — HIGH (ref 10–45)
ANION GAP SERPL CALC-SCNC: 10 MMOL/L — SIGNIFICANT CHANGE UP (ref 5–17)
AST SERPL-CCNC: 63 U/L — HIGH (ref 10–40)
BILIRUB SERPL-MCNC: 0.3 MG/DL — SIGNIFICANT CHANGE UP (ref 0.2–1.2)
BUN SERPL-MCNC: 10 MG/DL — SIGNIFICANT CHANGE UP (ref 7–23)
CALCIUM SERPL-MCNC: 9.6 MG/DL — SIGNIFICANT CHANGE UP (ref 8.4–10.5)
CHLORIDE SERPL-SCNC: 100 MMOL/L — SIGNIFICANT CHANGE UP (ref 96–108)
CO2 SERPL-SCNC: 28 MMOL/L — SIGNIFICANT CHANGE UP (ref 22–31)
CREAT SERPL-MCNC: 0.45 MG/DL — LOW (ref 0.5–1.3)
GLUCOSE SERPL-MCNC: 115 MG/DL — HIGH (ref 70–99)
HCT VFR BLD CALC: 33.6 % — LOW (ref 34.5–45)
HGB BLD-MCNC: 10.6 G/DL — LOW (ref 11.5–15.5)
MAGNESIUM SERPL-MCNC: 2.3 MG/DL — SIGNIFICANT CHANGE UP (ref 1.6–2.6)
MCHC RBC-ENTMCNC: 29.9 PG — SIGNIFICANT CHANGE UP (ref 27–34)
MCHC RBC-ENTMCNC: 31.5 GM/DL — LOW (ref 32–36)
MCV RBC AUTO: 94.9 FL — SIGNIFICANT CHANGE UP (ref 80–100)
NRBC # BLD: 0 /100 WBCS — SIGNIFICANT CHANGE UP (ref 0–0)
PLATELET # BLD AUTO: 401 K/UL — HIGH (ref 150–400)
POTASSIUM SERPL-MCNC: 4.2 MMOL/L — SIGNIFICANT CHANGE UP (ref 3.5–5.3)
POTASSIUM SERPL-SCNC: 4.2 MMOL/L — SIGNIFICANT CHANGE UP (ref 3.5–5.3)
PROT SERPL-MCNC: 6.4 G/DL — SIGNIFICANT CHANGE UP (ref 6–8.3)
RBC # BLD: 3.54 M/UL — LOW (ref 3.8–5.2)
RBC # FLD: 14.5 % — SIGNIFICANT CHANGE UP (ref 10.3–14.5)
SODIUM SERPL-SCNC: 138 MMOL/L — SIGNIFICANT CHANGE UP (ref 135–145)
WBC # BLD: 5.08 K/UL — SIGNIFICANT CHANGE UP (ref 3.8–10.5)
WBC # FLD AUTO: 5.08 K/UL — SIGNIFICANT CHANGE UP (ref 3.8–10.5)

## 2021-05-29 PROCEDURE — 71250 CT THORAX DX C-: CPT | Mod: 26

## 2021-05-29 PROCEDURE — 71045 X-RAY EXAM CHEST 1 VIEW: CPT | Mod: 26

## 2021-05-29 PROCEDURE — 99233 SBSQ HOSP IP/OBS HIGH 50: CPT

## 2021-05-29 RX ORDER — ACETAMINOPHEN 500 MG
1000 TABLET ORAL ONCE
Refills: 0 | Status: COMPLETED | OUTPATIENT
Start: 2021-05-29 | End: 2021-05-29

## 2021-05-29 RX ORDER — DORNASE ALFA 1 MG/ML
5 SOLUTION RESPIRATORY (INHALATION) EVERY 12 HOURS
Refills: 0 | Status: COMPLETED | OUTPATIENT
Start: 2021-05-29 | End: 2021-06-01

## 2021-05-29 RX ORDER — ALTEPLASE 100 MG
10 KIT INTRAVENOUS EVERY 12 HOURS
Refills: 0 | Status: COMPLETED | OUTPATIENT
Start: 2021-05-29 | End: 2021-06-01

## 2021-05-29 RX ADMIN — Medication 1000 MILLIGRAM(S): at 14:06

## 2021-05-29 RX ADMIN — SODIUM CHLORIDE 3 MILLILITER(S): 9 INJECTION INTRAMUSCULAR; INTRAVENOUS; SUBCUTANEOUS at 05:06

## 2021-05-29 RX ADMIN — SODIUM CHLORIDE 3 MILLILITER(S): 9 INJECTION INTRAMUSCULAR; INTRAVENOUS; SUBCUTANEOUS at 11:23

## 2021-05-29 RX ADMIN — ALTEPLASE 10 MILLIGRAM(S): KIT at 15:40

## 2021-05-29 RX ADMIN — HEPARIN SODIUM 5000 UNIT(S): 5000 INJECTION INTRAVENOUS; SUBCUTANEOUS at 14:06

## 2021-05-29 RX ADMIN — DORNASE ALFA 5 MILLIGRAM(S): 1 SOLUTION RESPIRATORY (INHALATION) at 17:40

## 2021-05-29 RX ADMIN — HEPARIN SODIUM 5000 UNIT(S): 5000 INJECTION INTRAVENOUS; SUBCUTANEOUS at 22:29

## 2021-05-29 RX ADMIN — Medication 1000 MILLIGRAM(S): at 18:25

## 2021-05-29 RX ADMIN — Medication 400 MILLIGRAM(S): at 11:22

## 2021-05-29 RX ADMIN — PANTOPRAZOLE SODIUM 40 MILLIGRAM(S): 20 TABLET, DELAYED RELEASE ORAL at 11:22

## 2021-05-29 RX ADMIN — HEPARIN SODIUM 5000 UNIT(S): 5000 INJECTION INTRAVENOUS; SUBCUTANEOUS at 05:07

## 2021-05-29 RX ADMIN — SODIUM CHLORIDE 3 MILLILITER(S): 9 INJECTION INTRAMUSCULAR; INTRAVENOUS; SUBCUTANEOUS at 21:59

## 2021-05-29 RX ADMIN — SODIUM CHLORIDE 10 MILLILITER(S): 9 INJECTION, SOLUTION INTRAVENOUS at 22:29

## 2021-05-29 RX ADMIN — Medication 400 MILLIGRAM(S): at 17:56

## 2021-05-29 NOTE — PROGRESS NOTE ADULT - SUBJECTIVE AND OBJECTIVE BOX
CTICU  CRITICAL  CARE  attending     Hand off received 					   Pertinent clinical, laboratory, radiographic, hemodynamic, echocardiographic, respiratory data, microbiologic data and chart were reviewed and analyzed frequently throughout the course of the day and night  Patient seen and examined with CTS/ SH attending at bedside  Pt is a 71y , Female, HEALTH ISSUES - PROBLEM Dx:      , FAMILY HISTORY:  No pertinent family history in first degree relatives    PAST MEDICAL & SURGICAL HISTORY:  HTN (hypertension)    Hyperlipidemia    Gastric reflux    Diaphragmatic hernia without obstruction or gangrene    Constipation    H/O colonoscopy  x2    History of coronary angiogram      Patient is a 71y old  Female who presents with a chief complaint of Paraesophageal hernia repair (29 May 2021 18:17)      14 system review limited by mentation and multiorgan morbidity     Vital signs, hemodynamic and respiratory parameters were reviewed from the bedside nursing flowsheet.  ICU Vital Signs Last 24 Hrs  T(C): 36.8 (29 May 2021 17:38), Max: 37.3 (29 May 2021 04:13)  T(F): 98.2 (29 May 2021 17:38), Max: 99.2 (29 May 2021 04:13)  HR: 94 (29 May 2021 16:00) (64 - 94)  BP: 145/73 (29 May 2021 17:38) (145/73 - 164/124)  BP(mean): 112 (29 May 2021 05:01) (108 - 138)  ABP: --  ABP(mean): --  RR: 22 (29 May 2021 17:38) (13 - 30)  SpO2: 93% (29 May 2021 17:38) (92% - 95%)    Adult Advanced Hemodynamics Last 24 Hrs  CVP(mm Hg): --  CVP(cm H2O): --  CO: --  CI: --  PA: --  PA(mean): --  PCWP: --  SVR: --  SVRI: --  PVR: --  PVRI: --,     Intake and output was reviewed and the fluid balance was calculated  Daily     Daily   I&O's Summary    28 May 2021 07:01  -  29 May 2021 07:00  --------------------------------------------------------  IN: 1653 mL / OUT: 2230 mL / NET: -577 mL    29 May 2021 07:01  -  29 May 2021 18:38  --------------------------------------------------------  IN: 50 mL / OUT: 510 mL / NET: -460 mL        All lines and drain sites were assessed  Glycemic trend was reviewedCAPILLARY BLOOD GLUCOSE        No acute change in focality  Auscultation of the chest reveals equal bs  Abdomen is soft  Extremities are warm and well perfused  Wounds appear clean and unremarkable  Antibiotics are periop    labs  CBC Full  -  ( 29 May 2021 06:27 )  WBC Count : 5.08 K/uL  RBC Count : 3.54 M/uL  Hemoglobin : 10.6 g/dL  Hematocrit : 33.6 %  Platelet Count - Automated : 401 K/uL  Mean Cell Volume : 94.9 fl  Mean Cell Hemoglobin : 29.9 pg  Mean Cell Hemoglobin Concentration : 31.5 gm/dL  Auto Neutrophil # : x  Auto Lymphocyte # : x  Auto Monocyte # : x  Auto Eosinophil # : x  Auto Basophil # : x  Auto Neutrophil % : x  Auto Lymphocyte % : x  Auto Monocyte % : x  Auto Eosinophil % : x  Auto Basophil % : x    05-29    138  |  100  |  10  ----------------------------<  115<H>  4.2   |  28  |  0.45<L>    Ca    9.6      29 May 2021 06:27  Mg     2.3     05-29    TPro  6.4  /  Alb  3.3  /  TBili  0.3  /  DBili  x   /  AST  63<H>  /  ALT  128<H>  /  AlkPhos  167<H>  05-29      The current medications were reviewed   MEDICATIONS  (STANDING):  alteplase  Injectable for Pleural Effusion 10 milliGRAM(s) IntraPleural. every 12 hours  dornase colleen Solution for Pleural Effusion 5 milliGRAM(s) IntraPleural. every 12 hours  heparin   Injectable 5000 Unit(s) SubCutaneous every 8 hours  lactated ringers. 500 milliLiter(s) (10 mL/Hr) IV Continuous <Continuous>  pantoprazole  Injectable 40 milliGRAM(s) IV Push daily  sodium chloride 0.9% lock flush 3 milliLiter(s) IV Push every 8 hours    MEDICATIONS  (PRN):  sodium chloride 0.65% Nasal 1 Spray(s) Both Nostrils three times a day PRN Nasal Congestion  tetracaine/benzocaine/butamben Spray 1 Spray(s) Topical every 4 hours PRN Sore Throat       PROBLEM LIST/ ASSESSMENT:  HEALTH ISSUES - PROBLEM Dx:      ,   Patient is a 71y old  Female who presents with a chief complaint of Paraesophageal hernia repair (29 May 2021 18:17)     s/p surgery                My plan includes :  close hemodynamic, ventilatory and drain monitoring and management per post op routine    Monitor for arrhythmias and monitor parameters for organ perfusion  beta blockade not administered due to hemodynamic instability and bradycardia  monitor neurologic status  Head of the bed should remain elevated to 45 deg .   chest PT and IS will be encouraged  monitor adequacy of oxygenation and ventilation and attempt to wean oxygen  antibiotic regimen will be tailored to the clinical, laboratory and microbiologic data  Nutritional goals will be met using po eventually , ensure adequate caloric intake and montior the same  Stress ulcer and VTE prophylaxis will be achieved    Glycemic control is satisfactory  Electrolytes have been repleted as necessary and wound care has been carried out. Pain control has been achieved.   agressive physical therapy and early mobility and ambulation goals will be met   The family was updated about the course and plan  CRITICAL CARE TIME personally provided by me  in evaluation and management, reassessments, review and interpretation of labs and x-rays, ventilator and hemodynamic management, formulating a plan and coordinating care: ___90____ MIN.  Time does not include procedural time. Time spent was non routine post-operarive caRE and included multiple and repeated evaluations at the bedside  CTICU ATTENDING     					    Benjamín Alvarado MD

## 2021-05-29 NOTE — PROGRESS NOTE ADULT - ASSESSMENT
70 y/o female with PMH HTN, HLD (myalgias with statins), and paraesophageal hernia resulting in mild GERD and esophageal dysmotility. She was evaluated as an outpatient by general surgery for elective repair and on 5/21 presented for her RA repair of paraesophageal hernia with partial  fundoplication. Intraop course complicated by esophageal perforation for which Dr. Alberto and Dr. Pinto were consulted for urgently. EGD was preformed and tear was not amenable to primary repair. They proceeded with an uncomplicated transhiatal esophagectomy. Post operatively she was brought to the CTICU extubated. Trickle feeds were started POD2 and she was transferred to [facility].     No acute surgical intervention  Continue increased TF to goal   Agree with IV Abx  Further medical management per Thoracic Sx  Team 4C will continue to follow  Discussed with chief surgery resident.

## 2021-05-29 NOTE — PROGRESS NOTE ADULT - SUBJECTIVE AND OBJECTIVE BOX
HX: POD  s/p      SUBJECTIVE: Patient seen and examined bedside by chief resident.    heparin   Injectable 5000 Unit(s) SubCutaneous every 8 hours    MEDICATIONS  (PRN):  sodium chloride 0.65% Nasal 1 Spray(s) Both Nostrils three times a day PRN Nasal Congestion  tetracaine/benzocaine/butamben Spray 1 Spray(s) Topical every 4 hours PRN Sore Throat      I&O's Detail    28 May 2021 07:01  -  29 May 2021 07:00  --------------------------------------------------------  IN:    Enteral Tube Flush: 750 mL    Jevity 1.5: 893 mL    Lactated Ringers: 10 mL  Total IN: 1653 mL    OUT:    Chest Tube (mL): 430 mL    Voided (mL): 1800 mL  Total OUT: 2230 mL    Total NET: -577 mL          Vital Signs Last 24 Hrs  T(C): 37.3 (29 May 2021 04:13), Max: 37.3 (29 May 2021 04:13)  T(F): 99.2 (29 May 2021 04:13), Max: 99.2 (29 May 2021 04:13)  HR: 65 (29 May 2021 05:01) (64 - 90)  BP: 151/78 (29 May 2021 05:01) (125/64 - 164/124)  BP(mean): 112 (29 May 2021 05:01) (85 - 138)  RR: 30 (29 May 2021 05:01) (13 - 30)  SpO2: 93% (29 May 2021 05:01) (92% - 97%)    General: NAD, resting comfortably in bed  C/V: pulses present in b/l upper extremities   Pulm: Nonlabored breathing, no respiratory distress  Abd: soft, ND, NT, no rebound or guarding,   Extrem: WWP, no edema, SCDs in place    LABS:                        10.6   5.08  )-----------( 401      ( 29 May 2021 06:27 )             33.6     05-29    138  |  100  |  10  ----------------------------<  115<H>  4.2   |  28  |  0.45<L>    Ca    9.6      29 May 2021 06:27  Mg     2.3     05-29    TPro  6.4  /  Alb  3.3  /  TBili  0.3  /  DBili  x   /  AST  63<H>  /  ALT  128<H>  /  AlkPhos  167<H>  05-29          RADIOLOGY & ADDITIONAL STUDIES:      Culture - Body Fluid with Gram Stain (collected 05-28-21 @ 10:50)  Source: .Body Fluid Left Pleural Fluid  Gram Stain (05-28-21 @ 18:11):    No organisms seen    No WBC's seen.        Plan:   Diet:   IVF:   Abx:   Pain/Nausea:   Home medications:   AM labs       O/N: SHERYL.     SUBJECTIVE: Patient seen and examined on telemetry. CT Sx at bedside infusing alteplase through chest tube. Patient reports tolerating CLD without any nausea or vomiting. Denies dysphagia. Report discomfort in the subxiphoid region, but denies any chest pressure, pain, or palpitations. Reports multiple loose BMs. Denies f/c, SOB, weakness or pain in extremities.     heparin   Injectable 5000 Unit(s) SubCutaneous every 8 hours    MEDICATIONS  (PRN):  sodium chloride 0.65% Nasal 1 Spray(s) Both Nostrils three times a day PRN Nasal Congestion  tetracaine/benzocaine/butamben Spray 1 Spray(s) Topical every 4 hours PRN Sore Throat      I&O's Detail    28 May 2021 07:01  -  29 May 2021 07:00  --------------------------------------------------------  IN:    Enteral Tube Flush: 750 mL    Jevity 1.5: 893 mL    Lactated Ringers: 10 mL  Total IN: 1653 mL    OUT:    Chest Tube (mL): 430 mL    Voided (mL): 1800 mL  Total OUT: 2230 mL    Total NET: -577 mL          Vital Signs Last 24 Hrs  T(C): 37.3 (29 May 2021 04:13), Max: 37.3 (29 May 2021 04:13)  T(F): 99.2 (29 May 2021 04:13), Max: 99.2 (29 May 2021 04:13)  HR: 65 (29 May 2021 05:01) (64 - 90)  BP: 151/78 (29 May 2021 05:01) (125/64 - 164/124)  BP(mean): 112 (29 May 2021 05:01) (85 - 138)  RR: 30 (29 May 2021 05:01) (13 - 30)  SpO2: 93% (29 May 2021 05:01) (92% - 97%)    General: NAD, resting comfortably in bed  C/V: NSR  Pulm: Nonlabored breathing, no respiratory distress  Chest: Left thoracostomy tube to wall suction, negative airleak. Serous output.   Abd: soft, nondistended, minimally TTP. No rebound or guarding. Jtube in place without surrounding erythema  Extrem: WWP, no edema, SCDs in place  Incisions: C/d/i    LABS:                        10.6   5.08  )-----------( 401      ( 29 May 2021 06:27 )             33.6     05-29    138  |  100  |  10  ----------------------------<  115<H>  4.2   |  28  |  0.45<L>    Ca    9.6      29 May 2021 06:27  Mg     2.3     05-29    TPro  6.4  /  Alb  3.3  /  TBili  0.3  /  DBili  x   /  AST  63<H>  /  ALT  128<H>  /  AlkPhos  167<H>  05-29          RADIOLOGY & ADDITIONAL STUDIES:      Culture - Body Fluid with Gram Stain (collected 05-28-21 @ 10:50)  Source: .Body Fluid Left Pleural Fluid  Gram Stain (05-28-21 @ 18:11):    No organisms seen    No WBC's seen.

## 2021-05-29 NOTE — PROGRESS NOTE ADULT - SUBJECTIVE AND OBJECTIVE BOX
Patient discussed on morning rounds with Dr. Pinto    Operation / Date: 5/21/21 Called to OR for transhiatal esophagectomy after esophageal perforation during hiatal hernia repair with General Surgery ([place])    SUBJECTIVE ASSESSMENT:  71y Female.  NAEO. Ct chest revealed loculated pleural effusion. Patient administered tpa/dornase into pigtail after some resistance from a clot. Patient tolerated procedure well. Will repeat lytic therapy q12hrs. Patient doing well otherwise, deneis chest pain, sob, n/v/d/c. Patient tolerating tube feeds and clear liquid diet.    Vital Signs Last 24 Hrs  T(C): 36.8 (29 May 2021 17:38), Max: 37.3 (29 May 2021 04:13)  T(F): 98.2 (29 May 2021 17:38), Max: 99.2 (29 May 2021 04:13)  HR: 94 (29 May 2021 16:00) (64 - 94)  BP: 145/73 (29 May 2021 17:38) (145/73 - 164/124)  BP(mean): 112 (29 May 2021 05:01) (108 - 138)  RR: 22 (29 May 2021 17:38) (13 - 30)  SpO2: 93% (29 May 2021 17:38) (92% - 95%)  I&O's Detail    28 May 2021 07:01  -  29 May 2021 07:00  --------------------------------------------------------  IN:    Enteral Tube Flush: 750 mL    Jevity 1.5: 893 mL    Lactated Ringers: 10 mL  Total IN: 1653 mL    OUT:    Chest Tube (mL): 430 mL    Voided (mL): 1800 mL  Total OUT: 2230 mL    Total NET: -577 mL      29 May 2021 07:01  -  29 May 2021 18:17  --------------------------------------------------------  IN:    Jevity 1.5: 50 mL  Total IN: 50 mL    OUT:    Chest Tube (mL): 60 mL    Voided (mL): 450 mL  Total OUT: 510 mL    Total NET: -460 mL    CHEST TUBE:  No.  LISETH DRAIN:  No.  EPICARDIAL WIRES: No.  TIE DOWNS: No.  JOY: No.    PHYSICAL EXAM:  GEN: NAD, looks comfortable  Psych: Mood appropriate  Neuro: A&Ox3.  No focal deficits.  Moving all extremities.   HEENT: No obvious abnormalities; Voice hoarse, but able to phonate some. Able to understand her clearly.   CV: S1S2, regular, no murmurs appreciated.  No carotid bruits.  No JVD  Lungs: Clear B/L.  No wheezing, rales or rhonchi  ABD: Soft, non-tender, non-distended.  +Bowel sounds; +J-tube. +NGT to intermittent suction.   EXT: Warm and well perfused.  No peripheral edema noted  Musculoskeletal: Moving all extremities with normal ROM, no joint swelling  PV: Pedal pulses palpable  Incision Sites: Neck incision w/ steristrips removed, clean and dry; Covered w/ dry gauze dressing.  Mid-abdomen incision clean and dry, open to air.    LABS:                        10.6   5.08  )-----------( 401      ( 29 May 2021 06:27 )             33.6     COUMADIN: No.     05-29    138  |  100  |  10  ----------------------------<  115<H>  4.2   |  28  |  0.45<L>    Ca    9.6      29 May 2021 06:27  Mg     2.3     05-29    TPro  6.4  /  Alb  3.3  /  TBili  0.3  /  DBili  x   /  AST  63<H>  /  ALT  128<H>  /  AlkPhos  167<H>  05-29      MEDICATIONS  (STANDING):  alteplase  Injectable for Pleural Effusion 10 milliGRAM(s) IntraPleural. every 12 hours  dornase colleen Solution for Pleural Effusion 5 milliGRAM(s) IntraPleural. every 12 hours  heparin   Injectable 5000 Unit(s) SubCutaneous every 8 hours  lactated ringers. 500 milliLiter(s) (10 mL/Hr) IV Continuous <Continuous>  pantoprazole  Injectable 40 milliGRAM(s) IV Push daily  sodium chloride 0.9% lock flush 3 milliLiter(s) IV Push every 8 hours    MEDICATIONS  (PRN):  sodium chloride 0.65% Nasal 1 Spray(s) Both Nostrils three times a day PRN Nasal Congestion  tetracaine/benzocaine/butamben Spray 1 Spray(s) Topical every 4 hours PRN Sore Throat        RADIOLOGY & ADDITIONAL TESTS:    < from: CT Chest No Cont (05.29.21 @ 10:44) >  Findings: Limited study without intravenous contrast.    Pleura:  Small right pleural effusion. Left pigtail chest tube with the tip at least partially extrapleural overlying the inferior aspect of the left lung base posteriorly. Moderately size heterogeneous left loculated pleural fluid containing small layering hemorrhagic component.    Lungs and large airways: Compressive atelectasis of left lower lobe secondary to loculated pleural collection. Passive atelectasis right lung base.    Mediastinum: Status post esophagectomy and gastric pull-up. 4.5 x 3.3 cm pocket of fluid overlying the lower mediastinum on the right side. Difficult to differentiate loculated pleural effusion from mediastinal fluid. No mediastinal free air. No extravasated oral contrast from prior esophagram. No thoracic lymphadenopathy.    Heart and pericardium:  Heart size is normal. No pericardial effusion.    Vessels: No aneurysm.    Chest wall and lower neck:  Trace subcutaneous air upper chest wall bilaterally, likely related to recent instrumentation    Upper abdomen: Postoperative changes in the mesenteric fat in the upper abdomen. Oral contrast from prior esophagram within the colon. 5 cm exophytic left renal cyst.    Bones: Degenerative changes of the spine.      Impression:  1.  Status post esophagectomy and gastric pull-up.  2.  Moderate size complex loculated left pleural effusion. Pigtail left chest tube appears to be at least partially extrapleural.      < end of copied text >

## 2021-05-29 NOTE — PROGRESS NOTE ADULT - ASSESSMENT
70 y/o female with PMH HTN, HLD (myalgias with statins), and paraesophageal hernia resulting in mild GERD and esophageal dysmotility. She was evaluated as an outpatient by general surgery for elective repair and on 5/21/21 presented for her RA repair of paraesophageal hernia with partial  fundoplication. Intraop course complicated by esophageal perforation for which Dr. Alberto and Dr. Pinto were consulted urgently. EGD was preformed and tear was not amenable to primary repair. They proceeded with an uncomplicated transhiatal esophagectomy. Post operatively she was brought to the CTICU extubated. Trickle feeds were started via J-tube on POD2 and she was transferred to [facility]. POD3 patient tube feeds to goal, continuing IV hydration, maintaining NPO. POD5 patient remained NPO on tube feeds.  NGT to intermittent suction. POD 6, s/p Barrium Esophagram, revealing no leak, NGT removed. POD7 S&S eval performed and patient cleared for sips of clear/full liquids. POD7 patient also went for left pigtail insertion for pleural effusion by IR. Minimal drainage from pleural effusion, chest CT performed and patient with loculated elft pleural effusion. Lytic therapy initiated. Patient tolerated well. Will continue q12h.     Neurovascular:   - No delirium. Pain well controlled with current regimen.  - Continue tylenol - Order as needed.     Cardiovascular:   - Hemodynamically stable.   - Hx of HTN:       - Home amlodipine on hold while NPO.  If needed, Start something IV. Hydral or BB. - consider restarting now that clear liquid diet initiated    HEENT:  - Post op voice changes: ENT consulted- hypomobile vs immobile LVC     -Needs out-patient F/U with ENT, Dr. Patel  -S&S eval today: cleared for clear to full liquids     Respiratory:   - 02 Sat = 96% on RA.  -CXR with small L effusion   -On U/S looks moderate and ?loculated. IR drainage of left loculated pleural effusion today - 160cc initially evacuated   -lytic therapy of pleural effusion: tpa/dornase q12 x3d ( 1 dose administered thus far)    GI:   - POD7 s/p emergent transhiatal esophagectomy after esophageal perforation in OR       - Continue tube feeds through J tube, feeds to goal  - NGT remains in place per thoracic protocol   - Continue GI PPX with protonix IV  -S&S eval today: clear to full liquids ok per S&S    -esophagram without leak    Renal / :  - BUN/Cr stable   - Continue to monitor renal function.  - Monitor I/O's.  - Replete electrolytes PRN.      Endocrine:    - no known hx diabetes or thyroid disease     Hematologic:  -H/H stable at with no obvious signs of bleeding    ID:  -Pt remains afebrile with no elevation in WBC or signs of infection    Prophylaxis:  -DVT prophylaxis with 5000 SubQ Heparin q8h.  -SCD's    Disposition:  -Home when medically cleared with tube feeds

## 2021-05-30 LAB
ALBUMIN SERPL ELPH-MCNC: 3.4 G/DL — SIGNIFICANT CHANGE UP (ref 3.3–5)
ALP SERPL-CCNC: 184 U/L — HIGH (ref 40–120)
ALT FLD-CCNC: 115 U/L — HIGH (ref 10–45)
ANION GAP SERPL CALC-SCNC: 11 MMOL/L — SIGNIFICANT CHANGE UP (ref 5–17)
AST SERPL-CCNC: 57 U/L — HIGH (ref 10–40)
BILIRUB SERPL-MCNC: 0.3 MG/DL — SIGNIFICANT CHANGE UP (ref 0.2–1.2)
BUN SERPL-MCNC: 8 MG/DL — SIGNIFICANT CHANGE UP (ref 7–23)
CALCIUM SERPL-MCNC: 9.7 MG/DL — SIGNIFICANT CHANGE UP (ref 8.4–10.5)
CHLORIDE SERPL-SCNC: 98 MMOL/L — SIGNIFICANT CHANGE UP (ref 96–108)
CO2 SERPL-SCNC: 28 MMOL/L — SIGNIFICANT CHANGE UP (ref 22–31)
CREAT SERPL-MCNC: 0.46 MG/DL — LOW (ref 0.5–1.3)
GLUCOSE SERPL-MCNC: 106 MG/DL — HIGH (ref 70–99)
HCT VFR BLD CALC: 36.2 % — SIGNIFICANT CHANGE UP (ref 34.5–45)
HGB BLD-MCNC: 11.3 G/DL — LOW (ref 11.5–15.5)
MAGNESIUM SERPL-MCNC: 2.4 MG/DL — SIGNIFICANT CHANGE UP (ref 1.6–2.6)
MCHC RBC-ENTMCNC: 29.3 PG — SIGNIFICANT CHANGE UP (ref 27–34)
MCHC RBC-ENTMCNC: 31.2 GM/DL — LOW (ref 32–36)
MCV RBC AUTO: 93.8 FL — SIGNIFICANT CHANGE UP (ref 80–100)
NRBC # BLD: 0 /100 WBCS — SIGNIFICANT CHANGE UP (ref 0–0)
PLATELET # BLD AUTO: 400 K/UL — SIGNIFICANT CHANGE UP (ref 150–400)
POTASSIUM SERPL-MCNC: 4.6 MMOL/L — SIGNIFICANT CHANGE UP (ref 3.5–5.3)
POTASSIUM SERPL-SCNC: 4.6 MMOL/L — SIGNIFICANT CHANGE UP (ref 3.5–5.3)
PROT SERPL-MCNC: 6.6 G/DL — SIGNIFICANT CHANGE UP (ref 6–8.3)
RBC # BLD: 3.86 M/UL — SIGNIFICANT CHANGE UP (ref 3.8–5.2)
RBC # FLD: 14.5 % — SIGNIFICANT CHANGE UP (ref 10.3–14.5)
SODIUM SERPL-SCNC: 137 MMOL/L — SIGNIFICANT CHANGE UP (ref 135–145)
WBC # BLD: 7.05 K/UL — SIGNIFICANT CHANGE UP (ref 3.8–10.5)
WBC # FLD AUTO: 7.05 K/UL — SIGNIFICANT CHANGE UP (ref 3.8–10.5)

## 2021-05-30 PROCEDURE — 71045 X-RAY EXAM CHEST 1 VIEW: CPT | Mod: 26

## 2021-05-30 RX ORDER — LIDOCAINE 4 G/100G
1 CREAM TOPICAL ONCE
Refills: 0 | Status: COMPLETED | OUTPATIENT
Start: 2021-05-30 | End: 2021-05-30

## 2021-05-30 RX ORDER — ACETAMINOPHEN 500 MG
1000 TABLET ORAL ONCE
Refills: 0 | Status: COMPLETED | OUTPATIENT
Start: 2021-05-30 | End: 2021-05-30

## 2021-05-30 RX ADMIN — SODIUM CHLORIDE 3 MILLILITER(S): 9 INJECTION INTRAMUSCULAR; INTRAVENOUS; SUBCUTANEOUS at 05:26

## 2021-05-30 RX ADMIN — Medication 1000 MILLIGRAM(S): at 06:59

## 2021-05-30 RX ADMIN — HEPARIN SODIUM 5000 UNIT(S): 5000 INJECTION INTRAVENOUS; SUBCUTANEOUS at 13:04

## 2021-05-30 RX ADMIN — ALTEPLASE 10 MILLIGRAM(S): KIT at 03:20

## 2021-05-30 RX ADMIN — LIDOCAINE 1 PATCH: 4 CREAM TOPICAL at 21:30

## 2021-05-30 RX ADMIN — SODIUM CHLORIDE 3 MILLILITER(S): 9 INJECTION INTRAMUSCULAR; INTRAVENOUS; SUBCUTANEOUS at 21:30

## 2021-05-30 RX ADMIN — DORNASE ALFA 5 MILLIGRAM(S): 1 SOLUTION RESPIRATORY (INHALATION) at 05:25

## 2021-05-30 RX ADMIN — HEPARIN SODIUM 5000 UNIT(S): 5000 INJECTION INTRAVENOUS; SUBCUTANEOUS at 06:06

## 2021-05-30 RX ADMIN — LIDOCAINE 1 PATCH: 4 CREAM TOPICAL at 10:14

## 2021-05-30 RX ADMIN — Medication 400 MILLIGRAM(S): at 06:29

## 2021-05-30 RX ADMIN — HEPARIN SODIUM 5000 UNIT(S): 5000 INJECTION INTRAVENOUS; SUBCUTANEOUS at 21:30

## 2021-05-30 RX ADMIN — SODIUM CHLORIDE 3 MILLILITER(S): 9 INJECTION INTRAMUSCULAR; INTRAVENOUS; SUBCUTANEOUS at 13:04

## 2021-05-30 RX ADMIN — ALTEPLASE 10 MILLIGRAM(S): KIT at 16:49

## 2021-05-30 RX ADMIN — DORNASE ALFA 5 MILLIGRAM(S): 1 SOLUTION RESPIRATORY (INHALATION) at 18:53

## 2021-05-30 RX ADMIN — PANTOPRAZOLE SODIUM 40 MILLIGRAM(S): 20 TABLET, DELAYED RELEASE ORAL at 11:01

## 2021-05-30 RX ADMIN — LIDOCAINE 1 PATCH: 4 CREAM TOPICAL at 19:25

## 2021-05-30 NOTE — PROGRESS NOTE ADULT - ASSESSMENT
72 y/o female with PMH HTN, HLD (myalgias with statins), and paraesophageal hernia resulting in mild GERD and esophageal dysmotility. She was evaluated as an outpatient by general surgery for elective repair and on 5/21/21 presented for her RA repair of paraesophageal hernia with partial  fundoplication. Intraop course complicated by esophageal perforation for which Dr. Alberto and Dr. Pinto were consulted urgently. EGD was preformed and tear was not amenable to primary repair. They proceeded with an uncomplicated transhiatal esophagectomy. Post operatively she was brought to the CTICU extubated. Trickle feeds were started via J-tube on POD2 and she was transferred to [facility]. POD3 patient tube feeds to goal, continuing IV hydration, maintaining NPO. POD5 patient remained NPO on tube feeds.  NGT to intermittent suction. POD 6, s/p Barrium Esophagram, revealing no leak, NGT removed. POD7 S&S eval performed and patient cleared for sips of clear/full liquids. POD7 patient also went for left pigtail insertion for pleural effusion by IR. Minimal drainage from pleural effusion, chest CT performed and patient with loculated left pleural effusion. Lytic therapy initiated, 2/6 rounds so far with ~120cc output. Patient tolerated well. Will continue q12h for 6 rounds.      Neurovascular:   - No delirium. Pain well controlled with current regimen.  - Continue IV tylenol - Order as needed; added lidocaine today    Cardiovascular:   - Hemodynamically stable.   - Hx of HTN:       - Home amlodipine on hold while NPO (still holding per [place]).  If needed, Start something IV. Hydral or BB. - not needed yet    HEENT:  - Post op voice changes: ENT consulted- hypomobile vs immobile LVC     -Needs out-patient F/U with ENT, Dr. Patel  -S&S eval today: cleared for clear and full liquids     Respiratory:   - 02 Sat = 96% on RA.  -CXR with small L effusion   -On U/S looks moderate and ?loculated. IR drainage of left loculated pleural effusion today - 160cc initially evacuated by IR  -lytic therapy of pleural effusion: tpa/dornase q12 x3d (2 doses administered thus far with ~120cc output)    GI:   - POD8 s/p emergent transhiatal esophagectomy after esophageal perforation in OR       - Continue tube feeds through J tube, feeds to goal  - NGT removed  -Continue GI PPX with protonix IV  -S&S eval today: clear to full liquids ok per S&S    -esophagram without leak    Renal / :  - BUN/Cr stable 8/0.46  - Continue to monitor renal function.  - Monitor I/O's  - Replete electrolytes PRN.      Endocrine:    - no known hx diabetes or thyroid disease     Hematologic:  -H/H stable at with no obvious signs of bleeding  -11.3/36.2    ID:  -Pt remains afebrile with no elevation in WBC or signs of infection  -WBC 7.05    Prophylaxis:  -DVT prophylaxis with 5000 SubQ Heparin q8h.  -SCD's    Disposition:  -Home when medically cleared with tube feeds: Monday vs Tuesday

## 2021-05-30 NOTE — PROGRESS NOTE ADULT - SUBJECTIVE AND OBJECTIVE BOX
O/N:      SUBJECTIVE: Patient seen and examined on telemetry. Patient reports tolerating CLD without any nausea or vomiting. Denies dysphagia. Report discomfort in the subxiphoid region, but denies any chest pressure, pain, or palpitations. Reports multiple loose BMs.   Denies f/c, SOB, weakness or pain in extremities.     ICU Vital Signs Last 24 Hrs  T(C): 36.2 (30 May 2021 09:38), Max: 36.8 (29 May 2021 12:53)  T(F): 97.2 (30 May 2021 09:38), Max: 98.3 (29 May 2021 12:53)  HR: 96 (30 May 2021 08:28) (70 - 96)  BP: 121/74 (30 May 2021 08:28) (121/74 - 153/82)  BP(mean): 89 (30 May 2021 08:28) (89 - 124)  ABP: --  ABP(mean): --  RR: 18 (30 May 2021 08:28) (13 - 28)  SpO2: 94% (30 May 2021 08:28) (93% - 96%)    I&O's Summary    29 May 2021 07:01  -  30 May 2021 07:00  --------------------------------------------------------  IN: 1681 mL / OUT: 1300 mL / NET: 381 mL    30 May 2021 07:01  -  30 May 2021 10:58  --------------------------------------------------------  IN: 310 mL / OUT: 130 mL / NET: 180 mL      General: NAD, resting comfortably in bed  C/V: NSR  Pulm: Nonlabored breathing, no respiratory distress  Chest: Left thoracostomy tube to wall suction, negative airleak. Serous output.   Abd: soft, nondistended, minimally TTP. No rebound or guarding. Jtube in place without surrounding erythema  Extrem: WWP, no edema, SCDs in place  Incisions: C/d/i                          11.3   7.05  )-----------( 400      ( 30 May 2021 05:54 )             36.2     05-30    137  |  98  |  8   ----------------------------<  106<H>  4.6   |  28  |  0.46<L>    Ca    9.7      30 May 2021 05:54  Mg     2.4     05-30    TPro  6.6  /  Alb  3.4  /  TBili  0.3  /  DBili  x   /  AST  57<H>  /  ALT  115<H>  /  AlkPhos  184<H>  05-30

## 2021-05-30 NOTE — PROGRESS NOTE ADULT - SUBJECTIVE AND OBJECTIVE BOX
Patient discussed on morning rounds with Dr. Pinto     Operation / Date:     SUBJECTIVE ASSESSMENT:  71y Female         Vital Signs Last 24 Hrs  T(C): 36.2 (30 May 2021 13:02), Max: 36.8 (29 May 2021 17:38)  T(F): 97.2 (30 May 2021 13:02), Max: 98.2 (29 May 2021 17:38)  HR: 90 (30 May 2021 12:13) (70 - 96)  BP: 125/67 (30 May 2021 12:13) (121/74 - 148/107)  BP(mean): 89 (30 May 2021 12:13) (89 - 124)  RR: 20 (30 May 2021 12:13) (13 - 28)  SpO2: 94% (30 May 2021 12:13) (93% - 96%)  I&O's Detail    29 May 2021 07:01  -  30 May 2021 07:00  --------------------------------------------------------  IN:    Enteral Tube Flush: 60 mL    Free Water: 750 mL    IV PiggyBack: 100 mL    Jevity 1.5: 671 mL    Lactated Ringers: 100 mL  Total IN: 1681 mL    OUT:    Chest Tube (mL): 150 mL    Voided (mL): 1150 mL  Total OUT: 1300 mL    Total NET: 381 mL      30 May 2021 07:01  -  30 May 2021 15:42  --------------------------------------------------------  IN:    Enteral Tube Flush: 40 mL    Free Water: 500 mL    Lactated Ringers: 80 mL  Total IN: 620 mL    OUT:    Chest Tube (mL): 130 mL    Jevity 1.5: 0 mL  Total OUT: 130 mL    Total NET: 490 mL    CHEST TUBE:  No.   LISETH DRAIN:  No.  EPICARDIAL WIRES: No.  TIE DOWNS: No.  JOY: No.    PHYSICAL EXAM:    General:     Neurological:    Cardiovascular:    Respiratory:    Gastrointestinal:    Extremities:    Vascular:    Incision Sites:    LABS:                        11.3   7.05  )-----------( 400      ( 30 May 2021 05:54 )             36.2       COUMADIN:  No.    05-30    137  |  98  |  8   ----------------------------<  106<H>  4.6   |  28  |  0.46<L>    Ca    9.7      30 May 2021 05:54  Mg     2.4     05-30    TPro  6.6  /  Alb  3.4  /  TBili  0.3  /  DBili  x   /  AST  57<H>  /  ALT  115<H>  /  AlkPhos  184<H>  05-30          MEDICATIONS  (STANDING):  alteplase  Injectable for Pleural Effusion 10 milliGRAM(s) IntraPleural. every 12 hours  dornase colleen Solution for Pleural Effusion 5 milliGRAM(s) IntraPleural. every 12 hours  heparin   Injectable 5000 Unit(s) SubCutaneous every 8 hours  lactated ringers. 500 milliLiter(s) (10 mL/Hr) IV Continuous <Continuous>  pantoprazole  Injectable 40 milliGRAM(s) IV Push daily  sodium chloride 0.9% lock flush 3 milliLiter(s) IV Push every 8 hours    MEDICATIONS  (PRN):  sodium chloride 0.65% Nasal 1 Spray(s) Both Nostrils three times a day PRN Nasal Congestion  tetracaine/benzocaine/butamben Spray 1 Spray(s) Topical every 4 hours PRN Sore Throat        RADIOLOGY & ADDITIONAL TESTS:     Patient discussed on morning rounds with Dr. Pinto     Operation / Date: 5/21/21 Called to OR for transhiatal esophagectomy after esophageal perforation during hiatal hernia repair with General Surgery ([place])    SUBJECTIVE ASSESSMENT:  71y Female. NAEO. CT chest revealed loculated pleural effusion. Patient administered tpa/dornase into pigtail 2/6 rounds. Patient tolerated procedure well. Will repeat lytic therapy q12hrs for 6 rounds. Patient doing well otherwise, denies chest pain, sob, n/v/d/c. Patient tolerating tube feeds and clear liquid diet.     Vital Signs Last 24 Hrs  T(C): 36.2 (30 May 2021 13:02), Max: 36.8 (29 May 2021 17:38)  T(F): 97.2 (30 May 2021 13:02), Max: 98.2 (29 May 2021 17:38)  HR: 90 (30 May 2021 12:13) (70 - 96)  BP: 125/67 (30 May 2021 12:13) (121/74 - 148/107)  BP(mean): 89 (30 May 2021 12:13) (89 - 124)  RR: 20 (30 May 2021 12:13) (13 - 28)  SpO2: 94% (30 May 2021 12:13) (93% - 96%)  I&O's Detail    29 May 2021 07:01  -  30 May 2021 07:00  --------------------------------------------------------  IN:    Enteral Tube Flush: 60 mL    Free Water: 750 mL    IV PiggyBack: 100 mL    Jevity 1.5: 671 mL    Lactated Ringers: 100 mL  Total IN: 1681 mL    OUT:    Chest Tube (mL): 150 mL    Voided (mL): 1150 mL  Total OUT: 1300 mL    Total NET: 381 mL      30 May 2021 07:01  -  30 May 2021 15:42  --------------------------------------------------------  IN:    Enteral Tube Flush: 40 mL    Free Water: 500 mL    Lactated Ringers: 80 mL  Total IN: 620 mL    OUT:    Chest Tube (mL): 130 mL    Jevity 1.5: 0 mL  Total OUT: 130 mL    Total NET: 490 mL    CHEST TUBE:  No.   LISETH DRAIN:  No.  EPICARDIAL WIRES: No.  TIE DOWNS: No.  JOY: No.    PHYSICAL EXAM:  GEN: NAD, looks comfortable  Psych: Mood appropriate  Neuro: A&Ox3.  No focal deficits.  Moving all extremities.   HEENT: No obvious abnormalities; Voice hoarse, but able to phonate some. Able to understand her clearly.   CV: S1S2, regular, no murmurs appreciated.  No carotid bruits.  No JVD  Lungs: Clear B/L.  No wheezing, rales or rhonchi  ABD: Soft, non-tender, non-distended. +Bowel sounds; +J-tube.   EXT: Warm and well perfused.  No peripheral edema noted  Musculoskeletal: Moving all extremities with normal ROM, no joint swelling  PV: Pedal pulses palpable  Incision Sites: Neck incision w/steristrips removed, clean and dry; Covered w/ dry gauze dressing.  Mid-abdomen incision clean and dry, open to air.    LABS:                        11.3   7.05  )-----------( 400      ( 30 May 2021 05:54 )             36.2       COUMADIN:  No.    05-30    137  |  98  |  8   ----------------------------<  106<H>  4.6   |  28  |  0.46<L>    Ca    9.7      30 May 2021 05:54  Mg     2.4     05-30    TPro  6.6  /  Alb  3.4  /  TBili  0.3  /  DBili  x   /  AST  57<H>  /  ALT  115<H>  /  AlkPhos  184<H>  05-30       MEDICATIONS  (STANDING):  alteplase  Injectable for Pleural Effusion 10 milliGRAM(s) IntraPleural. every 12 hours  dornase colleen Solution for Pleural Effusion 5 milliGRAM(s) IntraPleural. every 12 hours  heparin   Injectable 5000 Unit(s) SubCutaneous every 8 hours  lactated ringers. 500 milliLiter(s) (10 mL/Hr) IV Continuous <Continuous>  pantoprazole  Injectable 40 milliGRAM(s) IV Push daily  sodium chloride 0.9% lock flush 3 milliLiter(s) IV Push every 8 hours    MEDICATIONS  (PRN):  sodium chloride 0.65% Nasal 1 Spray(s) Both Nostrils three times a day PRN Nasal Congestion  tetracaine/benzocaine/butamben Spray 1 Spray(s) Topical every 4 hours PRN Sore Throat       RADIOLOGY & ADDITIONAL TESTS:    < from: Xray Chest 1 View- PORTABLE-Routine (Xray Chest 1 View- PORTABLE-Routine in AM.) (05.30.21 @ 03:41) >    INTERPRETATION:  Clinical History: Postop    Frontal examination of the chest demonstrates no interval change lung pathology in comparison to prior examination of the chest 5/29/2021. No interval change position remaining support devices.    IMPRESSION: No interval change lung pathology    < end of copied text >

## 2021-05-31 LAB
ALBUMIN SERPL ELPH-MCNC: 3.4 G/DL — SIGNIFICANT CHANGE UP (ref 3.3–5)
ALP SERPL-CCNC: 169 U/L — HIGH (ref 40–120)
ALT FLD-CCNC: 97 U/L — HIGH (ref 10–45)
ANION GAP SERPL CALC-SCNC: 10 MMOL/L — SIGNIFICANT CHANGE UP (ref 5–17)
APTT BLD: 28.3 SEC — SIGNIFICANT CHANGE UP (ref 27.5–35.5)
AST SERPL-CCNC: 44 U/L — HIGH (ref 10–40)
BILIRUB SERPL-MCNC: 0.2 MG/DL — SIGNIFICANT CHANGE UP (ref 0.2–1.2)
BUN SERPL-MCNC: 10 MG/DL — SIGNIFICANT CHANGE UP (ref 7–23)
CALCIUM SERPL-MCNC: 9.7 MG/DL — SIGNIFICANT CHANGE UP (ref 8.4–10.5)
CHLORIDE SERPL-SCNC: 98 MMOL/L — SIGNIFICANT CHANGE UP (ref 96–108)
CO2 SERPL-SCNC: 29 MMOL/L — SIGNIFICANT CHANGE UP (ref 22–31)
CREAT SERPL-MCNC: 0.49 MG/DL — LOW (ref 0.5–1.3)
GLUCOSE SERPL-MCNC: 112 MG/DL — HIGH (ref 70–99)
HCT VFR BLD CALC: 36.9 % — SIGNIFICANT CHANGE UP (ref 34.5–45)
HGB BLD-MCNC: 11.5 G/DL — SIGNIFICANT CHANGE UP (ref 11.5–15.5)
INR BLD: 1.04 — SIGNIFICANT CHANGE UP (ref 0.88–1.16)
MAGNESIUM SERPL-MCNC: 2.4 MG/DL — SIGNIFICANT CHANGE UP (ref 1.6–2.6)
MCHC RBC-ENTMCNC: 29 PG — SIGNIFICANT CHANGE UP (ref 27–34)
MCHC RBC-ENTMCNC: 31.2 GM/DL — LOW (ref 32–36)
MCV RBC AUTO: 93.2 FL — SIGNIFICANT CHANGE UP (ref 80–100)
NRBC # BLD: 0 /100 WBCS — SIGNIFICANT CHANGE UP (ref 0–0)
PLATELET # BLD AUTO: 382 K/UL — SIGNIFICANT CHANGE UP (ref 150–400)
POTASSIUM SERPL-MCNC: 4.3 MMOL/L — SIGNIFICANT CHANGE UP (ref 3.5–5.3)
POTASSIUM SERPL-SCNC: 4.3 MMOL/L — SIGNIFICANT CHANGE UP (ref 3.5–5.3)
PROT SERPL-MCNC: 6.6 G/DL — SIGNIFICANT CHANGE UP (ref 6–8.3)
PROTHROM AB SERPL-ACNC: 12.5 SEC — SIGNIFICANT CHANGE UP (ref 10.6–13.6)
RBC # BLD: 3.96 M/UL — SIGNIFICANT CHANGE UP (ref 3.8–5.2)
RBC # FLD: 14.5 % — SIGNIFICANT CHANGE UP (ref 10.3–14.5)
SARS-COV-2 RNA SPEC QL NAA+PROBE: SIGNIFICANT CHANGE UP
SODIUM SERPL-SCNC: 137 MMOL/L — SIGNIFICANT CHANGE UP (ref 135–145)
WBC # BLD: 6.08 K/UL — SIGNIFICANT CHANGE UP (ref 3.8–10.5)
WBC # FLD AUTO: 6.08 K/UL — SIGNIFICANT CHANGE UP (ref 3.8–10.5)

## 2021-05-31 PROCEDURE — 71045 X-RAY EXAM CHEST 1 VIEW: CPT | Mod: 26

## 2021-05-31 PROCEDURE — 76604 US EXAM CHEST: CPT | Mod: 26

## 2021-05-31 RX ORDER — ACETAMINOPHEN 500 MG
1000 TABLET ORAL ONCE
Refills: 0 | Status: COMPLETED | OUTPATIENT
Start: 2021-05-31 | End: 2021-05-31

## 2021-05-31 RX ADMIN — ALTEPLASE 10 MILLIGRAM(S): KIT at 04:45

## 2021-05-31 RX ADMIN — PANTOPRAZOLE SODIUM 40 MILLIGRAM(S): 20 TABLET, DELAYED RELEASE ORAL at 13:36

## 2021-05-31 RX ADMIN — DORNASE ALFA 5 MILLIGRAM(S): 1 SOLUTION RESPIRATORY (INHALATION) at 06:47

## 2021-05-31 RX ADMIN — ALTEPLASE 10 MILLIGRAM(S): KIT at 16:22

## 2021-05-31 RX ADMIN — Medication 1000 MILLIGRAM(S): at 15:14

## 2021-05-31 RX ADMIN — HEPARIN SODIUM 5000 UNIT(S): 5000 INJECTION INTRAVENOUS; SUBCUTANEOUS at 06:07

## 2021-05-31 RX ADMIN — SODIUM CHLORIDE 3 MILLILITER(S): 9 INJECTION INTRAMUSCULAR; INTRAVENOUS; SUBCUTANEOUS at 05:35

## 2021-05-31 RX ADMIN — SODIUM CHLORIDE 3 MILLILITER(S): 9 INJECTION INTRAMUSCULAR; INTRAVENOUS; SUBCUTANEOUS at 21:35

## 2021-05-31 RX ADMIN — SODIUM CHLORIDE 3 MILLILITER(S): 9 INJECTION INTRAMUSCULAR; INTRAVENOUS; SUBCUTANEOUS at 13:37

## 2021-05-31 RX ADMIN — HEPARIN SODIUM 5000 UNIT(S): 5000 INJECTION INTRAVENOUS; SUBCUTANEOUS at 21:57

## 2021-05-31 RX ADMIN — HEPARIN SODIUM 5000 UNIT(S): 5000 INJECTION INTRAVENOUS; SUBCUTANEOUS at 13:37

## 2021-05-31 RX ADMIN — DORNASE ALFA 5 MILLIGRAM(S): 1 SOLUTION RESPIRATORY (INHALATION) at 18:43

## 2021-05-31 RX ADMIN — Medication 400 MILLIGRAM(S): at 14:48

## 2021-05-31 NOTE — PROGRESS NOTE ADULT - ASSESSMENT
72 y/o female with PMH HTN, HLD (myalgias with statins), and paraesophageal hernia resulting in mild GERD and esophageal dysmotility. She was evaluated as an outpatient by general surgery for elective repair and on 5/21 presented for her RA repair of paraesophageal hernia with partial  fundoplication. Intraop course complicated by esophageal perforation for which Dr. Alberto and Dr. Pinto were consulted for urgently. EGD was preformed and tear was not amenable to primary repair. They proceeded with an uncomplicated transhiatal esophagectomy. Post operatively she was brought to the CTICU extubated. Trickle feeds were started POD2 and she was transferred to [facility].     - cAre per CTS, Gen Surg following

## 2021-05-31 NOTE — PROGRESS NOTE ADULT - SUBJECTIVE AND OBJECTIVE BOX
SUBJECTIVE: Patient seen and examined bedside by chief resident.    alteplase  Injectable for Pleural Effusion 10 milliGRAM(s) IntraPleural. every 12 hours  heparin   Injectable 5000 Unit(s) SubCutaneous every 8 hours    MEDICATIONS  (PRN):  sodium chloride 0.65% Nasal 1 Spray(s) Both Nostrils three times a day PRN Nasal Congestion  tetracaine/benzocaine/butamben Spray 1 Spray(s) Topical every 4 hours PRN Sore Throat      I&O's Detail    29 May 2021 07:01  -  30 May 2021 07:00  --------------------------------------------------------  IN:    Enteral Tube Flush: 60 mL    Free Water: 750 mL    IV PiggyBack: 100 mL    Jevity 1.5: 671 mL    Lactated Ringers: 100 mL  Total IN: 1681 mL    OUT:    Chest Tube (mL): 150 mL    Voided (mL): 1150 mL  Total OUT: 1300 mL    Total NET: 381 mL      30 May 2021 07:01  -  31 May 2021 06:18  --------------------------------------------------------  IN:    Enteral Tube Flush: 60 mL    Free Water: 750 mL    Jevity 1.5: 611 mL    Lactated Ringers: 220 mL  Total IN: 1641 mL    OUT:    Chest Tube (mL): 230 mL    Voided (mL): 400 mL  Total OUT: 630 mL    Total NET: 1011 mL          Vital Signs Last 24 Hrs  T(C): 36.6 (31 May 2021 05:01), Max: 36.8 (30 May 2021 22:30)  T(F): 97.9 (31 May 2021 05:01), Max: 98.2 (30 May 2021 22:30)  HR: 78 (31 May 2021 04:12) (70 - 96)  BP: 121/68 (31 May 2021 04:12) (121/68 - 140/73)  BP(mean): 88 (31 May 2021 04:12) (87 - 101)  RR: 13 (31 May 2021 04:12) (13 - 20)  SpO2: 98% (31 May 2021 04:12) (94% - 98%)    General: NAD, resting comfortably in bed  C/V: Regular rate  Pulm: Nonlabored breathing, no respiratory distress  Abd: soft, ND, NT  Extrem: WWP, no edema, SCDs in place    LABS:                        11.3   7.05  )-----------( 400      ( 30 May 2021 05:54 )             36.2     05-30    137  |  98  |  8   ----------------------------<  106<H>  4.6   |  28  |  0.46<L>    Ca    9.7      30 May 2021 05:54  Mg     2.4     05-30    TPro  6.6  /  Alb  3.4  /  TBili  0.3  /  DBili  x   /  AST  57<H>  /  ALT  115<H>  /  AlkPhos  184<H>  05-30          RADIOLOGY & ADDITIONAL STUDIES:      Culture - Body Fluid with Gram Stain (collected 05-28-21 @ 10:50)  Source: .Body Fluid Left Pleural Fluid  Gram Stain (05-28-21 @ 18:11):    No organisms seen    No WBC's seen.  Preliminary Report (05-29-21 @ 09:32):    No growth to date     SUBJECTIVE: Patient seen and examined bedside by chief resident, feels well, renan CLD without nausea or vomiting    alteplase  Injectable for Pleural Effusion 10 milliGRAM(s) IntraPleural. every 12 hours  heparin   Injectable 5000 Unit(s) SubCutaneous every 8 hours    MEDICATIONS  (PRN):  sodium chloride 0.65% Nasal 1 Spray(s) Both Nostrils three times a day PRN Nasal Congestion  tetracaine/benzocaine/butamben Spray 1 Spray(s) Topical every 4 hours PRN Sore Throat      I&O's Detail    29 May 2021 07:01  -  30 May 2021 07:00  --------------------------------------------------------  IN:    Enteral Tube Flush: 60 mL    Free Water: 750 mL    IV PiggyBack: 100 mL    Jevity 1.5: 671 mL    Lactated Ringers: 100 mL  Total IN: 1681 mL    OUT:    Chest Tube (mL): 150 mL    Voided (mL): 1150 mL  Total OUT: 1300 mL    Total NET: 381 mL      30 May 2021 07:01  -  31 May 2021 06:18  --------------------------------------------------------  IN:    Enteral Tube Flush: 60 mL    Free Water: 750 mL    Jevity 1.5: 611 mL    Lactated Ringers: 220 mL  Total IN: 1641 mL    OUT:    Chest Tube (mL): 230 mL    Voided (mL): 400 mL  Total OUT: 630 mL    Total NET: 1011 mL          Vital Signs Last 24 Hrs  T(C): 36.6 (31 May 2021 05:01), Max: 36.8 (30 May 2021 22:30)  T(F): 97.9 (31 May 2021 05:01), Max: 98.2 (30 May 2021 22:30)  HR: 78 (31 May 2021 04:12) (70 - 96)  BP: 121/68 (31 May 2021 04:12) (121/68 - 140/73)  BP(mean): 88 (31 May 2021 04:12) (87 - 101)  RR: 13 (31 May 2021 04:12) (13 - 20)  SpO2: 98% (31 May 2021 04:12) (94% - 98%)    PHYSICAL EXAM:  General: Sitting in chair NAD  HEENT: NCAT MMM EOMI neck supple   Neurological: A&O x3, no focal deficits, strength 5/5 throughout  Cardiovascular: RRR, normal s1 s2, no M/R/G  Respiratory: Non-labored breathing,  CTA b/l, no W/R/R  Gastrointestinal:  soft, non-tender to palpation, non-distended, J tube in place   Extremities: WWP, no pitting edema, no calf tenderness  Vascular: peripheral pulses palpable   Incision: Neck incision with steri strips CD&I, abdominal incisions CD&I    LABS:                        11.3   7.05  )-----------( 400      ( 30 May 2021 05:54 )             36.2     05-30    137  |  98  |  8   ----------------------------<  106<H>  4.6   |  28  |  0.46<L>    Ca    9.7      30 May 2021 05:54  Mg     2.4     05-30    TPro  6.6  /  Alb  3.4  /  TBili  0.3  /  DBili  x   /  AST  57<H>  /  ALT  115<H>  /  AlkPhos  184<H>  05-30          RADIOLOGY & ADDITIONAL STUDIES:      Culture - Body Fluid with Gram Stain (collected 05-28-21 @ 10:50)  Source: .Body Fluid Left Pleural Fluid  Gram Stain (05-28-21 @ 18:11):    No organisms seen    No WBC's seen.  Preliminary Report (05-29-21 @ 09:32):    No growth to date

## 2021-05-31 NOTE — PROGRESS NOTE ADULT - SUBJECTIVE AND OBJECTIVE BOX
Patient discussed on morning rounds with       Operation / Date:     SUBJECTIVE ASSESSMENT:  71y Female         Vital Signs Last 24 Hrs  T(C): 36.7 (31 May 2021 09:05), Max: 36.8 (30 May 2021 22:30)  T(F): 98.1 (31 May 2021 09:05), Max: 98.2 (30 May 2021 22:30)  HR: 78 (31 May 2021 04:12) (70 - 92)  BP: 121/68 (31 May 2021 04:12) (121/68 - 140/73)  BP(mean): 88 (31 May 2021 04:12) (87 - 101)  RR: 13 (31 May 2021 04:12) (13 - 20)  SpO2: 98% (31 May 2021 04:12) (94% - 98%)  I&O's Detail    30 May 2021 07:01  -  31 May 2021 07:00  --------------------------------------------------------  IN:    Enteral Tube Flush: 60 mL    Free Water: 1000 mL    Jevity 1.5: 752 mL    Lactated Ringers: 230 mL  Total IN: 2042 mL    OUT:    Chest Tube (mL): 240 mL    Voided (mL): 600 mL  Total OUT: 840 mL    Total NET: 1202 mL      31 May 2021 07:01  -  31 May 2021 13:18  --------------------------------------------------------  IN:    Jevity 1.5: 94 mL    Oral Fluid: 250 mL  Total IN: 344 mL    OUT:    Chest Tube (mL): 20 mL    Voided (mL): 900 mL  Total OUT: 920 mL    Total NET: -576 mL          CHEST TUBE:  Yes/No. AIR LEAKS: Yes/No. Suction / H2O SEAL.   LISETH DRAIN:  Yes/No.  EPICARDIAL WIRES: Yes/No.  TIE DOWNS: Yes/No.  JOY: Yes/No.    PHYSICAL EXAM:    General:     Neurological:    Cardiovascular:    Respiratory:    Gastrointestinal:    Extremities:    Vascular:    Incision Sites:    LABS:                        11.5   6.08  )-----------( 382      ( 31 May 2021 06:28 )             36.9       COUMADIN:  Yes/No. REASON: .    PT/INR - ( 31 May 2021 06:28 )   PT: 12.5 sec;   INR: 1.04          PTT - ( 31 May 2021 06:28 )  PTT:28.3 sec    05-31    137  |  98  |  10  ----------------------------<  112<H>  4.3   |  29  |  0.49<L>    Ca    9.7      31 May 2021 06:28  Mg     2.4     05-31    TPro  6.6  /  Alb  3.4  /  TBili  0.2  /  DBili  x   /  AST  44<H>  /  ALT  97<H>  /  AlkPhos  169<H>  05-31          MEDICATIONS  (STANDING):  alteplase  Injectable for Pleural Effusion 10 milliGRAM(s) IntraPleural. every 12 hours  dornase colleen Solution for Pleural Effusion 5 milliGRAM(s) IntraPleural. every 12 hours  heparin   Injectable 5000 Unit(s) SubCutaneous every 8 hours  lactated ringers. 500 milliLiter(s) (10 mL/Hr) IV Continuous <Continuous>  pantoprazole  Injectable 40 milliGRAM(s) IV Push daily  sodium chloride 0.9% lock flush 3 milliLiter(s) IV Push every 8 hours    MEDICATIONS  (PRN):  acetaminophen  IVPB .. 1000 milliGRAM(s) IV Intermittent once PRN Mild Pain (1 - 3)  sodium chloride 0.65% Nasal 1 Spray(s) Both Nostrils three times a day PRN Nasal Congestion  tetracaine/benzocaine/butamben Spray 1 Spray(s) Topical every 4 hours PRN Sore Throat        RADIOLOGY & ADDITIONAL TESTS:     Patient discussed on morning rounds with Dr. Pinto     Operation / Date: 5/21/21 Called to OR for transhiatal esophagectomy after esophageal perforation during hiatal hernia repair with General Surgery ([place])    SUBJECTIVE ASSESSMENT:  71y Female seen and examined at the bedside. She states that she had pain from the dornase infusion this morning which has now improved. She is tolerating CLD without abd pain, distention, n/v. Denies CP, SOB, abd pain, n/v/c/d, calf pain.         Vital Signs Last 24 Hrs  T(C): 36.7 (31 May 2021 09:05), Max: 36.8 (30 May 2021 22:30)  T(F): 98.1 (31 May 2021 09:05), Max: 98.2 (30 May 2021 22:30)  HR: 78 (31 May 2021 04:12) (70 - 92)  BP: 121/68 (31 May 2021 04:12) (121/68 - 140/73)  BP(mean): 88 (31 May 2021 04:12) (87 - 101)  RR: 13 (31 May 2021 04:12) (13 - 20)  SpO2: 98% (31 May 2021 04:12) (94% - 98%)  I&O's Detail    30 May 2021 07:01  -  31 May 2021 07:00  --------------------------------------------------------  IN:    Enteral Tube Flush: 60 mL    Free Water: 1000 mL    Jevity 1.5: 752 mL    Lactated Ringers: 230 mL  Total IN: 2042 mL    OUT:    Chest Tube (mL): 240 mL    Voided (mL): 600 mL  Total OUT: 840 mL    Total NET: 1202 mL      31 May 2021 07:01  -  31 May 2021 13:18  --------------------------------------------------------  IN:    Jevity 1.5: 94 mL    Oral Fluid: 250 mL  Total IN: 344 mL    OUT:    Chest Tube (mL): 20 mL    Voided (mL): 900 mL  Total OUT: 920 mL    Total NET: -576 mL          CHEST TUBE:  Yes. AIR LEAKS: No. Suction   LISETH DRAIN: No.  EPICARDIAL WIRES: No.  TIE DOWNS: No.  OJY: No.    PHYSICAL EXAM:  General: Sitting in chair NAD  HEENT: NCAT MMM EOMI neck supple   Neurological: A&O x3, no focal deficits, strength 5/5 throughout  Cardiovascular: RRR, normal s1 s2, no M/R/G  Respiratory: Non-labored breathing,  CTA b/l, no W/R/R  Gastrointestinal: +BS x4 quadrant, soft, non-tender to palpation, non-distended  Extremities: WWP, no pitting edema, no calf tenderness  Vascular: 2+radial pulses b/l, 2+DP pulses b/l  Incision: MDI, CD&I, no drainage, no erythema, no sternal click   General:     Neurological:    Cardiovascular:    Respiratory:    Gastrointestinal:    Extremities:    Vascular:    Incision Sites:    LABS:                        11.5   6.08  )-----------( 382      ( 31 May 2021 06:28 )             36.9       COUMADIN:  Yes/No. REASON: .    PT/INR - ( 31 May 2021 06:28 )   PT: 12.5 sec;   INR: 1.04          PTT - ( 31 May 2021 06:28 )  PTT:28.3 sec    05-31    137  |  98  |  10  ----------------------------<  112<H>  4.3   |  29  |  0.49<L>    Ca    9.7      31 May 2021 06:28  Mg     2.4     05-31    TPro  6.6  /  Alb  3.4  /  TBili  0.2  /  DBili  x   /  AST  44<H>  /  ALT  97<H>  /  AlkPhos  169<H>  05-31          MEDICATIONS  (STANDING):  alteplase  Injectable for Pleural Effusion 10 milliGRAM(s) IntraPleural. every 12 hours  dornase colleen Solution for Pleural Effusion 5 milliGRAM(s) IntraPleural. every 12 hours  heparin   Injectable 5000 Unit(s) SubCutaneous every 8 hours  lactated ringers. 500 milliLiter(s) (10 mL/Hr) IV Continuous <Continuous>  pantoprazole  Injectable 40 milliGRAM(s) IV Push daily  sodium chloride 0.9% lock flush 3 milliLiter(s) IV Push every 8 hours    MEDICATIONS  (PRN):  acetaminophen  IVPB .. 1000 milliGRAM(s) IV Intermittent once PRN Mild Pain (1 - 3)  sodium chloride 0.65% Nasal 1 Spray(s) Both Nostrils three times a day PRN Nasal Congestion  tetracaine/benzocaine/butamben Spray 1 Spray(s) Topical every 4 hours PRN Sore Throat        RADIOLOGY & ADDITIONAL TESTS:     Patient discussed on morning rounds with Dr. Pinto     Operation / Date: 5/21/21 Called to OR for transhiatal esophagectomy after esophageal perforation during hiatal hernia repair with General Surgery ([place])    SUBJECTIVE ASSESSMENT:  71y Female seen and examined at the bedside. She states that she had pain from the dornase infusion this morning which has now improved. She is tolerating CLD without abd pain, distention, n/v. Denies CP, SOB, abd pain, n/v/c/d, calf pain.         Vital Signs Last 24 Hrs  T(C): 36.7 (31 May 2021 09:05), Max: 36.8 (30 May 2021 22:30)  T(F): 98.1 (31 May 2021 09:05), Max: 98.2 (30 May 2021 22:30)  HR: 78 (31 May 2021 04:12) (70 - 92)  BP: 121/68 (31 May 2021 04:12) (121/68 - 140/73)  BP(mean): 88 (31 May 2021 04:12) (87 - 101)  RR: 13 (31 May 2021 04:12) (13 - 20)  SpO2: 98% (31 May 2021 04:12) (94% - 98%)  I&O's Detail    30 May 2021 07:01  -  31 May 2021 07:00  --------------------------------------------------------  IN:    Enteral Tube Flush: 60 mL    Free Water: 1000 mL    Jevity 1.5: 752 mL    Lactated Ringers: 230 mL  Total IN: 2042 mL    OUT:    Chest Tube (mL): 240 mL    Voided (mL): 600 mL  Total OUT: 840 mL    Total NET: 1202 mL      31 May 2021 07:01  -  31 May 2021 13:18  --------------------------------------------------------  IN:    Jevity 1.5: 94 mL    Oral Fluid: 250 mL  Total IN: 344 mL    OUT:    Chest Tube (mL): 20 mL    Voided (mL): 900 mL  Total OUT: 920 mL    Total NET: -576 mL          CHEST TUBE:  Yes. AIR LEAKS: No. Suction   LISETH DRAIN: No.  EPICARDIAL WIRES: No.  TIE DOWNS: No.  JOY: No.    PHYSICAL EXAM:  General: Sitting in chair NAD  HEENT: NCAT MMM EOMI neck supple   Neurological: A&O x3, no focal deficits, strength 5/5 throughout  Cardiovascular: RRR, normal s1 s2, no M/R/G  Respiratory: Non-labored breathing,  CTA b/l, no W/R/R  Gastrointestinal:  soft, non-tender to palpation, non-distended, J tube in place   Extremities: WWP, no pitting edema, no calf tenderness  Vascular: peripheral pulses palpable   Incision: Neck incision with steri strips CD&I, abdominal incisions CD&I    LABS:                        11.5   6.08  )-----------( 382      ( 31 May 2021 06:28 )             36.9       COUMADIN: no    PT/INR - ( 31 May 2021 06:28 )   PT: 12.5 sec;   INR: 1.04          PTT - ( 31 May 2021 06:28 )  PTT:28.3 sec    05-31    137  |  98  |  10  ----------------------------<  112<H>  4.3   |  29  |  0.49<L>    Ca    9.7      31 May 2021 06:28  Mg     2.4     05-31    TPro  6.6  /  Alb  3.4  /  TBili  0.2  /  DBili  x   /  AST  44<H>  /  ALT  97<H>  /  AlkPhos  169<H>  05-31          MEDICATIONS  (STANDING):  alteplase  Injectable for Pleural Effusion 10 milliGRAM(s) IntraPleural. every 12 hours  dornase colleen Solution for Pleural Effusion 5 milliGRAM(s) IntraPleural. every 12 hours  heparin   Injectable 5000 Unit(s) SubCutaneous every 8 hours  lactated ringers. 500 milliLiter(s) (10 mL/Hr) IV Continuous <Continuous>  pantoprazole  Injectable 40 milliGRAM(s) IV Push daily  sodium chloride 0.9% lock flush 3 milliLiter(s) IV Push every 8 hours    MEDICATIONS  (PRN):  acetaminophen  IVPB .. 1000 milliGRAM(s) IV Intermittent once PRN Mild Pain (1 - 3)  sodium chloride 0.65% Nasal 1 Spray(s) Both Nostrils three times a day PRN Nasal Congestion  tetracaine/benzocaine/butamben Spray 1 Spray(s) Topical every 4 hours PRN Sore Throat        RADIOLOGY & ADDITIONAL TESTS:    < from: Xray Chest 1 View-PORTABLE IMMEDIATE (Xray Chest 1 View-PORTABLE IMMEDIATE .) (05.31.21 @ 09:06) >  Similar appearance to prior exam earlier same day. Left lung base pigtail chest tube. Hypoinflation. Pleural effusions and focal atelectasis/infiltrates in the lung bases. Upper lungs clear. No pneumothorax.    < end of copied text >

## 2021-05-31 NOTE — PROGRESS NOTE ADULT - ASSESSMENT
72 y/o female with PMH HTN, HLD (myalgias with statins), and paraesophageal hernia resulting in mild GERD and esophageal dysmotility. She was evaluated as an outpatient by general surgery for elective repair and on 5/21/21 presented for her RA repair of paraesophageal hernia with partial  fundoplication. Intraop course complicated by esophageal perforation for which Dr. Alberto and Dr. Pinto were consulted urgently. EGD was preformed and tear was not amenable to primary repair. They proceeded with an uncomplicated transhiatal esophagectomy. Post operatively she was brought to the CTICU extubated. Trickle feeds were started via J-tube on POD2 and she was transferred to [facility]. POD3 patient tube feeds to goal, continuing IV hydration, maintaining NPO. POD5 patient remained NPO on tube feeds.  NGT to intermittent suction. POD 6, s/p Barrium Esophagram, revealing no leak, NGT removed. POD7 S&S eval performed and patient cleared for sips of clear/full liquids. POD7 patient also went for left pigtail insertion for pleural effusion by IR. Minimal drainage from pleural effusion, chest CT performed and patient with loculated left pleural effusion. Lytic therapy initiated q12h for 6 rounds.      Neurovascular:   - No delirium. Pain well controlled with current regimen.  - Continue IV tylenol - Order as needed with lytic therapy added lidocaine today    Cardiovascular:   -HTN: Home amlodipine on hold while NPO (still holding per Inrprakash).  BP acceptable today.   -Monitor hr/bp/tele    HEENT:  - Post op voice changes: ENT consulted- hypomobile vs immobile LVC. Needs out-patient F/U with ENT, Dr. Patel  - S&S eval 5/28: cleared for clear and full liquids     Respiratory:   - CXR with continued Left sided effusion. Bedside US preformed, fluid pockets with septations vizualized.   - D/w IR resident, pt may benefit from repositioning the pigtail to drain these pockets, follow up in AM. NPO after MN  - Will continue TPA/dornase lytic therapy q12h, 5/6 this afternoon.   - SpO2 98% on 2L NC    GI:   - POD 10 hiatal hernia repair c/b esophageal perforation requiring emergent transhiatal esophagectomy   - Continue CLD and tube feeds through J tube, feeds to goal. Maintain CLD while pt in the hospital. No meds in J tube  - Continue GI PPX with protonix IV  - S&S eval 5/28: cleared for clear and full liquids  - esophagram without leak    Renal / : BUN/Cr 10/0.4  - Continue to monitor renal function.  - Monitor I/O's  - Replete electrolytes PRN.      Endocrine:    - no known hx diabetes or thyroid disease     Hematologic: H&H 11/36  -H/H stable at with no obvious signs of bleeding  -DVT ppx: SCDs b/l, SQH TID      ID: afebrile WBC 6  - Monitor for SIRS/sepsis     Disposition:  -Home when medically cleared with tube feeds

## 2021-06-01 ENCOUNTER — TRANSCRIPTION ENCOUNTER (OUTPATIENT)
Age: 71
End: 2021-06-01

## 2021-06-01 LAB
ANION GAP SERPL CALC-SCNC: 10 MMOL/L — SIGNIFICANT CHANGE UP (ref 5–17)
APTT BLD: 28.3 SEC — SIGNIFICANT CHANGE UP (ref 27.5–35.5)
BUN SERPL-MCNC: 9 MG/DL — SIGNIFICANT CHANGE UP (ref 7–23)
CALCIUM SERPL-MCNC: 9.2 MG/DL — SIGNIFICANT CHANGE UP (ref 8.4–10.5)
CHLORIDE SERPL-SCNC: 99 MMOL/L — SIGNIFICANT CHANGE UP (ref 96–108)
CO2 SERPL-SCNC: 29 MMOL/L — SIGNIFICANT CHANGE UP (ref 22–31)
CREAT SERPL-MCNC: 0.45 MG/DL — LOW (ref 0.5–1.3)
GLUCOSE SERPL-MCNC: 122 MG/DL — HIGH (ref 70–99)
HCT VFR BLD CALC: 36.5 % — SIGNIFICANT CHANGE UP (ref 34.5–45)
HGB BLD-MCNC: 11.4 G/DL — LOW (ref 11.5–15.5)
INR BLD: 1.02 — SIGNIFICANT CHANGE UP (ref 0.88–1.16)
MAGNESIUM SERPL-MCNC: 2.4 MG/DL — SIGNIFICANT CHANGE UP (ref 1.6–2.6)
MCHC RBC-ENTMCNC: 29.8 PG — SIGNIFICANT CHANGE UP (ref 27–34)
MCHC RBC-ENTMCNC: 31.2 GM/DL — LOW (ref 32–36)
MCV RBC AUTO: 95.3 FL — SIGNIFICANT CHANGE UP (ref 80–100)
NRBC # BLD: 0 /100 WBCS — SIGNIFICANT CHANGE UP (ref 0–0)
PLATELET # BLD AUTO: 232 K/UL — SIGNIFICANT CHANGE UP (ref 150–400)
POTASSIUM SERPL-MCNC: 4.3 MMOL/L — SIGNIFICANT CHANGE UP (ref 3.5–5.3)
POTASSIUM SERPL-SCNC: 4.3 MMOL/L — SIGNIFICANT CHANGE UP (ref 3.5–5.3)
PROTHROM AB SERPL-ACNC: 12.2 SEC — SIGNIFICANT CHANGE UP (ref 10.6–13.6)
RBC # BLD: 3.83 M/UL — SIGNIFICANT CHANGE UP (ref 3.8–5.2)
RBC # FLD: 14.7 % — HIGH (ref 10.3–14.5)
SODIUM SERPL-SCNC: 138 MMOL/L — SIGNIFICANT CHANGE UP (ref 135–145)
WBC # BLD: 4 K/UL — SIGNIFICANT CHANGE UP (ref 3.8–10.5)
WBC # FLD AUTO: 4 K/UL — SIGNIFICANT CHANGE UP (ref 3.8–10.5)

## 2021-06-01 PROCEDURE — 71045 X-RAY EXAM CHEST 1 VIEW: CPT | Mod: 26

## 2021-06-01 PROCEDURE — 71045 X-RAY EXAM CHEST 1 VIEW: CPT | Mod: 26,77

## 2021-06-01 PROCEDURE — 32557 INSERT CATH PLEURA W/ IMAGE: CPT | Mod: LT

## 2021-06-01 RX ORDER — ACETAMINOPHEN 500 MG
1000 TABLET ORAL ONCE
Refills: 0 | Status: COMPLETED | OUTPATIENT
Start: 2021-06-01 | End: 2021-06-01

## 2021-06-01 RX ADMIN — SODIUM CHLORIDE 3 MILLILITER(S): 9 INJECTION INTRAMUSCULAR; INTRAVENOUS; SUBCUTANEOUS at 21:25

## 2021-06-01 RX ADMIN — Medication 1000 MILLIGRAM(S): at 11:44

## 2021-06-01 RX ADMIN — SODIUM CHLORIDE 3 MILLILITER(S): 9 INJECTION INTRAMUSCULAR; INTRAVENOUS; SUBCUTANEOUS at 13:59

## 2021-06-01 RX ADMIN — DORNASE ALFA 5 MILLIGRAM(S): 1 SOLUTION RESPIRATORY (INHALATION) at 07:55

## 2021-06-01 RX ADMIN — ALTEPLASE 10 MILLIGRAM(S): KIT at 05:00

## 2021-06-01 RX ADMIN — Medication 400 MILLIGRAM(S): at 10:45

## 2021-06-01 RX ADMIN — HEPARIN SODIUM 5000 UNIT(S): 5000 INJECTION INTRAVENOUS; SUBCUTANEOUS at 21:38

## 2021-06-01 RX ADMIN — SODIUM CHLORIDE 3 MILLILITER(S): 9 INJECTION INTRAMUSCULAR; INTRAVENOUS; SUBCUTANEOUS at 05:23

## 2021-06-01 RX ADMIN — PANTOPRAZOLE SODIUM 40 MILLIGRAM(S): 20 TABLET, DELAYED RELEASE ORAL at 12:09

## 2021-06-01 NOTE — PROGRESS NOTE ADULT - SUBJECTIVE AND OBJECTIVE BOX
Patient discussed on morning rounds with Dr. Alberto     Operation / Date: 5/21/21 -- transhiatal esophagectomy after esophageal perforation during hiatal hernia repair     SUBJECTIVE ASSESSMENT:  Pt is feeling okay this morning. States she still has discomfort at the chest tube side. Wants to eat but understands she can't until after her IR procedure today. Few episodes of diarrhea yesterday, but none today. Denies any CP, palpitations, SOB, wheezing, abd pain, n/v/d/c, fevers or chills.     Vital Signs Last 24 Hrs  T(C): 36.6 (01 Jun 2021 05:01), Max: 36.7 (31 May 2021 18:58)  T(F): 97.9 (01 Jun 2021 05:01), Max: 98 (31 May 2021 18:58)  HR: 82 (01 Jun 2021 05:17) (82 - 92)  BP: 159/67 (01 Jun 2021 05:17) (127/74 - 159/67)  BP(mean): 96 (01 Jun 2021 05:17) (84 - 102)  RR: 18 (01 Jun 2021 05:17) (18 - 25)  SpO2: 97% (01 Jun 2021 05:17) (93% - 97%)  I&O's Detail    31 May 2021 07:01  -  01 Jun 2021 07:00  --------------------------------------------------------  IN:    Enteral Tube Flush: 60 mL    Free Water: 250 mL    Jevity 1.5: 705 mL    Oral Fluid: 980 mL  Total IN: 1995 mL    OUT:    Chest Tube (mL): 180 mL    Voided (mL): 2750 mL  Total OUT: 2930 mL    Total NET: -935 mL    CHEST TUBE:  Yes -- left pigtail, on suction, no air leak   LISETH DRAIN:  no  EPICARDIAL WIRES: no  TIE DOWNS: no  JOY: no  J-TUBE: yes     PHYSICAL EXAM:  General: well appearing sitting in chair in NAD   Neurological: AOx3. Motor skills grossly intact  Cardiovascular: Normal S1/S2. Regular rate/rhythm. No murmurs  Respiratory: Lungs CTA bilaterally. No wheezing or rales  Gastrointestinal: +BS in all 4 quadrants. Non-distended. Soft. Non-tender  Extremities: Strength 5/5 b/l upper/lower extremities. Sensation grossly intact upper/lower extremities. No edema. No calf tenderness.  Vascular: Radial 2+bilaterally, DP 2+ b/l  Incision Sites: abdominal incision without erythema, purulence or ecchymosis. Suprasternal incision with steri strips in place c/d/i.     LABS:                         11.4   4.00  )-----------( 232      ( 01 Jun 2021 06:05 )             36.5     COUMADIN:  no    PT/INR - ( 01 Jun 2021 06:05 )   PT: 12.2 sec;   INR: 1.02     PTT - ( 01 Jun 2021 06:05 )  PTT:28.3 sec    06-01    138  |  99  |  9   ----------------------------<  122<H>  4.3   |  29  |  0.45<L>    Ca    9.2      01 Jun 2021 06:05  Mg     2.4     06-01    TPro  6.6  /  Alb  3.4  /  TBili  0.2  /  DBili  x   /  AST  44<H>  /  ALT  97<H>  /  AlkPhos  169<H>  05-31    MEDICATIONS  (STANDING):  heparin   Injectable 5000 Unit(s) SubCutaneous every 8 hours  lactated ringers. 500 milliLiter(s) (10 mL/Hr) IV Continuous <Continuous>  pantoprazole  Injectable 40 milliGRAM(s) IV Push daily  sodium chloride 0.9% lock flush 3 milliLiter(s) IV Push every 8 hours    MEDICATIONS  (PRN):  sodium chloride 0.65% Nasal 1 Spray(s) Both Nostrils three times a day PRN Nasal Congestion  tetracaine/benzocaine/butamben Spray 1 Spray(s) Topical every 4 hours PRN Sore Throat    RADIOLOGY & ADDITIONAL TESTS:  < from: Xray Chest 1 View-PORTABLE IMMEDIATE (Xray Chest 1 View-PORTABLE IMMEDIATE .) (05.31.21 @ 09:06) >  IMPRESSION:  Similar appearance to prior exam earlier same day. Left lung base pigtail chest tube. Hypoinflation. Pleural effusions and focal atelectasis/infiltrates in the lung bases. Upper lungs clear. No pneumothorax.  < end of copied text >

## 2021-06-01 NOTE — PROGRESS NOTE ADULT - SUBJECTIVE AND OBJECTIVE BOX
SUBJECTIVE: Patient seen and examined at bedside. Pt has no complaints this AM. Was tolerating CLD, currently NPO for IR chest tube repositioning. Has been walking. Denies pain.    heparin   Injectable 5000 Unit(s) SubCutaneous every 8 hours    MEDICATIONS  (PRN):  sodium chloride 0.65% Nasal 1 Spray(s) Both Nostrils three times a day PRN Nasal Congestion  tetracaine/benzocaine/butamben Spray 1 Spray(s) Topical every 4 hours PRN Sore Throat      I&O's Detail    31 May 2021 07:01  -  01 Jun 2021 07:00  --------------------------------------------------------  IN:    Enteral Tube Flush: 60 mL    Free Water: 250 mL    Jevity 1.5: 705 mL    Oral Fluid: 980 mL  Total IN: 1995 mL    OUT:    Chest Tube (mL): 180 mL    Voided (mL): 2750 mL  Total OUT: 2930 mL    Total NET: -935 mL          T(C): 36.6 (06-01-21 @ 09:53), Max: 36.7 (05-31-21 @ 18:58)  HR: 94 (06-01-21 @ 08:55) (82 - 94)  BP: 143/71 (06-01-21 @ 08:55) (127/74 - 159/67)  RR: 24 (06-01-21 @ 08:55) (18 - 24)  SpO2: 97% (06-01-21 @ 08:55) (93% - 97%)    GENERAL: NAD, Resting comfortably in bed, awake, opens eyes spontaneously  HEENT: NCAT, MMM, neck incision c/d/i  RESP: Nonlabored breathing, No respiratory distress, CT with small airleak, on 2L NC  CARD: Normal rate, Normal peripheral perfusion  GI: Soft, ND, NT, incisions c/d/i, No guarding, No rebound tenderness  EXTREM: WWP, No edema, No gross deformity of extremities  SKIN: No rashes, no lesions  NEURO: AAOx3, No focal motor or sensory deficits  PSYCH: Affect and characteristics of appearance, verbalizations, and behaviors are appropriate    LABS:                        11.4   4.00  )-----------( 232      ( 01 Jun 2021 06:05 )             36.5     06-01    138  |  99  |  9   ----------------------------<  122<H>  4.3   |  29  |  0.45<L>    Ca    9.2      01 Jun 2021 06:05  Mg     2.4     06-01    TPro  6.6  /  Alb  3.4  /  TBili  0.2  /  DBili  x   /  AST  44<H>  /  ALT  97<H>  /  AlkPhos  169<H>  05-31    PT/INR - ( 01 Jun 2021 06:05 )   PT: 12.2 sec;   INR: 1.02          PTT - ( 01 Jun 2021 06:05 )  PTT:28.3 sec      RADIOLOGY & ADDITIONAL STUDIES:      Culture - Body Fluid with Gram Stain (collected 05-28-21 @ 10:50)  Source: .Body Fluid Left Pleural Fluid  Gram Stain (05-28-21 @ 18:11):    No organisms seen    No WBC's seen.  Preliminary Report (05-29-21 @ 09:32):    No growth to date

## 2021-06-01 NOTE — PROGRESS NOTE ADULT - ASSESSMENT
70 y/o female with PMH HTN, HLD (myalgias with statins), and paraesophageal hernia resulting in mild GERD and esophageal dysmotility. She was evaluated as an outpatient by general surgery for elective repair and on 5/21/21 presented for her RA repair of paraesophageal hernia with partial  fundoplication. Intraoperatively, course complicated by esophageal perforation for which Dr. Alberto and Dr. Pinto were consulted urgently. EGD was preformed and tear were determined to be unamenable to primary repair. They proceeded with an uncomplicated transhiatal esophagectomy. Post operatively she was brought to the CTICU extubated. Trickle feeds were started via J-tube on POD2 and she was transferred to [facility]. POD3 patient tube feeds to goal, continued IV hydration, maintaining NPO. POD5 patient remained NPO on tube feeds.  NGT to intermittent suction. POD 6, s/p Barrium Esophagram, revealing no leak, NGT removed. POD7 S&S eval performed and patient cleared for sips of clear/full liquids. POD7 patient also went for left pigtail insertion for pleural effusion by IR. Minimal drainage from pleural effusion, chest CT performed and patient with loculated left pleural effusion. Lytic therapy initiated q12h for 6 rounds and completed last ose on 6/1 in the morning. Pending possible IR repositioning of pigtail catheter.     Plan:    Neurovascular:   -Pain well controlled with current regimen. PRN's: IV tylenol as needed     Cardiovascular:   -HTN: holding home meds (amlodipine)  -Hemodynamically stable.   -Monitor: BP, HR, tele    Respiratory:   -Oxygenating well on room air  -Encourage continued use of IS 10x/hr and frequent ambulation  -CXR: b/l pleural effusions vs atelectasis  -Pending POCUS of chest   -Pigtail in place on left, s/p completion of tPA/dornase therapy    -possible IR repositioning today     GI:  -GI PPX: pantoprazole 40mg IV   -PO Diet: Clear liquids with tube feeds   -Bowel Regimen: none needed -- pt with intermittent diarrhea, continue to monitor    Renal / :  -Continue to monitor renal function: BUN/Cr 9/0.45   -Monitor I/O's daily     Endocrine:    -No hx of DM or thyroid dx    Hematologic:  -CBC: H/H- 11/36   -Coagulation Panel: WNL     ID:  -Temperature: afebrile   -CBC: WBC- 4   -Continue to observe for SIRS/Sepsis Syndrome.    Prophylaxis:  -DVT prophylaxis with 5000 SubQ Heparin q8h.  -Continue with SCD's b/l while patient is at rest     Disposition:  -Discharge home once patient is medically ready

## 2021-06-01 NOTE — CHART NOTE - NSCHARTNOTEFT_GEN_A_CORE
Admitting Diagnosis:   Patient is a 71y old  Female who presents with a chief complaint of Paraesophageal hernia repair (27 May 2021 13:50)      PAST MEDICAL & SURGICAL HISTORY:  HTN (hypertension)    Hyperlipidemia    Gastric reflux    Diaphragmatic hernia without obstruction or gangrene    Constipation    H/O colonoscopy  x2    History of coronary angiogram        Current Nutrition Order:  Jevity 1.5 x18hrs 47ml/hr via JTUBE   >> provides ~847ml, 1296kcal, 54gm prot, 642ml   250ml water q 6hrs free water     PO Intake: Good (%) [   ]  Fair (50-75%) [   ] Poor (<25%) [   ]- NA NPO     GI Issues:   Remains with NGT to intermittent suction; OP 175ml 5/27, 925ml 5/26  Denies D/C, +BM today     Pain:  none per flow sheets     Skin Integrity:  Eldon 20  +nonpitting edema BL  No pressure     Labs:   05-27    138  |  103  |  12  ----------------------------<  104<H>  3.7   |  28  |  0.44<L>    Ca    9.5      27 May 2021 05:37  Mg     2.2     05-27      CAPILLARY BLOOD GLUCOSE          Medications:  MEDICATIONS  (STANDING):  heparin   Injectable 5000 Unit(s) SubCutaneous every 8 hours  lactated ringers. 500 milliLiter(s) (10 mL/Hr) IV Continuous <Continuous>  pantoprazole  Injectable 40 milliGRAM(s) IV Push daily  sodium chloride 0.9% lock flush 3 milliLiter(s) IV Push every 8 hours    MEDICATIONS  (PRN):  tetracaine/benzocaine/butamben Spray 1 Spray(s) Topical every 4 hours PRN Sore Throat      4'11'' IBW 98 pounds +-10%  Weight 168 pounds   %SEN=265 BMI 33.9     ** Based on most recent EMR wt     Estimated energy needs:   Ideal body weight used for calculations as pt >120% of IBW  Nutrient needs based on [facility] standards of care for maintenance in adults, adjusted for age, post-op needs     Estimated Energy Needs:  · Weight (lbs)	94.7 lb	     · Weight (kg)	43 kg	     · Enter From (nini/kg)	25	     · Enter To (nnii/kg)	30	     · Calculated From (nini/kg)	1075	     · Calculated To (nini/kg)	1290	      Estimated Protein Needs:  · Weight (lbs)	94.7 lb	     · Weight (kg)	43 kg	     · Enter From (g/kg)	1.2	     · Enter To (g/kg)	1.4	     · Calculated From (g/kg)	51.6	     · Calculated To (g/kg)	60.2	      Estimated Fluid Needs:  · Weight (lbs)	94.7 lb	     · Weight (kg)	43 kg	     · Enter From (ml/kg)	30	     · Enter To (ml/kg)	35	     · Calculated From (ml/kg)	1290	     · Calculated To (ml/kg)	1505	       Subjective:   71F with PMH HTN, HLD (myalgias with statins), and paraesophageal hernia resulting in mild GERD and esophageal dysmotility. She was evaluated as an outpatient by general surgery for elective repair and on 5/21 presented for her RA repair of paraesophageal hernia with partial  fundoplication. Intraop course complicated by esophageal perforation for which Dr. Alberto and Dr. Pinto were consulted for urgently. EGD was preformed and tear was not amenable to primary repair. They proceeded with an uncomplicated transhiatal esophagectomy. Post operatively she was brought to the CTICU extubated. Trickle feeds were started via J-tube on POD2 and she was transferred to [facility]. Possible IR drainage of left pleural effusion Pending; if she can't tolerate w/o sedation will do tomorrow.    Pt seen on 9[facility]. Feeds off during time of RD visit as pt had just been arriving back to unit. Pt reports feedings running during night and part of the day. Denies D/C, +BM today. Remains with NGT to intermittent suction; OP 175ml 5/27, 925ml 5/26. S/p Barium Esophagram today, pending results - noted possible for clears if no leak. Also SLP eval pending today.    Will follow per protocol, see recs below.    Prior PES: Increased Nutrient Needs kcal/pro Etiology r/t hypermetabolic state Signs/Symptoms AEB post-op demand.   ON GOING @ THIS TIME   Goal/Expected Outcome meet >75% EER.    Recommendations:  1. F/U with Barium Esophagram results + results from SLP. If PO feasible, defer PO cons to team/SLP.  2. While TF remains indicated as main source of nutrition, as medically feasible cont with Jevity 1.5 x18hrs 47ml/hr which provides ~847ml, 1296kcal, 54gm prot, 642ml.  3. Monitor for feeding tolerance, GI distress. Maintain ASP precautions at all times.  4. Monitor Skin, Wts, GOC.  5. Labs: monitor BMP, CBC, glucose, lytes, trend renal indices, LFTs.  6. RD to remain available for additional nutrition interventions as needed.     Education: NA @ this time     Risk Level: High [  X ] Moderate [   ] Low [   ]
Exam:  US Chest    Procedure Date:    History: 71y Female whose CXR today shows a possible bilateral pleural effusion.  Pt is POD #4 from transhiatal esphagectomy.       Findings:                    Evaluation of the right side of the thoracic cavity demonstrates                   small pleural effusion                  Lung is freely movable with in thoracic cavity                    Evaluation of the left side of the thoracic cavity demonstrates                   trace pleural effusion                  Lung is freely movable with in thoracic cavity      Impression:  small pleural effusion on right, trace on left  No need for drainage  Continue with aggressive IS
Exam:  US Chest    Procedure Date:    History: 71y Female whose CXR today shows a possible left pleural effusion.  Pt is post op from hiatal hernia repair and esophagectomy       Findings:                    Evaluation of the left side of the thoracic cavity demonstrates                  moderate pleural effusion ?possible large, looks loculated.  Some good windows seen.                                  Lung is freely movable with in thoracic cavity    Impression:  at least moderate pleural effusion, possible loculated.
Exam:  US Chest    Procedure Date:    History: 71y Female whose CXR today shows a possible left sided pleural effusion.  Pt is POD #2 from esophagectomy.       Findings:                    Evaluation of the left side of the thoracic cavity demonstrates small pleural effusion    Impression:  small left pleural effusion. No indication for procedural intervention.
Per MIST-2 Protocol for Intrapleural t-PA/Dornase colleen administration for left loculated pleural effusion    t-PA 10mg/30mL 0.9% NaCl was instilled intrapleurally at 15:45 pm into left pigtail  pigtail clamped for one hour per protocol  unclamped and no drainage for one hour  Dornase colleen 5mg/30mL sterile water for injection was instilled intrapleurally at 17:45pm  pigtail clamped for one hour per protocol  unclamped and no drainage for one hour    Will repeat process x3 days per protocol
Per MIST-2 Protocol for Intrapleural t-PA/Dornase colleen administration for left loculated pleural effusion    t-PA 10mg/30mL 0.9% NaCl was instilled intrapleurally at 16:45 pm into left pigtail  pigtail clamped for one hour per protocol  unclamped and no drainage for one hour  Dornase colleen 5mg/30mL sterile water for injection was instilled intrapleurally at 18:45pm  pigtail clamped for one hour per protocol  unclamped and no drainage for one hour    Will repeat process x3 days per protocol
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  They will sign off and F/U out-patient. She can F/U with Dr. Patel.  Spoke to Dr. Gutierrez, who will relay to Dr. Alberto.  Keep NPO for now.  Barium swallow Friday.
Admitting Diagnosis:   Patient is a 71y old  Female who presents with a chief complaint of Paraesophageal hernia repair (01 Jun 2021 09:06)      PAST MEDICAL & SURGICAL HISTORY:  HTN (hypertension)    Hyperlipidemia    Gastric reflux    Diaphragmatic hernia without obstruction or gangrene    Constipation    H/O colonoscopy  x2    History of coronary angiogram        Current Nutrition Order:  Jevity 1.5 x18hrs 47ml/hr via JTUBE   >> provides ~847ml, 1296kcal, 54gm prot, 642ml   +Full Liquids    PO Intake: Good (%) [   ]  Fair (50-75%) [   ] Poor (<25%) [   ]- NA NPO     GI Issues:   +BM soft today   +BM watery diarrhea 5/31     Pain:  none per flow sheets     Skin Integrity:  Eldon 20  No edema   No pressure ulcers, SX site noted         Labs:   06-01    138  |  99  |  9   ----------------------------<  122<H>  4.3   |  29  |  0.45<L>    Ca    9.2      01 Jun 2021 06:05  Mg     2.4     06-01    TPro  6.6  /  Alb  3.4  /  TBili  0.2  /  DBili  x   /  AST  44<H>  /  ALT  97<H>  /  AlkPhos  169<H>  05-31    CAPILLARY BLOOD GLUCOSE          Medications:  MEDICATIONS  (STANDING):  heparin   Injectable 5000 Unit(s) SubCutaneous every 8 hours  lactated ringers. 500 milliLiter(s) (10 mL/Hr) IV Continuous <Continuous>  pantoprazole  Injectable 40 milliGRAM(s) IV Push daily  sodium chloride 0.9% lock flush 3 milliLiter(s) IV Push every 8 hours    MEDICATIONS  (PRN):  sodium chloride 0.65% Nasal 1 Spray(s) Both Nostrils three times a day PRN Nasal Congestion  tetracaine/benzocaine/butamben Spray 1 Spray(s) Topical every 4 hours PRN Sore Throat          4'11'' IBW 98 pounds +-10%  Weight 168 pounds   %WIY=267 BMI 33.9     ** Based on most recent EMR wt     Estimated energy needs:   Ideal body weight used for calculations as pt >120% of IBW  Nutrient needs based on [facility] standards of care for maintenance in adults, adjusted for age, post-op needs     Estimated Energy Needs:  · Weight (lbs)	94.7 lb	     · Weight (kg)	43 kg	     · Enter From (nini/kg)	25	     · Enter To (nini/kg)	30	     · Calculated From (nini/kg)	1075	     · Calculated To (nini/kg)	1290	      Estimated Protein Needs:  · Weight (lbs)	94.7 lb	     · Weight (kg)	43 kg	     · Enter From (g/kg)	1.2	     · Enter To (g/kg)	1.4	     · Calculated From (g/kg)	51.6	     · Calculated To (g/kg)	60.2	      Estimated Fluid Needs:  · Weight (lbs)	94.7 lb	     · Weight (kg)	43 kg	     · Enter From (ml/kg)	30	     · Enter To (ml/kg)	35	     · Calculated From (ml/kg)	1290	     · Calculated To (ml/kg)	1505	       Subjective:   71F with PMH HTN, HLD (myalgias with statins), and paraesophageal hernia resulting in mild GERD and esophageal dysmotility. She was evaluated as an outpatient by general surgery for elective repair and on 5/21 presented for her RA repair of paraesophageal hernia with partial  fundoplication. Intraop course complicated by esophageal perforation for which Dr. Alberto and Dr. Pinto were consulted for urgently. EGD was preformed and tear was not amenable to primary repair. They proceeded with an uncomplicated transhiatal esophagectomy. Post operatively she was brought to the CTICU extubated. Trickle feeds were started via J-tube on POD2 and she was transferred to [facility]. POD7 L pigtail insertion for pleural effusion by IR. Minimal drainage from pleural effusion, chest CT performed and pt with loculated L pleural effusion. Lytic therapy initiated q12h for 6 rounds and completed last Dose 6/1. Pending possible IR repositioning of pigtail catheter.     Pt seen on [facility]. S/p Barium Esophagram without leak, NGT removed. Pt Now s/p swallow eval with recs for Full liquid (liquid only) vs clear liquid, per CT surgery 5/28. Remains with order for TF/Clear liquids + NPO@12. Pt NPO for pending procedures. Reports prior to NPO consuming clears however %PO intake not stated. Does report consuming jello, broth, juice, ices. Had not yet tried ensure clear, seen in mando. Pt does report having diarrhea upon consuming broth for first time x2 days ago. Since has somewhat resolved, soft BM today.   Will follow per protocol, see recs below.    Prior PES: Increased Nutrient Needs kcal/pro Etiology r/t hypermetabolic state Signs/Symptoms AEB post-op demand.   ON GOING @ THIS TIME   Goal/Expected Outcome meet >75% EER.    Recommendations:  1. Diet/Feeds to be advanced in 24-48hrs as medically feasible.   2. Defer PO diet cons to Team/SLP.  >> Monitor %PO intake, diet tolerance. Should pt be noted with s/s of issues swallowing, recommend NPO/repeat Barium.   3. While TF remains indicated as main source of nutrition, as medically feasible cont with Jevity 1.5 x18hrs 47ml/hr which provides ~847ml, 1296kcal, 54gm prot, 642ml.  4. Monitor Skin, Wts, GOC.  5. Labs: monitor BMP, CBC, glucose, lytes, trend renal indices, LFTs.  6. Monitor GI. If diarrhea to remain going issues consider banatrol use.   7. RD to remain available for additional nutrition interventions as needed.     Education: NA @ this time     Risk Level: High [  X ] Moderate [   ] Low [   ].
K+ was 3.8; Attempted to give 10mEq x 2 via peripheral IV however it was causing patient pain, despite running it very slowly.  Opted to stop the infusion.  Will avoid crushing K+ pill through J-tube.  Will just continue to watch for any ectopy and repeat labs tomorrow am.

## 2021-06-01 NOTE — CHART NOTE - NSCHARTNOTESELECT_GEN_ALL_CORE
Nutrition Services
addendum/Event Note
Chest US/Event Note
ENT follow up/Event Note
Event Note
Nutrition Services
U/S chest/Event Note

## 2021-06-01 NOTE — PROGRESS NOTE ADULT - ASSESSMENT
72 y/o female with PMH HTN, HLD (myalgias with statins), and paraesophageal hernia resulting in mild GERD and esophageal dysmotility. She was evaluated as an outpatient by general surgery for elective repair and on 5/21 presented for her RA repair of paraesophageal hernia with partial  fundoplication. Intraop course complicated by esophageal perforation for which Dr. Alberto and Dr. Pinto were consulted for urgently. EGD was preformed and tear was not amenable to primary repair. They proceeded with an uncomplicated transhiatal esophagectomy. Recovering well postoperatively.    - Care per CTS, Gen Surg following

## 2021-06-02 ENCOUNTER — APPOINTMENT (OUTPATIENT)
Dept: THORACIC SURGERY | Facility: HOSPITAL | Age: 71
End: 2021-06-02

## 2021-06-02 LAB
ANION GAP SERPL CALC-SCNC: 10 MMOL/L — SIGNIFICANT CHANGE UP (ref 5–17)
BUN SERPL-MCNC: 9 MG/DL — SIGNIFICANT CHANGE UP (ref 7–23)
CALCIUM SERPL-MCNC: 9.2 MG/DL — SIGNIFICANT CHANGE UP (ref 8.4–10.5)
CHLORIDE SERPL-SCNC: 99 MMOL/L — SIGNIFICANT CHANGE UP (ref 96–108)
CO2 SERPL-SCNC: 26 MMOL/L — SIGNIFICANT CHANGE UP (ref 22–31)
CREAT SERPL-MCNC: 0.53 MG/DL — SIGNIFICANT CHANGE UP (ref 0.5–1.3)
CULTURE RESULTS: NO GROWTH — SIGNIFICANT CHANGE UP
GLUCOSE SERPL-MCNC: 101 MG/DL — HIGH (ref 70–99)
HCT VFR BLD CALC: 32.7 % — LOW (ref 34.5–45)
HGB BLD-MCNC: 10.4 G/DL — LOW (ref 11.5–15.5)
MAGNESIUM SERPL-MCNC: 2.3 MG/DL — SIGNIFICANT CHANGE UP (ref 1.6–2.6)
MCHC RBC-ENTMCNC: 29.6 PG — SIGNIFICANT CHANGE UP (ref 27–34)
MCHC RBC-ENTMCNC: 31.8 GM/DL — LOW (ref 32–36)
MCV RBC AUTO: 93.2 FL — SIGNIFICANT CHANGE UP (ref 80–100)
NRBC # BLD: 0 /100 WBCS — SIGNIFICANT CHANGE UP (ref 0–0)
PLATELET # BLD AUTO: 376 K/UL — SIGNIFICANT CHANGE UP (ref 150–400)
POTASSIUM SERPL-MCNC: 4.3 MMOL/L — SIGNIFICANT CHANGE UP (ref 3.5–5.3)
POTASSIUM SERPL-SCNC: 4.3 MMOL/L — SIGNIFICANT CHANGE UP (ref 3.5–5.3)
RBC # BLD: 3.51 M/UL — LOW (ref 3.8–5.2)
RBC # FLD: 14.6 % — HIGH (ref 10.3–14.5)
SODIUM SERPL-SCNC: 135 MMOL/L — SIGNIFICANT CHANGE UP (ref 135–145)
SPECIMEN SOURCE: SIGNIFICANT CHANGE UP
WBC # BLD: 5.47 K/UL — SIGNIFICANT CHANGE UP (ref 3.8–10.5)
WBC # FLD AUTO: 5.47 K/UL — SIGNIFICANT CHANGE UP (ref 3.8–10.5)

## 2021-06-02 PROCEDURE — 71045 X-RAY EXAM CHEST 1 VIEW: CPT | Mod: 26

## 2021-06-02 PROCEDURE — 31622 DX BRONCHOSCOPE/WASH: CPT | Mod: 78

## 2021-06-02 PROCEDURE — 71045 X-RAY EXAM CHEST 1 VIEW: CPT | Mod: 26,77

## 2021-06-02 RX ORDER — LIDOCAINE HCL 20 MG/ML
10 VIAL (ML) INJECTION ONCE
Refills: 0 | Status: COMPLETED | OUTPATIENT
Start: 2021-06-02 | End: 2021-06-02

## 2021-06-02 RX ORDER — TETRACAINE/BENZOCAINE/BUTAMBEN 2%-14%-2%
1 OINTMENT (GRAM) TOPICAL ONCE
Refills: 0 | Status: COMPLETED | OUTPATIENT
Start: 2021-06-02 | End: 2021-06-02

## 2021-06-02 RX ORDER — LIDOCAINE 4 G/100G
10 CREAM TOPICAL ONCE
Refills: 0 | Status: COMPLETED | OUTPATIENT
Start: 2021-06-02 | End: 2021-06-02

## 2021-06-02 RX ORDER — ACETAMINOPHEN 500 MG
1000 TABLET ORAL ONCE
Refills: 0 | Status: COMPLETED | OUTPATIENT
Start: 2021-06-02 | End: 2021-06-02

## 2021-06-02 RX ADMIN — Medication 400 MILLIGRAM(S): at 14:52

## 2021-06-02 RX ADMIN — HEPARIN SODIUM 5000 UNIT(S): 5000 INJECTION INTRAVENOUS; SUBCUTANEOUS at 21:41

## 2021-06-02 RX ADMIN — SODIUM CHLORIDE 3 MILLILITER(S): 9 INJECTION INTRAMUSCULAR; INTRAVENOUS; SUBCUTANEOUS at 13:10

## 2021-06-02 RX ADMIN — LIDOCAINE 10 MILLILITER(S): 4 CREAM TOPICAL at 06:50

## 2021-06-02 RX ADMIN — SODIUM CHLORIDE 3 MILLILITER(S): 9 INJECTION INTRAMUSCULAR; INTRAVENOUS; SUBCUTANEOUS at 06:51

## 2021-06-02 RX ADMIN — Medication 10 MILLILITER(S): at 06:51

## 2021-06-02 RX ADMIN — HEPARIN SODIUM 5000 UNIT(S): 5000 INJECTION INTRAVENOUS; SUBCUTANEOUS at 06:50

## 2021-06-02 RX ADMIN — PANTOPRAZOLE SODIUM 40 MILLIGRAM(S): 20 TABLET, DELAYED RELEASE ORAL at 12:20

## 2021-06-02 RX ADMIN — Medication 1 SPRAY(S): at 06:50

## 2021-06-02 RX ADMIN — SODIUM CHLORIDE 3 MILLILITER(S): 9 INJECTION INTRAMUSCULAR; INTRAVENOUS; SUBCUTANEOUS at 21:40

## 2021-06-02 RX ADMIN — Medication 1000 MILLIGRAM(S): at 17:43

## 2021-06-02 RX ADMIN — HEPARIN SODIUM 5000 UNIT(S): 5000 INJECTION INTRAVENOUS; SUBCUTANEOUS at 13:09

## 2021-06-02 RX ADMIN — Medication 400 MILLIGRAM(S): at 23:52

## 2021-06-02 NOTE — PROVIDER CONTACT NOTE (CHANGE IN STATUS NOTIFICATION) - ACTION/TREATMENT ORDERED:
MD Pinto is currently at the bedside, plans on doing a bronchoscopy while patient is sitting in the chair without any sedation; plans on numbing area.

## 2021-06-02 NOTE — PROGRESS NOTE ADULT - ASSESSMENT
70 y/o female with PMH HTN, HLD (myalgias with statins), and paraesophageal hernia resulting in mild GERD and esophageal dysmotility. She was evaluated as an outpatient by general surgery for elective repair and on 5/21 presented for her RA repair of paraesophageal hernia with partial  fundoplication. Intraop course complicated by esophageal perforation for which Dr. Alberto and Dr. Pinto were consulted for urgently. EGD was preformed and tear was not amenable to primary repair. They proceeded with an uncomplicated transhiatal esophagectomy. Recovering well postoperatively. Noted to have RLL atelectasis on CXR, planning for awake bronch today    - Care per CTS, Gen Surg following

## 2021-06-02 NOTE — PROGRESS NOTE ADULT - SUBJECTIVE AND OBJECTIVE BOX
70 y/o female with history of HTN, HLD (myalgias with statins), and paraesophageal hernia resulting in mild GERD and esophageal dysmotility. She was evaluated as an outpatient by general surgery for elective repair and on 5/21/21 presented for her RA repair of paraesophageal hernia with partial fundoplication. Intraoperative course complicated by esophageal perforation for which Dr. Alberto and Dr. Pinto were consulted urgently. EGD was preformed and tear was not amenable to primary repair. They proceeded with an uncomplicated transhiatal esophagectomy. Imaging demonstrated post-operative left pleural effusion. IR consulted for left chest tube, which was placed on 05/29. IR again consulted due to poor chest tube output with replacement of chest tube on 06/01.    Left chest tube with 450 cc output (prior chest tube had put out 180 cc over the prior 24 hours).

## 2021-06-02 NOTE — PROGRESS NOTE ADULT - ASSESSMENT
72 y/o female with PMH HTN, HLD (myalgias with statins), and paraesophageal hernia resulting in mild GERD and esophageal dysmotility. She was evaluated as an outpatient by general surgery for elective repair and on 5/21/21 presented for her RA repair of paraesophageal hernia with partial  fundoplication. Intraoperatively, course complicated by esophageal perforation for which Dr. Alberto and Dr. Pinto were consulted urgently. EGD was preformed and tear were determined to be unamenable to primary repair. They proceeded with an uncomplicated transhiatal esophagectomy. Post operatively she was brought to the CTICU extubated. Trickle feeds were started via J-tube on POD2 and she was transferred to [facility]. POD3 patient tube feeds to goal, continued IV hydration, maintaining NPO. POD5 patient remained NPO on tube feeds.  NGT to intermittent suction. POD 6, s/p Barrium Esophagram, revealing no leak, NGT removed. POD7 S&S eval performed and patient cleared for sips of clear/full liquids. POD7 patient also went for left pigtail insertion for pleural effusion by IR. Minimal drainage from pleural effusion, chest CT performed and patient with loculated left pleural effusion. Lytic therapy initiated q12h for 6 rounds and completed last dose on 6/1 in the morning. On 6/1/21, IR repositioned left pigtail catheter. Today is 6/2/21 patient is planned for bronchoscopy.     Plan:    Neurovascular:   -Pain well controlled with current regimen. PRN's: IV tylenol as needed     Cardiovascular:   -HTN: holding home meds (amlodipine)  -Hemodynamically stable.   -Monitor: BP, HR, tele    Respiratory:   -Oxygenating well on room air  -Encourage continued use of IS 10x/hr and frequent ambulation  -CXR with evidence of right lower lobe atelectasis and patient is planned for awake bronchoscopy today with Thoracic Surgery team.    -Pigtail in place on left, s/p completion of tPA/dornase therapy    -IR repositioned left pigtail on 6/1/21.    GI:  -GI PPX: pantoprazole 40mg IV   -PO Diet: Clear liquids with tube feeds.   - NPO for bronchoscopy today.   -Bowel Regimen: none needed -- pt with intermittent diarrhea, continue to monitor    Renal / :  -Continue to monitor renal function: BUN/Cr 9/0.53  -Monitor I/O's daily     Endocrine:    -No hx of DM or thyroid dx    Hematologic:  -CBC: H/H- 10.4/32.7  -Coagulation Panel: WNL     ID:  -Temperature: afebrile   -CBC: WBC- 5  -Continue to observe for SIRS/Sepsis Syndrome.    Prophylaxis:  -DVT prophylaxis with 5000 SubQ Heparin q8h.  -Continue with SCD's b/l while patient is at rest     Disposition:  -Discharge home once patient is medically ready

## 2021-06-02 NOTE — PROGRESS NOTE ADULT - SUBJECTIVE AND OBJECTIVE BOX
SUBJECTIVE: Patient seen and examined at bedside. No complaints this AM. No pain, denies SOB, has been OOB, +bowel function. ROS negative.    heparin   Injectable 5000 Unit(s) SubCutaneous every 8 hours    MEDICATIONS  (PRN):  sodium chloride 0.65% Nasal 1 Spray(s) Both Nostrils three times a day PRN Nasal Congestion  tetracaine/benzocaine/butamben Spray 1 Spray(s) Topical every 4 hours PRN Sore Throat      I&O's Detail    01 Jun 2021 07:01  -  02 Jun 2021 07:00  --------------------------------------------------------  IN:    Enteral Tube Flush: 20 mL    IV PiggyBack: 100 mL    Jevity 1.5: 270 mL    Oral Fluid: 300 mL  Total IN: 690 mL    OUT:    Chest Tube (mL): 50 mL    Chest Tube (mL): 450 mL    Voided (mL): 700 mL  Total OUT: 1200 mL    Total NET: -510 mL      02 Jun 2021 07:01  -  02 Jun 2021 09:26  --------------------------------------------------------  IN:  Total IN: 0 mL    OUT:    Voided (mL): 400 mL  Total OUT: 400 mL    Total NET: -400 mL          T(C): 36.4 (06-02-21 @ 05:14), Max: 36.6 (06-01-21 @ 09:53)  HR: 78 (06-02-21 @ 04:34) (78 - 82)  BP: 125/60 (06-02-21 @ 04:34) (114/71 - 140/64)  RR: 12 (06-02-21 @ 04:34) (12 - 22)  SpO2: 93% (06-02-21 @ 04:34) (93% - 95%)    GENERAL: NAD, Resting comfortably in bed, awake, opens eyes spontaneously  HEENT: NCAT, MMM, neck incision c/d/i  RESP: Nonlabored breathing, No respiratory distress, CT to suction  CARD: Normal rate, Normal peripheral perfusion  GI: Soft, ND, NT, incisions c/d/i, No guarding, No rebound tenderness, J tube in place  EXTREM: WWP, No edema, No gross deformity of extremities  SKIN: No rashes, no lesions  NEURO: AAOx3, No focal motor or sensory deficits  PSYCH: Affect and characteristics of appearance, verbalizations, and behaviors are appropriate    LABS:                        10.4   5.47  )-----------( 376      ( 02 Jun 2021 06:14 )             32.7     06-02    135  |  99  |  9   ----------------------------<  101<H>  4.3   |  26  |  0.53    Ca    9.2      02 Jun 2021 06:14  Mg     2.3     06-02      PT/INR - ( 01 Jun 2021 06:05 )   PT: 12.2 sec;   INR: 1.02          PTT - ( 01 Jun 2021 06:05 )  PTT:28.3 sec      RADIOLOGY & ADDITIONAL STUDIES:      Culture - Body Fluid with Gram Stain (collected 05-28-21 @ 10:50)  Source: .Body Fluid Left Pleural Fluid  Gram Stain (05-28-21 @ 18:11):    No organisms seen    No WBC's seen.  Preliminary Report (05-29-21 @ 09:32):    No growth to date

## 2021-06-03 ENCOUNTER — TRANSCRIPTION ENCOUNTER (OUTPATIENT)
Age: 71
End: 2021-06-03

## 2021-06-03 VITALS — RESPIRATION RATE: 19 BRPM | OXYGEN SATURATION: 100 % | HEART RATE: 82 BPM

## 2021-06-03 LAB
ANION GAP SERPL CALC-SCNC: 6 MMOL/L — SIGNIFICANT CHANGE UP (ref 5–17)
BUN SERPL-MCNC: 8 MG/DL — SIGNIFICANT CHANGE UP (ref 7–23)
CALCIUM SERPL-MCNC: 9.7 MG/DL — SIGNIFICANT CHANGE UP (ref 8.4–10.5)
CHLORIDE SERPL-SCNC: 100 MMOL/L — SIGNIFICANT CHANGE UP (ref 96–108)
CO2 SERPL-SCNC: 31 MMOL/L — SIGNIFICANT CHANGE UP (ref 22–31)
CREAT SERPL-MCNC: 0.5 MG/DL — SIGNIFICANT CHANGE UP (ref 0.5–1.3)
GLUCOSE SERPL-MCNC: 130 MG/DL — HIGH (ref 70–99)
HCT VFR BLD CALC: 36.4 % — SIGNIFICANT CHANGE UP (ref 34.5–45)
HGB BLD-MCNC: 11.3 G/DL — LOW (ref 11.5–15.5)
MAGNESIUM SERPL-MCNC: 2.4 MG/DL — SIGNIFICANT CHANGE UP (ref 1.6–2.6)
MCHC RBC-ENTMCNC: 29 PG — SIGNIFICANT CHANGE UP (ref 27–34)
MCHC RBC-ENTMCNC: 31 GM/DL — LOW (ref 32–36)
MCV RBC AUTO: 93.3 FL — SIGNIFICANT CHANGE UP (ref 80–100)
NRBC # BLD: 0 /100 WBCS — SIGNIFICANT CHANGE UP (ref 0–0)
PLATELET # BLD AUTO: 423 K/UL — HIGH (ref 150–400)
POTASSIUM SERPL-MCNC: 4.3 MMOL/L — SIGNIFICANT CHANGE UP (ref 3.5–5.3)
POTASSIUM SERPL-SCNC: 4.3 MMOL/L — SIGNIFICANT CHANGE UP (ref 3.5–5.3)
RBC # BLD: 3.9 M/UL — SIGNIFICANT CHANGE UP (ref 3.8–5.2)
RBC # FLD: 14.6 % — HIGH (ref 10.3–14.5)
SODIUM SERPL-SCNC: 137 MMOL/L — SIGNIFICANT CHANGE UP (ref 135–145)
WBC # BLD: 4.2 K/UL — SIGNIFICANT CHANGE UP (ref 3.8–10.5)
WBC # FLD AUTO: 4.2 K/UL — SIGNIFICANT CHANGE UP (ref 3.8–10.5)

## 2021-06-03 PROCEDURE — 71045 X-RAY EXAM CHEST 1 VIEW: CPT | Mod: 26,77

## 2021-06-03 PROCEDURE — 85025 COMPLETE CBC W/AUTO DIFF WBC: CPT

## 2021-06-03 PROCEDURE — U0005: CPT

## 2021-06-03 PROCEDURE — 94640 AIRWAY INHALATION TREATMENT: CPT

## 2021-06-03 PROCEDURE — 82962 GLUCOSE BLOOD TEST: CPT

## 2021-06-03 PROCEDURE — 71250 CT THORAX DX C-: CPT

## 2021-06-03 PROCEDURE — 92526 ORAL FUNCTION THERAPY: CPT

## 2021-06-03 PROCEDURE — 84132 ASSAY OF SERUM POTASSIUM: CPT

## 2021-06-03 PROCEDURE — 85018 HEMOGLOBIN: CPT

## 2021-06-03 PROCEDURE — 82330 ASSAY OF CALCIUM: CPT

## 2021-06-03 PROCEDURE — 87070 CULTURE OTHR SPECIMN AEROBIC: CPT

## 2021-06-03 PROCEDURE — S2900: CPT

## 2021-06-03 PROCEDURE — C9399: CPT

## 2021-06-03 PROCEDURE — 86901 BLOOD TYPING SEROLOGIC RH(D): CPT

## 2021-06-03 PROCEDURE — 97162 PT EVAL MOD COMPLEX 30 MIN: CPT

## 2021-06-03 PROCEDURE — 71045 X-RAY EXAM CHEST 1 VIEW: CPT

## 2021-06-03 PROCEDURE — 85610 PROTHROMBIN TIME: CPT

## 2021-06-03 PROCEDURE — 88309 TISSUE EXAM BY PATHOLOGIST: CPT

## 2021-06-03 PROCEDURE — 87075 CULTR BACTERIA EXCEPT BLOOD: CPT

## 2021-06-03 PROCEDURE — C1729: CPT

## 2021-06-03 PROCEDURE — 32557 INSERT CATH PLEURA W/ IMAGE: CPT

## 2021-06-03 PROCEDURE — 74018 RADEX ABDOMEN 1 VIEW: CPT

## 2021-06-03 PROCEDURE — 87205 SMEAR GRAM STAIN: CPT

## 2021-06-03 PROCEDURE — 85027 COMPLETE CBC AUTOMATED: CPT

## 2021-06-03 PROCEDURE — 97116 GAIT TRAINING THERAPY: CPT

## 2021-06-03 PROCEDURE — 99153 MOD SED SAME PHYS/QHP EA: CPT

## 2021-06-03 PROCEDURE — C1769: CPT

## 2021-06-03 PROCEDURE — P9045: CPT

## 2021-06-03 PROCEDURE — 80053 COMPREHEN METABOLIC PANEL: CPT

## 2021-06-03 PROCEDURE — 84100 ASSAY OF PHOSPHORUS: CPT

## 2021-06-03 PROCEDURE — 84295 ASSAY OF SERUM SODIUM: CPT

## 2021-06-03 PROCEDURE — 71045 X-RAY EXAM CHEST 1 VIEW: CPT | Mod: 26

## 2021-06-03 PROCEDURE — 82803 BLOOD GASES ANY COMBINATION: CPT

## 2021-06-03 PROCEDURE — 86850 RBC ANTIBODY SCREEN: CPT

## 2021-06-03 PROCEDURE — 99152 MOD SED SAME PHYS/QHP 5/>YRS: CPT

## 2021-06-03 PROCEDURE — 86900 BLOOD TYPING SEROLOGIC ABO: CPT

## 2021-06-03 PROCEDURE — C1889: CPT

## 2021-06-03 PROCEDURE — 85730 THROMBOPLASTIN TIME PARTIAL: CPT

## 2021-06-03 PROCEDURE — 86923 COMPATIBILITY TEST ELECTRIC: CPT

## 2021-06-03 PROCEDURE — 36415 COLL VENOUS BLD VENIPUNCTURE: CPT

## 2021-06-03 PROCEDURE — 74220 X-RAY XM ESOPHAGUS 1CNTRST: CPT

## 2021-06-03 PROCEDURE — U0003: CPT

## 2021-06-03 PROCEDURE — 80048 BASIC METABOLIC PNL TOTAL CA: CPT

## 2021-06-03 PROCEDURE — 83735 ASSAY OF MAGNESIUM: CPT

## 2021-06-03 RX ADMIN — Medication 1000 MILLIGRAM(S): at 00:45

## 2021-06-03 RX ADMIN — HEPARIN SODIUM 5000 UNIT(S): 5000 INJECTION INTRAVENOUS; SUBCUTANEOUS at 06:29

## 2021-06-03 RX ADMIN — SODIUM CHLORIDE 3 MILLILITER(S): 9 INJECTION INTRAMUSCULAR; INTRAVENOUS; SUBCUTANEOUS at 06:29

## 2021-06-03 NOTE — PROGRESS NOTE ADULT - SUBJECTIVE AND OBJECTIVE BOX
SUBJECTIVE: Patient seen and examined at bedside. Pt feels well overall but still c/o irritation around CT site. Has been OOB. + bowel function, tolerating diet. Denies dyspnea. No specific complaints.     heparin   Injectable 5000 Unit(s) SubCutaneous every 8 hours    MEDICATIONS  (PRN):  sodium chloride 0.65% Nasal 1 Spray(s) Both Nostrils three times a day PRN Nasal Congestion  tetracaine/benzocaine/butamben Spray 1 Spray(s) Topical every 4 hours PRN Sore Throat      I&O's Detail    01 Jun 2021 07:01  -  02 Jun 2021 07:00  --------------------------------------------------------  IN:    Enteral Tube Flush: 20 mL    IV PiggyBack: 100 mL    Jevity 1.5: 270 mL    Oral Fluid: 300 mL  Total IN: 690 mL    OUT:    Chest Tube (mL): 50 mL    Chest Tube (mL): 450 mL    Voided (mL): 700 mL  Total OUT: 1200 mL    Total NET: -510 mL      02 Jun 2021 07:01  -  03 Jun 2021 06:47  --------------------------------------------------------  IN:    Jevity 1.5: 540 mL  Total IN: 540 mL    OUT:    Voided (mL): 1900 mL  Total OUT: 1900 mL    Total NET: -1360 mL          T(C): 36.5 (06-03-21 @ 04:54), Max: 37.3 (06-02-21 @ 09:00)  HR: 80 (06-03-21 @ 05:41) (62 - 88)  BP: 140/67 (06-03-21 @ 04:29) (124/70 - 146/67)  RR: 22 (06-03-21 @ 05:41) (16 - 29)  SpO2: 94% (06-03-21 @ 05:41) (90% - 98%)    GENERAL: NAD, Resting comfortably in chair  HEENT: NCAT, MMM, neck incision c/d/i  RESP: Nonlabored breathing, No respiratory distress, CT to suction  CARD: Normal rate, Normal peripheral perfusion  GI: Soft, ND, NT, incisions c/d/i, No guarding, No rebound tenderness, J tube in place  EXTREM: WWP, No edema, No gross deformity of extremities  SKIN: No rashes, no lesions  NEURO: AAOx3, No focal motor or sensory deficits  PSYCH: Affect and characteristics of appearance, verbalizations, and behaviors are appropriate    LABS:                        10.4   5.47  )-----------( 376      ( 02 Jun 2021 06:14 )             32.7     06-02    135  |  99  |  9   ----------------------------<  101<H>  4.3   |  26  |  0.53    Ca    9.2      02 Jun 2021 06:14  Mg     2.3     06-02

## 2021-06-03 NOTE — PROGRESS NOTE ADULT - ASSESSMENT
70 y/o female with PMH HTN, HLD (myalgias with statins), and paraesophageal hernia resulting in mild GERD and esophageal dysmotility. She was evaluated as an outpatient by general surgery for elective repair and on 5/21 presented for her RA repair of paraesophageal hernia with partial  fundoplication. Intraop course complicated by esophageal perforation for which Dr. Alberto and Dr. Pinto were consulted for urgently. EGD was preformed and tear was not amenable to primary repair. They proceeded with an uncomplicated transhiatal esophagectomy. Recovering well postoperatively. Noted to have RLL atelectasis s/p bronch 6/2 which was wnl.     - Care per CTS, Gen Surg following   - Discharge planning in progress. Please have pt follow up in office with Dr. Sheehan.

## 2021-06-03 NOTE — PROGRESS NOTE ADULT - PROVIDER SPECIALTY LIST ADULT
Critical Care
Surgery
Thoracic Surgery
Critical Care
Intervent Radiology
Surgery
Thoracic Surgery
Critical Care
Critical Care
Surgery
Thoracic Surgery
Thoracic Surgery
Critical Care
Surgery
Surgery
Thoracic Surgery
Thoracic Surgery

## 2021-06-03 NOTE — PROGRESS NOTE ADULT - REASON FOR ADMISSION
Paraesophageal hernia repair

## 2021-06-03 NOTE — DISCHARGE NOTE NURSING/CASE MANAGEMENT/SOCIAL WORK - PATIENT PORTAL LINK FT
You can access the FollowMyHealth Patient Portal offered by [facility] by registering at the following website: [email or web address]. By joining [facility]’s FollowMyHealth portal, you will also be able to view your health information using other applications (apps) compatible with our system.

## 2021-06-03 NOTE — DISCHARGE NOTE NURSING/CASE MANAGEMENT/SOCIAL WORK - NSDCFUADDAPPT_GEN_ALL_CORE_FT
-Please follow up with Dr. Alberto on 6/11/21 at 10am. The office is located at [facility], [facility], 4th floor. Call us with any questions  #[phone].    -Please follow up with Dr. Patel from ENT in two weeks. You will need to call the office to make an appointment.    -Please follow up with Dr. Sheehan from General Surgery in two weeks. You will need to call the office to make an appointment.

## 2021-06-03 NOTE — PROGRESS NOTE ADULT - SUBJECTIVE AND OBJECTIVE BOX
Patient discussed on morning rounds with Dr. Pinto     Operation / Date: Paraesophageal hernia repair 5/21/21    Surgeon: Dr. Alberto    Referring Physician: Dr. Sheehan    SUBJECTIVE ASSESSMENT AND HOSPITAL COURSE:  This is a 72 y/o female with PMHx of HTN, HLD (myalgias with statins), and paraesophageal hernia resulting in mild GERD and esophageal dysmotility. She was evaluated as an outpatient by general surgery for elective repair and on 5/21/21 presented for her RA repair of paraesophageal hernia with partial  fundoplication. Intraop course complicated by esophageal perforation for which Dr. Alberto and Dr. Pinto were consulted urgently. EGD was performed and tear was not amenable to primary repair. They proceeded with an uncomplicated transhiatal esophagectomy. Post operatively she was brought to the CTICU extubated. Trickle feeds were started via J-tube on POD2 and she was transferred to stepdown unit. POD3 patient tube feeds to goal, continuing IV hydration, maintaining NPO. POD5 patient remained NPO on tube feeds. NGT to intermittent suction. POD 6, s/p Barium Esophagram, no leak, and NGT was removed. POD7 speech and swallow evaluation performed and patient cleared for sips of clears/full liquids. POD7 patient also underwent IR procedure of left pigtail placement for pleural effusion. She was started on clear liquids, tolerated. POD8 minimal drainage noted from pigtail catheter, and CT Chest performed, revealing loculated left pleural effusion. Lytic therapy initiated q12h for 6 rounds and completed last dose on 6/1 in the morning. On 6/1/21, IR repositioned left pigtail catheter.  6/2/21 patient underwent awake bronchoscopy at bedside for collapsed right lower lobe, put on high flow nasal cannula overnight. CXR with improvement 6/3/21. Left pigtail removed 6/3/21, post pull CXR stable. Patient weaned off supplemental oxygen 6/3/21 while maintaining oxygen saturations above 96%.     Over 35 minutes was spent with the patient reviewing the discharge material including medications, follow up appointments, recovery, concerning symptoms, and how to contact their health care providers if they have questions      Vital Signs Last 24 Hrs  T(C): 36.7 (03 Jun 2021 09:15), Max: 37.3 (02 Jun 2021 14:05)  T(F): 98 (03 Jun 2021 09:15), Max: 99.2 (02 Jun 2021 14:05)  HR: 82 (03 Jun 2021 09:20) (62 - 88)  BP: 148/74 (03 Jun 2021 09:00) (124/70 - 148/74)  BP(mean): 104 (03 Jun 2021 09:00) (88 - 104)  RR: 19 (03 Jun 2021 09:20) (16 - 29)  SpO2: 100% (03 Jun 2021 09:20) (94% - 100%)    EPICARDIAL WIRES REMOVED: Yes.  TIE DOWNS REMOVED: Yes.    PHYSICAL EXAM:    General: Patient OOBTC, no acute distress   Neurological: Alert and oriented. No focal neurological deficits   Cardiovascular: S1S2, RRR, no murmurs appreciated on exam   Respiratory: Diminished at bilateral bases otherwise CTA, no wheezes, rales or rhonchi  Gastrointestinal: Abdomen soft, non tender, non distended   Extremities: Warm and well perfused. Trace peripheral edema b/l, no calf tenderness bilaterally  Vascular: Peripheral pulses palpable bilaterally  Incision Sites: Superior mediastinoscopy site appears c/d/i with steri strips, no surrounding signs of infection, no drainage. J-Tube site is intact. L pigtail site intact without evidence of kinks in the tube. VATS sites c/d/i.    LABS:                        11.3   4.20  )-----------( 423      ( 03 Jun 2021 08:25 )             36.4       COUMADIN: No.           06-03    137  |  100  |  8   ----------------------------<  130<H>  4.3   |  31  |  0.50    Ca    9.7      03 Jun 2021 08:25  Mg     2.4     06-03      Discharge CXR:  < from: Xray Chest 1 View- PORTABLE-Urgent (Xray Chest 1 View- PORTABLE-Urgent .) (06.02.21 @ 09:57) >    INTERPRETATION:  Chest, single portable view    HISTORY: Hiatal hernia repair, status post bronchoscopy    Comparison: 6/2    IMPRESSION:    Support Devices:  Unchanged  Heart:  Unremarkable  Mediastinum:  Unremarkable  Lungs/Airways: Small left effusion and bibasilar atelectasis. No pneumothorax.  Bones/Soft tissues: Unremarkable    < end of copied text >       Patient discussed on morning rounds with Dr. Pinto     Operation / Date: Paraesophageal hernia repair 5/21/21    Surgeon: Dr. Alberto    Referring Physician: Dr. Sheehan    SUBJECTIVE ASSESSMENT AND HOSPITAL COURSE:  This is a 72 y/o female with PMHx of HTN, HLD (myalgias with statins), and paraesophageal hernia resulting in mild GERD and esophageal dysmotility. She was evaluated as an outpatient by general surgery for elective repair and on 5/21/21 presented for her RA repair of paraesophageal hernia with partial  fundoplication. Intraop course complicated by esophageal perforation for which Dr. Alberto and Dr. Pinto were consulted urgently. EGD was performed and tear was not amenable to primary repair. They proceeded with an uncomplicated transhiatal esophagectomy. Post operatively she was brought to the CTICU extubated. Trickle feeds were started via J-tube on POD2 and she was transferred to stepdown unit. POD3 patient tube feeds to goal, continuing IV hydration, maintaining NPO. POD5 patient remained NPO on tube feeds. NGT to intermittent suction. POD 6, s/p Barium Esophagram, no leak, and NGT was removed. POD7 speech and swallow evaluation performed and patient cleared for sips of clears/full liquids. POD7 patient also underwent IR procedure of left pigtail placement for pleural effusion. She was started on clear liquids, tolerated. POD8 minimal drainage noted from pigtail catheter, and CT Chest performed, revealing loculated left pleural effusion. Lytic therapy initiated q12h for 6 rounds and completed last dose on 6/1 in the morning. On 6/1/21, IR repositioned left pigtail catheter.  6/2/21 patient underwent awake bronchoscopy at bedside for collapsed right lower lobe, put on high flow nasal cannula overnight. CXR with improvement 6/3/21. Left pigtail removed 6/3/21, post pull CXR stable. Patient weaned off supplemental oxygen 6/3/21 while maintaining oxygen saturations above 96%.  Patient setup for home tube feeds and supplemental clear liquid diet on discharge. Patient tolerating po clears, moving bowels, and ambulating without need of supplemental oxygen. Patient medically s table for discharge today.    Over 35 minutes was spent with the patient reviewing the discharge material including medications, follow up appointments, recovery, concerning symptoms, and how to contact their health care providers if they have questions      Vital Signs Last 24 Hrs  T(C): 36.7 (03 Jun 2021 09:15), Max: 37.3 (02 Jun 2021 14:05)  T(F): 98 (03 Jun 2021 09:15), Max: 99.2 (02 Jun 2021 14:05)  HR: 82 (03 Jun 2021 09:20) (62 - 88)  BP: 148/74 (03 Jun 2021 09:00) (124/70 - 148/74)  BP(mean): 104 (03 Jun 2021 09:00) (88 - 104)  RR: 19 (03 Jun 2021 09:20) (16 - 29)  SpO2: 100% (03 Jun 2021 09:20) (94% - 100%)    EPICARDIAL WIRES REMOVED: Yes.  TIE DOWNS REMOVED: Yes.    PHYSICAL EXAM:    General: Patient OOBTC, no acute distress   Neurological: Alert and oriented. No focal neurological deficits   Cardiovascular: S1S2, RRR, no murmurs appreciated on exam   Respiratory: Diminished at bilateral bases otherwise CTA, no wheezes, rales or rhonchi  Gastrointestinal: Abdomen soft, non tender, non distended   Extremities: Warm and well perfused. Trace peripheral edema b/l, no calf tenderness bilaterally  Vascular: Peripheral pulses palpable bilaterally  Incision Sites: Superior mediastinoscopy site appears c/d/i with steri strips, no surrounding signs of infection, no drainage. J-Tube site is intact. L pigtail site intact without evidence of kinks in the tube. VATS sites c/d/i.    LABS:                        11.3   4.20  )-----------( 423      ( 03 Jun 2021 08:25 )             36.4     COUMADIN: No.           06-03    137  |  100  |  8   ----------------------------<  130<H>  4.3   |  31  |  0.50    Ca    9.7      03 Jun 2021 08:25  Mg     2.4     06-03      Discharge CXR:  < from: Xray Chest 1 View- PORTABLE-Urgent (Xray Chest 1 View- PORTABLE-Urgent .) (06.02.21 @ 09:57) >    INTERPRETATION:  Chest, single portable view    HISTORY: Hiatal hernia repair, status post bronchoscopy    Comparison: 6/2    IMPRESSION:    Support Devices:  Unchanged  Heart:  Unremarkable  Mediastinum:  Unremarkable  Lungs/Airways: Small left effusion and bibasilar atelectasis. No pneumothorax.  Bones/Soft tissues: Unremarkable    < end of copied text >

## 2021-06-03 NOTE — PROGRESS NOTE ADULT - NSICDXPILOT_GEN_ALL_CORE
[place]

## 2021-06-03 NOTE — PROGRESS NOTE ADULT - ASSESSMENT
Additional Instructions	-Walk daily as tolerated and use your incentive spirometer every hour.    -No driving or strenuous activity/exercise for 6 weeks, or until  cleared by your surgeon.    -Gently clean your incisions with anti-bacterial soap and water, pat  dry.  You may leave them open to air.    -Call your doctor if you have shortness of breath, chest pain not  relieved by pain medication, dizziness, fever >101.5, or increased  redness or drainage from incisions.      Care Providers for Follow up (PCP/Outpatient Provider)	Musa Alberto)  Surgery; Thoracic Surgery  [facility] [place] [address]  Phone: [phone]  Fax: [phone]  Scheduled Appointment: 06/11/2021 10:00 AM    Mason Patel)  Otolaryngology  [address], [place] [address]  Phone: [phone]  Fax: [phone]  Follow Up Time: 2 weeks    Heidi Sheehan)  Surgery  [address], [place] [address]  Phone: [phone]  Fax: [phone]  Follow Up Time: 2 weeks    Patient's Scheduled Appointments	GE PERRY ; 06/07/2021 ; NPP OtoLaryng [address] [place] [address]  Additional Scheduled Appointments	-Please follow up with Dr. Alberto on 6/11/21 at 10am. The office is located at [facility], 4th floor. Call us with any questions  #[phone].    -Please follow up with Dr. Patel from ENT in two weeks. You will need to call the office to make an appointment.    -Please follow up with Dr. Sheehan from General Surgery in two weeks. You will need to call the office to make an appointment.

## 2021-06-07 ENCOUNTER — INPATIENT (INPATIENT)
Facility: HOSPITAL | Age: 71
LOS: 2 days | Discharge: HOME | DRG: 395 | End: 2021-06-10
Attending: SPECIALIST | Admitting: SPECIALIST
Payer: MEDICARE

## 2021-06-07 VITALS
HEIGHT: 59 IN | TEMPERATURE: 98 F | WEIGHT: 160.06 LBS | RESPIRATION RATE: 18 BRPM | HEART RATE: 89 BPM | DIASTOLIC BLOOD PRESSURE: 78 MMHG | SYSTOLIC BLOOD PRESSURE: 114 MMHG | OXYGEN SATURATION: 98 %

## 2021-06-07 DIAGNOSIS — Z98.890 OTHER SPECIFIED POSTPROCEDURAL STATES: Chronic | ICD-10-CM

## 2021-06-07 LAB
ALBUMIN SERPL ELPH-MCNC: 3.5 G/DL — SIGNIFICANT CHANGE UP (ref 3.3–5)
ALP SERPL-CCNC: 108 U/L — SIGNIFICANT CHANGE UP (ref 40–120)
ALT FLD-CCNC: 40 U/L — SIGNIFICANT CHANGE UP (ref 10–45)
ANION GAP SERPL CALC-SCNC: 11 MMOL/L — SIGNIFICANT CHANGE UP (ref 5–17)
APPEARANCE UR: CLEAR — SIGNIFICANT CHANGE UP
AST SERPL-CCNC: 24 U/L — SIGNIFICANT CHANGE UP (ref 10–40)
BASOPHILS # BLD AUTO: 0.03 K/UL — SIGNIFICANT CHANGE UP (ref 0–0.2)
BASOPHILS NFR BLD AUTO: 0.7 % — SIGNIFICANT CHANGE UP (ref 0–2)
BILIRUB SERPL-MCNC: 0.3 MG/DL — SIGNIFICANT CHANGE UP (ref 0.2–1.2)
BILIRUB UR-MCNC: NEGATIVE — SIGNIFICANT CHANGE UP
BLD GP AB SCN SERPL QL: NEGATIVE — SIGNIFICANT CHANGE UP
BUN SERPL-MCNC: 8 MG/DL — SIGNIFICANT CHANGE UP (ref 7–23)
CALCIUM SERPL-MCNC: 10 MG/DL — SIGNIFICANT CHANGE UP (ref 8.4–10.5)
CHLORIDE SERPL-SCNC: 97 MMOL/L — SIGNIFICANT CHANGE UP (ref 96–108)
CO2 SERPL-SCNC: 27 MMOL/L — SIGNIFICANT CHANGE UP (ref 22–31)
COLOR SPEC: YELLOW — SIGNIFICANT CHANGE UP
CREAT SERPL-MCNC: 0.48 MG/DL — LOW (ref 0.5–1.3)
DIFF PNL FLD: NEGATIVE — SIGNIFICANT CHANGE UP
EOSINOPHIL # BLD AUTO: 0.09 K/UL — SIGNIFICANT CHANGE UP (ref 0–0.5)
EOSINOPHIL NFR BLD AUTO: 2.1 % — SIGNIFICANT CHANGE UP (ref 0–6)
GLUCOSE SERPL-MCNC: 112 MG/DL — HIGH (ref 70–99)
GLUCOSE UR QL: NEGATIVE — SIGNIFICANT CHANGE UP
HCT VFR BLD CALC: 34.2 % — LOW (ref 34.5–45)
HGB BLD-MCNC: 10.8 G/DL — LOW (ref 11.5–15.5)
IMM GRANULOCYTES NFR BLD AUTO: 0.7 % — SIGNIFICANT CHANGE UP (ref 0–1.5)
KETONES UR-MCNC: NEGATIVE — SIGNIFICANT CHANGE UP
LEUKOCYTE ESTERASE UR-ACNC: NEGATIVE — SIGNIFICANT CHANGE UP
LYMPHOCYTES # BLD AUTO: 0.93 K/UL — LOW (ref 1–3.3)
LYMPHOCYTES # BLD AUTO: 22.1 % — SIGNIFICANT CHANGE UP (ref 13–44)
MCHC RBC-ENTMCNC: 29.2 PG — SIGNIFICANT CHANGE UP (ref 27–34)
MCHC RBC-ENTMCNC: 31.6 GM/DL — LOW (ref 32–36)
MCV RBC AUTO: 92.4 FL — SIGNIFICANT CHANGE UP (ref 80–100)
MONOCYTES # BLD AUTO: 0.49 K/UL — SIGNIFICANT CHANGE UP (ref 0–0.9)
MONOCYTES NFR BLD AUTO: 11.6 % — SIGNIFICANT CHANGE UP (ref 2–14)
NEUTROPHILS # BLD AUTO: 2.64 K/UL — SIGNIFICANT CHANGE UP (ref 1.8–7.4)
NEUTROPHILS NFR BLD AUTO: 62.8 % — SIGNIFICANT CHANGE UP (ref 43–77)
NITRITE UR-MCNC: NEGATIVE — SIGNIFICANT CHANGE UP
NRBC # BLD: 0 /100 WBCS — SIGNIFICANT CHANGE UP (ref 0–0)
PH UR: 7 — SIGNIFICANT CHANGE UP (ref 5–8)
PLATELET # BLD AUTO: 495 K/UL — HIGH (ref 150–400)
POTASSIUM SERPL-MCNC: 4.8 MMOL/L — SIGNIFICANT CHANGE UP (ref 3.5–5.3)
POTASSIUM SERPL-SCNC: 4.8 MMOL/L — SIGNIFICANT CHANGE UP (ref 3.5–5.3)
PROT SERPL-MCNC: 6.5 G/DL — SIGNIFICANT CHANGE UP (ref 6–8.3)
PROT UR-MCNC: NEGATIVE MG/DL — SIGNIFICANT CHANGE UP
RBC # BLD: 3.7 M/UL — LOW (ref 3.8–5.2)
RBC # FLD: 14.6 % — HIGH (ref 10.3–14.5)
RH IG SCN BLD-IMP: POSITIVE — SIGNIFICANT CHANGE UP
SARS-COV-2 RNA SPEC QL NAA+PROBE: NEGATIVE — SIGNIFICANT CHANGE UP
SODIUM SERPL-SCNC: 135 MMOL/L — SIGNIFICANT CHANGE UP (ref 135–145)
SP GR SPEC: 1.01 — SIGNIFICANT CHANGE UP (ref 1–1.03)
UROBILINOGEN FLD QL: 0.2 E.U./DL — SIGNIFICANT CHANGE UP
WBC # BLD: 4.21 K/UL — SIGNIFICANT CHANGE UP (ref 3.8–10.5)
WBC # FLD AUTO: 4.21 K/UL — SIGNIFICANT CHANGE UP (ref 3.8–10.5)

## 2021-06-07 PROCEDURE — 74018 RADEX ABDOMEN 1 VIEW: CPT | Mod: 26,59

## 2021-06-07 PROCEDURE — 99285 EMERGENCY DEPT VISIT HI MDM: CPT

## 2021-06-07 PROCEDURE — 99282 EMERGENCY DEPT VISIT SF MDM: CPT

## 2021-06-07 PROCEDURE — 71045 X-RAY EXAM CHEST 1 VIEW: CPT | Mod: 26

## 2021-06-07 PROCEDURE — 49451 REPLACE DUOD/JEJ TUBE PERC: CPT

## 2021-06-07 PROCEDURE — 74019 RADEX ABDOMEN 2 VIEWS: CPT | Mod: 26

## 2021-06-07 RX ORDER — HEPARIN SODIUM 5000 [USP'U]/ML
5000 INJECTION INTRAVENOUS; SUBCUTANEOUS EVERY 8 HOURS
Refills: 0 | Status: DISCONTINUED | OUTPATIENT
Start: 2021-06-07 | End: 2021-06-10

## 2021-06-07 RX ORDER — PANTOPRAZOLE SODIUM 20 MG/1
40 TABLET, DELAYED RELEASE ORAL DAILY
Refills: 0 | Status: DISCONTINUED | OUTPATIENT
Start: 2021-06-07 | End: 2021-06-10

## 2021-06-07 RX ORDER — SODIUM CHLORIDE 9 MG/ML
1000 INJECTION, SOLUTION INTRAVENOUS
Refills: 0 | Status: DISCONTINUED | OUTPATIENT
Start: 2021-06-07 | End: 2021-06-08

## 2021-06-07 RX ORDER — ACETAMINOPHEN 500 MG
650 TABLET ORAL EVERY 6 HOURS
Refills: 0 | Status: DISCONTINUED | OUTPATIENT
Start: 2021-06-07 | End: 2021-06-10

## 2021-06-07 RX ORDER — SODIUM CHLORIDE 9 MG/ML
3 INJECTION INTRAMUSCULAR; INTRAVENOUS; SUBCUTANEOUS EVERY 8 HOURS
Refills: 0 | Status: DISCONTINUED | OUTPATIENT
Start: 2021-06-07 | End: 2021-06-10

## 2021-06-07 RX ADMIN — SODIUM CHLORIDE 3 MILLILITER(S): 9 INJECTION INTRAMUSCULAR; INTRAVENOUS; SUBCUTANEOUS at 21:55

## 2021-06-07 RX ADMIN — SODIUM CHLORIDE 3 MILLILITER(S): 9 INJECTION INTRAMUSCULAR; INTRAVENOUS; SUBCUTANEOUS at 06:21

## 2021-06-07 RX ADMIN — Medication 650 MILLIGRAM(S): at 19:19

## 2021-06-07 RX ADMIN — PANTOPRAZOLE SODIUM 40 MILLIGRAM(S): 20 TABLET, DELAYED RELEASE ORAL at 11:45

## 2021-06-07 RX ADMIN — HEPARIN SODIUM 5000 UNIT(S): 5000 INJECTION INTRAVENOUS; SUBCUTANEOUS at 21:54

## 2021-06-07 RX ADMIN — HEPARIN SODIUM 5000 UNIT(S): 5000 INJECTION INTRAVENOUS; SUBCUTANEOUS at 06:25

## 2021-06-07 RX ADMIN — SODIUM CHLORIDE 50 MILLILITER(S): 9 INJECTION, SOLUTION INTRAVENOUS at 11:46

## 2021-06-07 RX ADMIN — Medication 650 MILLIGRAM(S): at 19:52

## 2021-06-07 NOTE — H&P ADULT - NSHPPHYSICALEXAM_GEN_ALL_CORE
Appearance: Normal	  HEENT:   Normal oral mucosa, PERRL, EOMI	  Neck: Supple, - JVD; Carotid Bruit   Cardiovascular: Normal S1 S2. No JVD. No murmurs.  Respiratory: Lungs clear to auscultation B/L. No Rales, Rhonchi, Wheezing.	  Gastrointestinal:  Soft, non-tender, + BS. Clogged J Tube. 	  Skin: No rashes. No ecchymoses. No cyanosis.  Extremities: Normal range of motion, no clubbing, cyanosis or edema.  Vascular: Peripheral pulses palpable 2+ bilaterally.  Neurologic: Non-focal.  Psychiatry: A & O x 3, mood & affect appropriate. Appearance: Normal	  HEENT:   Normal oral mucosa, PERRL, EOMI	  Neck: Supple, - JVD; Carotid Bruit   Cardiovascular: Normal S1 S2. No JVD. No murmurs.  Respiratory: Lungs clear to auscultation B/L. No Rales, Rhonchi, Wheezing.	  Gastrointestinal:  Soft, non-tender, + BS. Clogged J Tube. 	  Skin: No rashes. No ecchymoses. No cyanosis.  Extremities: Normal range of motion, no clubbing, cyanosis or edema.  Vascular: Peripheral pulses palpable 2+ bilaterally.  Neurologic: Non-focal.  Psychiatry: A & O x 3, mood & affect appropriate.    Vital Signs  T(C): 36.7 (07 Jun 2021 00:57), Max: 36.7 (07 Jun 2021 00:57)  T(F): 98 (07 Jun 2021 00:57), Max: 98 (07 Jun 2021 00:57)  HR: 89 (07 Jun 2021 00:57) (89 - 89)  BP: 114/78 (07 Jun 2021 00:57) (114/78 - 114/78)  RR: 18 (07 Jun 2021 00:57) (18 - 18)  SpO2: 98% (07 Jun 2021 00:57) (98% - 98%)

## 2021-06-07 NOTE — ED ADULT TRIAGE NOTE - OTHER COMPLAINTS
hx of esophageal hernia repair, recent d/c home with tube feed, per daughter tubefeed flushes but leaks when attached to pump

## 2021-06-07 NOTE — CONSULT NOTE ADULT - SUBJECTIVE AND OBJECTIVE BOX
71 year female with PMHx of HTN, HLD (myalgias with statins), and paraesophageal hernia resulting in mild GERD and esophageal dysmotility. She was evaluated as an outpatient by general surgery for elective repair and on 05/21/2021 presented for her RA repair of paraesophageal hernia with partial fundoplication. Intraoperative course complicated by esophageal perforation for which Dr. Alberto and Dr. Pinto were consulted urgently. EGD was performed and tear was not amenable to primary repair. They proceeded with an uncomplicated transhiatal esophagectomy. Trickle feeds were started via J-tube (red rubber placed 5/21/21) on POD2 and she was transferred to stepdown unit. On 06/07/2021 patient returned to [facility] ER with clogged JTube. Patient admitted for J Tube replacement / other. IR consulted for J tube exchange.      Clinical History: FEEDING TUBE OBSTRUCTION    No pertinent family history in first degree relatives    Handoff    MEWS Score    HTN (hypertension)    Hyperlipidemia    Gastric reflux    Diaphragmatic hernia without obstruction or gangrene    Constipation    Feeding tube obstruction    No significant past surgical history    H/O colonoscopy    History of coronary angiogram    FEED TUBE MALFUNCTION    3    [email or web address]        Meds:heparin   Injectable 5000 Unit(s) SubCutaneous every 8 hours  lactated ringers. 1000 milliLiter(s) IV Continuous <Continuous>  pantoprazole   Suspension 40 milliGRAM(s) Oral daily  sodium chloride 0.9% lock flush 3 milliLiter(s) IV Push every 8 hours      Allergies:Nuts (Hives)  penicillin (Hives)  pollen....sneeze, dry nose; itching (Other)  statins (Muscle Pain)        Labs:                           10.8   4.21  )-----------( 495      ( 07 Jun 2021 05:44 )             34.2       06-07    135  |  97  |  8   ----------------------------<  112<H>  4.8   |  27  |  0.48<L>    Ca    10.0      07 Jun 2021 05:44    TPro  6.5  /  Alb  3.5  /  TBili  0.3  /  DBili  x   /  AST  24  /  ALT  40  /  AlkPhos  108  06-07          Imaging Findings: reviewed

## 2021-06-07 NOTE — H&P ADULT - NSHPREVIEWOFSYSTEMS_GEN_ALL_CORE
CONSTITUTIONAL: No fevers / chills, sweats, fatigue, weight loss, weight gain  NEUROLOGICAL: No parathesias, seizures, syncope, confusion  EYES: No blurry vision, discharge, pain, loss of vision  ENMT: No difficulty hearing, vertigo, dysphagia, epistaxis, recent dental work  CV: No chest pain, palpitations, QUEEN, orthopnea  RESPIRATORY:  No wheezing, SOB, cough / sputum, hemoptysis  GI: No nausea, vomiting, diarrhea, constipation, melena  : No hematuria, dysuria, urgency, incontinence  MUSCULOSKELETAL: No arthritis, joint swelling, muscle weakness  SKIN/BREAST: No rash, itching, hair loss, masses  PSYCHIATRY: No depression, anxiety, suicidal ideation  HEMATOLOGY:  No bruising easily, enlarged lymph nodes, tender lymph nodes  ENDOCRINE: No cold intolerance, heat intolerance, polydipsia

## 2021-06-07 NOTE — ED PROVIDER NOTE - CLINICAL SUMMARY MEDICAL DECISION MAKING FREE TEXT BOX
Pt w J tube obstruction, abd XR wo contrast flushing. CT surgery consulted, carbonated beverage attempted, will admit for IR replacement. labs/covid sent.

## 2021-06-07 NOTE — ED ADULT NURSE NOTE - NSIMPLEMENTINTERV_GEN_ALL_ED
Implemented All Universal Safety Interventions:  [place] to call system. Call bell, personal items and telephone within reach. Instruct patient to call for assistance. Room bathroom lighting operational. Non-slip footwear when patient is off stretcher. Physically safe environment: no spills, clutter or unnecessary equipment. Stretcher in lowest position, wheels locked, appropriate side rails in place.

## 2021-06-07 NOTE — ED ADULT NURSE NOTE - OBJECTIVE STATEMENT
Patient to the ED with complaint of feeding tube malfunction, as per daughter, patient was fed successfully at 4 pm yesterday and attempted to again at 10pm, attached the bag to the G tube and ir kept popping out, endorsed that formula was flowing  well but the feeding bag  keeps popping out of the G tube.  Patient denies any symptoms, was sent to the ED to have G tube checked.

## 2021-06-07 NOTE — H&P ADULT - HISTORY OF PRESENT ILLNESS
This is a 71 year female with PMHx of HTN, HLD (myalgias with statins), and paraesophageal hernia resulting in mild GERD and esophageal dysmotility. She was evaluated as an outpatient by general surgery for elective repair and on 05/21/2021 presented for her RA repair of paraesophageal hernia with partial  fundoplication. Intraoperative course complicated by esophageal perforation for which Dr. Alberto and Dr. Pinto were consulted urgently. EGD was performed and tear was not amenable to primary repair. They proceeded with an uncomplicated transhiatal esophagectomy. Post operatively she was brought to the CTICU extubated. Trickle feeds were started via J-tube on POD2 and she was transferred to stepdown unit. POD3 patient tube feeds to goal, continuing IV hydration, maintaining NPO. POD5 patient remained NPO on tube feeds. NGT to intermittent suction. POD 6, s/p Barium Esophagram, no leak, and NGT was removed. POD7 speech and swallow evaluation performed and patient cleared for sips of clears/full liquids. POD7 patient also underwent IR procedure of left pigtail placement for pleural effusion. She was started on clear liquids, tolerated. POD8 minimal drainage noted from pigtail catheter, and CT Chest performed, revealing loculated left pleural effusion. Lytic therapy initiated q12h for 6 rounds and completed last dose on 06/01/2021 in the morning. On 06/01/2021, IR repositioned left pigtail catheter.  06/02/2021 patient underwent awake bronchoscopy at bedside for collapsed right lower lobe, put on high flow nasal cannula overnight. CXR with improvement 06/03/2021. Left pigtail removed 06/03/3021, post pull CXR stable. Patient weaned off supplemental oxygen 06/03/2021 while maintaining oxygen saturations above 96%.  Patient was setup for home tube feeds and supplemental clear liquid diet on discharge. Patient tolerating po clears, moving bowels, and ambulating without need of supplemental oxygen. Patient medically stable and discharged home.   On 06/07/2021 patient returned to [facility] ER with clogged JTube. Patient admitted for J Tube replacement / other. Patient without complaints feeling well.     Allergies:  Penicillin: Hives  Nuts: Hives  Pollen: Sneeze, dry nose; itching  Intolerances:  Statins: Muscle Pain    Home Medications:   See Below.    PAST MEDICAL HISTORY:  Constipation   Diaphragmatic hernia without obstruction or gangrene   Gastric reflux   HTN (hypertension)   Hyperlipidemia.     PAST SURGICAL HISTORY:  H/O colonoscopy x2  History of coronary angiogram.     FAMILY HISTORY:  No pertinent family history in first degree relatives.   No Pertinent Family History in first degree relatives of: same disease.

## 2021-06-07 NOTE — CONSULT NOTE ADULT - ASSESSMENT
Assessment: 71 year female with PMHx of HTN, HLD (myalgias with statins), and paraesophageal hernia resulting in mild GERD and esophageal dysmotility. She was evaluated as an outpatient by general surgery for elective repair and on 05/21/2021 presented for her RA repair of paraesophageal hernia with partial fundoplication. Intraoperative course complicated by esophageal perforation for which Dr. Alberto and Dr. Pinto were consulted urgently. EGD was performed and tear was not amenable to primary repair. They proceeded with an uncomplicated transhiatal esophagectomy. Trickle feeds were started via J-tube (red rubber placed 5/21/21) on POD2 and she was transferred to stepdown unit. On 06/07/2021 patient returned to [facility] ER with clogged JTube. Now for J tube exchange. Case reviewed with Dr. Corado and Dr. Perla. Plan for procedure with local anesthesia.       Communicated with: primary team

## 2021-06-07 NOTE — ED PROVIDER NOTE - OBJECTIVE STATEMENT
71 w recent paraesophageal hernia repair complicated by intraop esophageal perforation w complaints of Jtube occlusion. Last feed at 4p, today when again tried 10p w Jevity, unable to go through. Pt denies abd pain, vomiting, f/c, chest pain, shob or any other acute complaints.

## 2021-06-07 NOTE — ED PROVIDER NOTE - NS ED MD DISPO ADMITTING SERVICE
----- Message from Hafsa Ibarra sent at 2/10/2017  8:57 AM CST -----  Contact: mom [phone]    Mom called to say that [facility] and he was prescribed zithromax liquid and mom seems to think that pt's vomiting x 2. No rash. Please call mom.   
Advised mom to administer probiotics and monitor. If vomiting occurs again to call clinic for follow up. Mom verbalized understanding.  
SURG

## 2021-06-07 NOTE — PROGRESS NOTE ADULT - SUBJECTIVE AND OBJECTIVE BOX
Patient discussed on morning rounds with Dr. Pinto, Dr. Perla    Operation / Date: 5/10 hiatal hernia repair c/b esophageal perforation and emergency esophagectomy   replacement of j tube by IR    SUBJECTIVE ASSESSMENT:  71y Female assessed at bedside. No acute complaints, just feels hungry. Denies pain and SOB. Feels she was doing well at home after her surgery. Denies fever, chills, nausea, vomiting. Continues to ambulate frequently.     Vital Signs Last 24 Hrs  T(C): 36.8 (2021 11:50), Max: 36.8 (2021 06:31)  T(F): 98.3 (2021 11:50), Max: 98.3 (2021 11:50)  HR: 90 (2021 15:13) (80 - 90)  BP: 119/62 (2021 15:13) (113/71 - 134/73)  BP(mean): 84 (2021 15:13) (84 - 93)  RR: 16 (2021 15:13) (16 - 18)  SpO2: 95% (2021 15:13) (95% - 98%)  I&O's Detail    2021 07:01  -  2021 17:22  --------------------------------------------------------  IN:    Lactated Ringers: 100 mL  Total IN: 100 mL    OUT:    Voided (mL): 300 mL  Total OUT: 300 mL    Total NET: -200 mL    CHEST TUBE:  No  LISETH DRAIN: No.  EPICARDIAL WIRES: No.  TIE DOWNS: No.  JOY: No.    Physical Exam  CONSTITUTIONAL: Well appearing in NAD assessed sitting at bedside   NEURO: A&OX3. No focal deficits noted, moving bilateral upper and lower extremities                    CV: RRR, no murmurs, rubs, gallops  RESPIRATORY: Clear to auscultation bilateral posterior lung fields, no wheezes, rales, rhonchi   GI: +BS, NT/ND  MUSKULOSKELETAL: No peripheral edema or calf tenderness. Full strength and ROM bilateral upper and lower extremities   VASCULAR: Bilateral distal pulses 2+  INCISIONS: Neck incision clean, dry, intact. Abdominal incision clean, dry, intact.     LABS:                        10.8   4.21  )-----------( 495      ( 2021 05:44 )             34.2       COUMADIN:  No        135  |  97  |  8   ----------------------------<  112<H>  4.8   |  27  |  0.48<L>    Ca    10.0      2021 05:44    TPro  6.5  /  Alb  3.5  /  TBili  0.3  /  DBili  x   /  AST  24  /  ALT  40  /  AlkPhos  108        Urinalysis Basic - ( 2021 06:23 )    Color: Yellow / Appearance: Clear / S.010 / pH: x  Gluc: x / Ketone: NEGATIVE  / Bili: Negative / Urobili: 0.2 E.U./dL   Blood: x / Protein: NEGATIVE mg/dL / Nitrite: NEGATIVE   Leuk Esterase: NEGATIVE / RBC: x / WBC x   Sq Epi: x / Non Sq Epi: x / Bacteria: x      MEDICATIONS  (STANDING):  heparin   Injectable 5000 Unit(s) SubCutaneous every 8 hours  lactated ringers. 1000 milliLiter(s) (50 mL/Hr) IV Continuous <Continuous>  pantoprazole   Suspension 40 milliGRAM(s) Oral daily  sodium chloride 0.9% lock flush 3 milliLiter(s) IV Push every 8 hours    MEDICATIONS  (PRN):    RADIOLOGY & ADDITIONAL TESTS:  < from: Xray Chest 1 View-PORTABLE IMMEDIATE (Xray Chest 1 View-PORTABLE IMMEDIATE .) (21 @ 09:29) >  Findings/  impression: Interimremoval of left pigtail catheter, no pneumothorax. Heart size within normal limits. Mediastinal bowel dilatation. Bilateral opacities/pleural effusions, unchanged. Stable bony structures.    < end of copied text >

## 2021-06-07 NOTE — H&P ADULT - ASSESSMENT
This is a 71 year female with PMHx of HTN, HLD (myalgias with statins), and paraesophageal hernia resulting in mild GERD and esophageal dysmotility. She was evaluated as an outpatient by general surgery for elective repair and on 05/21/2021 presented for her RA repair of paraesophageal hernia with partial  fundoplication. Intraoperative course complicated by esophageal perforation for which Dr. Alberto and Dr. Pinto were consulted urgently. EGD was performed and tear was not amenable to primary repair. They proceeded with an uncomplicated transhiatal esophagectomy. Post operatively she was brought to the CTICU extubated. Trickle feeds were started via J-tube on POD2 and she was transferred to stepdown unit. POD3 patient tube feeds to goal, continuing IV hydration, maintaining NPO. POD5 patient remained NPO on tube feeds. NGT to intermittent suction. POD 6, s/p Barium Esophagram, no leak, and NGT was removed. POD7 speech and swallow evaluation performed and patient cleared for sips of clears/full liquids. POD7 patient also underwent IR procedure of left pigtail placement for pleural effusion. She was started on clear liquids, tolerated. POD8 minimal drainage noted from pigtail catheter, and CT Chest performed, revealing loculated left pleural effusion. Lytic therapy initiated q12h for 6 rounds and completed last dose on 06/01/2021 in the morning. On 06/01/2021, IR repositioned left pigtail catheter.  06/02/2021 patient underwent awake bronchoscopy at bedside for collapsed right lower lobe, put on high flow nasal cannula overnight. CXR with improvement 06/03/2021. Left pigtail removed 06/03/3021, post pull CXR stable. Patient weaned off supplemental oxygen 06/03/2021 while maintaining oxygen saturations above 96%.  Patient was setup for home tube feeds and supplemental clear liquid diet on discharge. Patient tolerating po clears, moving bowels, and ambulating without need of supplemental oxygen. Patient medically stable and discharged home.   On 06/07/2021 patient returned to [facility] ER with clogged JTube. Patient admitted for J Tube replacement / other. Patient without complaints feeling well.     Neurovascular: No delirium. Pain well controlled with current regimen.  -PRN's.    Cardiovascular: Hemodynamically stable. HR controlled. HTN, HLD (myalgias with statins).  -CTM.    Respiratory: 02 Sat = 98% on RA.  -If on oxygen wean to RA from for O2 Sat > 93%.  -Encourage C+DB and Use of IS 10x / hr while awake.  -CXR.    GI: Stable. RA repair of paraesophageal hernia with partial  fundoplication. Intraoperative course complicated by esophageal perforation for which Dr. Alberto and Dr. Pinto were consulted urgently. EGD was performed and tear was not amenable to primary repair. They proceeded with an uncomplicated transhiatal esophagectomy on 05/21/2021. Now with clogged J Tube.   -Consider replacement vs. medically unclogging.     Renal / :  -Monitor renal function.  -Monitor I/O's.    Endocrine:    -CTM.    Hematologic:  -CBC.  -Coagulation Panel.  -T/Sx2.    ID:  -Temperature.  -CBC.  -Observe for SIRS/Sepsis Syndrome.    Prophylaxis:  -DVT prophylaxis with 5000 SubQ Heparin q8h.  -SCD's.    Disposition:  -9L for frequent monitoring.

## 2021-06-08 LAB
ANION GAP SERPL CALC-SCNC: 12 MMOL/L — SIGNIFICANT CHANGE UP (ref 5–17)
BUN SERPL-MCNC: 7 MG/DL — SIGNIFICANT CHANGE UP (ref 7–23)
CALCIUM SERPL-MCNC: 10 MG/DL — SIGNIFICANT CHANGE UP (ref 8.4–10.5)
CHLORIDE SERPL-SCNC: 99 MMOL/L — SIGNIFICANT CHANGE UP (ref 96–108)
CO2 SERPL-SCNC: 23 MMOL/L — SIGNIFICANT CHANGE UP (ref 22–31)
COVID-19 SPIKE DOMAIN AB INTERP: POSITIVE
COVID-19 SPIKE DOMAIN ANTIBODY RESULT: >250 U/ML — HIGH
CREAT SERPL-MCNC: 0.53 MG/DL — SIGNIFICANT CHANGE UP (ref 0.5–1.3)
GLUCOSE SERPL-MCNC: 125 MG/DL — HIGH (ref 70–99)
HCT VFR BLD CALC: 36.1 % — SIGNIFICANT CHANGE UP (ref 34.5–45)
HGB BLD-MCNC: 11.3 G/DL — LOW (ref 11.5–15.5)
MAGNESIUM SERPL-MCNC: 2.3 MG/DL — SIGNIFICANT CHANGE UP (ref 1.6–2.6)
MCHC RBC-ENTMCNC: 29.4 PG — SIGNIFICANT CHANGE UP (ref 27–34)
MCHC RBC-ENTMCNC: 31.3 GM/DL — LOW (ref 32–36)
MCV RBC AUTO: 94 FL — SIGNIFICANT CHANGE UP (ref 80–100)
NRBC # BLD: 0 /100 WBCS — SIGNIFICANT CHANGE UP (ref 0–0)
PLATELET # BLD AUTO: 577 K/UL — HIGH (ref 150–400)
POTASSIUM SERPL-MCNC: 4.2 MMOL/L — SIGNIFICANT CHANGE UP (ref 3.5–5.3)
POTASSIUM SERPL-SCNC: 4.2 MMOL/L — SIGNIFICANT CHANGE UP (ref 3.5–5.3)
RBC # BLD: 3.84 M/UL — SIGNIFICANT CHANGE UP (ref 3.8–5.2)
RBC # FLD: 14.6 % — HIGH (ref 10.3–14.5)
SARS-COV-2 IGG+IGM SERPL QL IA: >250 U/ML — HIGH
SARS-COV-2 IGG+IGM SERPL QL IA: POSITIVE
SODIUM SERPL-SCNC: 134 MMOL/L — LOW (ref 135–145)
WBC # BLD: 3.98 K/UL — SIGNIFICANT CHANGE UP (ref 3.8–10.5)
WBC # FLD AUTO: 3.98 K/UL — SIGNIFICANT CHANGE UP (ref 3.8–10.5)

## 2021-06-08 PROCEDURE — 99232 SBSQ HOSP IP/OBS MODERATE 35: CPT

## 2021-06-08 RX ORDER — POLYETHYLENE GLYCOL 3350 17 G/17G
17 POWDER, FOR SOLUTION ORAL DAILY
Refills: 0 | Status: DISCONTINUED | OUTPATIENT
Start: 2021-06-08 | End: 2021-06-10

## 2021-06-08 RX ADMIN — Medication 650 MILLIGRAM(S): at 06:25

## 2021-06-08 RX ADMIN — Medication 650 MILLIGRAM(S): at 07:31

## 2021-06-08 RX ADMIN — POLYETHYLENE GLYCOL 3350 17 GRAM(S): 17 POWDER, FOR SOLUTION ORAL at 14:12

## 2021-06-08 RX ADMIN — PANTOPRAZOLE SODIUM 40 MILLIGRAM(S): 20 TABLET, DELAYED RELEASE ORAL at 14:03

## 2021-06-08 RX ADMIN — HEPARIN SODIUM 5000 UNIT(S): 5000 INJECTION INTRAVENOUS; SUBCUTANEOUS at 06:25

## 2021-06-08 RX ADMIN — SODIUM CHLORIDE 3 MILLILITER(S): 9 INJECTION INTRAMUSCULAR; INTRAVENOUS; SUBCUTANEOUS at 06:24

## 2021-06-08 RX ADMIN — HEPARIN SODIUM 5000 UNIT(S): 5000 INJECTION INTRAVENOUS; SUBCUTANEOUS at 21:50

## 2021-06-08 RX ADMIN — HEPARIN SODIUM 5000 UNIT(S): 5000 INJECTION INTRAVENOUS; SUBCUTANEOUS at 14:03

## 2021-06-08 RX ADMIN — SODIUM CHLORIDE 3 MILLILITER(S): 9 INJECTION INTRAMUSCULAR; INTRAVENOUS; SUBCUTANEOUS at 21:50

## 2021-06-08 RX ADMIN — SODIUM CHLORIDE 3 MILLILITER(S): 9 INJECTION INTRAMUSCULAR; INTRAVENOUS; SUBCUTANEOUS at 14:12

## 2021-06-08 NOTE — PROGRESS NOTE ADULT - SUBJECTIVE AND OBJECTIVE BOX
Post op acceptance    Operation / Date: 5/10 hiatal hernia repair c/b esophageal perforation and emergency esophagectomy   replacement of j tube by IR    SUBJECTIVE ASSESSMENT:  71y Female seen and examined. Feels well, offers no acute complaints. Tolerating clear liquid diet, + BM yesterday, passing flatus. Denies fever, chest pain, palpitaitons, SOB, abdominal pain, n/v.         Vital Signs Last 24 Hrs  T(C): 36.8 (2021 05:03), Max: 37.1 (2021 17:34)  T(F): 98.3 (2021 05:03), Max: 98.7 (2021 17:34)  HR: 91 (2021 08:37) (76 - 93)  BP: 111/69 (2021 08:37) (111/69 - 135/63)  BP(mean): 84 (2021 08:37) (83 - 93)  RR: 18 (2021 08:37) (16 - 18)  SpO2: 94% (2021 08:37) (93% - 97%)  I&O's Detail    2021 07:01  -  2021 07:00  --------------------------------------------------------  IN:    Lactated Ringers: 750 mL  Total IN: 750 mL    OUT:    Voided (mL): 1200 mL  Total OUT: 1200 mL    Total NET: -450 mL      2021 07:01  -  2021 10:21  --------------------------------------------------------  IN:    Lactated Ringers: 200 mL  Total IN: 200 mL    OUT:    Voided (mL): 800 mL  Total OUT: 800 mL    Total NET: -600 mL      PHYSICAL EXAM:    General: Patient lying comfortably in bed, no acute distress     Neurological: Alert and oriented. No focal neurological deficits     Cardiovascular: S1S2, RRR, no murmurs appreciated on exam     Respiratory: Clear to ausculation bilaterally, no wheeze/rhonchi/rales    Gastrointestinal: + BS, soft, non tender, non distended. + Jtube: CDI    Extremities: Warm and well perfused. No edema, no calf tenderness     Vascular: palpable peripheral pulses b/l     Incision Sites: Neck: clean, no signs of infeciton, + steristips. Abdominal incision: clean, no signs of infection     LABS:                        11.3   3.98  )-----------( 577      ( 2021 08:14 )             36.1       COUMADIN: NO        -08    134<L>  |  99  |  7   ----------------------------<  125<H>  4.2   |  23  |  0.53    Ca    10.0      2021 08:14  Mg     2.3     06-08    TPro  6.5  /  Alb  3.5  /  TBili  0.3  /  DBili  x   /  AST  24  /  ALT  40  /  AlkPhos  108  06-07      Urinalysis Basic - ( 2021 06:23 )    Color: Yellow / Appearance: Clear / S.010 / pH: x  Gluc: x / Ketone: NEGATIVE  / Bili: Negative / Urobili: 0.2 E.U./dL   Blood: x / Protein: NEGATIVE mg/dL / Nitrite: NEGATIVE   Leuk Esterase: NEGATIVE / RBC: x / WBC x   Sq Epi: x / Non Sq Epi: x / Bacteria: x        MEDICATIONS  (STANDING):  heparin   Injectable 5000 Unit(s) SubCutaneous every 8 hours  lactated ringers. 1000 milliLiter(s) (50 mL/Hr) IV Continuous <Continuous>  pantoprazole   Suspension 40 milliGRAM(s) Oral daily  sodium chloride 0.9% lock flush 3 milliLiter(s) IV Push every 8 hours    MEDICATIONS  (PRN):  acetaminophen   Tablet .. 650 milliGRAM(s) Oral every 6 hours PRN Mild Pain (1 - 3)        RADIOLOGY & ADDITIONAL TESTS:     Discussed on AM round with Dr. Pinto    Operation / Date: 5/10 hiatal hernia repair c/b esophageal perforation and emergency esophagectomy   replacement of j tube by IR    SUBJECTIVE ASSESSMENT:  71y Female seen and examined. Feels well, offers no acute complaints. Tolerating clear liquid diet, + BM yesterday, passing flatus. Denies fever, chest pain, palpitaitons, SOB, abdominal pain, n/v.         Vital Signs Last 24 Hrs  T(C): 36.8 (2021 05:03), Max: 37.1 (2021 17:34)  T(F): 98.3 (2021 05:03), Max: 98.7 (2021 17:34)  HR: 91 (2021 08:37) (76 - 93)  BP: 111/69 (2021 08:37) (111/69 - 135/63)  BP(mean): 84 (2021 08:37) (83 - 93)  RR: 18 (2021 08:37) (16 - 18)  SpO2: 94% (2021 08:37) (93% - 97%)  I&O's Detail    2021 07:01  -  2021 07:00  --------------------------------------------------------  IN:    Lactated Ringers: 750 mL  Total IN: 750 mL    OUT:    Voided (mL): 1200 mL  Total OUT: 1200 mL    Total NET: -450 mL      2021 07:01  -  2021 10:21  --------------------------------------------------------  IN:    Lactated Ringers: 200 mL  Total IN: 200 mL    OUT:    Voided (mL): 800 mL  Total OUT: 800 mL    Total NET: -600 mL      PHYSICAL EXAM:    General: Patient lying comfortably in bed, no acute distress     Neurological: Alert and oriented. No focal neurological deficits     Cardiovascular: S1S2, RRR, no murmurs appreciated on exam     Respiratory: Clear to ausculation bilaterally, no wheeze/rhonchi/rales    Gastrointestinal: + BS, soft, non tender, non distended. + Jtube: CDI    Extremities: Warm and well perfused. No edema, no calf tenderness     Vascular: palpable peripheral pulses b/l     Incision Sites: Neck: clean, no signs of infeciton, + steristips. Abdominal incision: clean, no signs of infection     LABS:                        11.3   3.98  )-----------( 577      ( 2021 08:14 )             36.1       COUMADIN: NO        -    134<L>  |  99  |  7   ----------------------------<  125<H>  4.2   |  23  |  0.53    Ca    10.0      2021 08:14  Mg     2.3     06-08    TPro  6.5  /  Alb  3.5  /  TBili  0.3  /  DBili  x   /  AST  24  /  ALT  40  /  AlkPhos  108  06-07      Urinalysis Basic - ( 2021 06:23 )    Color: Yellow / Appearance: Clear / S.010 / pH: x  Gluc: x / Ketone: NEGATIVE  / Bili: Negative / Urobili: 0.2 E.U./dL   Blood: x / Protein: NEGATIVE mg/dL / Nitrite: NEGATIVE   Leuk Esterase: NEGATIVE / RBC: x / WBC x   Sq Epi: x / Non Sq Epi: x / Bacteria: x        MEDICATIONS  (STANDING):  heparin   Injectable 5000 Unit(s) SubCutaneous every 8 hours  lactated ringers. 1000 milliLiter(s) (50 mL/Hr) IV Continuous <Continuous>  pantoprazole   Suspension 40 milliGRAM(s) Oral daily  sodium chloride 0.9% lock flush 3 milliLiter(s) IV Push every 8 hours    MEDICATIONS  (PRN):  acetaminophen   Tablet .. 650 milliGRAM(s) Oral every 6 hours PRN Mild Pain (1 - 3)        RADIOLOGY & ADDITIONAL TESTS:

## 2021-06-09 LAB
ANION GAP SERPL CALC-SCNC: 9 MMOL/L — SIGNIFICANT CHANGE UP (ref 5–17)
BUN SERPL-MCNC: 5 MG/DL — LOW (ref 7–23)
CALCIUM SERPL-MCNC: 9.7 MG/DL — SIGNIFICANT CHANGE UP (ref 8.4–10.5)
CHLORIDE SERPL-SCNC: 99 MMOL/L — SIGNIFICANT CHANGE UP (ref 96–108)
CO2 SERPL-SCNC: 27 MMOL/L — SIGNIFICANT CHANGE UP (ref 22–31)
CREAT SERPL-MCNC: 0.56 MG/DL — SIGNIFICANT CHANGE UP (ref 0.5–1.3)
GLUCOSE SERPL-MCNC: 99 MG/DL — SIGNIFICANT CHANGE UP (ref 70–99)
HCT VFR BLD CALC: 35.4 % — SIGNIFICANT CHANGE UP (ref 34.5–45)
HGB BLD-MCNC: 11.4 G/DL — LOW (ref 11.5–15.5)
MCHC RBC-ENTMCNC: 29.9 PG — SIGNIFICANT CHANGE UP (ref 27–34)
MCHC RBC-ENTMCNC: 32.2 GM/DL — SIGNIFICANT CHANGE UP (ref 32–36)
MCV RBC AUTO: 92.9 FL — SIGNIFICANT CHANGE UP (ref 80–100)
NRBC # BLD: 0 /100 WBCS — SIGNIFICANT CHANGE UP (ref 0–0)
PLATELET # BLD AUTO: 575 K/UL — HIGH (ref 150–400)
POTASSIUM SERPL-MCNC: 4.2 MMOL/L — SIGNIFICANT CHANGE UP (ref 3.5–5.3)
POTASSIUM SERPL-SCNC: 4.2 MMOL/L — SIGNIFICANT CHANGE UP (ref 3.5–5.3)
RBC # BLD: 3.81 M/UL — SIGNIFICANT CHANGE UP (ref 3.8–5.2)
RBC # FLD: 14.7 % — HIGH (ref 10.3–14.5)
SODIUM SERPL-SCNC: 135 MMOL/L — SIGNIFICANT CHANGE UP (ref 135–145)
WBC # BLD: 5.58 K/UL — SIGNIFICANT CHANGE UP (ref 3.8–10.5)
WBC # FLD AUTO: 5.58 K/UL — SIGNIFICANT CHANGE UP (ref 3.8–10.5)

## 2021-06-09 PROCEDURE — 71045 X-RAY EXAM CHEST 1 VIEW: CPT | Mod: 26

## 2021-06-09 RX ORDER — LIDOCAINE 4 G/100G
1 CREAM TOPICAL EVERY 24 HOURS
Refills: 0 | Status: DISCONTINUED | OUTPATIENT
Start: 2021-06-09 | End: 2021-06-10

## 2021-06-09 RX ORDER — FLUTICASONE PROPIONATE 50 MCG
1 SPRAY, SUSPENSION NASAL
Refills: 0 | Status: DISCONTINUED | OUTPATIENT
Start: 2021-06-09 | End: 2021-06-10

## 2021-06-09 RX ORDER — MECLIZINE HCL 12.5 MG
25 TABLET ORAL EVERY 8 HOURS
Refills: 0 | Status: DISCONTINUED | OUTPATIENT
Start: 2021-06-09 | End: 2021-06-10

## 2021-06-09 RX ADMIN — Medication 1 SPRAY(S): at 17:52

## 2021-06-09 RX ADMIN — LIDOCAINE 1 PATCH: 4 CREAM TOPICAL at 08:00

## 2021-06-09 RX ADMIN — HEPARIN SODIUM 5000 UNIT(S): 5000 INJECTION INTRAVENOUS; SUBCUTANEOUS at 22:46

## 2021-06-09 RX ADMIN — POLYETHYLENE GLYCOL 3350 17 GRAM(S): 17 POWDER, FOR SOLUTION ORAL at 12:22

## 2021-06-09 RX ADMIN — HEPARIN SODIUM 5000 UNIT(S): 5000 INJECTION INTRAVENOUS; SUBCUTANEOUS at 14:50

## 2021-06-09 RX ADMIN — LIDOCAINE 1 PATCH: 4 CREAM TOPICAL at 20:05

## 2021-06-09 RX ADMIN — SODIUM CHLORIDE 3 MILLILITER(S): 9 INJECTION INTRAMUSCULAR; INTRAVENOUS; SUBCUTANEOUS at 05:37

## 2021-06-09 RX ADMIN — SODIUM CHLORIDE 3 MILLILITER(S): 9 INJECTION INTRAMUSCULAR; INTRAVENOUS; SUBCUTANEOUS at 22:46

## 2021-06-09 RX ADMIN — SODIUM CHLORIDE 3 MILLILITER(S): 9 INJECTION INTRAMUSCULAR; INTRAVENOUS; SUBCUTANEOUS at 14:36

## 2021-06-09 RX ADMIN — HEPARIN SODIUM 5000 UNIT(S): 5000 INJECTION INTRAVENOUS; SUBCUTANEOUS at 05:36

## 2021-06-09 RX ADMIN — PANTOPRAZOLE SODIUM 40 MILLIGRAM(S): 20 TABLET, DELAYED RELEASE ORAL at 12:22

## 2021-06-09 NOTE — DIETITIAN INITIAL EVALUATION ADULT. - OTHER CALCULATIONS
Ideal body weight used for calculations as pt >120% of IBW. (167% IBW). Nutrient needs based on [facility] standards of care for maintenance in adults, adjusted for age, increased needs post-op

## 2021-06-09 NOTE — PROGRESS NOTE ADULT - SUBJECTIVE AND OBJECTIVE BOX
Patient discussed on morning rounds with Dr. Perla    Operation / Date:  5/10 hiatal hernia repair c/b esophageal perforation and emergency esophagectomy  6/7 replacement of j tube by IR    SUBJECTIVE ASSESSMENT:  71y Female seen and examined. Feels well, ambulating on room air, tolerating her full liquid diet, + BM yesterday. Denies fever, chest pain palpitations, SOB, abdominal pain, n/v.         Vital Signs Last 24 Hrs  T(C): 36.6 (09 Jun 2021 05:02), Max: 37.3 (08 Jun 2021 14:07)  T(F): 97.8 (09 Jun 2021 05:02), Max: 99.2 (08 Jun 2021 14:07)  HR: 88 (09 Jun 2021 04:03) (72 - 88)  BP: 128/62 (09 Jun 2021 04:03) (114/61 - 128/62)  BP(mean): 87 (09 Jun 2021 04:03) (81 - 91)  RR: 19 (09 Jun 2021 04:03) (17 - 19)  SpO2: 93% (09 Jun 2021 04:03) (93% - 96%)  I&O's Detail    08 Jun 2021 07:01  -  09 Jun 2021 07:00  --------------------------------------------------------  IN:    Lactated Ringers: 200 mL  Total IN: 200 mL    OUT:    Voided (mL): 2200 mL  Total OUT: 2200 mL    Total NET: -2000 mL          PHYSICAL EXAM:  General: Patient lying comfortably in bed, no acute distress     Neurological: Alert and oriented. No focal neurological deficits     Cardiovascular: S1S2, RRR, no murmurs appreciated on exam     Respiratory: Clear to ausculation bilaterally, no wheeze/rhonchi/rales    Gastrointestinal: + BS, soft, non tender, non distended. + Jtube: CDI    Extremities: Warm and well perfused. No edema, no calf tenderness     Vascular: palpable peripheral pulses b/l     Incision Sites: Neck: clean, no signs of infeciton, + steristips. Abdominal incision: clean, no signs of infection     LABS:                        11.4   5.58  )-----------( 575      ( 09 Jun 2021 07:25 )             35.4        No        06-09    135  |  99  |  5<L>  ----------------------------<  99  4.2   |  27  |  0.56    Ca    9.7      09 Jun 2021 07:25  Mg     2.3     06-08            MEDICATIONS  (STANDING):  fluticasone propionate 50 MICROgram(s)/spray Nasal Spray 1 Spray(s) Both Nostrils two times a day  heparin   Injectable 5000 Unit(s) SubCutaneous every 8 hours  lidocaine   Patch 1 Patch Transdermal every 24 hours  pantoprazole   Suspension 40 milliGRAM(s) Oral daily  polyethylene glycol 3350 17 Gram(s) Oral daily  sodium chloride 0.9% lock flush 3 milliLiter(s) IV Push every 8 hours    MEDICATIONS  (PRN):  acetaminophen   Tablet .. 650 milliGRAM(s) Oral every 6 hours PRN Mild Pain (1 - 3)  meclizine 25 milliGRAM(s) Oral every 8 hours PRN Dizziness        RADIOLOGY & ADDITIONAL TESTS:

## 2021-06-09 NOTE — DIETITIAN INITIAL EVALUATION ADULT. - OTHER INFO
71F with PMHx HTN, HLD (myalgias with statins), and paraesophageal hernia resulting in GERD and esophageal dysmotility. She was evaluated by general surgery for elective repair and on 5/21/21 presented for her RA repair of paraesophageal hernia with partial fundoplication. Intraop course complicated by esophageal perforation. EGD performed and tear was not amenable to primary repair. They proceeded with an uncomplicated transhiatal esophagectomy. Post operatively the patient was started on tube feeds via J-tube and was ultimately discharged home on clear liquids and tube feeds on POD13, 6/3. On 6/7 the patient represented to [facility] ED with clogged J tube. Attempts were made to unclog the tube and were unsuccessful. She underwent J tube exchange with IR on 6/7. Unable to obtain connector from Jtube to kangaroo pump (on  back order), advanced diet to full liquid with calorie count.   Pt well known to nutrition from prior admission, at present pt resting in chair at bedside just finishing breakfast, tray 75-80% consumed, per RN had good tolerance to CLD yesterday, this morning first day of FLD. Discussed diet advancement with pt/ likely advancement. She is hopeful she won't have to have tube feeds anymore as tube causing pain/ discomfort. Notes allergies to nuts. UBW noted at 72kg, consistent with present wt. During last admission wt noted at 76kg indicating -3kg, 3% loss x ~2 weeks, suspect d/t surgery and fluid shifts. At present denies n/v/d/c, chewing/ swallowing difficulty, some discomfort while swallowing but manageable. Encouraged intake through day and discussed stages of diet advancement, will follow per protocol.

## 2021-06-09 NOTE — DIETITIAN INITIAL EVALUATION ADULT. - ADD RECOMMEND
1. Encourage intake through day 2. Manage pain 3. Trend wts 4. Reinforce ed 5. Monitor and replete lytes

## 2021-06-10 ENCOUNTER — TRANSCRIPTION ENCOUNTER (OUTPATIENT)
Age: 71
End: 2021-06-10

## 2021-06-10 VITALS
HEART RATE: 84 BPM | SYSTOLIC BLOOD PRESSURE: 121 MMHG | OXYGEN SATURATION: 94 % | DIASTOLIC BLOOD PRESSURE: 62 MMHG | RESPIRATION RATE: 17 BRPM

## 2021-06-10 LAB
ANION GAP SERPL CALC-SCNC: 9 MMOL/L — SIGNIFICANT CHANGE UP (ref 5–17)
BUN SERPL-MCNC: 5 MG/DL — LOW (ref 7–23)
CALCIUM SERPL-MCNC: 9.8 MG/DL — SIGNIFICANT CHANGE UP (ref 8.4–10.5)
CHLORIDE SERPL-SCNC: 99 MMOL/L — SIGNIFICANT CHANGE UP (ref 96–108)
CO2 SERPL-SCNC: 27 MMOL/L — SIGNIFICANT CHANGE UP (ref 22–31)
CREAT SERPL-MCNC: 0.55 MG/DL — SIGNIFICANT CHANGE UP (ref 0.5–1.3)
GLUCOSE SERPL-MCNC: 102 MG/DL — HIGH (ref 70–99)
HCT VFR BLD CALC: 34 % — LOW (ref 34.5–45)
HGB BLD-MCNC: 10.9 G/DL — LOW (ref 11.5–15.5)
MCHC RBC-ENTMCNC: 29.5 PG — SIGNIFICANT CHANGE UP (ref 27–34)
MCHC RBC-ENTMCNC: 32.1 GM/DL — SIGNIFICANT CHANGE UP (ref 32–36)
MCV RBC AUTO: 91.9 FL — SIGNIFICANT CHANGE UP (ref 80–100)
NRBC # BLD: 0 /100 WBCS — SIGNIFICANT CHANGE UP (ref 0–0)
PLATELET # BLD AUTO: 520 K/UL — HIGH (ref 150–400)
POTASSIUM SERPL-MCNC: 4 MMOL/L — SIGNIFICANT CHANGE UP (ref 3.5–5.3)
POTASSIUM SERPL-SCNC: 4 MMOL/L — SIGNIFICANT CHANGE UP (ref 3.5–5.3)
RBC # BLD: 3.7 M/UL — LOW (ref 3.8–5.2)
RBC # FLD: 14.7 % — HIGH (ref 10.3–14.5)
SODIUM SERPL-SCNC: 135 MMOL/L — SIGNIFICANT CHANGE UP (ref 135–145)
WBC # BLD: 3.56 K/UL — LOW (ref 3.8–10.5)
WBC # FLD AUTO: 3.56 K/UL — LOW (ref 3.8–10.5)

## 2021-06-10 PROCEDURE — 80048 BASIC METABOLIC PNL TOTAL CA: CPT

## 2021-06-10 PROCEDURE — 87635 SARS-COV-2 COVID-19 AMP PRB: CPT

## 2021-06-10 PROCEDURE — 74019 RADEX ABDOMEN 2 VIEWS: CPT

## 2021-06-10 PROCEDURE — 86769 SARS-COV-2 COVID-19 ANTIBODY: CPT

## 2021-06-10 PROCEDURE — C1729: CPT

## 2021-06-10 PROCEDURE — 94640 AIRWAY INHALATION TREATMENT: CPT

## 2021-06-10 PROCEDURE — 86850 RBC ANTIBODY SCREEN: CPT

## 2021-06-10 PROCEDURE — 80053 COMPREHEN METABOLIC PANEL: CPT

## 2021-06-10 PROCEDURE — 85025 COMPLETE CBC W/AUTO DIFF WBC: CPT

## 2021-06-10 PROCEDURE — 85027 COMPLETE CBC AUTOMATED: CPT

## 2021-06-10 PROCEDURE — 71045 X-RAY EXAM CHEST 1 VIEW: CPT

## 2021-06-10 PROCEDURE — 74018 RADEX ABDOMEN 1 VIEW: CPT

## 2021-06-10 PROCEDURE — 86901 BLOOD TYPING SEROLOGIC RH(D): CPT

## 2021-06-10 PROCEDURE — 36415 COLL VENOUS BLD VENIPUNCTURE: CPT

## 2021-06-10 PROCEDURE — C1769: CPT

## 2021-06-10 PROCEDURE — 99285 EMERGENCY DEPT VISIT HI MDM: CPT

## 2021-06-10 PROCEDURE — 99024 POSTOP FOLLOW-UP VISIT: CPT

## 2021-06-10 PROCEDURE — 81003 URINALYSIS AUTO W/O SCOPE: CPT

## 2021-06-10 PROCEDURE — C1887: CPT

## 2021-06-10 PROCEDURE — 49451 REPLACE DUOD/JEJ TUBE PERC: CPT

## 2021-06-10 PROCEDURE — 83735 ASSAY OF MAGNESIUM: CPT

## 2021-06-10 PROCEDURE — 86900 BLOOD TYPING SEROLOGIC ABO: CPT

## 2021-06-10 RX ORDER — BENZOCAINE AND MENTHOL 5; 1 G/100ML; G/100ML
1 LIQUID ORAL EVERY 6 HOURS
Refills: 0 | Status: DISCONTINUED | OUTPATIENT
Start: 2021-06-10 | End: 2021-06-10

## 2021-06-10 RX ADMIN — LIDOCAINE 1 PATCH: 4 CREAM TOPICAL at 09:50

## 2021-06-10 RX ADMIN — PANTOPRAZOLE SODIUM 40 MILLIGRAM(S): 20 TABLET, DELAYED RELEASE ORAL at 11:25

## 2021-06-10 RX ADMIN — POLYETHYLENE GLYCOL 3350 17 GRAM(S): 17 POWDER, FOR SOLUTION ORAL at 11:25

## 2021-06-10 RX ADMIN — SODIUM CHLORIDE 3 MILLILITER(S): 9 INJECTION INTRAMUSCULAR; INTRAVENOUS; SUBCUTANEOUS at 05:48

## 2021-06-10 RX ADMIN — HEPARIN SODIUM 5000 UNIT(S): 5000 INJECTION INTRAVENOUS; SUBCUTANEOUS at 05:48

## 2021-06-10 NOTE — PROGRESS NOTE ADULT - ASSESSMENT
71 y.o female with PMHx HTN, HLD (myalgias with statins), and paraesophageal hernia resulting in GERD and esophageal dysmotility. She was evaluated by general surgery for elective repair and on 5/21/21 presented for her RA repair of paraesophageal hernia with partial fundoplication. Intraop course complicated by esophageal perforation for which Dr. Alberto and Dr. Pinto were consulted urgently. EGD performed and tear was not amenable to primary repair. They proceeded with an uncomplicated transhiatal esophagectomy. Post operatively the patient was started on tube feeds via J-tube and was ultimately discharged home on clear liquids and tube feeds on POD13, 6/3. On 6/7 the patient represented to [facility] ED with clogged J tube. Attempts were made to unclog the tube and were unsuccessful. She underwent J tube exchance with IR on 6/7. Today 6/8, patient stable, tolerating CLD.    Neurovascular:   - No delirium. Pain well controlled with current regimen.  -Continue tylenol PRN    Cardiovascular:   -Hemodynamically stable. HR controlled  - Hx of HTN will start home norvasc when appropriate  - HLD but myalgias with statins     Respiratory:   - 02 Sat = 98% on RA.  -Encourage C+DB and Use of IS 10x / hr while awake.  -CXR stable    GI:   - S/p esophagectomy, readmitted with clogged J tube  - Now s/p J tube replacement by IR with pigtail, need to configure connection to Kangaroo feeding pump, attempting to locate today  -Clear liquid diet, tolerating   -Continue GI PPX with protonix    Renal / :  - BUN/Cr stable at 8/0.48  -Continue to monitor renal function.  -Monitor I/O's.  - Replete electrolytes PRN    Endocrine:    -No known hx diabetes or thyroid disease     Hematologic:  -H/H stable at 11/36 with no obvious signs of bleeding  -Coagulation Panel.    ID:  -Pt remains afebrile with no elevation in WBC or signs of infection  -Continue to monitor CBC  -Observe for SIRS/Sepsis Syndrome.    Prophylaxis:  -DVT prophylaxis with 5000 SubQ Heparin q8h.  -SCD's    Disposition:  -Home when medically appropriate.    
71 y.o female with PMHx HTN, HLD (myalgias with statins), and paraesophageal hernia resulting in GERD and esophageal dysmotility. She was evaluated by general surgery for elective repair and on 5/21/21 presented for her RA repair of paraesophageal hernia with partial fundoplication. Intraop course complicated by esophageal perforation for which Dr. Alberto and Dr. Pinto were consulted urgently. EGD performed and tear was not amenable to primary repair. They proceeded with an uncomplicated transhiatal esophagectomy. Post operatively the patient was started on tube feeds via J-tube and was ultimately discharged home on clear liquids and tube feeds on POD13, 6/3. On 6/7 the patient represented to [facility] ED with clogged J tube. Attempts were made to unclog the tube and were unsuccessful. She underwent J tube exchance with IR on 6/7. Unable to obtain connector from Jtube to kangaroo pump (on  back order), advanced diet to full liquid with calorie count.     Neurovascular:   - No delirium. Pain well controlled with current regimen.  -Continue tylenol PRN    Cardiovascular:   -Hemodynamically stable. HR controlled  - Hx of HTN will start home norvasc when appropriate  - HLD but myalgias with statins     Respiratory:   - 02 Sat = 98% on RA.  -Encourage C+DB and Use of IS 10x / hr while awake.  -CXR stable    GI:   - S/p esophagectomy, readmitted with clogged J tube  - Now s/p J tube replacement by IR with pigtail,   - Unable to obtain connector from Jtube to kangaroo pump (on  back order), advanced diet to full liquid with calorie count.   -Continue GI PPX with protonix    Renal / :  - BUN/Cr stable at 5/0.56  -Continue to monitor renal function.  -Monitor I/O's.  - Replete electrolytes PRN    Endocrine:    -No known hx diabetes or thyroid disease     Hematologic:  -H/H stable at 11/35 with no obvious signs of bleeding  -Coagulation Panel.    ID:  -Pt remains afebrile with no elevation in WBC or signs of infection  -Continue to monitor CBC  -Observe for SIRS/Sepsis Syndrome.    Prophylaxis:  -DVT prophylaxis with 5000 SubQ Heparin q8h.  -SCD's    Disposition:  -Home when medically appropriate.  
A/P:    71 y.o female with PMHx HTN, HLD (myalgias with statins), and paraesophageal hernia resulting in GERD and esophageal dysmotility. She was evaluated by general surgery for elective repair and on 5/21/21 presented for her RA repair of paraesophageal hernia with partial fundoplication. Intraop course complicated by esophageal perforation for which Dr. Alberto and Dr. Pinto were consulted urgently. EGD performed and tear was not amenable to primary repair. They proceeded with an uncomplicated transhiatal esophagectomy. Post operatively the patient was started on tube feeds via J-tube and was ultimately discharged home on clear liquids and tube feeds on POD13, 6/3. On 6/7 the patient represented to [facility] ED with clogged J tube. Attempts were made to unclog the tube and were unsuccessful. Decision was made to proceed with IR exchange of J tube.     Neurovascular:   - No delirium. Pain well controlled with current regimen.  -Continue tylenol PRN    Cardiovascular:   -Hemodynamically stable. HR controlled (89)  - Hx of HTN, BP controlled (114/78)  - HLD but myalgias with statins     Respiratory:   - 02 Sat = 98% on RA.  -Encourage C+DB and Use of IS 10x / hr while awake.  -CXR stable    GI:   - S/p esophagectomy, readmitted with clogged J tube  - Now s/p J tube replacement by IR with pigtail, need to configure connection to Kangaroo feeding pump, f/u with Dr. Pinto  -Clear liquid diet   -Continue GI PPX with protonix    Renal / :  - BUN/Cr stable at 8/0.48  -Continue to monitor renal function.  -Monitor I/O's.  - Replete electrolytes PRN    Endocrine:    -No known hx diabetes or thyroid disease     Hematologic:  -H/H stable at 10.8/34.2 with no obvious signs of bleeding  -Coagulation Panel.    ID:  -Pt remains afebrile with no elevation in WBC or signs of infection  -Continue to monitor CBC  -Observe for SIRS/Sepsis Syndrome.    Prophylaxis:  -DVT prophylaxis with 5000 SubQ Heparin q8h.  -SCD's    Disposition:  -Home when medically appropriate.  
Med Reconciliation:  Medication Reconciliation Status	Admission Reconciliation is Not Complete  Discharge Reconciliation is Completed  Discharge Medications	amLODIPine 5 mg oral tablet: 1 tab(s) orally once a day  Flonase 50 mcg/inh nasal spray:   meclizine 25 mg oral tablet: 1 tab(s) orally every 8 hours, As Needed -for dizziness   Vitamin B12: orally once a day  .  ,  ,     Care Plan/Procedures:  Discharge Diagnoses, Assessment and Plan of Treatment	PRINCIPAL DISCHARGE DIAGNOSIS  Diagnosis: Feeding tube obstruction  Assessment and Plan of Treatment:  Discharge Procedures, Findings and Treatment	PRINCIPAL PROCEDURE  Procedure: Replacement of feeding tube  Findings and Treatment: J-Tube replacement under IR guidance   TUBE FEED WILL REMAIN CLAMPED. WE HAVE ORDERED A REPLACEMENT PART FOR THE JTUBE FOR YOU. DRESSING CAN BE REPLACED AS NEEDED WITH DRY GAUZE AND CLEAR TEGADERM (provided for you upon discharge and can be found at local [facility] or pharmacy)  Goal(s)	To get better and follow your care plan as instructed.     Follow Up:  Activity	Do not make important decisions, No heavy lifting/straining, Showering allowed, Stairs allowed, Walking - Indoors allowed, Walking - Outdoors allowed  Care Providers for Follow up (PCP/Outpatient Provider)	Musa Villasenor)  Surgery; Thoracic Surgery  [phone] [place] [address]  Phone: [phone]  Fax: [phone]  Follow Up Time:  Patient's Scheduled Appointments	GE PERRY ; 06/18/2021 ; [facility] [address]  GE PERRY ; 06/18/2021 ; [facility] [address]  Additional Scheduled Appointments	WE WILL CALL YOU WITH AN APPOINTMENT TO FOLLOW UP WITH DR. VILLASENOR    IF YOU DO NOT HEAR FROM US PLEASE FOLLOW UP -[phone]

## 2021-06-10 NOTE — DISCHARGE NOTE NURSING/CASE MANAGEMENT/SOCIAL WORK - NSDCFUADDAPPT_GEN_ALL_CORE_FT
WE WILL CALL YOU WITH AN APPOINTMENT TO FOLLOW UP WITH DR. VILLASENOR    IF YOU DO NOT HEAR FROM US PLEASE FOLLOW UP -[phone]

## 2021-06-10 NOTE — DISCHARGE NOTE PROVIDER - NSDCCPCAREPLAN_GEN_ALL_CORE_FT
PRINCIPAL DISCHARGE DIAGNOSIS  Diagnosis: Feeding tube obstruction  Assessment and Plan of Treatment:

## 2021-06-10 NOTE — PROGRESS NOTE ADULT - SUBJECTIVE AND OBJECTIVE BOX
Patient discussed on morning rounds with Dr. Julien    Operation / Date:   5/10 hiatal hernia repair c/b esophageal perforation and emergency esophagectomy  6/7 replacement of j tube by IR    Surgeon: Dr. Alberto      SUBJECTIVE ASSESSMENT AND HOSPITAL COURSE:  71 y.o female with PMHx HTN, HLD (myalgias with statins), and paraesophageal hernia resulting in GERD and esophageal dysmotility. She was evaluated by general surgery for elective repair and on 5/21/21 presented for her RA repair of paraesophageal hernia with partial fundoplication. Intraop course complicated by esophageal perforation for which Dr. Alberto and Dr. Pinto were consulted urgently. EGD performed and tear was not amenable to primary repair. They proceeded with an uncomplicated transhiatal esophagectomy. Post operatively the patient was started on tube feeds via J-tube and was ultimately discharged home on clear liquids and tube feeds on POD13, 6/3. On 6/7 the patient represented to [facility] ED with clogged J tube. Attempts were made to unclog the tube and were unsuccessful. She underwent J tube exchange with IR on 6/7. Unable to obtain connector from Jtube to kangaroo pump (on  back order, pending delivery), 6/8 advanced diet to full liquid with calorie count.  6/10 Patient seen by nutrition with updated reccomendations.       Patient was seen and examined this morning, care was discussed with Dr. Perla and deemed medically appropriate for discharge with outpatient follow up. Patient was hemodynamically stable and afebrile overnight and feels comfortable being discharged home this AM.     Over 35 minutes was spent with the patient reviewing the discharge material including medications, follow up appointments, recovery, concerning symptoms, and how to contact their health care providers if they have questions        Vital Signs Last 24 Hrs  T(C): 36.7 (10 [place] 2021 09:32), Max: 37.1 (09 Jun 2021 14:40)  T(F): 98 (10 Zaire 2021 09:32), Max: 98.7 (09 Jun 2021 14:40)  HR: 85 (10 Z[place] 2021 08:30) (75 - 96)  BP: 118/67 (10 Z[place] 2021 08:30) (117/58 - 134/70)  BP(mean): 83 (10 [place] 2021 08:30) (82 - 96)  RR: 18 (10 Z[place] 2021 08:30) (16 - 20)  SpO2: 96% (10 Zaire 2021 08:30) (92% - 96%)    EPICARDIAL WIRES REMOVED: Yes/No.  TIE DOWNS REMOVED: Yes/No.    PHYSICAL EXAM:  Neuro: A+O x 3, non-focal, speech clear and intact  HEENT: PERRL, EOMI, oral mucosa pink and moist  Neck: supple, no JVD  CV: regular rate, regular rhythm, +S1S2, no murmurs or rub  Pulm/chest: lung sounds CTA and equal bilaterally, no accessory muscle use noted  Abd: soft, NT, ND, +BS, Jtube clamped, dressing c/d/i  Ext: SLOAN x 4, no C/C/E  Skin: warm, well perfused, no rashes     LABS:                        10.9   3.56  )-----------( 520      ( 10 Zaire 2021 06:00 )             34.0         06-10    135  |  99  |  5<L>  ----------------------------<  102<H>  4.0   |  27  |  0.55    Ca    9.8      10 Zaire 2021 06:00

## 2021-06-10 NOTE — DISCHARGE NOTE PROVIDER - NSDCCPTREATMENT_GEN_ALL_CORE_FT
PRINCIPAL PROCEDURE  Procedure: Replacement of feeding tube  Findings and Treatment: J-Tube replacement under IR guidance   TUBE FEED WILL REMAIN CLAMPED. WE HAVE ORDERED A REPLACEMENT PART FOR THE JTUBE FOR YOU. DRESSING CAN BE REPLACED AS NEEDED WITH DRY GAUZE AND CLEAR TEGADERM (provided for you upon discharge and can be found at local [facility] or pharmacy)

## 2021-06-10 NOTE — DISCHARGE NOTE PROVIDER - NSDCFUSCHEDAPPT_GEN_ALL_CORE_FT
GE PERRY ; 06/18/2021 ; [facility] [address]  GE PERRY ; 06/18/2021 ; [facility] [place] [address]

## 2021-06-10 NOTE — DISCHARGE NOTE PROVIDER - CARE PROVIDER_API CALL
Musa Alberto (MD)  Surgery; Thoracic Surgery  [phone] [place] [address]  Phone: [phone]  Fax: [phone]  Follow Up Time:

## 2021-06-10 NOTE — CHART NOTE - NSCHARTNOTEFT_GEN_A_CORE
Admitting Diagnosis:   Patient is a 71y old  Female who presents with a chief complaint of Clogged J-Tube (10 Zaire 2021 12:38)      PAST MEDICAL & SURGICAL HISTORY:  HTN (hypertension)    Hyperlipidemia    Gastric reflux    Diaphragmatic hernia without obstruction or gangrene    Constipation    H/O colonoscopy  x2    History of coronary angiogram    Labs:   06-10    135  |  99  |  5<L>  ----------------------------<  102<H>  4.0   |  27  |  0.55    Ca    9.8      10 Zaire 2021 06:00      CAPILLARY BLOOD GLUCOSE          Medications:  MEDICATIONS  (STANDING):  fluticasone propionate 50 MICROgram(s)/spray Nasal Spray 1 Spray(s) Both Nostrils two times a day  heparin   Injectable 5000 Unit(s) SubCutaneous every 8 hours  lidocaine   Patch 1 Patch Transdermal every 24 hours  pantoprazole   Suspension 40 milliGRAM(s) Oral daily  polyethylene glycol 3350 17 Gram(s) Oral daily  sodium chloride 0.9% lock flush 3 milliLiter(s) IV Push every 8 hours    MEDICATIONS  (PRN):  acetaminophen   Tablet .. 650 milliGRAM(s) Oral every 6 hours PRN Mild Pain (1 - 3)  benzocaine 15 mG/menthol 3.6 mG (Sugar-Free) Lozenge 1 Lozenge Oral every 6 hours PRN Sore Throat  meclizine 25 milliGRAM(s) Oral every 8 hours PRN Dizziness    · Height for BMI (FEET)	4 Feet  · Height for BMI (INCHES)	11 Inch(s)  · Height for BMI (CENTIMETERS)	149.86 Centimeter(s)  · Weight for BMI (lbs)	160.1 lb  · Weight for BMI (kg)	72.6 kg  · Body Mass Index	32.3  · Ideal Body Weight (lbs)	94.7  · Ideal Body Weight (kg)	43    Weight Change: No documented weight changes.    Estimated energy needs: Ideal body weight used for calculations as pt >120% of IBW. (167% IBW).  25-30 kcal/[id number] kcal   1.2-1.4 g/k.6-60.2 g   30-35 mL/k1883-0288 mL    Subjective: Consulted for nutrition education full liquid diet prior to discharge. Family at bedside (daughter). Discussed in depth diet education on full liquid diet, including examples of foods. Provided aid on all questions from pt and family member. Diet education handout "7-Day Full Liquid Diet Sample Meal Plan" was provided. Discussed importance of small frequent meals and focusing on protein dense choices. Low sodium diet education was reinforced and pt was amenable to all diet education. Made aware RD remains available.     Previous Nutrition Diagnosis: Increased nutrient needs (kcal/pro) related to hypermetabolic state AEB post-op needs.     Active [ X  ]  Resolved [   ]    Goal: Pt to meet at least 75% of nutritional needs    Recommendations:  1. Encourage adherence to diet education  2. Encourage small frequent meals with protein and energy rich foods    Education: Full Liquid Diet Education    Risk Level: High [   ] Moderate [   ] Low [   ] Admitting Diagnosis:   Patient is a 71y old  Female who presents with a chief complaint of Clogged J-Tube (10 Zaire 2021 12:38)      PAST MEDICAL & SURGICAL HISTORY:  HTN (hypertension)    Hyperlipidemia    Gastric reflux    Diaphragmatic hernia without obstruction or gangrene    Constipation    H/O colonoscopy  x2    History of coronary angiogram    Labs:   06-10    135  |  99  |  5<L>  ----------------------------<  102<H>  4.0   |  27  |  0.55    Ca    9.8      10 Zaire 2021 06:00      CAPILLARY BLOOD GLUCOSE          Medications:  MEDICATIONS  (STANDING):  fluticasone propionate 50 MICROgram(s)/spray Nasal Spray 1 Spray(s) Both Nostrils two times a day  heparin   Injectable 5000 Unit(s) SubCutaneous every 8 hours  lidocaine   Patch 1 Patch Transdermal every 24 hours  pantoprazole   Suspension 40 milliGRAM(s) Oral daily  polyethylene glycol 3350 17 Gram(s) Oral daily  sodium chloride 0.9% lock flush 3 milliLiter(s) IV Push every 8 hours    MEDICATIONS  (PRN):  acetaminophen   Tablet .. 650 milliGRAM(s) Oral every 6 hours PRN Mild Pain (1 - 3)  benzocaine 15 mG/menthol 3.6 mG (Sugar-Free) Lozenge 1 Lozenge Oral every 6 hours PRN Sore Throat  meclizine 25 milliGRAM(s) Oral every 8 hours PRN Dizziness    · Height for BMI (FEET)	4 Feet  · Height for BMI (INCHES)	11 Inch(s)  · Height for BMI (CENTIMETERS)	149.86 Centimeter(s)  · Weight for BMI (lbs)	160.1 lb  · Weight for BMI (kg)	72.6 kg  · Body Mass Index	32.3  · Ideal Body Weight (lbs)	94.7  · Ideal Body Weight (kg)	43    Weight Change: No documented weight changes.    Estimated energy needs: Ideal body weight used for calculations as pt >120% of IBW. (167% IBW).  25-30 kcal/k5963-1634 kcal   1.2-1.4 g/k.6-60.2 g   30-35 mL/k0319-3729 mL    Subjective: Consulted for nutrition education full liquid diet prior to discharge. Family at bedside (daughter). Discussed in depth diet education on full liquid diet, including examples of foods. Provided aid on all questions from pt and family member. Diet education handout "7-Day Full Liquid Diet Sample Meal Plan" was provided. Discussed importance of small frequent meals and focusing on protein dense choices. Low sodium diet education was reinforced and pt was amenable to all diet education. Made aware RD remains available.     Previous Nutrition Diagnosis: Increased nutrient needs (kcal/pro) related to hypermetabolic state AEB post-op needs.     Active [ X  ]  Resolved [   ]    Goal: Pt to meet at least 75% of nutritional needs    Recommendations:  1. Encourage adherence to diet education  2. Encourage small frequent meals with protein and energy rich foods    Education: Full Liquid Diet Education

## 2021-06-10 NOTE — DISCHARGE NOTE PROVIDER - NSDCFUADDAPPT_GEN_ALL_CORE_FT
WE WILL CALL YOU WITH AN APPOINTMENT TO FOLLOW UP WITH DR. VILLASENOR    IF YOU DO NOT HEAR FROM US PLEASE FOLLOW UP -[phone]

## 2021-06-10 NOTE — DISCHARGE NOTE PROVIDER - HOSPITAL COURSE
71 y.o female with PMHx HTN, HLD (myalgias with statins), and paraesophageal hernia resulting in GERD and esophageal dysmotility. She was evaluated by general surgery for elective repair and on 5/21/21 presented for her RA repair of paraesophageal hernia with partial fundoplication. Intraop course complicated by esophageal perforation for which Dr. Alberto and Dr. Pinto were consulted urgently. EGD performed and tear was not amenable to primary repair. They proceeded with an uncomplicated transhiatal esophagectomy. Post operatively the patient was started on tube feeds via J-tube and was ultimately discharged home on clear liquids and tube feeds on POD13, 6/3. On 6/7 the patient represented to [facility] ED with clogged J tube. Attempts were made to unclog the tube and were unsuccessful. She underwent J tube exchange with IR on 6/7. Unable to obtain connector from Jtube to kangaroo pump (on  back order, pending delivery), 6/8 advanced diet to full liquid with calorie count.  6/10 Patient seen by nutrition with updated reccomendations.       Patient was seen and examined this morning, care was discussed with Dr. Perla and deemed medically appropriate for discharge with outpatient follow up. Patient was hemodynamically stable and afebrile overnight and feels comfortable being discharged home this AM.     Over 35 minutes was spent with the patient reviewing the discharge material including medications, follow up appointments, recovery, concerning symptoms, and how to contact their health care providers if they have questions

## 2021-06-10 NOTE — DISCHARGE NOTE NURSING/CASE MANAGEMENT/SOCIAL WORK - NURSING SECTION COMPLETE
Notification sent via Staff Message to the \"patient \" pool.     RAÚL Barragan. Ok to close chart when done.    Patient/Caregiver provided printed discharge information.

## 2021-06-10 NOTE — DISCHARGE NOTE PROVIDER - NSDCACTIVITY_GEN_ALL_CORE
Showering allowed/Do not make important decisions/Stairs allowed/Walking - Indoors allowed/No heavy lifting/straining/Walking - Outdoors allowed

## 2021-06-10 NOTE — DISCHARGE NOTE NURSING/CASE MANAGEMENT/SOCIAL WORK - PATIENT PORTAL LINK FT
You can access the FollowMyHealth Patient Portal offered by [facility] by registering at the following website: [email or web address]. By joining [facility]’s FollowMyHealth portal, you will also be able to view your health information using other applications (apps) compatible with our system.

## 2021-06-11 ENCOUNTER — APPOINTMENT (OUTPATIENT)
Dept: THORACIC SURGERY | Facility: CLINIC | Age: 71
End: 2021-06-11
Payer: MEDICARE
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
## 2021-06-16 DIAGNOSIS — I10 ESSENTIAL (PRIMARY) HYPERTENSION: ICD-10-CM
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
## 2021-06-16 DIAGNOSIS — J90 PLEURAL EFFUSION, NOT ELSEWHERE CLASSIFIED: ICD-10-CM

## 2021-06-16 DIAGNOSIS — K21.9 GASTRO-ESOPHAGEAL REFLUX DISEASE WITHOUT ESOPHAGITIS: ICD-10-CM

## 2021-06-16 DIAGNOSIS — J38.01 PARALYSIS OF VOCAL CORDS AND LARYNX, UNILATERAL: ICD-10-CM

## 2021-06-16 DIAGNOSIS — K44.9 DIAPHRAGMATIC HERNIA WITHOUT OBSTRUCTION OR GANGRENE: ICD-10-CM

## 2021-06-16 DIAGNOSIS — K91.71 ACCIDENTAL PUNCTURE AND LACERATION OF A DIGESTIVE SYSTEM ORGAN OR STRUCTURE DURING A DIGESTIVE SYSTEM PROCEDURE: ICD-10-CM

## 2021-06-16 DIAGNOSIS — E78.5 HYPERLIPIDEMIA, UNSPECIFIED: ICD-10-CM

## 2021-06-18 ENCOUNTER — APPOINTMENT (OUTPATIENT)
Dept: OTOLARYNGOLOGY | Facility: CLINIC | Age: 71
End: 2021-06-18

## 2021-06-18 ENCOUNTER — APPOINTMENT (OUTPATIENT)
Dept: THORACIC SURGERY | Facility: CLINIC | Age: 71
End: 2021-06-18
Payer: MEDICARE

## 2021-06-18 ENCOUNTER — OUTPATIENT (OUTPATIENT)
Dept: OUTPATIENT SERVICES | Facility: HOSPITAL | Age: 71
LOS: 1 days | End: 2021-06-18
Payer: MEDICARE

## 2021-06-18 VITALS
HEIGHT: 60 IN | OXYGEN SATURATION: 96 % | DIASTOLIC BLOOD PRESSURE: 64 MMHG | SYSTOLIC BLOOD PRESSURE: 119 MMHG | RESPIRATION RATE: 17 BRPM | HEART RATE: 90 BPM | TEMPERATURE: 96.8 F | BODY MASS INDEX: 31.02 KG/M2 | WEIGHT: 158 LBS

## 2021-06-18 DIAGNOSIS — Z98.890 OTHER SPECIFIED POSTPROCEDURAL STATES: Chronic | ICD-10-CM

## 2021-06-18 PROCEDURE — 99024 POSTOP FOLLOW-UP VISIT: CPT

## 2021-06-18 PROCEDURE — 71046 X-RAY EXAM CHEST 2 VIEWS: CPT

## 2021-06-18 PROCEDURE — 71046 X-RAY EXAM CHEST 2 VIEWS: CPT | Mod: 26

## 2021-06-19 DIAGNOSIS — K94.23 GASTROSTOMY MALFUNCTION: ICD-10-CM

## 2021-06-19 DIAGNOSIS — Z88.0 ALLERGY STATUS TO PENICILLIN: ICD-10-CM

## 2021-06-19 DIAGNOSIS — J30.2 OTHER SEASONAL ALLERGIC RHINITIS: ICD-10-CM

## 2021-06-19 DIAGNOSIS — I10 ESSENTIAL (PRIMARY) HYPERTENSION: ICD-10-CM

## 2021-06-19 DIAGNOSIS — E78.5 HYPERLIPIDEMIA, UNSPECIFIED: ICD-10-CM

## 2021-06-19 DIAGNOSIS — Z88.8 ALLERGY STATUS TO OTHER DRUGS, MEDICAMENTS AND BIOLOGICAL SUBSTANCES: ICD-10-CM

## 2021-06-19 DIAGNOSIS — Z91.018 ALLERGY TO OTHER FOODS: ICD-10-CM

## 2021-06-21 ENCOUNTER — APPOINTMENT (OUTPATIENT)
Dept: SURGERY | Facility: CLINIC | Age: 71
End: 2021-06-21
Payer: MEDICARE

## 2021-06-21 VITALS
WEIGHT: 156.44 LBS | HEART RATE: 100 BPM | BODY MASS INDEX: 30.71 KG/M2 | TEMPERATURE: 97 F | OXYGEN SATURATION: 94 % | SYSTOLIC BLOOD PRESSURE: 122 MMHG | DIASTOLIC BLOOD PRESSURE: 74 MMHG | HEIGHT: 60 IN

## 2021-06-21 DIAGNOSIS — Z78.9 OTHER SPECIFIED HEALTH STATUS: ICD-10-CM

## 2021-06-21 DIAGNOSIS — Z86.19 PERSONAL HISTORY OF OTHER INFECTIOUS AND PARASITIC DISEASES: ICD-10-CM

## 2021-06-21 DIAGNOSIS — Z80.0 FAMILY HISTORY OF MALIGNANT NEOPLASM OF DIGESTIVE ORGANS: ICD-10-CM

## 2021-06-21 DIAGNOSIS — K21.9 GASTRO-ESOPHAGEAL REFLUX DISEASE W/OUT ESOPHAGITIS: ICD-10-CM

## 2021-06-21 DIAGNOSIS — Z87.891 PERSONAL HISTORY OF NICOTINE DEPENDENCE: ICD-10-CM

## 2021-06-21 PROCEDURE — 99024 POSTOP FOLLOW-UP VISIT: CPT

## 2021-06-21 NOTE — PHYSICAL EXAM
[Calm] : calm [de-identified] : NAD, comfortable [de-identified] : NCAT, no scleral icterus [de-identified] : Supple, no JVD or cervical lymphadenopathy.  Neck incision healing well. [de-identified] : soft NT ND.  Incisions healing well without erythema or induration.  No evidence of any incisional hernias on Valsalva maneuver.  J tube in place. [de-identified] : No clubbing, cyanosis, or edema. [de-identified] : Warm, dry. [de-identified] : A&Ox3

## 2021-06-21 NOTE — ASSESSMENT
[FreeTextEntry1] : Ms. Katz is a 71-year-old woman who underwent a robotic-assisted paraesophageal hernia repair which was complicated by an esophageal injury and subsequent transhiatal robotic-assisted esophagogastrectomy with anastomosis, and feeding jejunostomy tube placement on May 21, 2021.  She is recovering well and will follow up with me as needed.

## 2021-06-21 NOTE — REASON FOR VISIT
[Post Op: _________] : a [unfilled] post op visit [FreeTextEntry1] : She underwent a robotic-assisted paraesophageal hernia repair which was complicated by an esophageal injury and subsequent transhiatal robotic-assisted esophagogastrectomy with anastomosis, and feeding jejunostomy tube placement on May 21, 2021.

## 2021-06-21 NOTE — CONSULT LETTER
[FreeTextEntry1] : 2021\par \par \par \par Megha Headley M.D.\par [facility]\par [address] [place] [address]\[place] [address]\par Telephone #:  [phone]\par \par \par Re:  Crystal Katz\par :  1950\par \par \par Dear Dr. Headley:\par \par I had the opportunity to see Ms. Katz today for a routine post-operative visit after she underwent a robotic-assisted paraesophageal hernia repair which was complicated by an esophageal injury and subsequent transhiatal robotic-assisted esophagogastrectomy with anastomosis, and feeding jejunostomy tube placement on May 21, 2021.  She is overall feeling well.  She denied fever, chills, nausea, vomiting, diarrhea, or constipation.  She is tolerating a soft diet well.\par \par On physical examination, her height is 5 feet, her weight is 156 pounds, and BMI 30.55.  Her temperature is 97 °F, blood pressure is 122/74, heart rate 100, and O2 saturation is 94% on room air.  In general, she is a well-dressed, well-nourished woman who appears her stated age and is in no acute distress.  She is calm, alert and oriented x 3 and calm.  HEENT exam demonstrates no scleral icterus and a normocephalic atraumatic appearance.  Her abdomen is soft, non-tender, and non-distended.  The incisions are healing well without erythema or induration.  There are no incisional hernias appreciated.  The feeding jejunostomy tube is in place.  Her extremities are warm and dry without clubbing, cyanosis or edema.\par \par In summary, Ms. Katz is a 71-year-old woman who underwent a robotic-assisted paraesophageal hernia repair which was complicated by an esophageal injury and subsequent transhiatal robotic-assisted esophagogastrectomy with anastomosis, and feeding jejunostomy tube placement on May 21, 2021.  She is recovering well and will follow up with me as needed.\par \par Thank you for the opportunity to care for this patient. Please do not hesitate to contact me in the event that you have any questions or concerns about the care of this patient.\par \par Sincerely,\par \par \par \par Heidi Sheehan M.D.\par \par CC:\par Musa Alberto M.D.\par [facility]\par [address]\par [address] [facility]\[place] [address]\par Telephone #: [phone]

## 2021-06-21 NOTE — HISTORY OF PRESENT ILLNESS
[de-identified] : She presented today for a routine post-operative visit.  She is overall feeling well.  She denied fever, chills, nausea, vomiting, diarrhea, or constipation.  She is tolerating a soft diet well. [de-identified] : Ms. Katz presented previously for evaluation and management of a paraesophageal hernia.  She stated the hernia has been present for "years".  She stated her only significant symptom is gas after eating fatty foods.  She denied chest pain, dysphagia, nausea, or heartburn.  She stated Mylanta improves the gassy feeling.  She underwent a robotic-assisted paraesophageal hernia repair which was complicated by an esophageal injury and subsequent transhiatal robotic-assisted esophagogastrectomy with anastomosis, and feeding jejunostomy tube placement on May 21, 2021.

## 2021-06-28 ENCOUNTER — APPOINTMENT (OUTPATIENT)
Dept: OTOLARYNGOLOGY | Facility: CLINIC | Age: 71
End: 2021-06-28
Payer: MEDICARE

## 2021-06-28 VITALS
SYSTOLIC BLOOD PRESSURE: 104 MMHG | HEIGHT: 60 IN | DIASTOLIC BLOOD PRESSURE: 67 MMHG | HEART RATE: 85 BPM | BODY MASS INDEX: 30.63 KG/M2 | WEIGHT: 156 LBS | TEMPERATURE: 97.6 F | OXYGEN SATURATION: 98 %

## 2021-06-28 DIAGNOSIS — R49.0 DYSPHONIA: ICD-10-CM

## 2021-06-28 PROCEDURE — 99214 OFFICE O/P EST MOD 30 MIN: CPT | Mod: 25

## 2021-06-28 PROCEDURE — 31579 LARYNGOSCOPY TELESCOPIC: CPT

## 2021-06-30 PROBLEM — R49.0 MUSCLE TENSION DYSPHONIA: Status: ACTIVE | Noted: 2021-06-30

## 2021-06-30 NOTE — CONSULT LETTER
[Dear  ___] : Dear  [unfilled], [Consult Letter:] : I had the pleasure of evaluating your patient, [unfilled]. [Please see my note below.] : Please see my note below. [Consult Closing:] : Thank you very much for allowing me to participate in the care of this patient.  If you have any questions, please do not hesitate to contact me. [Sincerely,] : Sincerely, [FreeTextEntry3] : Mason Patel MD\par Director; [facility]\par Otolaryngology - Head and Neck Surgery\par [facility]\par \par \par Department of Otolaryngology\par [facility]\par \par [address]\par [facility], 10th Floor\[place], [place], [address]\par Office Tel: [phone]\par \par \par

## 2021-06-30 NOTE — HISTORY OF PRESENT ILLNESS
[de-identified] : 72 yo female who presents with dysphonia.  She underwent partial fundoplication complicated by perforation and then esophagogastroduodenoscopy, Lap Robotic assisted mobilization of gastric conduit, Left cervical incision, esophagogastrectomy and placement of a J-tube for repair.  Post operatively patient had dysphonia, sob, and breathy voice.  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  \par \par Pt now follows up after hospitalization for evaluation.  Overall she notes that her voice is improving, though not back to normal.  Her voice remains breathy, but high pitched.  No issues chewing, eating or swallowing.  She is not short of breath.  She denies any ENT issues otherwise.

## 2021-06-30 NOTE — PHYSICAL EXAM
[Midline] : trachea located in midline position [Normal] : no rashes [FreeTextEntry1] : Hoarse, breathy, high pitched.  MPT 14 sec, adequate projection

## 2021-06-30 NOTE — ASSESSMENT
[FreeTextEntry1] : 70 yo female who presents with dysphonia s/p esophageal surgery 5/21/21.  On exam she remains dysphonic though she has compensated.  Voice is functional.  No issues with swallowing.  On exam there is evidence of left vocal fold paralysis and supraglottic compression to compensate.\par \par We had a discussion regarding treatment including left vocal fold injectio medialization.  This injection would carry her over 6 mo while we wait to see if function of the vocal fold returns.  She would not be a candidate for an in-office awake injection as I suspect she would not tolerate or cooperate for this procedure.  She would therefore require injection in the operating room under anesthesia.  We briefly discussed this and she would like to think about it.  Will follow up next week to see what she chooses.\par \par - observation vs left vocal fold injection medialization, she would like to think about it\par - will follow up with pt next week for decision

## 2021-06-30 NOTE — PROCEDURE
[de-identified] : Flexible Laryngoscopy with Stroboscopy \par Procedure Note\par \par Pre-operative Diagnosis: dysphonia\par Post-operative Diagnosis: left vocal fold paralysis\par Anesthesia: Topical - 1 % Lidocaine/Phenylephrine\par Procedure:  Flexible Laryngoscopy with Stroboscopy\par  \par Procedure Details:  \par The patient was placed in the sitting position.  After decongestant and anesthesia were applied the laryngoscope was passed.  The nasal cavities, nasopharynx, oropharynx, hypopharynx, and larynx were all examined.  Vocal folds were examined during respiration and phonation.  The following findings were noted:\par \par Findings:  \par Nose: Septum is midline, turbinates are normal, nasal airways patent, mucosa normal\par Nasopharynx: Adenoids normal, no masses, eustachian tube normal\par Oropharynx: Pharyngeal walls symmetric and without lesion. Tonsils/fossae symmetric\par Hypopharynx: Hypopharynx and pyriform sinuses without lesion. No masses or asymmetry.  No pooling of secretions.\par Larynx:  Epiglottis and aryepiglottic folds were sharp and crisp bilaterally.  Bilateral false vocal folds normal appearance. Airway was widely patent.\par \par Strobe Exam Ratings\par 		\par TVF Appearance: normal\par TVF Mobility: Left vocal fold is paralyzed\par Edema/hypertrophy: mild\par Mucus on TVF: none\par Glottic Closure: +glottic gap\par Mucosal Wave: reduced\par Amplitude of Vibration: reduced\par Phase: mild asymmetry\par Supraglottic Hyperfunction: +supraglottic compression\par Other Findings: none\par \par Condition: Stable.  Patient tolerated procedure well.\par \par Complications: None\par

## 2021-06-30 NOTE — REASON FOR VISIT
[Initial Consultation] : an initial consultation for [FreeTextEntry2] : dysphonia, left vocal fold paralysis

## 2021-07-02 ENCOUNTER — APPOINTMENT (OUTPATIENT)
Dept: THORACIC SURGERY | Facility: CLINIC | Age: 71
End: 2021-07-02
Payer: MEDICARE

## 2021-07-02 ENCOUNTER — OUTPATIENT (OUTPATIENT)
Dept: OUTPATIENT SERVICES | Facility: HOSPITAL | Age: 71
LOS: 1 days | End: 2021-07-02
Payer: MEDICARE

## 2021-07-02 VITALS
HEART RATE: 93 BPM | BODY MASS INDEX: 30.43 KG/M2 | RESPIRATION RATE: 17 BRPM | TEMPERATURE: 97.4 F | WEIGHT: 155 LBS | OXYGEN SATURATION: 98 % | SYSTOLIC BLOOD PRESSURE: 105 MMHG | DIASTOLIC BLOOD PRESSURE: 63 MMHG | HEIGHT: 60 IN

## 2021-07-02 DIAGNOSIS — Z98.890 OTHER SPECIFIED POSTPROCEDURAL STATES: Chronic | ICD-10-CM

## 2021-07-02 PROCEDURE — 99024 POSTOP FOLLOW-UP VISIT: CPT

## 2021-07-02 PROCEDURE — 71046 X-RAY EXAM CHEST 2 VIEWS: CPT | Mod: 26

## 2021-07-02 PROCEDURE — 71046 X-RAY EXAM CHEST 2 VIEWS: CPT

## 2021-08-05 ENCOUNTER — APPOINTMENT (OUTPATIENT)
Dept: ORTHOPEDIC SURGERY | Facility: CLINIC | Age: 71
End: 2021-08-05
Payer: MEDICARE

## 2021-08-05 ENCOUNTER — OUTPATIENT (OUTPATIENT)
Dept: OUTPATIENT SERVICES | Facility: HOSPITAL | Age: 71
LOS: 1 days | End: 2021-08-05
Payer: MEDICARE

## 2021-08-05 ENCOUNTER — RESULT REVIEW (OUTPATIENT)
Age: 71
End: 2021-08-05

## 2021-08-05 ENCOUNTER — NON-APPOINTMENT (OUTPATIENT)
Age: 71
End: 2021-08-05

## 2021-08-05 VITALS
BODY MASS INDEX: 30.23 KG/M2 | HEART RATE: 82 BPM | SYSTOLIC BLOOD PRESSURE: 128 MMHG | WEIGHT: 154 LBS | HEIGHT: 60 IN | DIASTOLIC BLOOD PRESSURE: 75 MMHG | OXYGEN SATURATION: 98 %

## 2021-08-05 DIAGNOSIS — Z98.890 OTHER SPECIFIED POSTPROCEDURAL STATES: Chronic | ICD-10-CM

## 2021-08-05 DIAGNOSIS — G89.29 PAIN IN RIGHT SHOULDER: ICD-10-CM

## 2021-08-05 DIAGNOSIS — Z72.3 LACK OF PHYSICAL EXERCISE: ICD-10-CM

## 2021-08-05 DIAGNOSIS — M25.511 PAIN IN RIGHT SHOULDER: ICD-10-CM

## 2021-08-05 DIAGNOSIS — M19.019 PRIMARY OSTEOARTHRITIS, UNSPECIFIED SHOULDER: ICD-10-CM

## 2021-08-05 PROCEDURE — 99203 OFFICE O/P NEW LOW 30 MIN: CPT

## 2021-08-05 PROCEDURE — 73030 X-RAY EXAM OF SHOULDER: CPT

## 2021-08-05 PROCEDURE — 73030 X-RAY EXAM OF SHOULDER: CPT | Mod: 26,RT

## 2021-08-05 NOTE — REVIEW OF SYSTEMS
[Negative] : Heme/Lymph [Joint Pain] : joint pain [Eye Pain] : eye pain [Sore Throat] : sore throat [Muscle Weakness] : muscle weakness

## 2021-08-05 NOTE — HISTORY OF PRESENT ILLNESS
[de-identified] : The patient is a 71 year old, lhd woman with h/o HTN, HLD (myalgias with statins), and paraesophageal hernia s/p partial fundoplication presenting with right shoulder/arm pain.\par \par The patient is a retired .\par \par She presents with a multiple year history of chronic, atraumatic, progressive right shoulder pain and stiffness.  She endorses crepitus with shoulder movement.  She has a history of cervical degenerative disc disease, and has had left-sided trigger point injections in the past.  Right shoulder pain different in character from left.  The patient denies distal numbness, weakness, paresthesias.  She has tried topical arnica and salonpas.\par \par Pain is rated 5/10, described as achy, improved with tylenol, worse with lying on affected side. [5] : a current pain level of 5/10

## 2021-08-05 NOTE — DISCUSSION/SUMMARY
[Medication Risks Reviewed] : Medication risks reviewed [de-identified] : The patient is a 71 year old, lhd woman with h/o HTN, HLD (myalgias with statins), and paraesophageal hernia s/p partial fundoplication presenting with chronic right shoulder pain and stiffnes likely secondary to glenohumeral osteoarthritis.\par \par Imaging/Diagnostics/Medical Records were interpreted and/or reviewed and results were reviewed with the patient in detail.  All questions were answered appropriately.\par \par I discussed the treatment of degenerative arthritis with the patient at length today. I described the spectrum of treatment from nonoperative modalities to total joint arthroplasty. Noninvasive and nonoperative treatment modalities include weight reduction, activity modification with low impact exercise, PRN use of acetaminophen or anti-inflammatory medication if tolerated, natural supplements such as glucosamine/chondroitin, and physical therapy. Further treatments can include corticosteroid injection, hyaluronic acid injections, and orthobiologic such as PRP. Definitive treatment can certainly include total joint arthroplasty, but patient would require surgical consultation to discuss that option further. The risks and benefits of each treatment options was discussed and all questions were answered.\par \par \par Patient understands that they may require surgery in the future.  Patient defers at this time.\par \par After informed patient consent, it has been agreed upon to trial a course of conservative, nonoperative treatment.\par \par Patient was prescribed a course of physical therapy today.  The patient was also provided some general home exercises.  The patient was counseled on activity modification.\par \par May consider GH joint injections in the future..\par \par Follow-up in 6-8 weeks after PT.\par \par ------------------------------------------------------------------------------------------------------------------\par Patient appreciates and agrees with current plan.\par \par The patient's diagnosis above was evaluated by me, personally.  Diagnostic Testing and treatment options were discussed with the patient in detail.  The risks/benefits/potential complications of diagnostic testing/treatments were described in detail.  \par \par This note was generated using a mixture of manual typing and dragon medical dictation software.  A reasonable effort has been made for proofreading its contents, but typos may still remain.  If there are any questions or points of clarification needed please notify my office.\par \par \par >30 minutes of time was spent on total encounter.  >50% of the visit was spent on face-to-face counseling/coordination of care and medical-decision making for this patient.\par

## 2021-08-05 NOTE — PHYSICAL EXAM
[de-identified] : General: Well-nourished, well-developed, alert, and in no acute distress.\par Head: Normocephalic.\par Eyes: Pupils equal round reactive to light and accommodation, extraocular muscles intact, normal sclera.\par Nose: No nasal discharge.\par Cardiac: Regular rate. Extremities are warm and well perfused. Distal pulses are symmetric bilaterally.\par Respiratory: No labored breathing.\par Extremities: Sensation is intact distally bilaterally.  Distal pulses are symmetric bilaterally\par Lymphatic: No regional lymphadenopathy, no lymphedema\par Neurologic: No focal deficits\par Skin: Normal skin color, texture, and turgor\par Psychiatric: Normal affect\par MSK: as noted above/below\par \par \par \par RIGHT SHOULDER:\par  \par Inspection:no bruising, rash, erythema, deformity\par Joint Effusion:no\par ROM:DECREASED IN ALL PLANES, FF/ABDUCTION ~130, ER ~60, IR TO LOW LUMBAR\par Palpation: MILD GH PAIN, NO AC joint pain, Bicipital Groove Pain , Clavicle pain , GT pain \par Distal Pulses:normal\par Upper Extremity Reflexes:2+\par Shoulder Strength: 5/5 \par Upper Extremity Sensation: normal\par \par Special Tests:\par Empty Can: MILDLY PAINFUL\par Lift-Off Test: Negative \par Cross Arm: POSITIVE\par Neer: Negative\par Quinteros: POSITIVE\par Speed: Negative\par \par \par LEFT SHOULDER:\par  \par Inspection:no bruising, rash, erythema, deformity\par Joint Effusion:no\par ROM:normal Forward Flexion, Abduction , Internal Rotation: , External Rotation \par Palpation: NO AC joint pain, Bicipital Groove Pain , GH joint pain , Clavicle pain , GT pain \par Distal Pulses:normal\par Upper Extremity Reflexes:2+\par Shoulder Strength: 5/5 \par Upper Extremity Sensation: normal\par \par Special Tests:\par Empty Can: Negative \par Lift-Off Test: Negative \par Cross Arm: Negative\par Neer: Negative\par Quinteros: Negative\par Speed: Negative\par \par \par  [de-identified] : Xray RIGHT Shoulder - Multiple views were reviewed with the patient in detail.  There is no acute fracture or dislocation.  There is no joint effusion.  Mild AC joint arthrosis.  Severe glenohumeral joint space narrowing and arthrosis.  We will await formal radiology reading.\par

## 2021-09-03 ENCOUNTER — APPOINTMENT (OUTPATIENT)
Dept: THORACIC SURGERY | Facility: CLINIC | Age: 71
End: 2021-09-03
Payer: MEDICARE

## 2021-09-03 ENCOUNTER — OUTPATIENT (OUTPATIENT)
Dept: OUTPATIENT SERVICES | Facility: HOSPITAL | Age: 71
LOS: 1 days | End: 2021-09-03
Payer: MEDICARE

## 2021-09-03 DIAGNOSIS — S27.819A: ICD-10-CM

## 2021-09-03 DIAGNOSIS — Z98.890 OTHER SPECIFIED POSTPROCEDURAL STATES: Chronic | ICD-10-CM

## 2021-09-03 PROCEDURE — 99214 OFFICE O/P EST MOD 30 MIN: CPT

## 2021-09-03 PROCEDURE — 71046 X-RAY EXAM CHEST 2 VIEWS: CPT | Mod: 26

## 2021-09-03 PROCEDURE — 71046 X-RAY EXAM CHEST 2 VIEWS: CPT

## 2021-09-08 NOTE — HISTORY OF PRESENT ILLNESS
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 She was evaluated by general surgery for elective repair and on 5/21/21 presented for her RA repair of paraesophageal hernia with partial fundoplication. Intraop course complicated by esophageal perforation. She is s/p ED, Laparoscopic roboticassisted mobilization and preparation of gastric conduit, Left cervical incision, Esophagogastrectomy, Placement of a jejunostomy tube on 5/21/21.\par \par Patient presents today for a follow-up visit. Patient doing well, tolerating PO intake well. Denies dysphagia, abdominal pain, nausea, vomiting, diarrhea, constipation, weight loss.

## 2021-09-08 NOTE — CONSULT LETTER
[FreeTextEntry3] : Musa Alberto MD\par Professor, Cardiovascular & Thoracic Surgery\par [facility]\par Director of [facility] \par Director of Thoracic Surgery, [facility]\par \par [facility]\par [address]\par [facility] 4th Floor\par [place] [address]\par Phone: [phone]\par Fax: [phone]

## 2021-09-08 NOTE — END OF VISIT
[Time Spent: ___ minutes] : I have spent [unfilled] minutes of time on the encounter. [FreeTextEntry3] : I, DEWAYNE JEAN , am scribing for and in the presence of PARAG VILLASENOR the following sections: history of present illness, past medical/family/surgical/family/social history, review of systems, vital signs, physical exam, and disposition.\par

## 2021-09-08 NOTE — ASSESSMENT
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 She was evaluated by general surgery for elective repair and on 5/21/21 presented for her RA repair of paraesophageal hernia with partial fundoplication. Intraop course complicated by esophageal perforation. She is s/p EGD, Laparoscopic roboticassisted mobilization and preparation of gastric conduit, Left cervical incision, Esophagogastrectomy, Placement of a jejunostomy tube on 5/21/21.\par \par Patient presents today for a follow-up visit. Patient doing well, tolerating PO intake well. Denies dysphagia, abdominal pain, nausea, vomiting, diarrhea, constipation, weight loss. \par \par CXR today reveals elevation of right hemidiaphragm. She denies SOB at rest. \par \par I have reviewed the patient's medical records and diagnostic images at the time of this office consultation and have made the following recommendation.\par Plan:\par 1. Rx omeprazole\par 2. CXR in 6 months

## 2021-09-14 ENCOUNTER — APPOINTMENT (OUTPATIENT)
Dept: OTOLARYNGOLOGY | Facility: CLINIC | Age: 71
End: 2021-09-14
Payer: MEDICARE

## 2021-09-14 VITALS
TEMPERATURE: 96.4 F | HEIGHT: 60 IN | WEIGHT: 150 LBS | OXYGEN SATURATION: 97 % | BODY MASS INDEX: 29.45 KG/M2 | DIASTOLIC BLOOD PRESSURE: 75 MMHG | HEART RATE: 80 BPM | SYSTOLIC BLOOD PRESSURE: 111 MMHG

## 2021-09-14 DIAGNOSIS — J38.01 PARALYSIS OF VOCAL CORDS AND LARYNX, UNILATERAL: ICD-10-CM

## 2021-09-14 PROCEDURE — 31579 LARYNGOSCOPY TELESCOPIC: CPT

## 2021-09-14 PROCEDURE — 92612 ENDOSCOPY SWALLOW (FEES) VID: CPT

## 2021-09-14 PROCEDURE — 99215 OFFICE O/P EST HI 40 MIN: CPT | Mod: 25

## 2021-09-14 NOTE — PHYSICAL EXAM
[Midline] : trachea located in midline position [Normal] : orientation to person, place, and time: normal [Laryngoscopy Performed] : laryngoscopy was performed, see procedure section for findings [FreeTextEntry1] : voice clear, adequate projection, normal glide

## 2021-09-14 NOTE — REVIEW OF SYSTEMS
[As Noted in HPI] : as noted in HPI [Negative] : Head and Neck [FreeTextEntry1] : All other ROS negative

## 2021-09-14 NOTE — PROCEDURE
[de-identified] : Laryngoscopy: Flexible Laryngoscopy with Stroboscopy \par Procedure Note\par \par Pre-operative Diagnosis: dysphonia, dysphagia\par Post-operative Diagnosis: left vocal fold paresis, but improved with more movement from previous.\par Anesthesia: Topical - 1 % Lidocaine/Phenylephrine\par Procedure: Flexible Laryngoscopy with Stroboscopy\par  \par Procedure Details: \par The patient was placed in the sitting position. After decongestant and anesthesia were applied the laryngoscope was passed. The nasal cavities, nasopharynx, oropharynx, hypopharynx, and larynx were all examined. Vocal folds were examined during respiration and phonation. The following findings were noted:\par \par Findings: \par Nose: Septum is midline, turbinates are normal, nasal airways patent, mucosa normal\par Nasopharynx: Adenoids normal, no masses, eustachian tube normal\par Oropharynx: Pharyngeal walls symmetric and without lesion. Tonsils/fossae symmetric\par Hypopharynx: Hypopharynx and pyriform sinuses without lesion. No masses or asymmetry. Mild pooling of secretions in the esophageal inlet.\par Larynx: Epiglottis and aryepiglottic folds were sharp and crisp bilaterally. Bilateral false vocal folds normal appearance. Airway was widely patent.\par \par Strobe Exam Ratings\par 		\par TVF Appearance: normal\par TVF Mobility: left vocal fold paresis\par Edema/hypertrophy: mild \par Mucus on TVF: none\par Glottic Closure: adequate\par Mucosal Wave:  reduced\par Amplitude of Vibration: reduced \par Phase: mild asymmetry\par Supraglottic Hyperfunction: mild\par Other Findings: post-cricoid edema\par \par Condition: Stable. Patient tolerated procedure well.\par \par Complications: None\par --------------------------------------------------------\par Fiberoptic Endoscopic Evaluation of Swallow (FEES) - cpt 11421 :\par \par Water:\par aspiration: none\par penetration: none\par \par Thickened:\par aspiration: none\par penetration: none\par \par Apple Sauce:\par aspiration: none\par penetration: none\par \par Odell Cracker:\par aspiration: none\par penetration: none\par \par

## 2021-09-14 NOTE — HISTORY OF PRESENT ILLNESS
[de-identified] : 72 yo female who presents with dysphonia. She underwent partial fundoplication complicated by perforation and then esophagogastroduodenoscopy, Lap Robotic assisted mobilization of gastric conduit, Left cervical incision, esophagogastrectomy and placement of a J-tube for repair. Post operatively patient had dysphonia, sob, and breathy voice. 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 \par \par Pt now follows up after hospitalization for evaluation. Overall she notes that her voice is improving, though not back to normal. Her voice remains breathy, but high pitched. No issues chewing, eating or swallowing. She is not short of breath. She denies any ENT issues otherwise. \par - [FreeTextEntry1] : Update 9/14/21:\par Pt overall well and stable.  Her voice has improved.  She presents with throat discomfort with swallowing, feels food getting stuck in her throat.  She now has to eat 6 small meals a day.  Does not cough/choke/regurgitation on liquids or solids.  She denies weight loss.  She still drinks ensure to help with calories.  No breathing issues. She denies any other ENT complaints.

## 2021-09-14 NOTE — ASSESSMENT
[FreeTextEntry1] : 70 yo female who presents with dysphonia s/p esophageal surgery 5/21/21. On exam, her voice has improved and her left true vocal fold function has partially recovered with adequate glottic closure.  I suspect she will see more recovery over the next few months.\par \par She now reports dysphagia and globus sensation.  On exam, she had a normal FEES and post-cricoid edema.  I believe her symptoms are likely due to laryngopharyngeal reflux.  I recommend observation and follow-up in 3 months.  If her symptoms persist, I would then recommend a modified barium swallow study and swallowing evaluation with SLP.\par \par - f/u 3 months\par - If her symptoms persist, we will plan for a modified barium swallow study and swallowing evaluation with SLP.\par

## 2021-09-21 ENCOUNTER — EMERGENCY (EMERGENCY)
Facility: HOSPITAL | Age: 71
LOS: 1 days | Discharge: HOME | End: 2021-09-21
Attending: EMERGENCY MEDICINE | Admitting: EMERGENCY MEDICINE
Payer: MEDICARE

## 2021-09-21 VITALS
SYSTOLIC BLOOD PRESSURE: 128 MMHG | HEIGHT: 59 IN | TEMPERATURE: 98 F | HEART RATE: 98 BPM | OXYGEN SATURATION: 99 % | RESPIRATION RATE: 18 BRPM | DIASTOLIC BLOOD PRESSURE: 85 MMHG | WEIGHT: 134.92 LBS

## 2021-09-21 VITALS
SYSTOLIC BLOOD PRESSURE: 123 MMHG | OXYGEN SATURATION: 99 % | TEMPERATURE: 98 F | RESPIRATION RATE: 18 BRPM | DIASTOLIC BLOOD PRESSURE: 79 MMHG | HEART RATE: 78 BPM

## 2021-09-21 DIAGNOSIS — Z91.018 ALLERGY TO OTHER FOODS: ICD-10-CM

## 2021-09-21 DIAGNOSIS — Z86.010 PERSONAL HISTORY OF COLONIC POLYPS: ICD-10-CM

## 2021-09-21 DIAGNOSIS — Z98.890 OTHER SPECIFIED POSTPROCEDURAL STATES: Chronic | ICD-10-CM

## 2021-09-21 DIAGNOSIS — I10 ESSENTIAL (PRIMARY) HYPERTENSION: ICD-10-CM

## 2021-09-21 DIAGNOSIS — Z88.8 ALLERGY STATUS TO OTHER DRUGS, MEDICAMENTS AND BIOLOGICAL SUBSTANCES STATUS: ICD-10-CM

## 2021-09-21 DIAGNOSIS — K21.9 GASTRO-ESOPHAGEAL REFLUX DISEASE WITHOUT ESOPHAGITIS: ICD-10-CM

## 2021-09-21 DIAGNOSIS — Z88.0 ALLERGY STATUS TO PENICILLIN: ICD-10-CM

## 2021-09-21 DIAGNOSIS — R42 DIZZINESS AND GIDDINESS: ICD-10-CM

## 2021-09-21 DIAGNOSIS — E78.5 HYPERLIPIDEMIA, UNSPECIFIED: ICD-10-CM

## 2021-09-21 PROCEDURE — 99284 EMERGENCY DEPT VISIT MOD MDM: CPT | Mod: 25

## 2021-09-21 PROCEDURE — 82962 GLUCOSE BLOOD TEST: CPT

## 2021-09-21 PROCEDURE — 93005 ELECTROCARDIOGRAM TRACING: CPT

## 2021-09-21 PROCEDURE — 99283 EMERGENCY DEPT VISIT LOW MDM: CPT

## 2021-09-21 PROCEDURE — 93010 ELECTROCARDIOGRAM REPORT: CPT

## 2021-09-21 NOTE — ED ADULT NURSE NOTE - CHIEF COMPLAINT QUOTE
72 y/o female c/o "My blood pressure was high this morning." Pt reports 146/95 at home, now 128/85. Pt reports feeling lightheaded now that she arrived to ED, reports she was not taking BP medications for over a month, also reports that she did not eat breakfast today. Pt is prescribed amlodipine 10 mg daily.

## 2021-09-21 NOTE — ED ADULT TRIAGE NOTE - CHIEF COMPLAINT QUOTE
70 y/o female c/o "My blood pressure was high this morning." Pt reports 146/95 at home, now 128/85. Pt reports feeling lightheaded now that she arrived to ED, reports she was not taking BP medications for over a month, also reports that she did not eat breakfast today. Pt is prescribed amlodipine 10 mg daily.

## 2021-09-21 NOTE — ED PROVIDER NOTE - NSFOLLOWUPINSTRUCTIONS_ED_ALL_ED_FT
Hypertension    Hypertension, commonly called high blood pressure, is when the force of blood pumping through your arteries is too strong. Hypertension forces your heart to work harder to pump blood. Your arteries may become narrow or stiff. Having untreated or uncontrolled hypertension for a long period of time can cause heart attack, stroke, kidney disease, and other problems. If started on a medication, take exactly as prescribed by your health care professional. Maintain a healthy lifestyle and follow up with your primary care physician.    SEEK IMMEDIATE MEDICAL CARE IF YOU HAVE ANY OF THE FOLLOWING SYMPTOMS: severe headache, confusion, chest pain, abdominal pain, vomiting, or shortness of breath.    Follow up with your doctor as discussed next week.   While awaiting your appointment, if you note 3 consecutive bp readings >130 Systolic Blood Pressure (upper number) or >80 Diastolic Blood Pressure (lower number), resume taking 5mg of your Amlodipine.

## 2021-09-21 NOTE — ED ADULT NURSE NOTE - OBJECTIVE STATEMENT
pt states she took her blood pressure this AM and it was elevated so she came to the ER. pt BP normal upon arrival to ER. pt asymptomatic and denies any other complaints.

## 2021-09-21 NOTE — ED PROVIDER NOTE - PATIENT PORTAL LINK FT
You can access the FollowMyHealth Patient Portal offered by [facility] by registering at the following website: [email or web address]. By joining [employer]’s FollowMyHealth portal, you will also be able to view your health information using other applications (apps) compatible with our system.

## 2021-09-21 NOTE — ED PROVIDER NOTE - CHPI ED SYMPTOMS NEG
no headache, tingling, weakness, tingling, changes to speech or gait/no chest pain/no nausea/no vomiting

## 2021-09-21 NOTE — ED PROVIDER NOTE - CLINICAL SUMMARY MEDICAL DECISION MAKING FREE TEXT BOX
70 y/o F w/ concerns for elevated BP (SBP 140s/ DBP 90s) over past 2 days after recent discontinued amlodipine 3 weeks ago. Normal exam. Stable vitals. Pt looks well. EKG and fingerstick obtained and WNL. Plan for reassurance and outpatient followup w/ primary care to reassess need for BP support.

## 2021-09-21 NOTE — ED PROVIDER NOTE - OBJECTIVE STATEMENT
70 y/o F Hx w/ Hx (recent discontinued amlodipine 10 mg daily on 9/4/21, as per communication w/ PCP, given ambulatory BP reading that were normal to low w/ -110s), presents now w/ concern over BP reading at home X2 days, w/ lightheadedness. Denies CP, headache, N/V, weakness, numbness, tingling, or changes to speech or    gait.

## 2021-10-07 ENCOUNTER — EMERGENCY (EMERGENCY)
Facility: HOSPITAL | Age: 71
LOS: 1 days | Discharge: HOME | End: 2021-10-07
Attending: EMERGENCY MEDICINE | Admitting: EMERGENCY MEDICINE
Payer: MEDICARE

## 2021-10-07 VITALS
DIASTOLIC BLOOD PRESSURE: 80 MMHG | TEMPERATURE: 98 F | RESPIRATION RATE: 16 BRPM | WEIGHT: 141.98 LBS | SYSTOLIC BLOOD PRESSURE: 137 MMHG | OXYGEN SATURATION: 97 % | HEART RATE: 80 BPM | HEIGHT: 59 IN

## 2021-10-07 VITALS
OXYGEN SATURATION: 99 % | TEMPERATURE: 98 F | DIASTOLIC BLOOD PRESSURE: 80 MMHG | SYSTOLIC BLOOD PRESSURE: 132 MMHG | RESPIRATION RATE: 16 BRPM | HEART RATE: 75 BPM

## 2021-10-07 DIAGNOSIS — Z88.8 ALLERGY STATUS TO OTHER DRUGS, MEDICAMENTS AND BIOLOGICAL SUBSTANCES: ICD-10-CM

## 2021-10-07 DIAGNOSIS — K44.9 DIAPHRAGMATIC HERNIA WITHOUT OBSTRUCTION OR GANGRENE: ICD-10-CM

## 2021-10-07 DIAGNOSIS — Z98.890 OTHER SPECIFIED POSTPROCEDURAL STATES: Chronic | ICD-10-CM

## 2021-10-07 DIAGNOSIS — R51.9 HEADACHE, UNSPECIFIED: ICD-10-CM

## 2021-10-07 DIAGNOSIS — K21.9 GASTRO-ESOPHAGEAL REFLUX DISEASE WITHOUT ESOPHAGITIS: ICD-10-CM

## 2021-10-07 DIAGNOSIS — Z91.018 ALLERGY TO OTHER FOODS: ICD-10-CM

## 2021-10-07 DIAGNOSIS — E78.00 PURE HYPERCHOLESTEROLEMIA, UNSPECIFIED: ICD-10-CM

## 2021-10-07 DIAGNOSIS — E78.5 HYPERLIPIDEMIA, UNSPECIFIED: ICD-10-CM

## 2021-10-07 DIAGNOSIS — R42 DIZZINESS AND GIDDINESS: ICD-10-CM

## 2021-10-07 DIAGNOSIS — Z98.890 OTHER SPECIFIED POSTPROCEDURAL STATES: ICD-10-CM

## 2021-10-07 DIAGNOSIS — I10 ESSENTIAL (PRIMARY) HYPERTENSION: ICD-10-CM

## 2021-10-07 DIAGNOSIS — I44.4 LEFT ANTERIOR FASCICULAR BLOCK: ICD-10-CM

## 2021-10-07 DIAGNOSIS — Z88.0 ALLERGY STATUS TO PENICILLIN: ICD-10-CM

## 2021-10-07 DIAGNOSIS — Z91.048 OTHER NONMEDICINAL SUBSTANCE ALLERGY STATUS: ICD-10-CM

## 2021-10-07 DIAGNOSIS — Z79.811 LONG TERM (CURRENT) USE OF AROMATASE INHIBITORS: ICD-10-CM

## 2021-10-07 PROCEDURE — G1004: CPT

## 2021-10-07 PROCEDURE — 99284 EMERGENCY DEPT VISIT MOD MDM: CPT | Mod: 25

## 2021-10-07 PROCEDURE — 70450 CT HEAD/BRAIN W/O DYE: CPT | Mod: MG

## 2021-10-07 PROCEDURE — 93005 ELECTROCARDIOGRAM TRACING: CPT

## 2021-10-07 PROCEDURE — 70450 CT HEAD/BRAIN W/O DYE: CPT | Mod: 26,MG

## 2021-10-07 PROCEDURE — 93010 ELECTROCARDIOGRAM REPORT: CPT

## 2021-10-07 RX ORDER — ACETAMINOPHEN 500 MG
650 TABLET ORAL ONCE
Refills: 0 | Status: COMPLETED | OUTPATIENT
Start: 2021-10-07 | End: 2021-10-07

## 2021-10-07 RX ADMIN — Medication 650 MILLIGRAM(S): at 03:40

## 2021-10-07 RX ADMIN — Medication 650 MILLIGRAM(S): at 03:02

## 2021-10-07 NOTE — ED ADULT TRIAGE NOTE - CADM TRG TX PRIOR TO ARRIVAL
Admission Date: 02/07/2018  Discharge Date: 02/08/2018    PRIMARY CARE PHYSICIAN:  Dr. Yusef Lim.    DISCHARGE DISPOSITION:  Home.    DISCHARGE CONDITION:  Satisfactory.    DIAGNOSES:  1.  Left-sided pulmonary embolism, pleuritic chest pain.  2.  Hypertension.  3.  Coronary artery disease.    INPATIENT CONSULTANTS:   PERT team.    DISCHARGE MEDICATIONS:  Per discharge medication reconciliation.    DISCHARGE ACTIVITY:  As tolerated.    DISCHARGE DIET:  Cardiac.    FOLLOW UP RECOMMENDATIONS:  Follow up with PCP in 1 week from discharge.    BRIEF HOSPITAL COURSE:  This 71-year-old patient was admitted by Dr. Shukla with a the chief complaint of pleuritic chest pain.  Please refer to the history and physical note for further details.  The symptoms were experienced after he was shoveling snow outside his home.  He denied any significant shortness of breath, syncope, etc.  CT angiogram of chest performed in the emergency was noted for solitary subsegment left posterior basal pulmonary embolism, scattered ground glass opacities most relevant in the bilateral lung bases, noted for left renal lesion measuring 1.1 cm mildly hyperdense.  He was started on IV Heparin and admitted for further management.  The following morning, patient felt better with controlled pleuritic pain.  No respiratory distress was noted.  An echocardiogram performed did not reveal any significant RV strain.  An ultrasound of lower extremities will be performed.  The patient will be discharged on Eliquis, which he will be required to complete for at least 6 months.    FOLLOWUP PLAN:  Complete 6 months of anticoagulation.  Follow up on renal lesion with outpatient ultrasound/CT.  Primary care physician requested.      His examination at time of discharge is satisfactory.    Approximately 20 minutes spent on this process.      Dictated By: Shaka Escobar MD  Signing Provider: MD KENYETTA Zavala/dian ([id number])  DD: 02/08/2018 14:25:16 TD:  02/08/2018 14:42:11    Copy Sent To:    none

## 2021-10-07 NOTE — ED PROVIDER NOTE - PHYSICAL EXAMINATION
CONSTITUTIONAL: NAD   SKIN: Normal color and turgor.    HEAD: NC/AT.  Tenderness posterior scalp at site of reported chronic pain (muscle insertion site).    EYES: Conjunctiva clear. EOMI. PERRL.    ENT: Airway clear. Normal voice.   RESPIRATORY:  Normal work of breathing. Lungs CTAB.  CARDIOVASCULAR:  RRR, S1S2. No M/R/G.   No carotid bruits.    GI:  Abdomen soft, nontender.    MSK: Neck supple.  No lower extremity edema or calf tenderness.  No joint swelling or ROM limitation.  NEURO: Alert and oriented; CN II-XII grossly intact. Speech clear. 5/5 strength in all extremities.  SILT throughout. Good balance. Steady gait. Romberg neg. F-N normal. Heel-shin normal.

## 2021-10-07 NOTE — ED ADULT NURSE NOTE - OBJECTIVE STATEMENT
Pt presents with c/o intermittent "lightheadedness" x one day. Took B/P and noted it was "high", reporting systolic of 150. Took Rx Amlodipine at approx 2300, states she called ambulance because B/P was "high". Noted 137/82, denies any c/p or palpitations.  lightheaded    neuro exam non-focal

## 2021-10-07 NOTE — ED PROVIDER NOTE - CLINICAL SUMMARY MEDICAL DECISION MAKING FREE TEXT BOX
Elevated BP reading, improved in ED.  No concerning symptoms.  Chronic posterior head pain, seems to be related to scalp/muscular.  Nonfocal neuro exam, do not suspect posterior circulation problem.  Pt wants head CT, though I feel doubt any acute pathology.  EKG nonischemic.

## 2021-10-07 NOTE — ED PROVIDER NOTE - NSFOLLOWUPINSTRUCTIONS_ED_ALL_ED_FT
Follow up with Dr Headley in 1-2 days.  Return to the Emergency Department if you have any new or worsening symptoms, or if you have any concerns.  ==========================    How to Take a Blood Pressure     WHAT YOU NEED TO KNOW:    Blood pressure (BP) is the force of blood pushing on the walls of your arteries. Your BP results are written as 2 numbers. The first, or top, number is called systolic BP. This is the pressure caused by your heart pushing blood out to your body. The second, or bottom, number is called diastolic BP. This is the pressure when your heart relaxes and fills back up with blood. Ask your healthcare provider what your BP should be. For most people, a good BP goal is less than 120/80.    Blood Pressure Readings         DISCHARGE INSTRUCTIONS:    Call your doctor if:   •Your BP is higher or lower than you were told it should be.      •You have questions or concerns about your condition or care.      Why you may need to take your BP: You may not have any signs or symptoms of high BP. You may need to take your BP regularly to know how often your BP is high. High BP increases your risk for a stroke, heart attack, or kidney disease. You may need to take medicine to keep your BP at a normal level. Write down and keep a log of your BP. Your healthcare provider can use the BP results in your log to see if your BP medicines are working.    How often to take your BP: Your healthcare provider may recommend that you take your BP 2 times a day. Take your BP at the same times each day, such as the morning and evening. Ask your healthcare provider when and how often you should take your BP.    How to take your BP: You can take your BP at home with a digital BP monitor. Read the instructions that came with your BP monitor. The monitor comes with an adjustable cuff. Ask your healthcare provider if your cuff is the correct size.  •Do not eat, drink, smoke, or exercise for 30 minutes before you take your BP.      •Rest quietly for 5 minutes before you take your BP.      •Sit with your feet flat on the floor and your back against a chair.      •Extend your arm and support it on a flat surface. Your arm should be at the same level as your heart.      •Make sure all of the air is out of the cuff. Place the BP cuff against your bare skin about 1 inch (2.5 cm) above your elbow. Wrap the cuff snugly around your arm. The BP reading may not be correct if the cuff is too loose.      •If you are using a wrist cuff, wrap the cuff snugly around your wrist. Hold your wrist at the same level as your heart.      •Turn on the BP monitor and follow the directions.      •Write down your BP, the date, the time, and which arm you used to take the BP. Take your BP 2 times and write down both readings. Use the same arm each time. These BP readings can be 1 minute apart.  How to take a Blood Pressure           What else you need to know:   •Do not take a BP reading in an arm that is injured or has an IV or shunt.      •Take your BP medicines as directed. Do not stop taking your medicines if your BP is at your goal. A BP at your goal means your medicine is working correctly.      Follow up with your doctor as directed: Bring the log of your BP readings. Also bring the BP machine. Healthcare providers can check that you are using the machine correctly. Write down your questions so you remember to ask them during your visits.

## 2021-10-07 NOTE — ED ADULT TRIAGE NOTE - CHIEF COMPLAINT QUOTE
Pt presents with c/o intermittent "lightheadedness" x one day. Took B/P and noted it was "high", reporting systolic of 150. Took Rx Amlodipine at approx 2300, states she called ambulance because B/P was "high". Noted 137/82, denies any c/p or palpitations.

## 2021-10-07 NOTE — ED PROVIDER NOTE - CARE PROVIDER_API CALL
Megha Headley  INTERNAL MEDICINE  [address] [id number]  Phone: [phone]  Fax: [phone]  Follow Up Time:

## 2021-10-07 NOTE — ED PROVIDER NOTE - ATTENDING CONTRIBUTION TO CARE
71F hx htn, high chol, concerned that her BP was elevated.  pt states her PMD recently stopped her amlodipine.  pt states felt lightheaded earlier took her BP and it was 148/100.  no chest pain. no SOB.  states chronic left sided headache for a long time.  no dizziness currently.  gen- nad  heent- ncat, clear conj  cv -rrr  lungs -ctab  abd - soft, nt, nd  ext -wwp, no edema  neuro -aox3, steady gait, cheung  no acute st/t wave changes on EKG, CT head unremarkable.  recommend f/u with PMD for continued BP mgmt.

## 2021-10-07 NOTE — ED PROVIDER NOTE - PATIENT PORTAL LINK FT
You can access the FollowMyHealth Patient Portal offered by [facility] by registering at the following website: [email or web address]. By joining [employer]’s FollowMyHealth portal, you will also be able to view your health information using other applications (apps) compatible with our system.

## 2021-10-07 NOTE — ED PROVIDER NOTE - OBJECTIVE STATEMENT
72 yo fem with xh GERD, HTN, HLD worried about her blood pressure.  Her physician stopped her amlodipine several weeks ago because BP improved with weight loss.  She checks it several times today, showed me her BP log tonight, generally well controlled in low 100s up to 130s. She says she was feeling mildly lightheaded earlier today, and warm.  She thought it was allergies.  Tonight /100 she became worried and called 911.  There has been no spiining/vertigo, visual chagnes,  CP/palpitations, SOB/QUEEN, cough, focal weakness or numbness.  Reports a chronic left sided posterior headache "for years" that radiates up from her left posterolateral neck/trapezius region, reports herniated cervical disc.  She wants imaging of this.      BP here is not significantly elevated.

## 2021-12-02 ENCOUNTER — APPOINTMENT (OUTPATIENT)
Dept: THORACIC SURGERY | Facility: CLINIC | Age: 71
End: 2021-12-02
Payer: MEDICARE

## 2021-12-02 VITALS
HEART RATE: 80 BPM | DIASTOLIC BLOOD PRESSURE: 63 MMHG | TEMPERATURE: 96 F | HEIGHT: 60 IN | BODY MASS INDEX: 27.88 KG/M2 | WEIGHT: 142 LBS | RESPIRATION RATE: 18 BRPM | OXYGEN SATURATION: 94 % | SYSTOLIC BLOOD PRESSURE: 121 MMHG

## 2021-12-02 DIAGNOSIS — K22.4 DYSKINESIA OF ESOPHAGUS: ICD-10-CM

## 2021-12-02 PROCEDURE — 99213 OFFICE O/P EST LOW 20 MIN: CPT

## 2021-12-13 NOTE — HISTORY OF PRESENT ILLNESS
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 She was evaluated by general surgery for elective repair and on 5/21/21 presented for her RA repair of paraesophageal hernia with partial fundoplication. Intraop course complicated by esophageal perforation. She is s/p ED, Laparoscopic robotic_assisted mobilization and preparation of gastric conduit, Left cervical incision, Esophagogastrectomy, Placement of a jejunostomy tube on 5/21/21. 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 She presents today for further evaluation. \par \par \par \par \par \par

## 2021-12-13 NOTE — ASSESSMENT
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 She was evaluated by general surgery for elective repair and on 5/21/21 presented for her RA repair of paraesophageal hernia with partial fundoplication. Intraop course complicated by esophageal perforation. She is s/p ED, Laparoscopic robotic_assisted mobilization and preparation of gastric conduit, Left cervical incision, Esophagogastrectomy, Placement of a jejunostomy tube on 5/21/21. 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 She presents today for further evaluation. \par \par Patient admits to feeling bloated and GERD taking Mylanta for relief. She admits to a globus sensation and will be seeing Dr. Patel for further evaluation. She denies weight loss or shortness of breath. She tolerates a regular diet, however takes small portions. \par \par Will plan to assess the emptying of the stomach with barium esophagram. Will refer to Dr. Sams for an EGD and potential injection of Botox. \par \par Plan:\par 1. Barium esophagram\par 2. Referral to Dr. Sams for an EGD with possible Botox injection \par \par I, EVER TOSCANO , am scribing for and in the presence of JOHNSON PERLA the following sections: history of present illness, past medical/family/surgical/family/social history, review of systems, vital signs, physical exam, and disposition.\par \par ADAL, Johnson Perla, have reviewed the documentation recorded by the scribe in my presence and it accurately and completely records my words and actions.\par \par The patient's symptoms may be related to delayed gastric emptying, and the diagnostic evaluation was discussed with the patient as well as referral to interventional gastroenterology for consideration of endoscopy with treatment directed towards the pylorus should delayed gastric emptying of the conduit be identified.

## 2021-12-14 ENCOUNTER — APPOINTMENT (OUTPATIENT)
Dept: OTOLARYNGOLOGY | Facility: CLINIC | Age: 71
End: 2021-12-14
Payer: MEDICARE

## 2021-12-14 VITALS
SYSTOLIC BLOOD PRESSURE: 131 MMHG | TEMPERATURE: 97.7 F | OXYGEN SATURATION: 98 % | WEIGHT: 142 LBS | DIASTOLIC BLOOD PRESSURE: 85 MMHG | HEIGHT: 60 IN | HEART RATE: 67 BPM | BODY MASS INDEX: 27.88 KG/M2

## 2021-12-14 DIAGNOSIS — R49.0 DYSPHONIA: ICD-10-CM

## 2021-12-14 PROCEDURE — 31579 LARYNGOSCOPY TELESCOPIC: CPT

## 2021-12-14 PROCEDURE — 99214 OFFICE O/P EST MOD 30 MIN: CPT | Mod: 25

## 2021-12-14 NOTE — HISTORY OF PRESENT ILLNESS
[de-identified] : 72 yo female who presents with dysphonia. She underwent partial fundoplication complicated by perforation and then esophagogastroduodenoscopy, Lap Robotic assisted mobilization of gastric conduit, Left cervical incision, esophagogastrectomy and placement of a J-tube for repair. Post operatively patient had dysphonia, sob, and breathy voice. 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 \par \par Pt now follows up after hospitalization for evaluation. Overall she notes that her voice is improving, though not back to normal. Her voice remains breathy, but high pitched. No issues chewing, eating or swallowing. She is not short of breath. She denies any ENT issues otherwise. \par -\par Update 9/14/21:\par Pt overall well and stable.  Her voice has improved.  She presents with throat discomfort with swallowing, feels food getting stuck in her throat.  She now has to eat 6 small meals a day.  Does not cough/choke/regurgitation on liquids or solids.  She denies weight loss.  She still drinks ensure to help with calories.  No breathing issues. She denies any other ENT complaints.\par - [FreeTextEntry1] : Update 12/14/21\par Overall stable and well.  She notes continued improvement in terms of voice and swallowing.  She feels back to baseline.  she continues to eat smaller meals, but no issues with chewing, eating swallowing, coughing or regurgitation of food.  she is not losing weight, and she is getting adequate nutrition.

## 2021-12-14 NOTE — ASSESSMENT
[FreeTextEntry1] : 70 yo female who presents with dysphonia s/p esophageal surgery 5/21/21. On exam, her voice has improved and her left true vocal fold function is now fully recovered.  She is swallowing well and is essentially asymptomatic.  At this point she should continue to follow up with GI surgery and follow up with me as needed.\par \par - follow up with me as needed\par

## 2021-12-14 NOTE — PHYSICAL EXAM
[Midline] : trachea located in midline position [Normal] : orientation to person, place, and time: normal [FreeTextEntry1] : Hoarse, breathy, high pitched.  MPT 14 sec, adequate projection.  strong cough

## 2021-12-14 NOTE — PROCEDURE
[de-identified] : -\par Flexible Laryngoscopy with Stroboscopy \par Procedure Note\par \par Pre-operative Diagnosis: dysphonia, dysphagia\par Post-operative Diagnosis: normal exam\par Anesthesia: Topical - 1 % Lidocaine/Phenylephrine\par Procedure: Flexible Laryngoscopy with Stroboscopy\par  \par Procedure Details: \par The patient was placed in the sitting position. After decongestant and anesthesia were applied the laryngoscope was passed. The nasal cavities, nasopharynx, oropharynx, hypopharynx, and larynx were all examined. Vocal folds were examined during respiration and phonation. The following findings were noted:\par \par Findings: \par Nose: Septum is midline, turbinates are normal, nasal airways patent, mucosa normal\par Nasopharynx: Adenoids normal, no masses, eustachian tube normal\par Oropharynx: Pharyngeal walls symmetric and without lesion. Tonsils/fossae symmetric\par Hypopharynx: Hypopharynx and pyriform sinuses without lesion. No masses or asymmetry. Mild pooling of secretions in the esophageal inlet.\par Larynx: Epiglottis and aryepiglottic folds were sharp and crisp bilaterally. Bilateral false vocal folds normal appearance. Airway was widely patent.\par \par Strobe Exam Ratings\par 		\par TVF Appearance: normal\par TVF Mobility: normal\par Edema/hypertrophy: mild \par Mucus on TVF: none\par Glottic Closure: adequate\par Mucosal Wave: reduced\par Amplitude of Vibration: reduced \par Phase: mild asymmetry\par Supraglottic Hyperfunction: mild\par Other Findings: mild post-cricoid edema\par \par Condition: Stable. Patient tolerated procedure well.\par \par Complications: None\par

## 2021-12-30 ENCOUNTER — APPOINTMENT (OUTPATIENT)
Dept: RADIOLOGY | Facility: HOSPITAL | Age: 71
End: 2021-12-30

## 2022-01-19 ENCOUNTER — APPOINTMENT (OUTPATIENT)
Dept: NEUROLOGY | Facility: CLINIC | Age: 72
End: 2022-01-19
Payer: MEDICARE

## 2022-01-19 VITALS
HEART RATE: 92 BPM | DIASTOLIC BLOOD PRESSURE: 68 MMHG | WEIGHT: 139 LBS | SYSTOLIC BLOOD PRESSURE: 110 MMHG | BODY MASS INDEX: 28.02 KG/M2 | OXYGEN SATURATION: 97 % | HEIGHT: 59 IN | TEMPERATURE: 98.1 F

## 2022-01-19 PROCEDURE — 99204 OFFICE O/P NEW MOD 45 MIN: CPT

## 2022-01-19 RX ORDER — AZELASTINE HYDROCHLORIDE 137 UG/1
0.1 SPRAY, METERED NASAL
Qty: 30 | Refills: 0 | Status: DISCONTINUED | COMMUNITY
Start: 2020-11-24 | End: 2022-01-19

## 2022-01-19 RX ORDER — MOMETASONE FUROATE 1 MG/G
0.1 CREAM TOPICAL
Qty: 1 | Refills: 3 | Status: DISCONTINUED | COMMUNITY
Start: 2020-07-24 | End: 2022-01-19

## 2022-01-19 RX ORDER — OMEPRAZOLE 40 MG/1
40 CAPSULE, DELAYED RELEASE ORAL
Qty: 30 | Refills: 8 | Status: DISCONTINUED | COMMUNITY
Start: 2021-03-17 | End: 2022-01-19

## 2022-01-19 RX ORDER — PRAVASTATIN SODIUM 20 MG/1
20 TABLET ORAL
Qty: 90 | Refills: 0 | Status: DISCONTINUED | COMMUNITY
Start: 2020-12-30 | End: 2022-01-19

## 2022-01-19 RX ORDER — AMMONIUM LACTATE 12 %
12 CREAM (GRAM) TOPICAL
Qty: 385 | Refills: 0 | Status: DISCONTINUED | COMMUNITY
Start: 2021-01-19 | End: 2022-01-19

## 2022-01-19 RX ORDER — AMLODIPINE BESYLATE 10 MG/1
10 TABLET ORAL
Refills: 0 | Status: DISCONTINUED | COMMUNITY
End: 2022-01-19

## 2022-01-19 NOTE — DATA REVIEWED
[de-identified] : MRI C spine 2016 independently reviewed and reviewed with patient, multilevel moderate to severe disk disease with ventral cord flattening and cord signal change

## 2022-01-19 NOTE — HISTORY OF PRESENT ILLNESS
[FreeTextEntry1] : Referred by Dr. Headley.  Reports pain in the left side of her neck for many years, more recently extending down into left shoulder and up into the back of the head on the left side.  Feels like a hot, burning, horrible sensation.  No headaches without neck pain.  Pain does not radiate into arm.  No weakness, numbness, tingling in arms.  Has been doing physical therapy and using [place] which helps.  Takes Tylenol which does not seem to help.  Has not taken neuropathic pain medications or muscle relaxers.  Does not want surgery.

## 2022-01-19 NOTE — ASSESSMENT
[FreeTextEntry1] : 71-year-old woman with neck pain and cervicogenic headaches due to degenerative disk disease of the cervical spine.  On exam her strength, sensation, and reflexes are normal so I would not recommend surgical consultation as this time.  Recommend continuing physical therapy and trying a low dose of gabapentin (100 mg -- potential side effects reviewed).   If this is insufficiently effective we can increase the dose and/or add a low dose of cyclobenzaprine.  If pain does not improve or she develops weakness or sensory deficits, will repeat for MRI and refer for HAMZAH and/or surgical evaluation.

## 2022-01-19 NOTE — CONSULT LETTER
[Dear  ___] : Dear  [unfilled], [Consult Letter:] : I had the pleasure of evaluating your patient, [unfilled]. [Please see my note below.] : Please see my note below. [Consult Closing:] : Thank you very much for allowing me to participate in the care of this patient.  If you have any questions, please do not hesitate to contact me. [Sincerely,] : Sincerely, [FreeTextEntry2] : Megha Headley MD [FreeTextEntry3] : Mary Anne Jacobs MD

## 2022-01-24 ENCOUNTER — EMERGENCY (EMERGENCY)
Facility: HOSPITAL | Age: 72
LOS: 1 days | Discharge: HOME | End: 2022-01-24
Attending: EMERGENCY MEDICINE | Admitting: EMERGENCY MEDICINE
Payer: MEDICARE

## 2022-01-24 VITALS
HEIGHT: 59 IN | HEART RATE: 98 BPM | OXYGEN SATURATION: 97 % | RESPIRATION RATE: 18 BRPM | DIASTOLIC BLOOD PRESSURE: 83 MMHG | SYSTOLIC BLOOD PRESSURE: 136 MMHG | WEIGHT: 139.99 LBS | TEMPERATURE: 98 F

## 2022-01-24 VITALS
HEART RATE: 70 BPM | OXYGEN SATURATION: 96 % | RESPIRATION RATE: 16 BRPM | DIASTOLIC BLOOD PRESSURE: 85 MMHG | SYSTOLIC BLOOD PRESSURE: 134 MMHG

## 2022-01-24 DIAGNOSIS — D72.819 DECREASED WHITE BLOOD CELL COUNT, UNSPECIFIED: ICD-10-CM

## 2022-01-24 DIAGNOSIS — I10 ESSENTIAL (PRIMARY) HYPERTENSION: ICD-10-CM

## 2022-01-24 DIAGNOSIS — Z98.890 OTHER SPECIFIED POSTPROCEDURAL STATES: Chronic | ICD-10-CM

## 2022-01-24 DIAGNOSIS — Z91.018 ALLERGY TO OTHER FOODS: ICD-10-CM

## 2022-01-24 DIAGNOSIS — Z88.0 ALLERGY STATUS TO PENICILLIN: ICD-10-CM

## 2022-01-24 LAB
ALBUMIN SERPL ELPH-MCNC: 4.1 G/DL — SIGNIFICANT CHANGE UP (ref 3.3–5)
ALP SERPL-CCNC: 115 U/L — SIGNIFICANT CHANGE UP (ref 40–120)
ALT FLD-CCNC: 18 U/L — SIGNIFICANT CHANGE UP (ref 10–45)
ANION GAP SERPL CALC-SCNC: 10 MMOL/L — SIGNIFICANT CHANGE UP (ref 5–17)
APPEARANCE UR: CLEAR — SIGNIFICANT CHANGE UP
AST SERPL-CCNC: 19 U/L — SIGNIFICANT CHANGE UP (ref 10–40)
BACTERIA # UR AUTO: PRESENT /HPF
BILIRUB SERPL-MCNC: 0.2 MG/DL — SIGNIFICANT CHANGE UP (ref 0.2–1.2)
BILIRUB UR-MCNC: NEGATIVE — SIGNIFICANT CHANGE UP
BUN SERPL-MCNC: 14 MG/DL — SIGNIFICANT CHANGE UP (ref 7–23)
CALCIUM SERPL-MCNC: 10.4 MG/DL — SIGNIFICANT CHANGE UP (ref 8.4–10.5)
CHLORIDE SERPL-SCNC: 100 MMOL/L — SIGNIFICANT CHANGE UP (ref 96–108)
CO2 SERPL-SCNC: 26 MMOL/L — SIGNIFICANT CHANGE UP (ref 22–31)
COLOR SPEC: YELLOW — SIGNIFICANT CHANGE UP
CREAT SERPL-MCNC: 0.58 MG/DL — SIGNIFICANT CHANGE UP (ref 0.5–1.3)
DIFF PNL FLD: NEGATIVE — SIGNIFICANT CHANGE UP
EPI CELLS # UR: SIGNIFICANT CHANGE UP /HPF (ref 0–5)
GLUCOSE SERPL-MCNC: 110 MG/DL — HIGH (ref 70–99)
GLUCOSE UR QL: NEGATIVE — SIGNIFICANT CHANGE UP
HCT VFR BLD CALC: 38.6 % — SIGNIFICANT CHANGE UP (ref 34.5–45)
HGB BLD-MCNC: 12.1 G/DL — SIGNIFICANT CHANGE UP (ref 11.5–15.5)
KETONES UR-MCNC: ABNORMAL MG/DL
LEUKOCYTE ESTERASE UR-ACNC: ABNORMAL
MCHC RBC-ENTMCNC: 29.7 PG — SIGNIFICANT CHANGE UP (ref 27–34)
MCHC RBC-ENTMCNC: 31.3 GM/DL — LOW (ref 32–36)
MCV RBC AUTO: 94.6 FL — SIGNIFICANT CHANGE UP (ref 80–100)
NITRITE UR-MCNC: NEGATIVE — SIGNIFICANT CHANGE UP
NRBC # BLD: 0 /100 WBCS — SIGNIFICANT CHANGE UP (ref 0–0)
PH UR: 7 — SIGNIFICANT CHANGE UP (ref 5–8)
PLATELET # BLD AUTO: 404 K/UL — HIGH (ref 150–400)
POTASSIUM SERPL-MCNC: 3.8 MMOL/L — SIGNIFICANT CHANGE UP (ref 3.5–5.3)
POTASSIUM SERPL-SCNC: 3.8 MMOL/L — SIGNIFICANT CHANGE UP (ref 3.5–5.3)
PROT SERPL-MCNC: 7.3 G/DL — SIGNIFICANT CHANGE UP (ref 6–8.3)
PROT UR-MCNC: NEGATIVE MG/DL — SIGNIFICANT CHANGE UP
RBC # BLD: 4.08 M/UL — SIGNIFICANT CHANGE UP (ref 3.8–5.2)
RBC # FLD: 13 % — SIGNIFICANT CHANGE UP (ref 10.3–14.5)
SODIUM SERPL-SCNC: 136 MMOL/L — SIGNIFICANT CHANGE UP (ref 135–145)
SP GR SPEC: 1.02 — SIGNIFICANT CHANGE UP (ref 1–1.03)
UROBILINOGEN FLD QL: 0.2 E.U./DL — SIGNIFICANT CHANGE UP
WBC # BLD: 3.23 K/UL — LOW (ref 3.8–10.5)
WBC # FLD AUTO: 3.23 K/UL — LOW (ref 3.8–10.5)
WBC UR QL: < 5 /HPF — SIGNIFICANT CHANGE UP

## 2022-01-24 PROCEDURE — 80053 COMPREHEN METABOLIC PANEL: CPT

## 2022-01-24 PROCEDURE — 36415 COLL VENOUS BLD VENIPUNCTURE: CPT

## 2022-01-24 PROCEDURE — 99284 EMERGENCY DEPT VISIT MOD MDM: CPT

## 2022-01-24 PROCEDURE — 81001 URINALYSIS AUTO W/SCOPE: CPT

## 2022-01-24 PROCEDURE — 85027 COMPLETE CBC AUTOMATED: CPT

## 2022-01-24 PROCEDURE — 99283 EMERGENCY DEPT VISIT LOW MDM: CPT

## 2022-01-24 PROCEDURE — 93005 ELECTROCARDIOGRAM TRACING: CPT

## 2022-01-24 NOTE — ED PROVIDER NOTE - OBJECTIVE STATEMENT
72 y/o female with a PMHx of HTN (amlodipine 5 mg prn) is here in the ED c/o elevated blood pressure reading at home today. Pt states she monitors her blood pressure everyday as per instructions from her physician. She does not take her htn medication daily as pt states her bp is usually normal. Last time she took her medication was Jan 16th. Today she noted her bp elevated at 155 and 160 systolic. She took her prescribed amlodipine prior to ED arrival. She the denies any symptoms to include HA, dizziness, blurred vision, chest pain, sob, numbness/tingling of extremities, LE swelling, recent fall/injury or any pain.

## 2022-01-24 NOTE — ED PROVIDER NOTE - NSFOLLOWUPINSTRUCTIONS_ED_ALL_ED_FT
Your blood pressure was elevated today. Uncontrolled high blood pressure may lead to risks such as heart disease, heart attacks, strokes and kidney disease. Please follow up with your PMD for further evaluation and return to the ED for any concerning symptoms.    Hypertension    WHAT YOU NEED TO KNOW:    Hypertension is high blood pressure. Your blood pressure is the force of your blood moving against the walls of your arteries. Hypertension causes your blood pressure to get so high that your heart has to work much harder than normal. This can damage your heart. The cause of hypertension may not be known. This is called essential or primary hypertension. Hypertension caused by another medical condition, such as kidney disease, is called secondary hypertension.    DISCHARGE INSTRUCTIONS:    Call your local emergency number (911 in the [place]) or have someone call if:   •You have chest pain.      •You have any of the following signs of a heart attack: ?Squeezing, pressure, or pain in your chest      ?You may also have any of the following: ?Discomfort or pain in your back, neck, jaw, stomach, or arm      ?Shortness of breath      ?Nausea or vomiting      ?Lightheadedness or a sudden cold sweat        •You become confused or have trouble speaking.      •You suddenly feel lightheaded or have trouble breathing.      Return to the emergency department if:   •You have a severe headache or vision loss.      •You have weakness in an arm or leg.      Call your doctor or cardiologist if:   •You feel faint, dizzy, confused, or drowsy.      •You have been taking your blood pressure medicine but your pressure is higher than your provider says it should be.      •You have questions or concerns about your condition or care.      Medicines: You may need any of the following:  •Antihypertensives may be used to help lower your blood pressure. Several kinds of medicines are available. Your healthcare provider will choose medicines based on the kind of hypertension you have. You may need more than one type of medicine. Take the medicine exactly as directed.      •Diuretics help decrease extra fluid that collects in your body. This will help lower your blood pressure. You may urinate more often while you take this medicine.      •Cholesterol medicine helps lower your cholesterol level. A low cholesterol level helps prevent heart disease and makes it easier to control your blood pressure.      •Take your medicine as directed. Contact your healthcare provider if you think your medicine is not helping or if you have side effects. Tell him or her if you are allergic to any medicine. Keep a list of the medicines, vitamins, and herbs you take. Include the amounts, and when and why you take them. Bring the list or the pill bottles to follow-up visits. Carry your medicine list with you in case of an emergency.      Follow up with your doctor or cardiologist as directed: You will need to return to have your blood pressure checked and to have other lab tests done. Write down your questions so you remember to ask them during your visits.    Stages of hypertension:     Blood Pressure Readings     •Normal blood pressure is 119/79 or lower. Your healthcare provider may only check your blood pressure each year if it stays at a normal level.      •Elevated blood pressure is 120/79 to 129/79. This is sometimes called prehypertension. Your healthcare provider may suggest lifestyle changes to help lower your blood pressure to a normal level. He or she may then check it again in 3 to 6 months.      •Stage 1 hypertension is 130/80 to 139/89. Your provider may recommend lifestyle changes, medication, and checks every 3 to 6 months until your blood pressure is controlled.      •Stage 2 hypertension is 140/90 or higher. Your provider will recommend lifestyle changes and have you take 2 kinds of hypertension medicines. You will also need to have your blood pressure checked monthly until it is controlled.      Manage hypertension:   •Check your blood pressure at home. Avoid smoking, caffeine, and exercise at least 30 minutes before checking your blood pressure. Sit and rest for 5 minutes before you take your blood pressure. Extend your arm and support it on a flat surface. Your arm should be at the same level as your heart. Follow the directions that came with your blood pressure monitor. Check your blood pressure 2 times, 1 minute apart, before you take your medicine in the morning. Also check your blood pressure before your evening meal. Keep a record of your readings and bring it to your follow-up visits. Ask your healthcare provider what your blood pressure should be.  How to take a Blood Pressure           •Manage any other health conditions you have. Health conditions such as diabetes can increase your risk for hypertension. Follow your healthcare provider's instructions and take all your medicines as directed.      •Ask about all medicines. Certain medicines can increase your blood pressure. Examples include oral birth control pills, decongestants, herbal supplements, and NSAIDs, such as ibuprofen. Your healthcare provider can tell you which medicines are safe for you to take. This includes prescription and over-the-counter medicines.      Lifestyle changes you can make to manage hypertension: Your healthcare provider may recommend you work with a team to manage hypertension. The team may include medical experts such as a dietitian, an exercise or physical therapist, and a behavior therapist. Your family members may be included in helping you create lifestyle changes.    Ways to Lower Your Blood Pressure     •Limit sodium (salt) as directed. Too much sodium can affect your fluid balance. Check labels to find low-sodium or no-salt-added foods. Some low-sodium foods use potassium salts for flavor. Too much potassium can also cause health problems. Your healthcare provider will tell you how much sodium and potassium are safe for you to have in a day. He or she may recommend that you limit sodium to 2,300 mg a day.             •Follow the meal plan recommended by your healthcare provider. A dietitian or your provider can give you more information on low-sodium plans or the DASH (Dietary Approaches to Stop Hypertension) eating plan. The DASH plan is low in sodium, processed sugar, unhealthy fats, and total fat. It is high in potassium, calcium, and fiber. These can be found in vegetables, fruit, and whole-grain foods.             •Be physically active throughout the day. Physical activity, such as exercise, can help control your blood pressure and your weight. Be physically active for at least 30 minutes per day, on most days of the week. Include aerobic activity, such as walking or riding a bicycle. Also include strength training at least 2 times each week. Your healthcare providers can help you create a physical activity plan.  Ways to Be Physically Active       Strength Training for Adults           •Decrease stress. This may help lower your blood pressure. Learn ways to relax, such as deep breathing or listening to music.      •Limit alcohol as directed. Alcohol can increase your blood pressure. A drink of alcohol is 12 ounces of beer, 5 ounces of wine, or 1½ ounces of liquor.      •Do not smoke. Nicotine and other chemicals in cigarettes and cigars can increase your blood pressure and also cause lung damage. Ask your healthcare provider for information if you currently smoke and need help to quit. E-cigarettes or smokeless tobacco still contain nicotine. Talk to your healthcare provider before you use these products.  Prevent Heart Disease               © Copyright [employer] 2022           back to top                          © Copyright [employer] 2022

## 2022-01-24 NOTE — ED PROVIDER NOTE - CLINICAL SUMMARY MEDICAL DECISION MAKING FREE TEXT BOX
72 y/o female with a PMHx of HTN is here in the ED c/o elevated blood pressure reading at home. Here in the ED pt noted with slightly elevated bp who remains asymptomatic. No sxs to suggest hypertensive urgency or crisis. EKG without findings of acute ischemia. Pt is well appearing otherwise. Had a recent follow-up shea with her PMD Dr. Headley. Discussed dangers of uncontrolled HTN. Pt given HTN discharge instructions discussing causes and risks. Strictly advised PMD follow-up and given return precautions. 70 y/o female with a PMHx of HTN is here in the ED c/o elevated blood pressure reading at home. Here in the ED pt noted with slightly elevated bp who remains asymptomatic. No sxs to suggest hypertensive urgency or crisis. EKG without findings of acute ischemia. Noted with slight leukopenia. Pt is aware and states baseline. She is currently being followed by a hematologist. Pt is well appearing otherwise. Had a recent follow-up shea with her PMD Dr. Headley. Discussed dangers of uncontrolled HTN. Pt given HTN discharge instructions discussing causes and risks. Strictly advised PMD follow-up and given return precautions.

## 2022-01-24 NOTE — ED ADULT NURSE NOTE - OBJECTIVE STATEMENT
pt missed her dose of amlodipine today and her BP was elevated at home, pt denies any pain/ no complaints at this time

## 2022-01-24 NOTE — ED PROVIDER NOTE - PATIENT PORTAL LINK FT
You can access the FollowMyHealth Patient Portal offered by [facility] by registering at the following website: [email or web address]. By joining [employer]’s FollowMyHealth portal, you will also be able to view your health information using other applications (apps) compatible with our system.

## 2022-01-24 NOTE — ED PROVIDER NOTE - CARE PROVIDER_API CALL
Megha Headley  INTERNAL MEDICINE  [address] [id number]  Phone: [phone]  Fax: [phone]  Follow Up Time:

## 2022-02-14 ENCOUNTER — APPOINTMENT (OUTPATIENT)
Dept: OTOLARYNGOLOGY | Facility: CLINIC | Age: 72
End: 2022-02-14
Payer: MEDICARE

## 2022-02-14 VITALS
HEART RATE: 81 BPM | HEIGHT: 59 IN | WEIGHT: 140 LBS | DIASTOLIC BLOOD PRESSURE: 78 MMHG | SYSTOLIC BLOOD PRESSURE: 124 MMHG | TEMPERATURE: 98.1 F | BODY MASS INDEX: 28.22 KG/M2

## 2022-02-14 PROCEDURE — 31575 DIAGNOSTIC LARYNGOSCOPY: CPT

## 2022-02-14 PROCEDURE — 99214 OFFICE O/P EST MOD 30 MIN: CPT | Mod: 25

## 2022-02-14 NOTE — HISTORY OF PRESENT ILLNESS
[de-identified] : 72 yo female who presents with dysphonia. She underwent partial fundoplication complicated by perforation and then esophagogastroduodenoscopy, Lap Robotic assisted mobilization of gastric conduit, Left cervical incision, esophagogastrectomy and placement of a J-tube for repair. Post operatively patient had dysphonia, sob, and breathy voice. 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 \par \par Pt now follows up after hospitalization for evaluation. Overall she notes that her voice is improving, though not back to normal. Her voice remains breathy, but high pitched. No issues chewing, eating or swallowing. She is not short of breath. She denies any ENT issues otherwise. \par -\par Update 9/14/21:\par Pt overall well and stable.  Her voice has improved.  She presents with throat discomfort with swallowing, feels food getting stuck in her throat.  She now has to eat 6 small meals a day.  Does not cough/choke/regurgitation on liquids or solids.  She denies weight loss.  She still drinks ensure to help with calories.  No breathing issues. She denies any other ENT complaints.\par -\par Update 12/14/21\par Overall stable and well.  She notes continued improvement in terms of voice and swallowing.  She feels back to baseline.  she continues to eat smaller meals, but no issues with chewing, eating swallowing, coughing or regurgitation of food.  she is not losing weight, and she is getting adequate nutrition.\par - [FreeTextEntry1] : Update 2/14/22\par Pt presents with swallowing concern.  Last night she finished a meal or rice, beans, sweet potato, chicken without issue.  After her meal she used her medication a powder which she puts in water and had a reaction where she felt it didn't pass.  This caused pain and discomfort.  Last night she did take another medication without issue.  This morning she drank coffee without any issues.  No coughing, choking or regurgitation of foods.  She presents for reval.  No new ENT issues otherwise.

## 2022-02-14 NOTE — PHYSICAL EXAM
[FreeTextEntry1] : Hoarse, breathy, high pitched.  MPT 14 sec, adequate projection.  strong cough.  Pt drank water and ate a banana without issue. [Midline] : trachea located in midline position [Normal] : no rashes

## 2022-02-14 NOTE — PROCEDURE
[de-identified] : -\par Flexible Laryngoscopy\par Procedure Note\par \par Pre-operative Diagnosis: dysphonia, dysphagia\par Post-operative Diagnosis: normal exam, very mild saliva pooling in post cricoid region\par Anesthesia: Topical - 1 % Lidocaine/Phenylephrine\par Procedure: Flexible Laryngoscopy\par  \par Procedure Details: \par The patient was placed in the sitting position. After decongestant and anesthesia were applied the laryngoscope was passed. The nasal cavities, nasopharynx, oropharynx, hypopharynx, and larynx were all examined. Vocal folds were examined during respiration and phonation. The following findings were noted:\par \par Findings: \par Nose: Septum is midline, turbinates are normal, nasal airways patent, mucosa normal\par Nasopharynx: Adenoids normal, no masses, eustachian tube normal\par Oropharynx: Pharyngeal walls symmetric and without lesion. Tonsils/fossae symmetric\par Hypopharynx: Hypopharynx and pyriform sinuses without lesion. No masses or asymmetry. Mild pooling of secretions in the esophageal inlet.\par Larynx: Epiglottis and aryepiglottic folds were sharp and crisp bilaterally. Bilateral false vocal folds normal appearance. Airway was widely patent.		\par TVF Appearance: normal\par TVF Mobility: normal

## 2022-02-14 NOTE — ASSESSMENT
[FreeTextEntry1] : 71 yo female who presents with dysphonia s/p esophageal surgery 5/21/21. On exam, her voice has improved and her left true vocal fold function is now fully recovered.  She is swallowing well and is essentially asymptomatic.  At this point she should continue to follow up with GI surgery and follow up with me as needed.\par \par Today she presents after an isolated episode of swallowing discomfort last night.  She has since taken medication/pills, and coffee without issue.  In the office she ate a banana without issue.  Exam was unchanged from previous.  At this point I reassured her and advised to follow up if symptom recur, become more frequent or  worsen.  Will plan FEES should that be the case.\par \par - follow up with me 1 mo for re-evaluation\par

## 2022-02-16 NOTE — DISCHARGE NOTE PROVIDER - NSDCMRMEDTOKEN_GEN_ALL_CORE_FT
amLODIPine 5 mg oral tablet: 1 tab(s) orally once a day  Flonase 50 mcg/inh nasal spray:   meclizine 25 mg oral tablet: 1 tab(s) orally every 8 hours, As Needed -for dizziness   Vitamin B12: orally once a day   none

## 2022-03-07 PROBLEM — Z87.898 HISTORY OF DIZZINESS: Status: RESOLVED | Noted: 2019-12-12 | Resolved: 2022-03-07

## 2022-03-07 PROBLEM — Z01.811 PREOP PULMONARY/RESPIRATORY EXAM: Status: RESOLVED | Noted: 2020-10-30 | Resolved: 2022-03-07

## 2022-03-07 PROBLEM — H92.02 OTALGIA OF LEFT EAR: Status: RESOLVED | Noted: 2021-03-05 | Resolved: 2022-03-07

## 2022-03-07 PROBLEM — Z09 POSTOP CHECK: Status: RESOLVED | Noted: 2021-06-07 | Resolved: 2022-03-07

## 2022-03-11 ENCOUNTER — APPOINTMENT (OUTPATIENT)
Dept: THORACIC SURGERY | Facility: HOSPITAL | Age: 72
End: 2022-03-11
Payer: MEDICARE

## 2022-03-11 DIAGNOSIS — Z09 ENCOUNTER FOR FOLLOW-UP EXAMINATION AFTER COMPLETED TREATMENT FOR CONDITIONS OTHER THAN MALIGNANT NEOPLASM: ICD-10-CM

## 2022-03-11 DIAGNOSIS — Z01.811 ENCOUNTER FOR PREPROCEDURAL RESPIRATORY EXAMINATION: ICD-10-CM

## 2022-03-11 DIAGNOSIS — Z87.898 PERSONAL HISTORY OF OTHER SPECIFIED CONDITIONS: ICD-10-CM

## 2022-03-11 DIAGNOSIS — H92.02 OTALGIA, LEFT EAR: ICD-10-CM

## 2022-03-14 ENCOUNTER — APPOINTMENT (OUTPATIENT)
Dept: OTOLARYNGOLOGY | Facility: CLINIC | Age: 72
End: 2022-03-14
Payer: MEDICARE

## 2022-03-14 VITALS
TEMPERATURE: 98 F | OXYGEN SATURATION: 97 % | BODY MASS INDEX: 28.22 KG/M2 | DIASTOLIC BLOOD PRESSURE: 72 MMHG | HEIGHT: 59 IN | SYSTOLIC BLOOD PRESSURE: 114 MMHG | WEIGHT: 140 LBS | HEART RATE: 69 BPM

## 2022-03-14 PROCEDURE — 31575 DIAGNOSTIC LARYNGOSCOPY: CPT

## 2022-03-14 PROCEDURE — 99213 OFFICE O/P EST LOW 20 MIN: CPT | Mod: 25

## 2022-03-14 NOTE — HISTORY OF PRESENT ILLNESS
[de-identified] : 72 yo female who presents with dysphonia. She underwent partial fundoplication complicated by perforation and then esophagogastroduodenoscopy, Lap Robotic assisted mobilization of gastric conduit, Left cervical incision, esophagogastrectomy and placement of a J-tube for repair. Post operatively patient had dysphonia, sob, and breathy voice. 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 \par \par Pt now follows up after hospitalization for evaluation. Overall she notes that her voice is improving, though not back to normal. Her voice remains breathy, but high pitched. No issues chewing, eating or swallowing. She is not short of breath. She denies any ENT issues otherwise. \par -\par Update 9/14/21:\par Pt overall well and stable.  Her voice has improved.  She presents with throat discomfort with swallowing, feels food getting stuck in her throat.  She now has to eat 6 small meals a day.  Does not cough/choke/regurgitation on liquids or solids.  She denies weight loss.  She still drinks ensure to help with calories.  No breathing issues. She denies any other ENT complaints.\par -\par Update 12/14/21\par Overall stable and well.  She notes continued improvement in terms of voice and swallowing.  She feels back to baseline.  she continues to eat smaller meals, but no issues with chewing, eating swallowing, coughing or regurgitation of food.  she is not losing weight, and she is getting adequate nutrition.\par -\par Update 2/14/22\par Pt presents with swallowing concern.  Last night she finished a meal or rice, beans, sweet potato, chicken without issue.  After her meal she used her medication a powder which she puts in water and had a reaction where she felt it didn't pass.  This caused pain and discomfort.  Last night she did take another medication without issue.  This morning she drank coffee without any issues.  No coughing, choking or regurgitation of foods.  She presents for reval.  No new ENT issues otherwise. \par - [FreeTextEntry1] : Update 3/14/22\par Pt presents today with improvement of symptoms of difficulty swallowing. She does not have any difficulty swallowing or chewing foods/liquids since her isolated episode last month. She feels like she is getting adequate nutrition. Denies coughing, choking, or regurgitation. She denies any new ENT issues otherwise.

## 2022-03-14 NOTE — ASSESSMENT
[FreeTextEntry1] : 71 yo female who presents with dysphonia s/p esophageal surgery 5/21/21. On exam, her voice has improved and her left true vocal fold function is now fully recovered. She is swallowing well and is essentially asymptomatic. At this point she should continue to follow up with GI surgery and follow up with me as needed. She had an isolated episode of swallowing difficulty in February 2022 but has not had any recurrence since then. \par \par Today, she denies any episodes of dysphagia or swallowing discomfort and feels well at this time. Her exam is unchanged from previous. We did not perform FEES due to her improvement in symptoms.  At this point, should she need a barium esophagram in the future, would plan for modified barium swallow study as well, but otherwise she will follow up as needed.\par \par - fu as needed\par - if plan for barium esophagram in future, would prefer modified barium swallow also completed at same time.

## 2022-03-14 NOTE — PROCEDURE
[de-identified] : -\par Flexible Laryngoscopy\par Procedure Note\par \par Pre-operative Diagnosis: dysphagia\par Post-operative Diagnosis: normal exam\par Anesthesia: Topical - 1 % Lidocaine/Phenylephrine\par Procedure: Flexible Laryngoscopy\par  \par Procedure Details: \par The patient was placed in the sitting position. After decongestant and anesthesia were applied the laryngoscope was passed. The nasal cavities, nasopharynx, oropharynx, hypopharynx, and larynx were all examined. Vocal folds were examined during respiration and phonation. The following findings were noted:\par \par Findings: \par Nose: Septum is midline, turbinates are normal, nasal airways patent, mucosa normal\par Nasopharynx: Adenoids normal, no masses, eustachian tube normal\par Oropharynx: Pharyngeal walls symmetric and without lesion. Tonsils/fossae symmetric\par Hypopharynx: Hypopharynx and pyriform sinuses without lesion. No masses or asymmetry. Mild pooling of secretions in the esophageal inlet.\par Larynx: Epiglottis and aryepiglottic folds were sharp and crisp bilaterally. Bilateral false vocal folds normal appearance. Airway was widely patent.		\par TVF Appearance: normal\par TVF Mobility: normal.

## 2022-03-15 ENCOUNTER — APPOINTMENT (OUTPATIENT)
Dept: THORACIC SURGERY | Facility: CLINIC | Age: 72
End: 2022-03-15
Payer: MEDICARE

## 2022-03-18 ENCOUNTER — APPOINTMENT (OUTPATIENT)
Dept: THORACIC SURGERY | Facility: CLINIC | Age: 72
End: 2022-03-18
Payer: MEDICARE

## 2022-03-18 ENCOUNTER — OUTPATIENT (OUTPATIENT)
Dept: OUTPATIENT SERVICES | Facility: HOSPITAL | Age: 72
LOS: 1 days | End: 2022-03-18
Payer: MEDICARE

## 2022-03-18 VITALS
HEART RATE: 73 BPM | OXYGEN SATURATION: 97 % | RESPIRATION RATE: 18 BRPM | TEMPERATURE: 96.8 F | HEIGHT: 59 IN | DIASTOLIC BLOOD PRESSURE: 64 MMHG | WEIGHT: 143.31 LBS | BODY MASS INDEX: 28.89 KG/M2 | SYSTOLIC BLOOD PRESSURE: 114 MMHG

## 2022-03-18 DIAGNOSIS — Z98.890 OTHER SPECIFIED POSTPROCEDURAL STATES: Chronic | ICD-10-CM

## 2022-03-18 DIAGNOSIS — K44.9 DIAPHRAGMATIC HERNIA W/OUT OBSTRUCTION OR GANGRENE: ICD-10-CM

## 2022-03-18 PROCEDURE — 99213 OFFICE O/P EST LOW 20 MIN: CPT

## 2022-03-18 PROCEDURE — 71046 X-RAY EXAM CHEST 2 VIEWS: CPT

## 2022-03-18 PROCEDURE — 71046 X-RAY EXAM CHEST 2 VIEWS: CPT | Mod: 26

## 2022-03-22 NOTE — END OF VISIT
[FreeTextEntry3] : I, LISA SAMAYOA , am scribing for and in the presence of PARAG VILLASENOR the following sections: history of present illness, past medical/family/surgical/family/social history, review of systems, vital signs, physical exam, and disposition.\par \par

## 2022-03-22 NOTE — HISTORY OF PRESENT ILLNESS
[FreeTextEntry1] : 72 y/o female with PMHx HTN, HLD (myalgias with statins), and paraesophageal hernia resulting in GERD and esophageal dysmotility. She underwent paraesophageal hernia repair with partial fundoplication by general surgery on 5/21/22, complicated by esophageal perforation.  She is now around 10 months s/p ED, Laparoscopic mobilization and preparation of gastric conduit, Esophagogastrectomy, Placement of a jejunostomy tube on 5/21/21 by Dr. Alberto. She developed L vocal fold paralysis s/p the surgery, discharged from ENT on 3/14/22 b/c improved symptoms. She was last saw by Dr. Perla in Dec 2021, with an elevated right hemidiaphragm, was suggested to have barium esophagram, and EGD with possible botox injection by Dr. Sams, which was not done. She presents today with a CXR.

## 2022-03-22 NOTE — ASSESSMENT
[FreeTextEntry1] : 70 y/o female with PMHx HTN, HLD (myalgias with statins), and paraesophageal hernia resulting in GERD and esophageal dysmotility. She underwent paraesophageal hernia repair with partial fundoplication by general surgery on 5/21/22, complicated  by esophageal perforation.  She is now around 10 months s/p ED, Laparoscopic mobilization and preparation of gastric conduit, Esophagogastrectomy, Placement of a jejunostomy tube on 5/21/21 by Dr. Alberto. She was last saw by Dr. Perla in Dec 2021, with a right hemidiaphragm, was suggested to have barium esophagram, and EGD with possible botox injection by Dr. Sams, which was not done.  She presents today with a CXR.  \par \par Today the patient admits to feeling well. She denies, pain, cough, fever, SOB, unintentional weight loss, reflux and food sticking. Her weight is stable. She is tolerating a regular diet l of her symptoms have resolved. She takes Mylanta PRN. CXR reviewed, showed improvement no evidence of pneumothorax, pleural effusion or atelectasis noted. She should RTC in 6 months for a clinical recheck or sooner should she become symptomatic. \par \par PLAN:\par 1. 6 months CXR

## 2022-04-06 ENCOUNTER — EMERGENCY (EMERGENCY)
Facility: HOSPITAL | Age: 72
LOS: 1 days | Discharge: HOME | End: 2022-04-06
Attending: EMERGENCY MEDICINE | Admitting: EMERGENCY MEDICINE
Payer: MEDICARE

## 2022-04-06 VITALS
HEIGHT: 59 IN | TEMPERATURE: 98 F | WEIGHT: 138.89 LBS | DIASTOLIC BLOOD PRESSURE: 84 MMHG | HEART RATE: 94 BPM | OXYGEN SATURATION: 98 % | RESPIRATION RATE: 16 BRPM | SYSTOLIC BLOOD PRESSURE: 138 MMHG

## 2022-04-06 VITALS — SYSTOLIC BLOOD PRESSURE: 107 MMHG | DIASTOLIC BLOOD PRESSURE: 64 MMHG | HEART RATE: 90 BPM

## 2022-04-06 DIAGNOSIS — Z90.89 ACQUIRED ABSENCE OF OTHER ORGANS: ICD-10-CM

## 2022-04-06 DIAGNOSIS — Z91.018 ALLERGY TO OTHER FOODS: ICD-10-CM

## 2022-04-06 DIAGNOSIS — I10 ESSENTIAL (PRIMARY) HYPERTENSION: ICD-10-CM

## 2022-04-06 DIAGNOSIS — Z98.890 OTHER SPECIFIED POSTPROCEDURAL STATES: Chronic | ICD-10-CM

## 2022-04-06 DIAGNOSIS — K21.9 GASTRO-ESOPHAGEAL REFLUX DISEASE WITHOUT ESOPHAGITIS: ICD-10-CM

## 2022-04-06 DIAGNOSIS — Z88.0 ALLERGY STATUS TO PENICILLIN: ICD-10-CM

## 2022-04-06 DIAGNOSIS — Z88.8 ALLERGY STATUS TO OTHER DRUGS, MEDICAMENTS AND BIOLOGICAL SUBSTANCES STATUS: ICD-10-CM

## 2022-04-06 DIAGNOSIS — E78.5 HYPERLIPIDEMIA, UNSPECIFIED: ICD-10-CM

## 2022-04-06 PROCEDURE — 99283 EMERGENCY DEPT VISIT LOW MDM: CPT

## 2022-04-06 PROCEDURE — 99282 EMERGENCY DEPT VISIT SF MDM: CPT

## 2022-04-06 NOTE — ED ADULT TRIAGE NOTE - CHIEF COMPLAINT QUOTE
walked in to ambulance triage c/o hypertension. PT states "I take my blood pressure daily and it was high today." 170/80s in the field. takes amlodipine 5 mg daily, did not miss a dose. denies headaches, blurred vision, change in vision, CP, SOB, numbness, tingling, weakness.

## 2022-04-06 NOTE — ED PROVIDER NOTE - CLINICAL SUMMARY MEDICAL DECISION MAKING FREE TEXT BOX
71F with PMHx HTN, HLD (myalgias with statins), hx of paraesophageal hernia resulting in GERD and esophageal dysmotility s/p esophagectomy 5/21/21 who p/w elevated BP reading this morning. Pt takes her BP every day and this morning it was 170s/80s. She took her BP medication (amlodipine 5mg) this am. She keep rechecking her BP and it remained elevated which made her anxious, She denies other sx, no CP/SOB, new HA, neck pain, n/v, dizziness, syncope, n/t/w in ext or other complaints. Upon arrival to the ER pt's BP is 138/84.  Pt is well-appearing on exam, VSS, no focal neuro deficits, reviewed BP journal and for the most part BP controlled, I explained temporary elevation may be related to anxiety or timing of medication taken in the AM. SHe currently has no other symptoms, no indications for labs or imaging, Do not recommend medication adjustment.   Pt request neuro f/u for chronic intermittent left posterior headache x years, she report hx of cervical herniated discs and pain radiates up neck and santoro of head. Msg sent to referrals coordinator for neuro f/u.

## 2022-04-06 NOTE — ED PROVIDER NOTE - NSFOLLOWUPINSTRUCTIONS_ED_ALL_ED_FT
Please follow up with your primary care physician. You may call our referrals coordinator at [phone] Monday to Friday 11am-7pm for assistance with making an appointment.  Return to the Emergency Department if you have any new or worsening symptoms, or for any other concerns. Please read below for further information.    Hypertension    Hypertension, commonly called high blood pressure, is when the force of blood pumping through your arteries is too strong. Hypertension forces your heart to work harder to pump blood. Your arteries may become narrow or stiff. Having untreated or uncontrolled hypertension for a long period of time can cause heart attack, stroke, kidney disease, and other problems. If started on a medication, take exactly as prescribed by your health care professional. Maintain a healthy lifestyle and follow up with your primary care physician.    SEEK IMMEDIATE MEDICAL CARE IF YOU HAVE ANY OF THE FOLLOWING SYMPTOMS: severe headache, confusion, chest pain, abdominal pain, vomiting, or shortness of breath.

## 2022-04-06 NOTE — ED PROVIDER NOTE - OBJECTIVE STATEMENT
71F with PMHx HTN, HLD (myalgias with statins), hx of paraesophageal hernia resulting in GERD and esophageal dysmotility s/p esophagectomy 5/21/21 who p/w elevated BP reading. 71F with PMHx HTN, HLD (myalgias with statins), hx of paraesophageal hernia resulting in GERD and esophageal dysmotility s/p esophagectomy 5/21/21 who p/w elevated BP reading this morning. Pt takes her BP every day and this morning it was 170s/80s. She took her BP medication (amlodipine 5mg) this am. She keep rechecking her BP and it remained elevated which made her anxious, She denies other sx, no CP/SOB, new HA, neck pain, n/v, dizziness, syncope, n/t/w in ext or other complaints. Upon arrival to the ER pt's BP is 138/84.

## 2022-04-06 NOTE — ED PROVIDER NOTE - PATIENT PORTAL LINK FT
You can access the FollowMyHealth Patient Portal offered by [facility] by registering at the following website: [email or web address]. By joining [employer]’s FollowMyHealth portal, you will also be able to view your health information using other applications (apps) compatible with our system.

## 2022-04-14 ENCOUNTER — APPOINTMENT (OUTPATIENT)
Dept: ORTHOPEDIC SURGERY | Facility: CLINIC | Age: 72
End: 2022-04-14
Payer: MEDICARE

## 2022-04-14 VITALS
HEIGHT: 59 IN | DIASTOLIC BLOOD PRESSURE: 74 MMHG | TEMPERATURE: 98.6 F | BODY MASS INDEX: 28.83 KG/M2 | HEART RATE: 95 BPM | WEIGHT: 143 LBS | SYSTOLIC BLOOD PRESSURE: 111 MMHG | OXYGEN SATURATION: 98 %

## 2022-04-14 DIAGNOSIS — M19.011 PRIMARY OSTEOARTHRITIS, RIGHT SHOULDER: ICD-10-CM

## 2022-04-14 PROCEDURE — 99212 OFFICE O/P EST SF 10 MIN: CPT

## 2022-04-14 NOTE — PHYSICAL EXAM
[de-identified] : General: Well-nourished, well-developed, alert, and in no acute distress.\par Head: Normocephalic.\par Eyes: Pupils equal round reactive to light and accommodation, extraocular muscles intact, normal sclera.\par Nose: No nasal discharge.\par Cardiac: Regular rate. Extremities are warm and well perfused. Distal pulses are symmetric bilaterally.\par Respiratory: No labored breathing.\par Extremities: Sensation is intact distally bilaterally.  Distal pulses are symmetric bilaterally\par Lymphatic: No regional lymphadenopathy, no lymphedema\par Neurologic: No focal deficits\par Skin: Normal skin color, texture, and turgor\par Psychiatric: Normal affect\par MSK: as noted above/below\par \par \par \par RIGHT SHOULDER:\par  \par Inspection:no bruising, rash, erythema, deformity\par Joint Effusion:no\par ROM:DECREASED IN ALL PLANES, FF/ABDUCTION ~130, ER ~60, IR TO LOW LUMBAR\par Palpation: MILD GH PAIN, NO AC joint pain, Bicipital Groove Pain , Clavicle pain , GT pain \par Distal Pulses:normal\par Upper Extremity Reflexes:2+\par Shoulder Strength: 5/5 \par Upper Extremity Sensation: normal\par \par Special Tests:\par Empty Can: MILDLY PAINFUL\par Lift-Off Test: Negative \par Cross Arm: POSITIVE\par Neer: Negative\par Quinteros: POSITIVE\par Speed: Negative\par \par \par LEFT SHOULDER:\par  \par Inspection:no bruising, rash, erythema, deformity\par Joint Effusion:no\par ROM:normal Forward Flexion, Abduction , Internal Rotation: , External Rotation \par Palpation: NO AC joint pain, Bicipital Groove Pain , GH joint pain , Clavicle pain , GT pain \par Distal Pulses:normal\par Upper Extremity Reflexes:2+\par Shoulder Strength: 5/5 \par Upper Extremity Sensation: normal\par \par Special Tests:\par Empty Can: Negative \par Lift-Off Test: Negative \par Cross Arm: Negative\par Neer: Negative\par Quinteros: Negative\par Speed: Negative\par \par \par

## 2022-04-14 NOTE — HISTORY OF PRESENT ILLNESS
[de-identified] : The patient is a 71 year old, lhd woman with h/o HTN, HLD (myalgias with statins), and paraesophageal hernia s/p partial fundoplication presenting with right shoulder/arm pain.\par \par The patient is a retired .\par \par She was last seen ~ 8 months ago for presents with a multiple year history of chronic, atraumatic, progressive right shoulder pain and stiffness.  She endorses crepitus with shoulder movement.  She has a history of cervical degenerative disc disease, and has had left-sided trigger point injections in the past.  Right shoulder pain different in character from left.  The patient denies distal numbness, weakness, paresthesias.  She has tried topical arnica and salonpas.\par \par At last visit, she was found to have glenohumeral arthritis.  She opted for a trial of PT.  She continues to have pain and stiffness.

## 2022-04-14 NOTE — DISCUSSION/SUMMARY
[Medication Risks Reviewed] : Medication risks reviewed [de-identified] : The patient is a 71 year old, lhd woman with h/o HTN, HLD (myalgias with statins), and paraesophageal hernia s/p partial fundoplication presenting with chronic right shoulder pain and stiffnes likely secondary to glenohumeral osteoarthritis.\par \par We again discussed natural progression of arthritis, as well as nonoperative and operative treatment options.\par \par Discussed potential surgery options including TSA, but she defers surgery for now.\par \par Discussed CSI, but is reluctant.\par \par She will consider self-pay HA injections.\par \par For now, continue PT.\par \par \par ------------------------------------------------------------------------------------------------------------------\par Patient appreciates and agrees with current plan.\par \par The patient's diagnosis above was evaluated by me, personally.  Diagnostic Testing and treatment options were discussed with the patient in detail.  The risks/benefits/potential complications of diagnostic testing/treatments were described in detail.  \par \par This note was generated using a mixture of manual typing and dragon medical dictation software.  A reasonable effort has been made for proofreading its contents, but typos may still remain.  If there are any questions or points of clarification needed please notify my office.\par \par \par >15 minutes of time was spent on total encounter.  >50% of the visit was spent on face-to-face counseling/coordination of care and medical-decision making for this patient.\par

## 2022-04-20 ENCOUNTER — APPOINTMENT (OUTPATIENT)
Dept: NEUROLOGY | Facility: CLINIC | Age: 72
End: 2022-04-20
Payer: MEDICARE

## 2022-04-20 VITALS
SYSTOLIC BLOOD PRESSURE: 113 MMHG | WEIGHT: 163 LBS | HEART RATE: 73 BPM | TEMPERATURE: 98.1 F | HEIGHT: 59 IN | OXYGEN SATURATION: 99 % | BODY MASS INDEX: 32.86 KG/M2 | DIASTOLIC BLOOD PRESSURE: 80 MMHG

## 2022-04-20 PROCEDURE — 99214 OFFICE O/P EST MOD 30 MIN: CPT

## 2022-04-20 NOTE — ASSESSMENT
[FreeTextEntry1] : Neck pain.\par \par Although her last MRI in 2016 showed quite pronounced cervical spondylosis and cord compression at C3-5, this was stable from a prior scan the year before, her pain remains manageable, and her neurologic exam is reassuringly normal.\par Recommend trying gabapentin 100 mg again for the burning component of the pain, would take at night instead of during the day to reduce side effects, particularly as she says the pain is worst at night.  Okay to continue Tylenol as needed as well.\par \par RTC 4 months

## 2022-04-20 NOTE — HISTORY OF PRESENT ILLNESS
[FreeTextEntry1] : Neck pain has persisted.  Starts in middle/back of neck and shoots up into the back of her head.  Feels like a hot/burning sensation.  Tried gabapentin and did not like how it made her feel, but was taking during the day.  Takes Tylenol which helps somewhat.  Also has pain in right shoulder, following with Dr. Whittaker.

## 2022-04-20 NOTE — PHYSICAL EXAM
[FreeTextEntry1] : Physical Exam\par Constitutional: no apparent distress\par Psychiatric: normal affect, euthymic, alert and oriented x 3\par \par Neurologic Exam:\par Mental Status: awake and alert, speech fluent and prosodic with no paraphasic errors\par Cranial Nerves: tracks in all directions, face symmetric, no dysarthria\par Motor: normal bulk and tone, no orbiting or pronator drift, power 5/5 to confrontation throughout including shoulder abduction, elbow flexion and extension, wrist flexion and extension, hip flexion, knee flexion and extension, no abnormal movements\par Sensation: intact to light touch in distal upper and lower extremities bilaterally\par Coordination: finger-nose-finger intact bilaterally\par Reflexes: 2+ biceps, triceps, brachioradialis, patella\par Gait: narrow base, normal stance and stride, normal arm swing\par

## 2022-04-20 NOTE — DATA REVIEWED
[de-identified] : MRI C spine December 2016 independently reviewed, notable for anterolisthesis (C7/T1 and T1/T2), multilevel spondylosis, and cord compression at C3/C4 and C4/C5

## 2022-04-25 ENCOUNTER — APPOINTMENT (OUTPATIENT)
Dept: OTOLARYNGOLOGY | Facility: CLINIC | Age: 72
End: 2022-04-25
Payer: MEDICARE

## 2022-04-25 VITALS
RESPIRATION RATE: 19 BRPM | DIASTOLIC BLOOD PRESSURE: 76 MMHG | HEIGHT: 58 IN | TEMPERATURE: 98 F | HEART RATE: 66 BPM | OXYGEN SATURATION: 98 % | WEIGHT: 139 LBS | BODY MASS INDEX: 29.18 KG/M2 | SYSTOLIC BLOOD PRESSURE: 117 MMHG

## 2022-04-25 DIAGNOSIS — H61.23 IMPACTED CERUMEN, BILATERAL: ICD-10-CM

## 2022-04-25 DIAGNOSIS — H92.09 OTALGIA, UNSPECIFIED EAR: ICD-10-CM

## 2022-04-25 PROCEDURE — 69210 REMOVE IMPACTED EAR WAX UNI: CPT

## 2022-04-25 PROCEDURE — 99214 OFFICE O/P EST MOD 30 MIN: CPT | Mod: 25

## 2022-04-25 RX ORDER — OFLOXACIN OTIC 3 MG/ML
0.3 SOLUTION AURICULAR (OTIC)
Qty: 1 | Refills: 1 | Status: ACTIVE | COMMUNITY
Start: 2022-04-25 | End: 1900-01-01

## 2022-04-25 RX ORDER — OFLOXACIN OTIC 3 MG/ML
0.3 SOLUTION AURICULAR (OTIC) TWICE DAILY
Qty: 1 | Refills: 1 | Status: ACTIVE | COMMUNITY
Start: 2022-04-25 | End: 1900-01-01

## 2022-04-25 NOTE — PHYSICAL EXAM
[de-identified] : b significant  [de-identified] : L eac abrasion, b copious cerumen removed with suction atraumatically, TMs intact [Normal] : lingual tonsils are normal [de-identified] : gait steady

## 2022-04-25 NOTE — PROCEDURE
[Cerumen Impaction] : Cerumen Impaction [Same] : same as the Pre Op Dx. [] : Removal of Cerumen [FreeTextEntry5] : L eac abrasion, b copious cerumen removed atraumatically with suction, TMs intact

## 2022-04-25 NOTE — ASSESSMENT
[FreeTextEntry1] : 1. L otalgia likely d/t TMJ\par -avoid bruxism/ chewing gum\par -soft diet\par -Aleve BID x 10 days if pt can tolerate\par -emphasized importance of seeing dentist to get mouth guard- pt said she would\par -rec she followup with neurologist for neck pain\par 2. L eac abrasion\par -Ofloxacin drops x 5 days\par 3. b cerumen impaction \par -cerumen removed\par -ears feel better\par -avoid Q tips and putting FB in nose\par RTC in 1 week to recheck ear\par

## 2022-04-25 NOTE — HISTORY OF PRESENT ILLNESS
[de-identified] : 73 y/o F last seen in 5/2021 presenting today with L otalgia for the past month. She has known herniated disc and feels ear pain and wants to know if ear is causing her pain or if it is herniated disc. Pt recently used cotton ball to clean L ear. She saw neuro and was told it is nerve pain. She had MRI in past which showed TMJ, It was recommended she see dentist for mouth guard but she has not been able to because of COVID.

## 2022-05-02 ENCOUNTER — APPOINTMENT (OUTPATIENT)
Dept: OTOLARYNGOLOGY | Facility: CLINIC | Age: 72
End: 2022-05-02
Payer: MEDICARE

## 2022-05-02 VITALS
BODY MASS INDEX: 29.18 KG/M2 | HEIGHT: 58 IN | HEART RATE: 64 BPM | TEMPERATURE: 97.8 F | DIASTOLIC BLOOD PRESSURE: 76 MMHG | SYSTOLIC BLOOD PRESSURE: 117 MMHG | OXYGEN SATURATION: 100 % | WEIGHT: 139 LBS

## 2022-05-02 DIAGNOSIS — H92.02 OTALGIA, LEFT EAR: ICD-10-CM

## 2022-05-02 DIAGNOSIS — S00.412A ABRASION OF LEFT EAR, INITIAL ENCOUNTER: ICD-10-CM

## 2022-05-02 PROCEDURE — 99213 OFFICE O/P EST LOW 20 MIN: CPT

## 2022-05-02 NOTE — PHYSICAL EXAM
[de-identified] : improved b [Normal] : assessment of respiratory effort is normal [de-identified] : gait steady

## 2022-05-02 NOTE — HISTORY OF PRESENT ILLNESS
[de-identified] : 1 week followup appt for this 73 yo F with l eac abrasion and otalgia. she also had tmj. she is following soft diet and took nsaids as recommended and otalgia resolved. Currently has no complaints.

## 2022-05-02 NOTE — ASSESSMENT
[FreeTextEntry1] : lt eac abrasion\par resolved\par tmj improved\par no longer symptomatic\par rtc as needed or if recurs\par cautioned not to use cotton swabs

## 2022-05-20 NOTE — ED ADULT NURSE NOTE - CCCP TRG CHIEF CMPLNT
Referred by: Kayla Carreon MD; Medical Diagnosis (from order):    Diagnosis Information      Diagnosis    724.2, 724.3, 338.29 (ICD-9-CM) - M54.42, G89.29 (ICD-10-CM) - Chronic bilateral low back pain with left-sided sciatica                Daily Treatment Note    Visit:  12     SUBJECTIVE                                                                                                               The patient reports that she had a really good week.  Her sciatica pain is WAY better.  She feels a twinge for a brief period of time but then it goes away.  Her thigh pain and hamstring pain is better most days.  She thinks that sitting at a play irritated it.  Volunteering has been going well.  She was also able to take a walk last week and did well with that.  She feels that the strengthening is helping a lot.      OBJECTIVE                                                                                                                        TREATMENT                                                                                                                  Therapeutic Exercise:  -recumbent bike 5 min  -standing terminal knee extension with band above the knee 10x5\" and below the knee 10x5\" - orange  - Quadriceps stretch in standing and sidelying with a strap 2x45\"  -internal rotation with band in sitting 2x10 yellow      Manual Therapy:  Sidelying:   Soft tissue mobilization to the proximal quadriceps/hip flexor, gluteal region and along the left lumbar spine to improve tissue mobility and decrease pain to improve blood flow, reduce tone, and reduce pain.        Skilled input: verbal instruction/cues, tactile instruction/cues and posture correction    Writer verbally educated and received verbal consent for hand placement, positioning of patient, and techniques to be performed today from patient for clothing adjustments for techniques, therapist position for techniques and hand placement and palpation for  hypertension techniques as described above and how they are pertinent to the patient's plan of care.    Home Exercise Program/Education Materials: Access Code: [email or web address]  URL: [email or web address]  Date: 05/20/2022  Prepared by: Paula Cobos    Exercises  · Child's Pose Stretch - 1 x daily - 7 x weekly - 1 sets - 1 reps - 60 hold  · Sidelying Quad Stretch - 1 x daily - 5 x weekly - 1 sets - 2 reps - 40 sec hold  · Supine Piriformis Stretch with Foot on Ground - 2 x daily - 7 x weekly - 1 sets - 3 reps - 30 hold  · Hooklying Clamshell with Resistance - 1 x daily - 3 x weekly - 3 sets - 5 reps  · Supine Bridge - 1 x daily - 3 x weekly - 1 sets - 5 reps - 5 sec hold  · Standing Gastroc Stretch on Foam 1/2 Roll - 2 x daily - 7 x weekly - 1 reps - 1 sets - 2 min hold  · Squat with Chair Touch - 1 x daily - 4 x weekly - 3 sets - 10 reps  · Standing Terminal Knee Extension with Resistance - 1 x daily - 3 x weekly - 1 sets - 10 reps - 5 sec hold  · Standing Terminal Knee Extension with Resistance - 1 x daily - 3 x weekly - 1 sets - 10 reps - 5 sec hold  · Seated Heel Squeeze - 1 x daily - 4 x weekly - 1 sets - 10 reps - 5 sec hold  · Seated Hip Internal Rotation with Resistance - 1 x daily - 3 x weekly - 2 sets - 10 reps  · Seated Long Arc Quad - 1 x daily - 3 x weekly - 1 sets - 10 reps - 2 hold     ASSESSMENT                                                                                                             The patient continues to tolerate progressive exercises and is walking with less antalgic gait pattern.  She is  to palpation in the quadriceps but tolerates more pressure.    Patient Education:   Results of above outlined education: Verbalizes understanding and Demonstrates understanding      PLAN                                                                                                                           Suggestions for next session as indicated: Progress per plan of care,  begin balance activities (tandem?), try side steps with band at thigh         Therapy procedure time and total treatment time can be found documented on the Time Entry flowsheet

## 2022-06-27 ENCOUNTER — RESULT REVIEW (OUTPATIENT)
Age: 72
End: 2022-06-27

## 2022-06-27 ENCOUNTER — APPOINTMENT (OUTPATIENT)
Dept: OTOLARYNGOLOGY | Facility: CLINIC | Age: 72
End: 2022-06-27
Payer: MEDICARE

## 2022-06-27 VITALS
TEMPERATURE: 97.4 F | DIASTOLIC BLOOD PRESSURE: 74 MMHG | HEIGHT: 58 IN | SYSTOLIC BLOOD PRESSURE: 109 MMHG | HEART RATE: 70 BPM | WEIGHT: 140 LBS | BODY MASS INDEX: 29.39 KG/M2

## 2022-06-27 DIAGNOSIS — K21.9 GASTRO-ESOPHAGEAL REFLUX DISEASE W/OUT ESOPHAGITIS: ICD-10-CM

## 2022-06-27 PROCEDURE — 99214 OFFICE O/P EST MOD 30 MIN: CPT | Mod: 25

## 2022-06-27 PROCEDURE — 31575 DIAGNOSTIC LARYNGOSCOPY: CPT

## 2022-06-27 NOTE — ASSESSMENT
[FreeTextEntry1] : 71 yo female who presents with dysphonia s/p esophageal surgery 5/21/21. On exam, her voice has improved and her left true vocal fold function is now fully recovered. She is swallowing well and is essentially asymptomatic. At this point she should continue to follow up with GI surgery and follow up with me as needed. She had an isolated episode of swallowing difficulty in February 2022 but has not had any recurrence since then. \par \par Today, she denies any episodes of dysphagia but does present with globus sensation.  Her exam is unchanged from previous.  At this time, due to her history of dysphagia and esophageal surgery, we will refer the patient to SLP and will obtain a modified barium swallow with follow-through esophagram.  In regards to her complaints of globus sensation, there was evidence of LPR on exam. I am recommending dietary and behavioral change to reduce acid in the diet. I am also recommending omeprazole. She will follow-up with us after her MBS for reevaluation.\par \par - Dietary and behavioral modification to reduce acid reflux, handout given \par - Omeprazole qd\par - Voice hygiene, increase hydration, sips of water throughout the day, avoid throat clearing\par - Modified barium swallow with follow-through esophagram\par - SLP referral\par - Follow-up after above for reevaluation\par

## 2022-06-27 NOTE — HISTORY OF PRESENT ILLNESS
[de-identified] : 72 yo female who presents with dysphonia. She underwent partial fundoplication complicated by perforation and then esophagogastroduodenoscopy, Lap Robotic assisted mobilization of gastric conduit, Left cervical incision, esophagogastrectomy and placement of a J-tube for repair. Post operatively patient had dysphonia, sob, and breathy voice. 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 \par \par Pt now follows up after hospitalization for evaluation. Overall she notes that her voice is improving, though not back to normal. Her voice remains breathy, but high pitched. No issues chewing, eating or swallowing. She is not short of breath. She denies any ENT issues otherwise. \par -\par Update 9/14/21:\par Pt overall well and stable. Her voice has improved. She presents with throat discomfort with swallowing, feels food getting stuck in her throat. She now has to eat 6 small meals a day. Does not cough/choke/regurgitation on liquids or solids. She denies weight loss. She still drinks ensure to help with calories. No breathing issues. She denies any other ENT complaints.\par -\par Update 12/14/21\par Overall stable and well. She notes continued improvement in terms of voice and swallowing. She feels back to baseline. she continues to eat smaller meals, but no issues with chewing, eating swallowing, coughing or regurgitation of food. she is not losing weight, and she is getting adequate nutrition.\par -\par Update 2/14/22\par Pt presents with swallowing concern. Last night she finished a meal or rice, beans, sweet potato, chicken without issue. After her meal she used her medication a powder which she puts in water and had a reaction where she felt it didn't pass. This caused pain and discomfort. Last night she did take another medication without issue. This morning she drank coffee without any issues. No coughing, choking or regurgitation of foods. She presents for reval. No new ENT issues otherwise. \par - \par Update 3/14/22\par Pt presents today with improvement of symptoms of difficulty swallowing. She does not have any difficulty swallowing or chewing foods/liquids since her isolated episode last month. She feels like she is getting adequate nutrition. Denies coughing, choking, or regurgitation. She denies any new ENT issues otherwise. \par - [FreeTextEntry1] : 6/27/2022\par Patient presents for follow-up on dysphagia.  She complains of "something is stuck in my throat." She is able to swallow without issue and is not losing weight.  Denies any breathing issues or voice changes since the last visit.  She has not completed a modified barium swallow/esophagram.  No new ENT issues otherwise.\par \par Diet:\par + chocolate, garlic/onion, blueberries, citrus\par - mint, tomatoes, spicy, carbonated beverages\par 4 glasses of water per day\par No late night eating \par No alcohol

## 2022-06-27 NOTE — PHYSICAL EXAM
[Normal] : mucosa is normal [Midline] : trachea located in midline position [Laryngoscopy Performed] : laryngoscopy was performed, see procedure section for findings [FreeTextEntry1] : Good projection and clear voice.

## 2022-06-27 NOTE — PROCEDURE
[de-identified] : -\par Flexible Laryngoscopy\par Procedure Note\par \par Pre-operative Diagnosis: dysphagia\par Post-operative Diagnosis: LPR\par Anesthesia: Topical - 1 % Lidocaine/Phenylephrine\par Procedure: Flexible Laryngoscopy\par  \par Procedure Details: \par The patient was placed in the sitting position. After decongestant and anesthesia were applied the laryngoscope was passed. The nasal cavities, nasopharynx, oropharynx, hypopharynx, and larynx were all examined. Vocal folds were examined during respiration and phonation. The following findings were noted:\par \par Findings: \par Nose: Septum is midline, turbinates are normal, nasal airways patent, mucosa normal\par Nasopharynx: Adenoids normal, no masses, eustachian tube normal\par Oropharynx: Pharyngeal walls symmetric and without lesion. Tonsils/fossae symmetric\par Hypopharynx: Hypopharynx and pyriform sinuses without lesion. No masses or asymmetry. Mild pooling of secretions in the esophageal inlet.\par Larynx: Epiglottis and aryepiglottic folds were sharp and crisp bilaterally. Bilateral false vocal folds normal appearance. Airway was widely patent. + post cricoid edema		\par TVF Appearance: normal\par TVF Mobility: normal. \par

## 2022-07-15 NOTE — PHYSICAL THERAPY INITIAL EVALUATION ADULT - LIVES WITH, PROFILE
Pt stated that atrial septal defect surgery originally scheduled for 6/14/22 was r/s due to mri finding of large cyst on the cardiac mri 5/27/22. It has subsequently been determined to proceed w/the surgery, now scheduled for 7/19/22. The cyst will be monitored. She will be taking no medications the morning of surgery.   alone

## 2022-07-20 ENCOUNTER — APPOINTMENT (OUTPATIENT)
Dept: RADIOLOGY | Facility: HOSPITAL | Age: 72
End: 2022-07-20

## 2022-08-10 ENCOUNTER — RESULT REVIEW (OUTPATIENT)
Age: 72
End: 2022-08-10

## 2022-08-10 ENCOUNTER — OUTPATIENT (OUTPATIENT)
Dept: OUTPATIENT SERVICES | Facility: HOSPITAL | Age: 72
LOS: 1 days | End: 2022-08-10
Payer: MEDICARE

## 2022-08-10 ENCOUNTER — APPOINTMENT (OUTPATIENT)
Dept: RADIOLOGY | Facility: HOSPITAL | Age: 72
End: 2022-08-10

## 2022-08-10 DIAGNOSIS — Z98.890 OTHER SPECIFIED POSTPROCEDURAL STATES: Chronic | ICD-10-CM

## 2022-08-10 PROCEDURE — 74230 X-RAY XM SWLNG FUNCJ C+: CPT

## 2022-08-10 PROCEDURE — 74230 X-RAY XM SWLNG FUNCJ C+: CPT | Mod: 26

## 2022-08-10 PROCEDURE — 74220 X-RAY XM ESOPHAGUS 1CNTRST: CPT

## 2022-08-10 PROCEDURE — 92611 MOTION FLUOROSCOPY/SWALLOW: CPT | Mod: GN

## 2022-08-11 ENCOUNTER — INPATIENT (INPATIENT)
Facility: HOSPITAL | Age: 72
LOS: 0 days | Discharge: HOME | DRG: 301 | End: 2022-08-12
Attending: PSYCHIATRY & NEUROLOGY | Admitting: PSYCHIATRY & NEUROLOGY
Payer: MEDICARE

## 2022-08-11 VITALS
HEART RATE: 80 BPM | WEIGHT: 139.99 LBS | HEIGHT: 59 IN | SYSTOLIC BLOOD PRESSURE: 151 MMHG | TEMPERATURE: 98 F | OXYGEN SATURATION: 98 % | RESPIRATION RATE: 18 BRPM | DIASTOLIC BLOOD PRESSURE: 79 MMHG

## 2022-08-11 DIAGNOSIS — Z98.890 OTHER SPECIFIED POSTPROCEDURAL STATES: Chronic | ICD-10-CM

## 2022-08-11 LAB
ALBUMIN SERPL ELPH-MCNC: 4.6 G/DL — SIGNIFICANT CHANGE UP (ref 3.3–5)
ALP SERPL-CCNC: 115 U/L — SIGNIFICANT CHANGE UP (ref 40–120)
ALT FLD-CCNC: 17 U/L — SIGNIFICANT CHANGE UP (ref 10–45)
ANION GAP SERPL CALC-SCNC: 13 MMOL/L — SIGNIFICANT CHANGE UP (ref 5–17)
APPEARANCE UR: CLEAR — SIGNIFICANT CHANGE UP
APTT BLD: 28.4 SEC — SIGNIFICANT CHANGE UP (ref 27.5–35.5)
AST SERPL-CCNC: 21 U/L — SIGNIFICANT CHANGE UP (ref 10–40)
BASOPHILS # BLD AUTO: 0.02 K/UL — SIGNIFICANT CHANGE UP (ref 0–0.2)
BASOPHILS NFR BLD AUTO: 0.6 % — SIGNIFICANT CHANGE UP (ref 0–2)
BILIRUB SERPL-MCNC: 0.2 MG/DL — SIGNIFICANT CHANGE UP (ref 0.2–1.2)
BILIRUB UR-MCNC: NEGATIVE — SIGNIFICANT CHANGE UP
BLD GP AB SCN SERPL QL: NEGATIVE — SIGNIFICANT CHANGE UP
BUN SERPL-MCNC: 12 MG/DL — SIGNIFICANT CHANGE UP (ref 7–23)
CALCIUM SERPL-MCNC: 10 MG/DL — SIGNIFICANT CHANGE UP (ref 8.4–10.5)
CHLORIDE SERPL-SCNC: 93 MMOL/L — LOW (ref 96–108)
CO2 SERPL-SCNC: 24 MMOL/L — SIGNIFICANT CHANGE UP (ref 22–31)
COLOR SPEC: YELLOW — SIGNIFICANT CHANGE UP
CREAT SERPL-MCNC: 0.57 MG/DL — SIGNIFICANT CHANGE UP (ref 0.5–1.3)
DIFF PNL FLD: NEGATIVE — SIGNIFICANT CHANGE UP
EGFR: 95 ML/MIN/1.73M2 — SIGNIFICANT CHANGE UP
EOSINOPHIL # BLD AUTO: 0.08 K/UL — SIGNIFICANT CHANGE UP (ref 0–0.5)
EOSINOPHIL NFR BLD AUTO: 2.3 % — SIGNIFICANT CHANGE UP (ref 0–6)
GLUCOSE SERPL-MCNC: 111 MG/DL — HIGH (ref 70–99)
GLUCOSE UR QL: NEGATIVE — SIGNIFICANT CHANGE UP
HCT VFR BLD CALC: 38.8 % — SIGNIFICANT CHANGE UP (ref 34.5–45)
HGB BLD-MCNC: 12.8 G/DL — SIGNIFICANT CHANGE UP (ref 11.5–15.5)
IMM GRANULOCYTES NFR BLD AUTO: 0.3 % — SIGNIFICANT CHANGE UP (ref 0–1.5)
INR BLD: 1.02 — SIGNIFICANT CHANGE UP (ref 0.88–1.16)
KETONES UR-MCNC: NEGATIVE — SIGNIFICANT CHANGE UP
LEUKOCYTE ESTERASE UR-ACNC: NEGATIVE — SIGNIFICANT CHANGE UP
LYMPHOCYTES # BLD AUTO: 1.04 K/UL — SIGNIFICANT CHANGE UP (ref 1–3.3)
LYMPHOCYTES # BLD AUTO: 30.2 % — SIGNIFICANT CHANGE UP (ref 13–44)
MAGNESIUM SERPL-MCNC: 2.3 MG/DL — SIGNIFICANT CHANGE UP (ref 1.6–2.6)
MCHC RBC-ENTMCNC: 29.9 PG — SIGNIFICANT CHANGE UP (ref 27–34)
MCHC RBC-ENTMCNC: 33 GM/DL — SIGNIFICANT CHANGE UP (ref 32–36)
MCV RBC AUTO: 90.7 FL — SIGNIFICANT CHANGE UP (ref 80–100)
MONOCYTES # BLD AUTO: 0.38 K/UL — SIGNIFICANT CHANGE UP (ref 0–0.9)
MONOCYTES NFR BLD AUTO: 11 % — SIGNIFICANT CHANGE UP (ref 2–14)
NEUTROPHILS # BLD AUTO: 1.91 K/UL — SIGNIFICANT CHANGE UP (ref 1.8–7.4)
NEUTROPHILS NFR BLD AUTO: 55.6 % — SIGNIFICANT CHANGE UP (ref 43–77)
NITRITE UR-MCNC: NEGATIVE — SIGNIFICANT CHANGE UP
NRBC # BLD: 0 /100 WBCS — SIGNIFICANT CHANGE UP (ref 0–0)
PH UR: 7 — SIGNIFICANT CHANGE UP (ref 5–8)
PLATELET # BLD AUTO: 387 K/UL — SIGNIFICANT CHANGE UP (ref 150–400)
POTASSIUM SERPL-MCNC: 4.1 MMOL/L — SIGNIFICANT CHANGE UP (ref 3.5–5.3)
POTASSIUM SERPL-SCNC: 4.1 MMOL/L — SIGNIFICANT CHANGE UP (ref 3.5–5.3)
PROT SERPL-MCNC: 8 G/DL — SIGNIFICANT CHANGE UP (ref 6–8.3)
PROT UR-MCNC: NEGATIVE MG/DL — SIGNIFICANT CHANGE UP
PROTHROM AB SERPL-ACNC: 12.2 SEC — SIGNIFICANT CHANGE UP (ref 10.5–13.4)
RBC # BLD: 4.28 M/UL — SIGNIFICANT CHANGE UP (ref 3.8–5.2)
RBC # FLD: 13.3 % — SIGNIFICANT CHANGE UP (ref 10.3–14.5)
RH IG SCN BLD-IMP: POSITIVE — SIGNIFICANT CHANGE UP
SARS-COV-2 RNA SPEC QL NAA+PROBE: NEGATIVE — SIGNIFICANT CHANGE UP
SODIUM SERPL-SCNC: 130 MMOL/L — LOW (ref 135–145)
SP GR SPEC: 1.01 — SIGNIFICANT CHANGE UP (ref 1–1.03)
UROBILINOGEN FLD QL: 0.2 E.U./DL — SIGNIFICANT CHANGE UP
WBC # BLD: 3.44 K/UL — LOW (ref 3.8–10.5)
WBC # FLD AUTO: 3.44 K/UL — LOW (ref 3.8–10.5)

## 2022-08-11 PROCEDURE — 70450 CT HEAD/BRAIN W/O DYE: CPT | Mod: 26,59,MA

## 2022-08-11 PROCEDURE — 70498 CT ANGIOGRAPHY NECK: CPT | Mod: 26,MA

## 2022-08-11 PROCEDURE — 70547 MR ANGIOGRAPHY NECK W/O DYE: CPT | Mod: 26

## 2022-08-11 PROCEDURE — 70544 MR ANGIOGRAPHY HEAD W/O DYE: CPT | Mod: 26,XU,MA

## 2022-08-11 PROCEDURE — 99285 EMERGENCY DEPT VISIT HI MDM: CPT | Mod: 25

## 2022-08-11 PROCEDURE — 70496 CT ANGIOGRAPHY HEAD: CPT | Mod: 26,MA

## 2022-08-11 PROCEDURE — 93010 ELECTROCARDIOGRAM REPORT: CPT

## 2022-08-11 PROCEDURE — 70551 MRI BRAIN STEM W/O DYE: CPT | Mod: 26,MA

## 2022-08-11 RX ORDER — ENOXAPARIN SODIUM 100 MG/ML
40 INJECTION SUBCUTANEOUS EVERY 24 HOURS
Refills: 0 | Status: DISCONTINUED | OUTPATIENT
Start: 2022-08-11 | End: 2022-08-12

## 2022-08-11 RX ORDER — ACETAMINOPHEN 500 MG
650 TABLET ORAL ONCE
Refills: 0 | Status: COMPLETED | OUTPATIENT
Start: 2022-08-11 | End: 2022-08-11

## 2022-08-11 RX ORDER — ASPIRIN/CALCIUM CARB/MAGNESIUM 324 MG
81 TABLET ORAL ONCE
Refills: 0 | Status: COMPLETED | OUTPATIENT
Start: 2022-08-11 | End: 2022-08-11

## 2022-08-11 RX ORDER — ACETAMINOPHEN 500 MG
650 TABLET ORAL EVERY 6 HOURS
Refills: 0 | Status: DISCONTINUED | OUTPATIENT
Start: 2022-08-11 | End: 2022-08-12

## 2022-08-11 RX ORDER — LIDOCAINE 4 G/100G
1 CREAM TOPICAL EVERY 24 HOURS
Refills: 0 | Status: DISCONTINUED | OUTPATIENT
Start: 2022-08-11 | End: 2022-08-12

## 2022-08-11 RX ORDER — ASPIRIN/CALCIUM CARB/MAGNESIUM 324 MG
81 TABLET ORAL DAILY
Refills: 0 | Status: DISCONTINUED | OUTPATIENT
Start: 2022-08-12 | End: 2022-08-12

## 2022-08-11 RX ADMIN — Medication 650 MILLIGRAM(S): at 04:22

## 2022-08-11 RX ADMIN — Medication 650 MILLIGRAM(S): at 03:35

## 2022-08-11 RX ADMIN — ENOXAPARIN SODIUM 40 MILLIGRAM(S): 100 INJECTION SUBCUTANEOUS at 22:05

## 2022-08-11 NOTE — ED ADULT NURSE REASSESSMENT NOTE - COMFORT CARE
plan of care explained/wait time explained/warm blanket provided
plan of care explained/wait time explained

## 2022-08-11 NOTE — CONSULT NOTE ADULT - SUBJECTIVE AND OBJECTIVE BOX
HISTORY OF PRESENT ILLNESS:     PAST MEDICAL & SURGICAL HISTORY:  HTN (hypertension)      Hyperlipidemia      Gastric reflux      Diaphragmatic hernia without obstruction or gangrene      Constipation      H/O colonoscopy  x2      History of coronary angiogram      H/O hernia repair        FAMILY HISTORY:      SOCIAL HISTORY:  Tobacco Use:  EtOH use:   Substance:    Allergies    Nuts (Hives)  penicillin (Hives)  pollen....sneeze, dry nose; itching (Other)    Intolerances    statins (Muscle Pain)      REVIEW OF SYSTEMS      General:	no recent illnesses, no recent wt gain/loss    Skin/Breast:  intact  	  Ophthalmologic:  negative, glasses for ***  	  ENMT:	negative    Respiratory and Thorax: no coughing, wheezing, recent URI  	  Cardiovascular: no chest pain, QUEEN    Gastrointestinal:	soft, non tender    Genitourinary: no frequency, dysuria    Musculoskeletal:	negative    Neurological:	see HPI    Psychiatric:	negative    Hematology/Lymphatics:	negative    Endocrine:  	negative    Allergic/Immunologic:  Negative      MEDICATIONS:  Antibiotics:    Neuro:    Anticoagulation:    OTHER:    IVF:      Vital Signs Last 24 Hrs  T(C): 36.6 (11 Aug 2022 02:34), Max: 36.6 (11 Aug 2022 02:34)  T(F): 97.8 (11 Aug 2022 02:34), Max: 97.8 (11 Aug 2022 02:34)  HR: 81 (11 Aug 2022 05:02) (80 - 81)  BP: 131/80 (11 Aug 2022 05:02) (131/80 - 151/79)  BP(mean): --  RR: 16 (11 Aug 2022 05:02) (16 - 18)  SpO2: 99% (11 Aug 2022 05:02) (98% - 99%)    Parameters below as of 11 Aug 2022 05:02  Patient On (Oxygen Delivery Method): room air        PHYSICAL EXAM:  Constitutional:  Eyes:  ENMT:  Neck:  Back:  Respiratory:  Cardiovascular:  Gastrointestinal:  Genitourinary:  Rectal:  Vascular:  Neurological:  Skin:    LABS:                        12.8   3.44  )-----------( 387      ( 11 Aug 2022 03:13 )             38.8     08-11    130<L>  |  93<L>  |  12  ----------------------------<  111<H>  4.1   |  24  |  0.57    Ca    10.0      11 Aug 2022 03:13  Mg     2.3         TPro  8.0  /  Alb  4.6  /  TBili  0.2  /  DBili  x   /  AST  21  /  ALT  17  /  AlkPhos  115  08-    PT/INR - ( 11 Aug 2022 05:42 )   PT: 12.2 sec;   INR: 1.02          PTT - ( 11 Aug 2022 05:42 )  PTT:28.4 sec  Urinalysis Basic - ( 11 Aug 2022 05:42 )    Color: Yellow / Appearance: Clear / S.015 / pH: x  Gluc: x / Ketone: NEGATIVE  / Bili: Negative / Urobili: 0.2 E.U./dL   Blood: x / Protein: NEGATIVE mg/dL / Nitrite: NEGATIVE   Leuk Esterase: NEGATIVE / RBC: x / WBC x   Sq Epi: x / Non Sq Epi: x / Bacteria: x      CULTURES:      RADIOLOGY & ADDITIONAL STUDIES:    Assessment:      Plan:  - MRI Brain w/wo contrast   - MRA head/neck   - Stroke/Neurology consult    D/W Dr. Perez  CC: Headache, flushing x 1 day     HISTORY OF PRESENT ILLNESS:   This is a 71 y/o female with h/o HTN, HLD, GERD who presents to ER with headache, facial flushing x 1 day in the setting of elevated blood pressure. Patient also states she has been experiencing L neck pain for years, relieved by tylenol, and has been going to physical therapy for herniated disc. 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     PAST MEDICAL & SURGICAL HISTORY:  HTN (hypertension)    Hyperlipidemia    Gastric reflux    Diaphragmatic hernia without obstruction or gangrene    Constipation    H/O colonoscopy  x2    History of coronary angiogram    H/O hernia repair    FAMILY HISTORY:    SOCIAL HISTORY:  Tobacco Use:   EtOH use:    Substance:     Allergies    Nuts (Hives)  penicillin (Hives)  pollen....sneeze, dry nose; itching (Other)    Intolerances    statins (Muscle Pain)    REVIEW OF SYSTEMS    General: no recent illnesses, no recent wt gain/loss    Skin/Breast:  intact  	  Ophthalmologic:  negative   	  ENMT:	negative    Respiratory and Thorax: no coughing, wheezing, recent URI  	  Cardiovascular: no chest pain, QUEEN    Gastrointestinal:	soft, non tender    Genitourinary: no frequency, dysuria    Musculoskeletal:	negative    Neurological:	see HPI    Psychiatric:	negative    Hematology/Lymphatics:	negative    Endocrine:  	negative    Allergic/Immunologic:  Negative    MEDICATIONS:  Antibiotics:    Neuro:    Anticoagulation:    OTHER:    IVF:    Vital Signs Last 24 Hrs  T(C): 36.6 (11 Aug 2022 02:34), Max: 36.6 (11 Aug 2022 02:34)  T(F): 97.8 (11 Aug 2022 02:34), Max: 97.8 (11 Aug 2022 02:34)  HR: 81 (11 Aug 2022 05:02) (80 - 81)  BP: 131/80 (11 Aug 2022 05:02) (131/80 - 151/79)  BP(mean): --  RR: 16 (11 Aug 2022 05:02) (16 - 18)  SpO2: 99% (11 Aug 2022 05:02) (98% - 99%)    Parameters below as of 11 Aug 2022 05:02  Patient On (Oxygen Delivery Method): room air    PHYSICAL EXAM:  Constitutional: 71 y/o female awake, alert in no acute distress.  Eyes:  Sclera anicteric, conjunctiva noninjected.   ENMT: Oropharyngeal mucosa moist, pink. Tongue midline.    Respiratory: Clear to auscultation bilaterally.   Cardiovascular: Regular rate and rhythm.  Gastrointestinal:  Soft, nontender, nondistended.   Vascular: Extremities warm, no ulcers, no discoloration of skin.   Neurological: Gen: AA&O x 3, conversant, appropriate. CN II-XII grossly intact. SLOAN x 4, 5/5 throughout UE/LE. Sensation intact to light touch throughout. No pronator drift, no dysmetria.  Skin: Warm, dry, no erythema.     LABS:                        12.8   3.44  )-----------( 387      ( 11 Aug 2022 03:13 )             38.8     08-    130<L>  |  93<L>  |  12  ----------------------------<  111<H>  4.1   |  24  |  0.57    Ca    10.0      11 Aug 2022 03:13  Mg     2.3         TPro  8.0  /  Alb  4.6  /  TBili  0.2  /  DBili  x   /  AST  21  /  ALT  17  /  AlkPhos  115  08-11    PT/INR - ( 11 Aug 2022 05:42 )   PT: 12.2 sec;   INR: 1.02          PTT - ( 11 Aug 2022 05:42 )  PTT:28.4 sec  Urinalysis Basic - ( 11 Aug 2022 05:42 )    Color: Yellow / Appearance: Clear / S.015 / pH: x  Gluc: x / Ketone: NEGATIVE  / Bili: Negative / Urobili: 0.2 E.U./dL   Blood: x / Protein: NEGATIVE mg/dL / Nitrite: NEGATIVE   Leuk Esterase: NEGATIVE / RBC: x / WBC x   Sq Epi: x / Non Sq Epi: x / Bacteria: x    CULTURES:    RADIOLOGY & ADDITIONAL STUDIES:  < from: CT Head No Cont (22 @ 04:05) >  FINDINGS:  There is no hydrocephalus. The basal cisterns are patent. There is no   focal mass effect or midline shift. There are no extra-axial collections   or intraparenchymal hemorrhage.    Mild chronic white matter microangiopathic ischemic changes There is no   evidence of acute transcortical territorial infarction.    The globes and retrobulbar fat are intact.    The mastoids and paranasal sinuses are well aerated. The calvaria is   intact.    Partially empty sella turcica again noted.    IMPRESSION:  No hydrocephalus, midline shift, acute intracranial hemorrhage or   demarcated territorial infarct.    < end of copied text >    < from: CT Angio Head w/ IV Cont (22 @ 04:05) >  FINDINGS:    ANTERIOR CIRCULATION:  Right internal carotid artery: Unremarkable. Intracranial segment is   patent  with no significant stenosis. No aneurysm.  Right middle cerebral artery: Unremarkable. No occlusion or significant  stenosis. No aneurysm.  Right anterior cerebral artery: Unremarkable. No occlusion or significant  stenosis. No aneurysm.    Left internal carotid artery: Unremarkable. Intracranial segment is   patent with  no significant stenosis. No aneurysm.  Left middle cerebral artery: Unremarkable. No occlusion or significant  stenosis. No aneurysm.  Left anterior cerebral artery: Unremarkable. No occlusion or significant  stenosis. No aneurysm.    POSTERIOR CIRCULATION:  Right vertebral artery: Unremarkable. No occlusion or significant   stenosis. No  aneurysm.  Left vertebral artery: Unremarkable. No occlusion or significant   stenosis. No  aneurysm.  Basilar artery: Unremarkable. No occlusion or significant stenosis. No  aneurysm.  Right posterior cerebral artery: Unremarkable. No occlusion or significant  stenosis. No aneurysm.  Left posterior cerebral artery: Unremarkable. No occlusion or significant  stenosis. No aneurysm.    Brain: No definite mass, mass effect, or midline shift.  Cerebral ventricles: No ventriculomegaly.  Bones/joints: Unremarkable.No acute fracture.  Soft tissues: Unremarkable.    IMPRESSION:  Negative CTA Head. No evidence of intracranial large vessel stenosis or  occlusion. No evidence of aneurysm or AVM.  < from: CT Angio Head w/ IV Cont (22 @ 04:05) >  INTERPRETATION:  I Shmuel Quick MD  have reviewed the images of the study   and agree with the following report with the added modifications: (I   disagree with the provided [facility] report in regards to the left vertebral   artery as further discussed below).    A patent left vertebral artery is seen emanating off the left subclavian   artery and ascending along the left transversarium foramen. At the   transversarium foramen at C5 there is noticeable decreased attenuation of   the left vertebral artery which becomes more pronounced at the   transversarium foramen of C4 at which point the left vertebral artery is   no longer visualized. These findings are suspicious for age indeterminate   thrombosed dissection. (Follow up MRA of the neck may provide further   clarification).    The right vertebral artery is patent and normal caliber throughout its   course.    Evaluation of the anterior circulation demonstrates patency of the   bilateral common carotid arteries. Retropharyngeal course of the left   common carotid artery. No arthrosclerotic narrowing at the right andleft   carotid bifurcation.    The bilateral intracranial carotid arteries, middle cerebral arteries and   anterior cerebral arteries are patent. The MCA bifurcations are   unremarkable.    Surgical sutures involving the proximal-mid esophagus. Thereis gas and   fluid distention of the esophagus throughout its visualized course.    Results discussed with Dr. Austin of the ED at 5:20 AM.    < end of copied text >    Assessment:  This is a 71 y/o female with h/o HTN, HLD, GERD who presents to ER with headache, facial flushing x 1 day in the setting of elevated blood pressure. 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     Plan:  #Left vertebral artery dissection   - Please obtain MRI Brain w/wo contrast   - Please obtain MRA w/ contrast head/neck   - Stroke/Neurology consult    D/W Dr. Perez  CC: Headache, flushing x 1 day     HISTORY OF PRESENT ILLNESS:   This is a 71 y/o female with h/o HTN, HLD, GERD who presents to ER with mild headache in the setting of elevated blood pressure. Patient also states she has been experiencing L neck pain for years, relieved by tylenol, and has been going to physical therapy for herniated disc. 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     PAST MEDICAL & SURGICAL HISTORY:  HTN (hypertension)    Hyperlipidemia    Gastric reflux    Diaphragmatic hernia without obstruction or gangrene    Constipation    H/O colonoscopy  x2    History of coronary angiogram    H/O hernia repair    FAMILY HISTORY:    SOCIAL HISTORY:  Tobacco Use: Denies use   EtOH use: Denies use   Substance: Denies     Allergies    Nuts (Hives)  penicillin (Hives)  pollen....sneeze, dry nose; itching (Other)    Intolerances    statins (Muscle Pain)    REVIEW OF SYSTEMS    General: no recent illnesses, no recent wt gain/loss    Skin/Breast:  intact  	  Ophthalmologic:  negative   	  ENMT:	negative    Respiratory and Thorax: no coughing, wheezing, recent URI  	  Cardiovascular: no chest pain, QUEEN    Gastrointestinal:	soft, non tender    Genitourinary: no frequency, dysuria    Musculoskeletal:	negative    Neurological:	see HPI    Psychiatric:	negative    Hematology/Lymphatics:	negative    Endocrine:  	negative    Allergic/Immunologic:  Negative    MEDICATIONS:  Antibiotics:    Neuro:    Anticoagulation:    OTHER:    IVF:    Vital Signs Last 24 Hrs  T(C): 36.6 (11 Aug 2022 02:34), Max: 36.6 (11 Aug 2022 02:34)  T(F): 97.8 (11 Aug 2022 02:34), Max: 97.8 (11 Aug 2022 02:34)  HR: 81 (11 Aug 2022 05:02) (80 - 81)  BP: 131/80 (11 Aug 2022 05:02) (131/80 - 151/79)  BP(mean): --  RR: 16 (11 Aug 2022 05:02) (16 - 18)  SpO2: 99% (11 Aug 2022 05:02) (98% - 99%)    Parameters below as of 11 Aug 2022 05:02  Patient On (Oxygen Delivery Method): room air    PHYSICAL EXAM:  Constitutional: 71 y/o female awake, alert in no acute distress.  Eyes:  Sclera anicteric, conjunctiva noninjected.   ENMT: Oropharyngeal mucosa moist, pink. Tongue midline. + dentures   Respiratory: Clear to auscultation bilaterally.   Cardiovascular: Regular rate and rhythm.  Gastrointestinal:  Soft, nontender, nondistended.   Vascular: Extremities warm, no ulcers, no discoloration of skin.   Neurological: AA&O x 3, conversant, appropriate. CN II-XII grossly intact. SLOAN x 4, 5/5 throughout UE/LE. Sensation intact to light touch throughout. No pronator drift, no dysmetria.  Skin: Warm, dry, no erythema.     LABS:                        12.8   3.44  )-----------( 387      ( 11 Aug 2022 03:13 )             38.8     08-    130<L>  |  93<L>  |  12  ----------------------------<  111<H>  4.1   |  24  |  0.57    Ca    10.0      11 Aug 2022 03:13  Mg     2.3         TPro  8.0  /  Alb  4.6  /  TBili  0.2  /  DBili  x   /  AST  21  /  ALT  17  /  AlkPhos  115  08-11    PT/INR - ( 11 Aug 2022 05:42 )   PT: 12.2 sec;   INR: 1.02          PTT - ( 11 Aug 2022 05:42 )  PTT:28.4 sec  Urinalysis Basic - ( 11 Aug 2022 05:42 )    Color: Yellow / Appearance: Clear / S.015 / pH: x  Gluc: x / Ketone: NEGATIVE  / Bili: Negative / Urobili: 0.2 E.U./dL   Blood: x / Protein: NEGATIVE mg/dL / Nitrite: NEGATIVE   Leuk Esterase: NEGATIVE / RBC: x / WBC x   Sq Epi: x / Non Sq Epi: x / Bacteria: x    CULTURES:    RADIOLOGY & ADDITIONAL STUDIES:  < from: CT Head No Cont (22 @ 04:05) >  FINDINGS:  There is no hydrocephalus. The basal cisterns are patent. There is no   focal mass effect or midline shift. There are no extra-axial collections   or intraparenchymal hemorrhage.    Mild chronic white matter microangiopathic ischemic changes There is no   evidence of acute transcortical territorial infarction.    The globes and retrobulbar fat are intact.    The mastoids and paranasal sinuses are well aerated. The calvaria is   intact.    Partially empty sella turcica again noted.    IMPRESSION:  No hydrocephalus, midline shift, acute intracranial hemorrhage or   demarcated territorial infarct.    < end of copied text >    < from: CT Angio Head w/ IV Cont (22 @ 04:05) >  FINDINGS:    ANTERIOR CIRCULATION:  Right internal carotid artery: Unremarkable. Intracranial segment is   patent  with no significant stenosis. No aneurysm.  Right middle cerebral artery: Unremarkable. No occlusion or significant  stenosis. No aneurysm.  Right anterior cerebral artery: Unremarkable. No occlusion or significant  stenosis. No aneurysm.    Left internal carotid artery: Unremarkable. Intracranial segment is   patent with  no significant stenosis. No aneurysm.  Left middle cerebral artery: Unremarkable. No occlusion or significant  stenosis. No aneurysm.  Left anterior cerebral artery: Unremarkable. No occlusion or significant  stenosis. No aneurysm.    POSTERIOR CIRCULATION:  Right vertebral artery: Unremarkable. No occlusion or significant   stenosis. No  aneurysm.  Left vertebral artery: Unremarkable. No occlusion or significant   stenosis. No  aneurysm.  Basilar artery: Unremarkable. No occlusion or significant stenosis. No  aneurysm.  Right posterior cerebral artery: Unremarkable. No occlusion or significant  stenosis. No aneurysm.  Left posterior cerebral artery: Unremarkable. No occlusion or significant  stenosis. No aneurysm.    Brain: No definite mass, mass effect, or midline shift.  Cerebral ventricles: No ventriculomegaly.  Bones/joints: Unremarkable.No acute fracture.  Soft tissues: Unremarkable.    IMPRESSION:  Negative CTA Head. No evidence of intracranial large vessel stenosis or  occlusion. No evidence of aneurysm or AVM.  < from: CT Angio Head w/ IV Cont (22 @ 04:05) >  INTERPRETATION:  I Shmuel Quick MD  have reviewed the images of the study   and agree with the following report with the added modifications: (I   disagree with the provided [facility] report in regards to the left vertebral   artery as further discussed below).    A patent left vertebral artery is seen emanating off the left subclavian   artery and ascending along the left transversarium foramen. At the   transversarium foramen at C5 there is noticeable decreased attenuation of   the left vertebral artery which becomes more pronounced at the   transversarium foramen of C4 at which point the left vertebral artery is   no longer visualized. These findings are suspicious for age indeterminate   thrombosed dissection. (Follow up MRA of the neck may provide further   clarification).    The right vertebral artery is patent and normal caliber throughout its   course.    Evaluation of the anterior circulation demonstrates patency of the   bilateral common carotid arteries. Retropharyngeal course of the left   common carotid artery. No arthrosclerotic narrowing at the right andleft   carotid bifurcation.    The bilateral intracranial carotid arteries, middle cerebral arteries and   anterior cerebral arteries are patent. The MCA bifurcations are   unremarkable.    Surgical sutures involving the proximal-mid esophagus. Thereis gas and   fluid distention of the esophagus throughout its visualized course.    Results discussed with Dr. Austin of the ED at 5:20 AM.    < end of copied text >    < from: MR Head No Cont (22 @ 15:35) >  Impression: Nonspecific several scattered punctate foci of T2 and Flair   signal hyperintensities within the white matter; findings may be seen in   patient with migraine headaches, vasculitis, microvascular disease,   demyelinating disease, Lyme disease and viral illness.    No evidence of acute infarction.    Findings may represent a partially empty sella.    < end of copied text >    < from: MR Angio Neck No Cont (22 @ 14:23) >  Findings: Reference is made to a prior CTA performed on 2022 and CT   of the cervical spine performed on 2017    The course and caliber of the bilateral common carotid, internal carotid   and external carotid arteries are within normal limits. There is no   evidence of focal stenosis or dissection.    There is productive changes and deformity of the left lateral mass of C1   and C2. There are increased areas of lucency likely consistent with   subchondral cystic changes and sclerosis. On 2-D time-of-flight source   images there is loss of signal signal within the distal V3 segment   segment of the left vertebral artery as it courses over the C1-C2 lateral   mass. There is abnormal T1 signal hyper intensity and proton density   hyperintensity surrounding and within the left distal V3 vertebral artery   (image #43 through 39 series 501). The course and caliber of the right   cervical segments of the vertebral artery is normal.    Impression: Findings are highly suspicious for stenosis due to thrombus   or dissection of the distal V3 left vertebral artery. No evidence of   carotid stenosis.    < end of copied text >    Assessment:  This is a 71 y/o female with h/o HTN, HLD, GERD who presents to ER with headache, facial flushing x 1 day in the setting of elevated blood pressure. 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     Plan:  #Left vertebral artery dissection   - Please obtain MRI Brain w/wo contrast   - Please obtain MRA w/ contrast head/neck   - Stroke/Neurology consult    D/W Dr. Perez

## 2022-08-11 NOTE — H&P ADULT - HISTORY OF PRESENT ILLNESS
STROKE HPI    HPI: 71YO L hand dominant female with PMH of HTN (on amlodipine 5mg OP), HLD, GERD presented to ER on 08/10 for elevated BP and L head discomfort and L scalp tingling. Per pt, always takes her BP multiple times a day and records them. Her BP is well controlled which ranges between SBP of 100-120's most of the time. On  evening @ around 11:30pm took her BP which was 135/89 and took her amlodipine 5mg po and rechecked her BP @ around 1am which was 163/98 and on rpt 169/93 which made pt concerned and came to ER. Pt states she has herniated disc and always has vague L sided discomfort which is unrelated to her BP elevation. States has been having intermittent L shoulder/neck and scalp discomfort for years which is relieved by tylenol and pain pacth(salonpas), also been going to physical therapy 1X week for herniated disc.  Pt also states that she receives trigger point injections ? L shoulder (by Dr. Celia juarez 1X month), pt admits to skipping appointments. While in ER, CTH and CTA H/N was done. 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 Pt denies any recent illness/neck trauma including blunt/ vigorous cough, recent chiropractor or spa visits. pt not a candidate for any intervention tPA/EVT 2/2 out of the window.    PAST MEDICAL & SURGICAL HISTORY:  HTN (hypertension)      Hyperlipidemia      Gastric reflux      Diaphragmatic hernia without obstruction or gangrene      Constipation      H/O colonoscopy  x2      History of coronary angiogram      H/O hernia repair          FAMILY HISTORY:                                    NIHSS: **** ASPECT Score: *** ICH Score: *** (GCS)    Fingerstick Blood Glucose: CAPILLARY BLOOD GLUCOSE        LABS:                        12.8   3.44  )-----------( 387      ( 11 Aug 2022 03:13 )             38.8     08-    130<L>  |  93<L>  |  12  ----------------------------<  111<H>  4.1   |  24  |  0.57    Ca    10.0      11 Aug 2022 03:13  Mg     2.3         TPro  8.0  /  Alb  4.6  /  TBili  0.2  /  DBili  x   /  AST  21  /  ALT  17  /  AlkPhos  115  08    PT/INR - ( 11 Aug 2022 05:42 )   PT: 12.2 sec;   INR: 1.02          PTT - ( 11 Aug 2022 05:42 )  PTT:28.4 sec      Urinalysis Basic - ( 11 Aug 2022 05:42 )    Color: Yellow / Appearance: Clear / S.015 / pH: x  Gluc: x / Ketone: NEGATIVE  / Bili: Negative / Urobili: 0.2 E.U./dL   Blood: x / Protein: NEGATIVE mg/dL / Nitrite: NEGATIVE   Leuk Esterase: NEGATIVE / RBC: x / WBC x   Sq Epi: x / Non Sq Epi: x / Bacteria: x        RADIOLOGY & ADDITIONAL STUDIES:    HCT:     CTA:    -----------------------------------------------------------------------------------------    ASSESSMENT/PLAN:    72y Female w/ PMH ***. HCT showed ***. CTA showed ***. Given *** tPA was ***. Patient was admitted to **** for further workup

## 2022-08-11 NOTE — ED ADULT NURSE REASSESSMENT NOTE - GENERAL PATIENT STATE
anxious
comfortable appearance/improvement verbalized/smiling/interactive
comfortable appearance/cooperative/improvement verbalized/smiling/interactive

## 2022-08-11 NOTE — H&P ADULT - ASSESSMENT
Assessment and Plan  72y L hand dominant Female with PMH of HTN, HLD, GERD presented to ER with elevated BP and L sided head discomfort and scalp tingling unrelated to her elevated BP. Pt states she gets L sided head discomfort and scalp tingling even when her BP is not hogh. 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 MRA brain and MRA H/N obtained which showed L vert thrombosed dissection, but unable to ascertain acute Vs chronic dissection. Pt admitted to stroke service for further monitoring.    Neuro  #CVA workup  - C/W asa 81mg po daily  - Not started on atorvastatin- as she gets muscle cramps with statin.  - q4hr stroke neuro checks and vitals  - MRI Brain with/ without contrast : Non specific white matter FLAIR signal hyperintensities, partially empty sella turcica.  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    Renal  - C/T trend BUN/Crt (12/0.57)    Infectious Disease  - C/T trend WBC    Endocrine  - A1C results: pending  - TSH results: Pending    DVT Prophylaxis  - lovenox sq for DVT prophylaxis   - SCDs for DVT prophylaxis       IDR Goals: Goals reviewed at interdisciplinary rounds with case management, social work, physical therapy, occupational therapy, and speech language pathology.   Please see specific therapy  notes for in depth goals.  Dispo: Pending PT/OT eval, probable D/C home     Discussed daily hospital plans and goals with patient and family at bedside. (Called and updated family.)    Discussed with Neurology Attending Dr. Polo and stroke Neuro fellow Dr. Rodriguez Assessment and Plan  72y L hand dominant Female with PMH of HTN, HLD, GERD presented to ER with elevated BP and L sided head discomfort and scalp tingling unrelated to her elevated BP. Pt states she gets L sided head discomfort and scalp tingling even when her BP is not hogh. 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 MRA brain and MRA H/N obtained which showed L vert thrombosed dissection, but unable to ascertain acute Vs chronic dissection. Pt admitted to stroke service for further monitoring.    Neuro  #CVA workup  - C/W asa 81mg po daily  - Not started on atorvastatin- as she gets muscle cramps with statin.  - q4hr stroke neuro checks and vitals  - MRI Brain with/ without contrast : Non specific white matter FLAIR signal hyperintensities, partially empty sella turcica.  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    # L Neck pain  - Chronic for which pt gets PT 1X week and gets trigger point injections  - Tyleonl PRN  - Lidocaine patch    #HLD  - LDL results: pending    Pulm  - call provider if SPO2 < 94%    GI  #Nutrition/Fluids/Electrolytes   - replete K<4 and Mg <2  - Diet: DASH /TLC  - IVF: None for now.    Renal  - C/T trend BUN/Crt (12/0.57)    Infectious Disease  - C/T trend WBC    Endocrine  - A1C results: pending  - TSH results: Pending    DVT Prophylaxis  - lovenox sq for DVT prophylaxis   - SCDs for DVT prophylaxis       IDR Goals: Goals reviewed at interdisciplinary rounds with case management, social work, physical therapy, occupational therapy, and speech language pathology.   Please see specific therapy  notes for in depth goals.  Dispo: Pending PT/OT eval, probable D/C home     Discussed daily hospital plans and goals with patient and family at bedside. (Called and updated family.)    Discussed with Neurology Attending Dr. Polo and stroke Neuro fellow Dr. Rodriguez

## 2022-08-11 NOTE — CONSULT NOTE ADULT - ASSESSMENT
Assessment and Plan  72y L hand dominant Female with PMH of HTN, HLD, GERD presented to ER with elevated BP and L sided head discomfort and scalp tingling unrelated to her elevated BP. Pt states she gets L sided head discomfort and scalp tingling even when her BP is not hogh. CTH and CTA H/N obtained in ER with CTH showing partially empty sella       Assessment and Plan  72y L hand dominant Female with PMH of HTN, HLD, GERD presented to ER with elevated BP and L sided head discomfort and scalp tingling unrelated to her elevated BP. Pt states she gets L sided head discomfort and scalp tingling even when her BP is not hogh. 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    Plan :  - Start pt on Asa 81mg PO daily  - Agree with neurosx in obtaining MRI brain and MRA H/N.  - Rest of the care per ER.      The above mentioned plan was d/w Dr. Polo and Stroke fellow Dr. Rodriguez during rounds.       Assessment and Plan  72y L hand dominant Female with PMH of HTN, HLD, GERD presented to ER with elevated BP and L sided head discomfort and scalp tingling unrelated to her elevated BP. Pt states she gets L sided head discomfort and scalp tingling even when her BP is not hogh. 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    Plan :  - Would recommend Starting  pt on Asa 81mg PO daily   - Agree with neurosx in obtaining MRI brain and MRA H/N.  - Rest of the care per ER.      The above mentioned plan was d/w Dr. Polo and Stroke fellow Dr. Rodriguez during rounds.

## 2022-08-11 NOTE — ED ADULT NURSE NOTE - CHIEF COMPLAINT QUOTE
Pt BIBEMS c/o headache starting 1AM. Pt states PMH HTN, took 5mg amlodipine last night. No facial droop, no slurred speech, ambulatory with steady gait.

## 2022-08-11 NOTE — H&P ADULT - NSHPLABSRESULTS_GEN_ALL_CORE
Vital Signs Last 24 Hrs  T(C): 36.9 (11 Aug 2022 17:49), Max: 37.1 (11 Aug 2022 15:44)  T(F): 98.5 (11 Aug 2022 17:49), Max: 98.7 (11 Aug 2022 15:44)  HR: 69 (11 Aug 2022 19:37) (69 - 81)  BP: 138/77 (11 Aug 2022 19:37) (127/79 - 151/79)  BP(mean): --  RR: 18 (11 Aug 2022 19:37) (16 - 18)  SpO2: 98% (11 Aug 2022 19:37) (97% - 99%)    Parameters below as of 11 Aug 2022 19:37  Patient On (Oxygen Delivery Method): room air      LABS :                          12.8   3.44  )-----------( 387      ( 11 Aug 2022 03:13 )             38.8        08-11    130<L>  |  93<L>  |  12  ----------------------------<  111<H>  4.1   |  24  |  0.57    Ca    10.0      11 Aug 2022 03:13  Mg     2.3     08-11    TPro  8.0  /  Alb  4.6  /  TBili  0.2  /  DBili  x   /  AST  21  /  ALT  17  /  AlkPhos  115  08-11      RADIOLOGY & ADDITIONAL TESTS:    CT Head No Cont (08.11.22 @ 04:05)     IMPRESSION:  No hydrocephalus, midline shift, acute intracranial hemorrhage or   demarcated territorial infarct.    Partially empty sella turcica again noted.    CT Angio Neck w/ IV Cont (08.11.22 @ 04:05)   A patent left vertebral artery is seen emanating off the left subclavian   artery and ascending along the left transversarium foramen. At the   transversarium foramen at C5 there is noticeable decreased attenuation of   the left vertebral artery which becomes more pronounced at the   transversarium foramen of C4 at which point the left vertebral artery is   no longer visualized. These findings are suspicious for age indeterminate   thrombosed dissection. (Follow up MRA of the neck may provide further   clarification).    MR Head No Cont (08.11.22 @ 15:35)   Impression: Nonspecific several scattered punctate foci of T2 and Flair   signal hyperintensities within the white matter; findings may be seen in   patient with migraine headaches, vasculitis, microvascular disease,   demyelinating disease, Lyme disease and viral illness.    No evidence of acute infarction.    Findings may represent a partially empty sella.    MR Angio Neck No Cont (08.11.22 @ 14:23)     Impression: Findings are highly suspicious for stenosis due to thrombus   or dissection of the distal V3 left vertebral artery. No evidence of   carotid stenosis.    MR Angio Head No Cont (08.11.22 @ 14:22)     Impression: Normal MRA of the intracranial circulation.

## 2022-08-11 NOTE — ED ADULT NURSE REASSESSMENT NOTE - NS ED NURSE REASSESS COMMENT FT1
Pt asked if she could take her home amlodipine 5 mg. /81. Pt took amlodipine 5 mg.
Pt received from day shift RN. Pt A&Ox4. Pt is conversive and has a steady gait. Denies any discomfort at present. Will cont to re assess.
Pt wheeled to floor via stretcher accompanied by RN and transport on cardiac monitor and not in distress. Endorsed accordingly to RN.
pt assessed, pt A&Ox4, pt appears comfortable, pt denies any pain at this time, pt waiting for MRI test, safety and comfort maintained, will continue to monitor.
pt assessed, pt A&Ox4, pt denies any dizziness, headache, n/v, pt waiting for MRI, safety and comfort maintained, will continue to monitor.

## 2022-08-11 NOTE — ED PROVIDER NOTE - PROGRESS NOTE DETAILS
no acute abnormalities on CT/CTA, no focal neuro deficits. recommend f/u with PMD.  BP improved without intervention.  I have discussed the discharge plan with the patient. The patient agrees with the plan, as discussed.  The patient understands Emergency Department diagnosis is a preliminary diagnosis often based on limited information and that the patient must adhere to the follow-up plan as discussed.  The patient understands that if the symptoms worsen  the patient may return to the Emergency Department at any time for further evaluation and treatment. no acute abnormalities on CT/CTA, no focal neuro deficits. recommend f/u with PMD.  BP improved without intervention. call from radiology - possible left vertebral artery dissection.  will consult vascular per vascular location of possible dissection requires neurosurgery consult pt has been resting comfortably all day.  MRIs obtained as per stroke/neurosx.    MRA neck w/ findings highly suspicious for stenosis due to thrombus or dissection of the distal V3 left vertebral artery. No evidence of carotid stenosis.  pending Nsx and stroke recs

## 2022-08-11 NOTE — H&P ADULT - NSHPPHYSICALEXAM_GEN_ALL_CORE
Physical exam:  General: No acute distress, awake and alert  Cardiovascular: Regular rate and rhythm on the monitor.  Pulmonary: . No use of accessory muscles      Neurologic:  -Mental status: Awake, alert, oriented to person, place, and time. Speech is fluent with intact naming, repetition, and comprehension, no dysarthria. Recent and remote memory intact. Follows commands. Attention/concentration intact. Fund of knowledge appropriate.  -Cranial nerves:   II: Visual fields are full to confrontation.  III, IV, VI: Extraocular movements are intact without nystagmus. Pupils equally round and reactive to light  V:  Facial sensation V1-V3 equal and intact   VII: Face is symmetric with normal eye closure and smile  VIII: Hearing is bilaterally intact to finger rub  IX, X: Uvula is midline and soft palate rises symmetrically  XI: Head turning and shoulder shrug are intact.  XII: Tongue protrudes midline  Motor: Normal bulk and tone. No pronator drift. Strength bilateral upper extremity 5/5, bilateral lower extremities 5/5.  Rapid alternating movements intact and symmetric  Sensation: Intact to light touch bilaterally. No neglect or extinction on double simultaneous testing.  Coordination: No dysmetria on finger-to-nose and heel-to-shin bilaterally  Reflexes: Downgoing toes bilaterally   Gait: Narrow gait and steady    NIHSS: 0

## 2022-08-11 NOTE — CONSULT NOTE ADULT - SUBJECTIVE AND OBJECTIVE BOX
Neurology Stroke Consult Note    Chief Complaint: L sided HA, L scalp tingling and elevated BP.    HPI: 73YO L hand dominant female with PMH of HTN (on amlodipine 5mg OP), HLD, GERD presented to ER on 08/10 for elevated BP and L head discomfort and L scalp tingling. Per pt, always takes her BP multiple times a day and records them. Her BP is well controlled which ranges between SBP of 100-120's most of the time. On  evening @ around 11:30pm took her BP which was 135/89 and took her amlodipine 5mg po and rechecked her BP @ around 1am which was 163/98 and on rpt 169/93 which made pt concerned and came to ER. Pt states she has herniated disc and always has vague L sided discomfort which is unrelated to her BP elevation. States has been having intermittent L shoulder/neck and scalp discomfort for years which is relieved by tylenol and pain pacth(salonpas), also been going to physical therapy 1X week for herniated disc.  Pt also states that she receives trigger point injections ? L shoulder (by Dr. Celia juarez 1X month), pt admits to skipping appointments. While in ER, CTH and CTA H/N was done. 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 Pt denies any recent illness/neck trauma including blunt/ vigorous cough, recent chiropractor or spa visits. pt not a candidate for any intervention tPA/EVT 2/2 out of the window.        PAST MEDICAL & SURGICAL HISTORY:  HTN (hypertension)  Hyperlipidemia  Gastric reflux  Diaphragmatic hernia without obstruction or gangrene  Constipation  H/O colonoscopy x2  History of coronary angiogram  H/O hernia repair      SOCIAL HISTORY:  Denies smoking, drinking, or drug use    ROS:  Constitutional: No fever, weight loss or fatigue  Eyes: No eye pain, visual disturbances, or discharge  ENMT:  No difficulty hearing, tinnitus, vertigo;  No sinus or throat pain  Neck: No pain or stiffness  Respiratory: No cough, wheezing, chills or hemoptysis  Cardiovascular: No chest pain, palpitations, shortness of breath, dizziness or leg swelling  Gastrointestinal: No abdominal pain. No nausea, vomiting or hematemesis; No diarrhea or constipation.  Genitourinary: No dysuria, frequency, hematuria or incontinence  Neurological: As per HPI  Skin: No itching, burning, rashes or lesions       MEDICATIONS  (STANDING):    MEDICATIONS  (PRN):      Allergies  Nuts (Hives)  penicillin (Hives)  pollen....sneeze, dry nose; itching (Other)    Intolerances  statins (Muscle Pain)      Vital Signs Last 24 Hrs  T(C): 36.9 (11 Aug 2022 08:03), Max: 36.9 (11 Aug 2022 08:03)  T(F): 98.4 (11 Aug 2022 08:03), Max: 98.4 (11 Aug 2022 08:03)  HR: 78 (11 Aug 2022 08:03) (78 - 81)  BP: 127/79 (11 Aug 2022 08:03) (127/79 - 151/79)  BP(mean): --  RR: 18 (11 Aug 2022 08:03) (16 - 18)  SpO2: 97% (11 Aug 2022 08:03) (97% - 99%)    Parameters below as of 11 Aug 2022 08:03  Patient On (Oxygen Delivery Method): room air        Physical exam:  General: No acute distress, awake and alert  Cardiovascular: Regular rate and rhythm on the monitor.  Pulmonary: . No use of accessory muscles      Neurologic:  -Mental status: Awake, alert, oriented to person, place, and time. Speech is fluent with intact naming, repetition, and comprehension, no dysarthria. Recent and remote memory intact. Follows commands. Attention/concentration intact. Fund of knowledge appropriate.  -Cranial nerves:   II: Visual fields are full to confrontation.  III, IV, VI: Extraocular movements are intact without nystagmus. Pupils equally round and reactive to light  V:  Facial sensation V1-V3 equal and intact   VII: Face is symmetric with normal eye closure and smile  VIII: Hearing is bilaterally intact to finger rub  IX, X: Uvula is midline and soft palate rises symmetrically  XI: Head turning and shoulder shrug are intact.  XII: Tongue protrudes midline  Motor: Normal bulk and tone. No pronator drift. Strength bilateral upper extremity 5/5, bilateral lower extremities 5/5.  Rapid alternating movements intact and symmetric  Sensation: Intact to light touch bilaterally. No neglect or extinction on double simultaneous testing.  Coordination: No dysmetria on finger-to-nose and heel-to-shin bilaterally  Reflexes: Downgoing toes bilaterally   Gait: Narrow gait and steady    NIHSS: 0      LABS:                        12.8   3.44  )-----------( 387      ( 11 Aug 2022 03:13 )             38.8     08    130<L>  |  93<L>  |  12  ----------------------------<  111<H>  4.1   |  24  |  0.57    Ca    10.0      11 Aug 2022 03:13  Mg     2.3         TPro  8.0  /  Alb  4.6  /  TBili  0.2  /  DBili  x   /  AST  21  /  ALT  17  /  AlkPhos  115      PT/INR - ( 11 Aug 2022 05:42 )   PT: 12.2 sec;   INR: 1.02          PTT - ( 11 Aug 2022 05:42 )  PTT:28.4 sec  Urinalysis Basic - ( 11 Aug 2022 05:42 )    Color: Yellow / Appearance: Clear / S.015 / pH: x  Gluc: x / Ketone: NEGATIVE  / Bili: Negative / Urobili: 0.2 E.U./dL   Blood: x / Protein: NEGATIVE mg/dL / Nitrite: NEGATIVE   Leuk Esterase: NEGATIVE / RBC: x / WBC x   Sq Epi: x / Non Sq Epi: x / Bacteria: x        RADIOLOGY & ADDITIONAL TESTS:    CT Head No Cont (22 @ 04:05)     IMPRESSION:  No hydrocephalus, midline shift, acute intracranial hemorrhage or   demarcated territorial infarct.    Partially empty sella turcica again noted.    CT Angio Neck w/ IV Cont (22 @ 04:05)   A patent left vertebral artery is seen emanating off the left subclavian   artery and ascending along the left transversarium foramen. At the   transversarium foramen at C5 there is noticeable decreased attenuation of   the left vertebral artery which becomes more pronounced at the   transversarium foramen of C4 at which point the left vertebral artery is   no longer visualized. These findings are suspicious for age indeterminate   thrombosed dissection. (Follow up MRA of the neck may provide further   clarification).               Neurology Stroke Consult Note    Chief Complaint: L sided HA, L scalp tingling and elevated BP.    HPI: 71YO L hand dominant female with PMH of HTN (on amlodipine 5mg OP), HLD, GERD presented to ER on 08/10 for elevated BP and L head discomfort and L scalp tingling. Per pt, always takes her BP multiple times a day and records them. Her BP is well controlled which ranges between SBP of 100-120's most of the time. On  evening @ around 11:30pm took her BP which was 135/89 and took her amlodipine 5mg po and rechecked her BP @ around 1am which was 163/98 and on rpt 169/93 which made pt concerned and came to ER. Pt states she has herniated disc and always has vague L sided discomfort which is unrelated to her BP elevation. States has been having intermittent L shoulder/neck and scalp discomfort for years which is relieved by tylenol and pain pacth(salonpas), also been going to physical therapy 1X week for herniated disc.  Pt also states that she receives trigger point injections ? L shoulder (by Dr. Celia juarez 1X month), pt admits to skipping appointments. While in ER, CTH and CTA H/N was done. 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 Pt denies any recent illness/neck trauma including blunt/ vigorous cough, recent chiropractor or spa visits. pt not a candidate for any intervention tPA/EVT 2/2 out of the window.        PAST MEDICAL & SURGICAL HISTORY:  HTN (hypertension)  Hyperlipidemia  Gastric reflux  Diaphragmatic hernia without obstruction or gangrene  Constipation  H/O colonoscopy x2  History of coronary angiogram  H/O hernia repair      SOCIAL HISTORY:  Denies smoking, drinking, or drug use    ROS:  Constitutional: No fever, weight loss or fatigue  Eyes: No eye pain, visual disturbances, or discharge  ENMT:  No difficulty hearing, tinnitus, vertigo;  No sinus or throat pain  Neck: No pain or stiffness  Respiratory: No cough, wheezing, chills or hemoptysis  Cardiovascular: No chest pain, palpitations, shortness of breath, dizziness or leg swelling  Gastrointestinal: No abdominal pain. No nausea, vomiting or hematemesis; No diarrhea or constipation.  Genitourinary: No dysuria, frequency, hematuria or incontinence  Neurological: As per HPI  Skin: No itching, burning, rashes or lesions       MEDICATIONS  (STANDING):    MEDICATIONS  (PRN):      Allergies  Nuts (Hives)  penicillin (Hives)  pollen....sneeze, dry nose; itching (Other)    Intolerances  statins (Muscle Pain)      Vital Signs Last 24 Hrs  T(C): 36.9 (11 Aug 2022 08:03), Max: 36.9 (11 Aug 2022 08:03)  T(F): 98.4 (11 Aug 2022 08:03), Max: 98.4 (11 Aug 2022 08:03)  HR: 78 (11 Aug 2022 08:03) (78 - 81)  BP: 127/79 (11 Aug 2022 08:03) (127/79 - 151/79)  BP(mean): --  RR: 18 (11 Aug 2022 08:03) (16 - 18)  SpO2: 97% (11 Aug 2022 08:03) (97% - 99%)    Parameters below as of 11 Aug 2022 08:03  Patient On (Oxygen Delivery Method): room air        Physical exam:  General: No acute distress, awake and alert  Cardiovascular: Regular rate and rhythm on the monitor.  Pulmonary: . No use of accessory muscles      Neurologic:  -Mental status: Awake, alert, oriented to person, place, and time. Speech is fluent with intact naming, repetition, and comprehension, no dysarthria. Recent and remote memory intact. Follows commands. Attention/concentration intact. Fund of knowledge appropriate.  -Cranial nerves:   II: Visual fields are full to confrontation.  III, IV, VI: Extraocular movements are intact without nystagmus. Pupils equally round and reactive to light  V:  Facial sensation V1-V3 equal and intact   VII: Face is symmetric with normal eye closure and smile  VIII: Hearing is bilaterally intact to finger rub  IX, X: Uvula is midline and soft palate rises symmetrically  XI: Head turning and shoulder shrug are intact.  XII: Tongue protrudes midline  Motor: Normal bulk and tone. No pronator drift. Strength bilateral upper extremity 5/5, bilateral lower extremities 5/5.  Rapid alternating movements intact and symmetric  Sensation: Intact to light touch bilaterally. No neglect or extinction on double simultaneous testing.  Coordination: No dysmetria on finger-to-nose and heel-to-shin bilaterally  Reflexes: Downgoing toes bilaterally   Gait: Narrow gait and steady    NIHSS: 0      LABS:                        12.8   3.44  )-----------( 387      ( 11 Aug 2022 03:13 )             38.8     08-11    130<L>  |  93<L>  |  12  ----------------------------<  111<H>  4.1   |  24  |  0.57    Ca    10.0      11 Aug 2022 03:13  Mg     2.3         TPro  8.0  /  Alb  4.6  /  TBili  0.2  /  DBili  x   /  AST  21  /  ALT  17  /  AlkPhos  115      PT/INR - ( 11 Aug 2022 05:42 )   PT: 12.2 sec;   INR: 1.02          PTT - ( 11 Aug 2022 05:42 )  PTT:28.4 sec  Urinalysis Basic - ( 11 Aug 2022 05:42 )    Color: Yellow / Appearance: Clear / S.015 / pH: x  Gluc: x / Ketone: NEGATIVE  / Bili: Negative / Urobili: 0.2 E.U./dL   Blood: x / Protein: NEGATIVE mg/dL / Nitrite: NEGATIVE   Leuk Esterase: NEGATIVE / RBC: x / WBC x   Sq Epi: x / Non Sq Epi: x / Bacteria: x        RADIOLOGY & ADDITIONAL TESTS:    CT Head No Cont (22 @ 04:05)     IMPRESSION:  No hydrocephalus, midline shift, acute intracranial hemorrhage or   demarcated territorial infarct.    Partially empty sella turcica again noted.    CT Angio Neck w/ IV Cont (22 @ 04:05)   A patent left vertebral artery is seen emanating off the left subclavian   artery and ascending along the left transversarium foramen. At the   transversarium foramen at C5 there is noticeable decreased attenuation of   the left vertebral artery which becomes more pronounced at the   transversarium foramen of C4 at which point the left vertebral artery is   no longer visualized. These findings are suspicious for age indeterminate   thrombosed dissection. (Follow up MRA of the neck may provide further   clarification).

## 2022-08-11 NOTE — ED PROVIDER NOTE - PATIENT PORTAL LINK FT
You can access the FollowMyHealth Patient Portal offered by [facility] by registering at the following website: [email or web address]. By joining [facility]’s FollowMyHealth portal, you will also be able to view your health information using other applications (apps) compatible with our system.

## 2022-08-11 NOTE — ED PROVIDER NOTE - CLINICAL SUMMARY MEDICAL DECISION MAKING FREE TEXT BOX
left scalp tingling/HA, no focal neuro deficits on exam, pt felt flushed worried about elevated BP, no chest pain, no sudden onset of HA, not maximal at onset. unlikely sah, doubt cva  -check labs  -ekg  -tylenol  -CT/CTA

## 2022-08-11 NOTE — ED ADULT NURSE NOTE - CAS EDN DISCHARGE INTERVENTIONS
[FreeTextEntry1] : 1/5/22---Here for HIV follow up visit .\par Had Pfizer 2nd Vaccine in April 17th, 2021. Two weeks before that was the first one. \par JESSICA BOUCHER is a 32 year old male being called for a HIV follow-up visit. \par Patient is taking Biktarvy daily. Diagnosed with HIV in 2015. \par Patient states he has had high blood pressure for years starting age 10 with elevated BP. Seen by pediatric cardiologist Dr. Burgess at [facility]. needs to see cardiology ASAP !!!!!!. \par Pt would like to hold off on restarting Atenolol at 25 mg daily. Pt states none of the BP meds worked for him. Pt declines flu vaccine\par Non drinker, non smoker. \par No family of colon cancer. \par Plan 1/5/2022: \par 1) all labs today, continue Biktarvy ,  see in 6 months. \par 2) Please see preventive cardiologist Dr. Omero Sehppard, referral in computer\par \par 10/14/2019----JESSICA BOUCHER is a 30 year old male being seen for a HIV follow-up visit. Multiple partners. Needs to check allergy first. all labs today see in 4 months. treat presumtivly with azithromycin and ceftriaxone. Pt seeing Adonis Gamboa for anxiety and depression. No family Hx of colon Cancer. Pt states allergy to augmentin long time ago but did not think it was anything other than rash?and states not sure if it was an allergy to the medication as he was ill at the time. \par  \par History of Present Illness\par 30 male with H/O PCP PNA, diagnosed after with HIV , off prophylaxis\par On Biktarvy for HIV and no PPx, says he is complaint with his ART. Will miss a dose here and there due to him being a  and travels to [place] on a regular basis. Patient seen on 2/15/19 with acute Varicella rash all over his body. confirmed by swab PCR. Treated with Valtrex. \par In addition to the Varicella, he underwent STI screening and his anal chlamydia and GC were both positive. He was treated with IM CTX and azithro x1 and 3 weeks doxy but only completed 2 weeks as he developed "stomach bug" with severe vomiting which self resolved. \par \par He returns today for follow up. He still complains of some off and on burning with urination. No fevers, chills, flank or abd pain.\par \par Sexual History: The patient is sexually active and is not monogamous. The patient has oral and anal intercourse with men. \par Occupation: . \par \par \par  Allergies\par Augmentin TABS\par Codeine Derivatives\par \par  Arm band on/IV intact/Admission wristband placed

## 2022-08-11 NOTE — PATIENT PROFILE ADULT - FALL HARM RISK - HARM RISK INTERVENTIONS

## 2022-08-11 NOTE — ED PROVIDER NOTE - NSFOLLOWUPINSTRUCTIONS_ED_ALL_ED_FT
Follow-up with your primary care physician      General Headache Without Cause    A headache is pain or discomfort that is felt around the head or neck area. There are many causes and types of headaches. In some cases, the cause may not be found.    Follow these instructions at home:    Watch your condition for any changes. Let your doctor know about them. Take these steps to help with your condition:    Managing pain     •Take over-the-counter and prescription medicines only as told by your doctor.    •Lie down in a dark, quiet room when you have a headache.    •If told, put ice on your head and neck area:  •Put ice in a plastic bag.    •Place a towel between your skin and the bag.    •Leave the ice on for 20 minutes, 2–3 times per day.    •If told, put heat on the affected area. Use the heat source that your doctor recommends, such as a moist heat pack or a heating pad.   • Place a towel between your skin and the heat source.     •Leave the heat on for 20–30 minutes.     •Remove the heat if your skin turns bright red. This is very important if you are unable to feel pain, heat, or cold. You may have a greater risk of getting burned.    •Keep lights dim if bright lights bother you or make your headaches worse.    Eating and drinking     •Eat meals on a regular schedule.    •If you drink alcohol: •Limit how much you use to:   •0–1 drink a day for women.     • 0–2 drinks a day for men.     •Be aware of how much alcohol is in your drink. In the [place] one drink equals one 12 oz bottle of beer (355 mL), one 5 oz glass of wine (148 mL), or one 1½ oz glass of hard liquor (44 mL).    •Stop drinking caffeine, or reduce how much caffeine you drink.    General instructions    •Keep a journal to find out if certain things bring on headaches. For example, write down:  •What you eat and drink.    •How much sleep you get.    •Any change to your diet or medicines.    •Get a massage or try other ways to relax.    •Limit stress.    •Sit up straight. Do not tighten (tense) your muscles.    • Do not use any products that contain nicotine or tobacco. This includes cigarettes, e-cigarettes, and chewing tobacco. If you need help quitting, ask your doctor.    •Exercise regularly as told by your doctor.    •Get enough sleep. This often means 7–9 hours of sleep each night.    •Keep all follow-up visits as told by your doctor. This is important.    Contact a doctor if:    •Your symptoms are not helped by medicine.    •You have a headache that feels different than the other headaches.    •You feel sick to your stomach (nauseous) or you throw up (vomit).    •You have a fever.    Get help right away if:    •Your headache gets very bad quickly.    •Your headache gets worse after a lot of physical activity.    •You keep throwing up.    •You have a stiff neck.    •You have trouble seeing.    •You have trouble speaking.    •You have pain in the eye or ear.    •Your muscles are weak or you lose muscle control.    •You lose your balance or have trouble walking.    •You feel like you will pass out (faint) or you pass out.    •You are mixed up (confused).    •You have a seizure.    Summary    •A headache is pain or discomfort that is felt around the head or neck area.    •There are many causes and types of headaches. In some cases, the cause may not be found.    •Keep a journal to help find out what causes your headaches. Watch your condition for any changes. Let your doctor know about them.    •Contact a doctor if you have a headache that is different from usual, or if your headache is not helped by medicine.    •Get help right away if your headache gets very bad, you throw up, you have trouble seeing, you lose your balance, or you have a seizure.    This information is not intended to replace advice given to you by your health care provider. Make sure you discuss any questions you have with your health care provider.      Paresthesia    WHAT YOU NEED TO KNOW:    Paresthesia is numbness, tingling, or burning. It can happen in any part of your body, but usually occurs in your legs, feet, arms, or hands.    DISCHARGE INSTRUCTIONS:    Return to the emergency department if:   •You have severe pain along with numbness and tingling.    •Your legs suddenly become cold. You have trouble moving your legs, and they ache.    •You have increased weakness in a part of your body.    •You have uncontrolled movements.    Contact your healthcare provider or neurologist if:   •Your symptoms do not improve.    •You have symptoms in more than one part of your body.    •You have questions or concerns about your condition or care.

## 2022-08-11 NOTE — ED PROVIDER NOTE - NSICDXPASTSURGICALHX_GEN_ALL_CORE_FT
PAST SURGICAL HISTORY:  H/O colonoscopy x2    H/O hernia repair     History of coronary angiogram

## 2022-08-11 NOTE — H&P ADULT - NSHPREVIEWOFSYSTEMS_GEN_ALL_CORE
ROS:  Constitutional: No fever, weight loss or fatigue  Eyes: No eye pain, visual disturbances, or discharge  ENMT:  No difficulty hearing, tinnitus, vertigo;  No sinus or throat pain  Neck: No pain or stiffness  Respiratory: No cough, wheezing, chills or hemoptysis  Cardiovascular: No chest pain, palpitations, shortness of breath, dizziness or leg swelling  Gastrointestinal: No abdominal pain. No nausea, vomiting or hematemesis; No diarrhea or constipation.  Genitourinary: No dysuria, frequency, hematuria or incontinence  Neurological: As per HPI  Skin: No itching, burning, rashes or lesions

## 2022-08-11 NOTE — ED PROVIDER NOTE - OBJECTIVE STATEMENT
72F hx htn, c/o elevated BP and left sided HA. pt states she awoke tonight and felt flushed. states took her BP and it was 169/93. pt states feeling nervous about elevated BP. states having some tingling to left scalp and left head discomfort. no fevers. no dizziness. no focal numbness/weakness. no abd pain. no vision changes. no recent travel. no sick contacts.

## 2022-08-12 ENCOUNTER — TRANSCRIPTION ENCOUNTER (OUTPATIENT)
Age: 72
End: 2022-08-12

## 2022-08-12 VITALS — TEMPERATURE: 98 F

## 2022-08-12 LAB
A1C WITH ESTIMATED AVERAGE GLUCOSE RESULT: 5.2 % — SIGNIFICANT CHANGE UP (ref 4–5.6)
ANION GAP SERPL CALC-SCNC: 10 MMOL/L — SIGNIFICANT CHANGE UP (ref 5–17)
BUN SERPL-MCNC: 9 MG/DL — SIGNIFICANT CHANGE UP (ref 7–23)
CALCIUM SERPL-MCNC: 10 MG/DL — SIGNIFICANT CHANGE UP (ref 8.4–10.5)
CHLORIDE SERPL-SCNC: 99 MMOL/L — SIGNIFICANT CHANGE UP (ref 96–108)
CHOLEST SERPL-MCNC: 206 MG/DL — HIGH
CO2 SERPL-SCNC: 25 MMOL/L — SIGNIFICANT CHANGE UP (ref 22–31)
CREAT SERPL-MCNC: 0.54 MG/DL — SIGNIFICANT CHANGE UP (ref 0.5–1.3)
EGFR: 97 ML/MIN/1.73M2 — SIGNIFICANT CHANGE UP
ESTIMATED AVERAGE GLUCOSE: 103 MG/DL — SIGNIFICANT CHANGE UP (ref 68–114)
GLUCOSE SERPL-MCNC: 90 MG/DL — SIGNIFICANT CHANGE UP (ref 70–99)
HCT VFR BLD CALC: 36 % — SIGNIFICANT CHANGE UP (ref 34.5–45)
HCV AB S/CO SERPL IA: 0.03 S/CO — SIGNIFICANT CHANGE UP
HCV AB SERPL-IMP: SIGNIFICANT CHANGE UP
HDLC SERPL-MCNC: 70 MG/DL — SIGNIFICANT CHANGE UP
HGB BLD-MCNC: 11.8 G/DL — SIGNIFICANT CHANGE UP (ref 11.5–15.5)
LIPID PNL WITH DIRECT LDL SERPL: 118 MG/DL — HIGH
MAGNESIUM SERPL-MCNC: 2.4 MG/DL — SIGNIFICANT CHANGE UP (ref 1.6–2.6)
MCHC RBC-ENTMCNC: 29.9 PG — SIGNIFICANT CHANGE UP (ref 27–34)
MCHC RBC-ENTMCNC: 32.8 GM/DL — SIGNIFICANT CHANGE UP (ref 32–36)
MCV RBC AUTO: 91.1 FL — SIGNIFICANT CHANGE UP (ref 80–100)
NON HDL CHOLESTEROL: 136 MG/DL — HIGH
NRBC # BLD: 0 /100 WBCS — SIGNIFICANT CHANGE UP (ref 0–0)
PHOSPHATE SERPL-MCNC: 4.1 MG/DL — SIGNIFICANT CHANGE UP (ref 2.5–4.5)
PLATELET # BLD AUTO: 366 K/UL — SIGNIFICANT CHANGE UP (ref 150–400)
POTASSIUM SERPL-MCNC: 3.9 MMOL/L — SIGNIFICANT CHANGE UP (ref 3.5–5.3)
POTASSIUM SERPL-SCNC: 3.9 MMOL/L — SIGNIFICANT CHANGE UP (ref 3.5–5.3)
RBC # BLD: 3.95 M/UL — SIGNIFICANT CHANGE UP (ref 3.8–5.2)
RBC # FLD: 13.5 % — SIGNIFICANT CHANGE UP (ref 10.3–14.5)
SODIUM SERPL-SCNC: 134 MMOL/L — LOW (ref 135–145)
TRIGL SERPL-MCNC: 88 MG/DL — SIGNIFICANT CHANGE UP
TSH SERPL-MCNC: 3.37 UIU/ML — SIGNIFICANT CHANGE UP (ref 0.27–4.2)
WBC # BLD: 3.34 K/UL — LOW (ref 3.8–10.5)
WBC # FLD AUTO: 3.34 K/UL — LOW (ref 3.8–10.5)

## 2022-08-12 PROCEDURE — 36415 COLL VENOUS BLD VENIPUNCTURE: CPT

## 2022-08-12 PROCEDURE — 70544 MR ANGIOGRAPHY HEAD W/O DYE: CPT | Mod: MA

## 2022-08-12 PROCEDURE — 70496 CT ANGIOGRAPHY HEAD: CPT | Mod: MA

## 2022-08-12 PROCEDURE — 85610 PROTHROMBIN TIME: CPT

## 2022-08-12 PROCEDURE — 86803 HEPATITIS C AB TEST: CPT

## 2022-08-12 PROCEDURE — 80061 LIPID PANEL: CPT

## 2022-08-12 PROCEDURE — 84100 ASSAY OF PHOSPHORUS: CPT

## 2022-08-12 PROCEDURE — 80048 BASIC METABOLIC PNL TOTAL CA: CPT

## 2022-08-12 PROCEDURE — 86850 RBC ANTIBODY SCREEN: CPT

## 2022-08-12 PROCEDURE — 85730 THROMBOPLASTIN TIME PARTIAL: CPT

## 2022-08-12 PROCEDURE — 99285 EMERGENCY DEPT VISIT HI MDM: CPT

## 2022-08-12 PROCEDURE — 70450 CT HEAD/BRAIN W/O DYE: CPT | Mod: MA

## 2022-08-12 PROCEDURE — 85025 COMPLETE CBC W/AUTO DIFF WBC: CPT

## 2022-08-12 PROCEDURE — 87635 SARS-COV-2 COVID-19 AMP PRB: CPT

## 2022-08-12 PROCEDURE — 86901 BLOOD TYPING SEROLOGIC RH(D): CPT

## 2022-08-12 PROCEDURE — 81003 URINALYSIS AUTO W/O SCOPE: CPT

## 2022-08-12 PROCEDURE — 83735 ASSAY OF MAGNESIUM: CPT

## 2022-08-12 PROCEDURE — 99221 1ST HOSP IP/OBS SF/LOW 40: CPT

## 2022-08-12 PROCEDURE — 85027 COMPLETE CBC AUTOMATED: CPT

## 2022-08-12 PROCEDURE — 70547 MR ANGIOGRAPHY NECK W/O DYE: CPT

## 2022-08-12 PROCEDURE — 86900 BLOOD TYPING SEROLOGIC ABO: CPT

## 2022-08-12 PROCEDURE — 97161 PT EVAL LOW COMPLEX 20 MIN: CPT

## 2022-08-12 PROCEDURE — 70498 CT ANGIOGRAPHY NECK: CPT | Mod: MA

## 2022-08-12 PROCEDURE — 70551 MRI BRAIN STEM W/O DYE: CPT | Mod: MA

## 2022-08-12 PROCEDURE — 84443 ASSAY THYROID STIM HORMONE: CPT

## 2022-08-12 PROCEDURE — 80053 COMPREHEN METABOLIC PANEL: CPT

## 2022-08-12 PROCEDURE — 83036 HEMOGLOBIN GLYCOSYLATED A1C: CPT

## 2022-08-12 RX ORDER — APIXABAN 2.5 MG/1
1 TABLET, FILM COATED ORAL
Qty: 60 | Refills: 0
Start: 2022-08-12 | End: 2022-09-10

## 2022-08-12 RX ORDER — AMLODIPINE BESYLATE 2.5 MG/1
5 TABLET ORAL ONCE
Refills: 0 | Status: COMPLETED | OUTPATIENT
Start: 2022-08-12 | End: 2022-08-12

## 2022-08-12 RX ORDER — FLUTICASONE PROPIONATE 50 MCG
0 SPRAY, SUSPENSION NASAL
Qty: 0 | Refills: 0 | DISCHARGE

## 2022-08-12 RX ORDER — PREGABALIN 225 MG/1
0 CAPSULE ORAL
Qty: 0 | Refills: 0 | DISCHARGE

## 2022-08-12 RX ORDER — AMLODIPINE BESYLATE 2.5 MG/1
1 TABLET ORAL
Qty: 0 | Refills: 0 | DISCHARGE

## 2022-08-12 RX ADMIN — Medication 81 MILLIGRAM(S): at 12:44

## 2022-08-12 RX ADMIN — AMLODIPINE BESYLATE 5 MILLIGRAM(S): 2.5 TABLET ORAL at 16:58

## 2022-08-12 RX ADMIN — LIDOCAINE 1 PATCH: 4 CREAM TOPICAL at 14:00

## 2022-08-12 NOTE — DISCHARGE NOTE NURSING/CASE MANAGEMENT/SOCIAL WORK - NSDCPEELIQUISCOMP_GEN_ALL_CORE
Apixaban/Eliquis is used to treat and prevent blood clots. If you are not able to swallow the tablets whole, they may be crushed and mixed in water, apple juice, or applesauce and promptly taken within four hours. Never skip a dose of Apixaban/Eliquis. If you forget to take your Apixaban/Eliquis, take a dose as soon as you remember. If it is almost time for your next Apixaban/Eliquis dose, wait until then and take a regular dose. DO NOT take an extra pill to ‘catch up’.  NEVER TAKE A DOUBLE DOSE. Notify your doctor that you missed a dose. Take Apixaban/Eliquis at the same time each morning and evening. Apixaban/Eliquis may be taken with other medication or food. Sal [address]  (PCA)  2017 09:28:14

## 2022-08-12 NOTE — DISCHARGE NOTE PROVIDER - NSDCFUSCHEDAPPT_GEN_ALL_CORE_FT
Mary Anne Jacobs  [facility] Physician [facility]  Neurology [address]  Scheduled Appointment: 08/17/2022    Musa Alberto  [facility]  [place] [address]  Scheduled Appointment: 09/09/2022

## 2022-08-12 NOTE — CONSULT NOTE ADULT - SUBJECTIVE AND OBJECTIVE BOX
Patient is a 72y old  Female who presents with a chief complaint of L vertebral artery dissection (12 Aug 2022 08:22)        HPI:  STROKE HPI    HPI: 73YO L hand dominant female with PMH of HTN (on amlodipine 5mg OP), HLD, GERD presented to ER on 08/10 for elevated BP and L head discomfort and L scalp tingling. Per pt, always takes her BP multiple times a day and records them. Her BP is well controlled which ranges between SBP of 100-120's most of the time. On  evening @ around 11:30pm took her BP which was 135/89 and took her amlodipine 5mg po and rechecked her BP @ around 1am which was 163/98 and on rpt 169/93 which made pt concerned and came to ER. Pt states she has herniated disc and always has vague L sided discomfort which is unrelated to her BP elevation. States has been having intermittent L shoulder/neck and scalp discomfort for years which is relieved by tylenol and pain pacth(salonpas), also been going to physical therapy 1X week for herniated disc.  Pt also states that she receives trigger point injections ? L shoulder (by Dr. Celia juarez 1X month), pt admits to skipping appointments. While in ER, CTH and CTA H/N was done. 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 Pt denies any recent illness/neck trauma including blunt/ vigorous cough, recent chiropractor or spa visits. pt not a candidate for any intervention tPA/EVT 2/2 out of the window.    PAST MEDICAL & SURGICAL HISTORY:  HTN (hypertension)      Hyperlipidemia      Gastric reflux      Diaphragmatic hernia without obstruction or gangrene      Constipation      H/O colonoscopy  x2      History of coronary angiogram      H/O hernia repair          FAMILY HISTORY:      NIHSS: **** ASPECT Score: *** ICH Score: *** (GCS)    Fingerstick Blood Glucose: CAPILLARY BLOOD GLUCOSE        LABS:                        12.8   3.44  )-----------( 387      ( 11 Aug 2022 03:13 )             38.8     08-11    130<L>  |  93<L>  |  12  ----------------------------<  111<H>  4.1   |  24  |  0.57    Ca    10.0      11 Aug 2022 03:13  Mg     2.3     08-11    TPro  8.0  /  Alb  4.6  /  TBili  0.2  /  DBili  x   /  AST  21  /  ALT  17  /  AlkPhos  115  08-11    PT/INR - ( 11 Aug 2022 05:42 )   PT: 12.2 sec;   INR: 1.02          PTT - ( 11 Aug 2022 05:42 )  PTT:28.4 sec      Urinalysis Basic - ( 11 Aug 2022 05:42 )    Color: Yellow / Appearance: Clear / S.015 / pH: x  Gluc: x / Ketone: NEGATIVE  / Bili: Negative / Urobili: 0.2 E.U./dL   Blood: x / Protein: NEGATIVE mg/dL / Nitrite: NEGATIVE   Leuk Esterase: NEGATIVE / RBC: x / WBC x   Sq Epi: x / Non Sq Epi: x / Bacteria: x        MEDICATIONS  (STANDING):  aspirin enteric coated 81 milliGRAM(s) Oral daily  enoxaparin Injectable 40 milliGRAM(s) SubCutaneous every 24 hours  lidocaine   4% Patch 1 Patch Transdermal every 24 hours    MEDICATIONS  (PRN):  acetaminophen     Tablet .. 650 milliGRAM(s) Oral every 6 hours PRN Mild Pain (1 - 3)        FAMILY HISTORY:    CBC Full  -  ( 12 Aug 2022 06:48 )  WBC Count : 3.34 K/uL  RBC Count : 3.95 M/uL  Hemoglobin : 11.8 g/dL  Hematocrit : 36.0 %  Platelet Count - Automated : 366 K/uL  Mean Cell Volume : 91.1 fl  Mean Cell Hemoglobin : 29.9 pg  Mean Cell Hemoglobin Concentration : 32.8 gm/dL  Auto Neutrophil # : x  Auto Lymphocyte # : x  Auto Monocyte # : x  Auto Eosinophil # : x  Auto Basophil # : x  Auto Neutrophil % : x  Auto Lymphocyte % : x  Auto Monocyte % : x  Auto Eosinophil % : x  Auto Basophil % : x      08-12    134<L>  |  99  |  9   ----------------------------<  90  3.9   |  25  |  0.54    Ca    10.0      12 Aug 2022 06:48  Phos  4.1     08-12  Mg     2.4     08-12    TPro  8.0  /  Alb  4.6  /  TBili  0.2  /  DBili  x   /  AST  21  /  ALT  17  /  AlkPhos  115        Urinalysis Basic - ( 11 Aug 2022 05:42 )    Color: Yellow / Appearance: Clear / S.015 / pH: x  Gluc: x / Ketone: NEGATIVE  / Bili: Negative / Urobili: 0.2 E.U./dL   Blood: x / Protein: NEGATIVE mg/dL / Nitrite: NEGATIVE   Leuk Esterase: NEGATIVE / RBC: x / WBC x   Sq Epi: x / Non Sq Epi: x / Bacteria: x          Radiology :    < from: MR Head No Cont (22 @ 15:35) >    ACC: [id number] EXAM:  MR BRAIN                          PROCEDURE DATE:  2022          INTERPRETATION:  Left vertebral artery dissection.    Technique: MRI of the brain was performed utilizing sagittal and axial   T1, axial T2, axial gradient echo, axial and coronal FLAIR and diffusion   imaging.    Comparison is made to a prior CT of the brain performed on 2019.    Findings: There are several scattered punctate foci of T2 and Flair   signal hyperintensities predominantly within the subcortical white matter   that are nonspecific. There is no evidence of mass-effect or midline   shift. There is no evidence of intra or extra-axial fluid collection.   There is enlargement the sulci, cisterns and ventricles consistent with   mild cerebral volume loss. There is asymmetry of the temporal horns are   right larger than the left, unchanged from the prior study. There is no   evidence of acute infarction. Evaluation of the intracranial vascular   flow-voids appear within normal limits.    There is enlargement of the sella measuring approximately 1.3 cm in AP   diameter. There is flattening of the pituitary gland. Findings may   represent a partially empty sella.    Gradient echo images demonstrate no evidence of abnormal susceptibility   changes within the brain parenchyma.    The visualized paranasal sinuses and bilateral mastoid air cells are   clear.    Impression: Nonspecific several scattered punctate foci of T2 and Flair   signal hyperintensities within the white matter; findings may be seen in   patient with migraine headaches, vasculitis, microvascular disease,   demyelinating disease, Lyme disease and viral illness.    No evidence of acute infarction.    Findings may represent a partially empty sella.          < from: MR Angio Neck No Cont (22 @ 14:23) >  ACC: [id number] EXAM:  MR ANGIO NECK                          PROCEDURE DATE:  2022          INTERPRETATION:  Evaluate for left vertebral artery dissection.      Technique: MRA OF THE EXTRACRANIAL CIRCULATION:     2D and 3-D time-of-flight angiography of the cervical arteries was   performed. Reformatted mid images were acquired. Axial fat-saturated T1   and density images were also obtained    Findings: Reference is made to a prior CTA performed on 2022 and CT   of the cervical spine performed on 2017    The course and caliber of the bilateral common carotid, internal carotid   and external carotid arteries are within normal limits. There is no   evidence of focal stenosis or dissection.    There is productive changes and deformity of the left lateral mass of C1   and C2. There are increased areas of lucency likely consistent with   subchondral cystic changes and sclerosis. On 2-D time-of-flight source   images there is loss of signal signal within the distal V3 segment   segment of the left vertebral artery as it courses over the C1-C2 lateral   mass. There is abnormal T1 signal hyper intensity and proton density   hyperintensity surrounding and within the left distal V3 vertebral artery   (image #43 through 39 series 501). The course and caliber of the right   cervical segments of the vertebral artery is normal.    Impression: Findings are highly suspicious for stenosis due to thrombus   or dissection of the distal V3 left vertebral artery. No evidence of   carotid stenosis.        < from: MR Angio Head No Cont (22 @ 14:22) >    ACC: [id number] EXAM:  MR ANGIO BRAIN                          PROCEDURE DATE:  2022          INTERPRETATION:  Evaluate for left vertebral artery dissection.      Technique: MRA of the intracranial circulation was performed using 3-D   time of flight technique.    Findings: Evaluation of the anterior circulation demonstrates normal   course and caliber of the distal internal carotid arteries. There is a   normal appearance to the bilateral anterior cerebral and middle cerebral   arteries.    Evaluation the posterior circulation demonstrates normal course and   caliber of the bilateral vertebral arteries, vertebrobasilar junction and   basilar artery. The bilateral posterior cerebral arteries are also within   normal limits. There is a right posterior communicating artery present.    There is no evidence of aneurysm or stenosis  .    Impression: Normal MRA of the intracranial circulation          Vital Signs Last 24 Hrs  T(C): 36.7 (12 Aug 2022 08:35), Max: 37.1 (11 Aug 2022 15:44)  T(F): 98 (12 Aug 2022 08:35), Max: 98.7 (11 Aug 2022 15:44)  HR: 68 (12 Aug 2022 08:35) (58 - 100)  BP: 119/70 (12 Aug 2022 08:35) (118/75 - 151/72)  BP(mean): 88 (12 Aug 2022 08:35) (88 - 90)  RR: 18 (12 Aug 2022 08:35) (16 - 18)  SpO2: 96% (12 Aug 2022 08:35) (93% - 98%)    Parameters below as of 12 Aug 2022 08:35  Patient On (Oxygen Delivery Method): room air            REVIEW OF SYSTEMS:   per hpi            Physical Exam: WDWN 72 y o woman  lying in semi Cooper's position , awake , alert , no acute complaints      Neurologic Exam:    Alert and oriented to person , place , date/year, speech fluent w/o dysarthria , follows commands      Cranial Nerves:     II :                         pupils equal , round and reactive to light , visual fields intact   III/ IV/VI :              extraocular movements intact , neg nystagmus , neg ptosis  V :                        facial sensation intact , V1-3 normal  VII :                      face symmetric , no droop , normal eye closure and smile  VIII :                     hearing intact to finger rub bilaterally  IX and X :             no hoarseness , gag intact , palate/ uvula rise symmetrically  XI :                       SCM / trapezius strength intact bilateral  XII :                      no tongue deviation    Motor Exam:    Right UE:               no focal weakness ,  > 4/5 throughout   negative pronator drift                               Left UE:                 no focal weakness ,  > 4/5 throughout    negative pronator drift        Right LE:                no focal weakness ,  > 4/5 throughout        Left LE:                  no focal weakness ,  > 4/5 throughout        Sensation:         intact to light touch x 4 extremities                                                 DTR :                     biceps/brachioradialis : equal                                              patella/ankle : equal                                                                               neg Babinski          Coordination :      Finger to Nose :  neg dysmetria bilaterally                                Gait :  not tested              PM&R Impression :     1) admitted for c/o  L sided head discomfort and scalp tingling, MRA brain and MRA H/N obtained which showed L vert thrombosed dissection, but unable to ascertain acute Vs chronic dissection.    2) no focal neuro deficits     Recommendations / Plan :     1) Physical / Occupational therapy focusing on therapeutic exercises , equipment evaluation , bed mobility/transfer out of bed evaluation , progressive ambulation with assistive devices prn .    2) Anticipated Disposition Plan / Recs  :   pending functional progress

## 2022-08-12 NOTE — CONSULT NOTE ADULT - ASSESSMENT
72 year old female with a PMHx of HTN, HLD and GERD who presented with elevated blood pressure.  Patient was incidentally found to have an age indeterminate thrombosed dissection of the left vertebral artery via CTA and MRI.       #Left Vertebral Artery Dissection  -stroke team to clarify with neuro radiology and NSGY regarding the acuity of the dissection   -pending above, will be discharged on ASA or Eliquis as per stroke team   -continue with home Colesevelam Hydrochloride 2.25mg daily upon discharge (not on statin due to myopathy)   -rest of management as per stroke team     #HTN  -continue with amlodipine 5mg daily upon discharge     DVT PPx: lovenox     Dispo: likely home today

## 2022-08-12 NOTE — OCCUPATIONAL THERAPY INITIAL EVALUATION ADULT - PERTINENT HX OF CURRENT PROBLEM, REHAB EVAL
72y L hand dominant Female with PMH of HTN, HLD, GERD presented to ER with elevated BP and L sided head discomfort and scalp tingling unrelated to her elevated BP. Pt states she gets L sided head discomfort and scalp tingling even when her BP is not high. 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 MRA brain and MRA H/N obtained which showed L vert thrombosed dissection, but unable to ascertain acute Vs chronic dissection. Pt admitted to stroke service for further monitoring.

## 2022-08-12 NOTE — OCCUPATIONAL THERAPY INITIAL EVALUATION ADULT - ADDITIONAL COMMENTS
Pt lives alone in apt w/ elevator and ramp access. Pt states that she was independent prior to admission. Pt states that she has a RW from prior procedure. No other DMEs/AEs reported.

## 2022-08-12 NOTE — DISCHARGE NOTE PROVIDER - NSDCCPCAREPLAN_GEN_ALL_CORE_FT
PRINCIPAL DISCHARGE DIAGNOSIS  Diagnosis: Vertebral artery dissection  Assessment and Plan of Treatment: You came to the hospital with headache and neck pain. You had imaging done of the vessels in your neck which showed something called vertebral artery dissection in L vertebral arteries. Artery walls are made up of three layers of different types of tissue, each with a specific function. Dissection occurs when a tear in the artery wall allows blood to leak between the layers and separate them. As the blood collects in the area of the dissection, it forms a clot that limits blood flow through the artery. If the clot is large enough to completely block blood flow, this can result in a stroke. Equally dangerous, pieces of the clot can break off and travel up through the bloodstream, limit the blood flow to the brain and cause a stroke. You had MR imaging of your brain which did not show any signs of stroke.   First line treatment for this condition is anti-platelet or anti-coagulation medications. You were started on aspirin. You will be on this medication for a few months and will get repeat imaging in 3-6 months of your dissection.         PRINCIPAL DISCHARGE DIAGNOSIS  Diagnosis: Vertebral artery dissection  Assessment and Plan of Treatment: You came to the hospital with headache and neck pain. You had imaging done of the vessels in your neck which showed something called vertebral artery dissection in L vertebral arteries. Artery walls are made up of three layers of different types of tissue, each with a specific function. Dissection occurs when a tear in the artery wall allows blood to leak between the layers and separate them. As the blood collects in the area of the dissection, it forms a clot that limits blood flow through the artery. If the clot is large enough to completely block blood flow, this can result in a stroke. Equally dangerous, pieces of the clot can break off and travel up through the bloodstream, limit the blood flow to the brain and cause a stroke. You had MR imaging of your brain which did not show any signs of stroke.   First line treatment for this condition is blood thinners. You were started on eliquis. You will be on this medication for at least 2 weeks until you follow up with us in clinic for repeat blood vessel imaging.

## 2022-08-12 NOTE — PHYSICAL THERAPY INITIAL EVALUATION ADULT - GAIT PATTERN USED, PT EVAL
Pt ambulated in hallway on RA with no manual assistance provided. Pt able to hold conversation without difficulty during ambulation./2-point gait

## 2022-08-12 NOTE — PHYSICAL THERAPY INITIAL EVALUATION ADULT - GENERAL OBSERVATIONS, REHAB EVAL
PT IE Completed. MRS: #0. Pt received sitting EOB with al Alvarenga at bedside, +heplock, +tele, A&Ox4, +room air, in NAD and agreeable to work with PT, RN Elva notified. ISAC Birch  present throughout. Pt presents to [facility] with elevated BP and chronic L sided head discomfort and tingling. PT exam shows independence with all functional mobility. Pt completed bed mob, transfers, gait. Pt left as found, +bed alarm, +call bell within reach. Pt is discharged from PT program at this time. Should patient status change, new PT consult is recommended.

## 2022-08-12 NOTE — OCCUPATIONAL THERAPY INITIAL EVALUATION ADULT - GENERAL OBSERVATIONS, REHAB EVAL
Pt's RN Elva aware of intent to eval/tx; cleared Pt. PT Drea present. Pt received in EOB - +telemetry, heplock. Pt agreeable to OT.

## 2022-08-12 NOTE — PHYSICAL THERAPY INITIAL EVALUATION ADULT - ADDITIONAL COMMENTS
Pt lives in an elevator accessible building with ramp access to enter the building. Pt reports she has a walker from a previous surgery but does not use it. Pt is independent and states her daughter comes over on the weekends to spend the night. [facility] the senior center.

## 2022-08-12 NOTE — CONSULT NOTE ADULT - ASSESSMENT
per Neurology    72 y o L hand dominant Female with PMH of HTN, HLD, GERD presented to ER with elevated BP and L sided head discomfort and scalp tingling unrelated to her elevated BP. Pt states she gets L sided head discomfort and scalp tingling even when her BP is not hogh. 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 MRA brain and MRA H/N obtained which showed L vert thrombosed dissection, but unable to ascertain acute Vs chronic dissection. Pt admitted to stroke service for further monitoring.    Neuro  #CVA workup  - C/W asa 81mg po daily  - Not started on atorvastatin- as she gets muscle cramps with statin.  - q4hr stroke neuro checks and vitals  - MRI Brain with/ without contrast : Non specific white matter FLAIR signal hyperintensities, partially empty sella turcica.  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    # L Neck pain  - Chronic for which pt gets PT 1X week and gets trigger point injections  - Tyleonl PRN  - Lidocaine patch    #HLD  - LDL results: pending    Pulm  - call provider if SPO2 < 94%    GI  #Nutrition/Fluids/Electrolytes   - replete K<4 and Mg <2  - Diet: DASH /TLC  - IVF: None for now.    Renal  - C/T trend BUN/Crt (12/0.57)    Infectious Disease  - C/T trend WBC    Endocrine  - A1C results: pending  - TSH results: Pending    DVT Prophylaxis  - lovenox sq for DVT prophylaxis   - SCDs for DVT prophylaxis

## 2022-08-12 NOTE — DISCHARGE NOTE PROVIDER - PROVIDER TOKENS
FREE:[LAST:[Kennedi],FIRST:[Camilo],PHONE:[phone]],FAX:[(   )    -],ADDRESS:[[facility] [address], [address] [place] [address], [facility] [address]  [place] [address]],SCHEDULEDAPPT:[08/26/2022],SCHEDULEDAPPTTIME:[12:00 AM]] PROVIDER:[TOKEN:[[id number]],SCHEDULEDAPPT:[08/26/2022],SCHEDULEDAPPTTIME:[12:00 PM]]

## 2022-08-12 NOTE — PROGRESS NOTE ADULT - SUBJECTIVE AND OBJECTIVE BOX
to follow consult note: NEUROSURGERY:    Patients was reevaluated at bedside. She remains neurological intact.  No focal motor deficit. A&O x 3.  MRA of brain is negative for large vessel disease.  MRA neck is read as:  Findings are highly suspicious for stenosis due to thrombus   or dissection of the distal V3 left vertebral artery. No evidence of   carotid stenosis.   Dr. Perez was notified of results and  reviewed  MRA of neck.  Plan: - If patients wants to go home today, she shold br discharged on aspirin and repeat MRA of neck in 3weeks.  - If patient chooses to stay, Dr Perez will perform angiogram tomorrow.  Please keep patient NPO after midnight if she stays.  Case discussed with Dr. Perez.

## 2022-08-12 NOTE — OCCUPATIONAL THERAPY INITIAL EVALUATION ADULT - MD ORDER
L sided HA, L scalp tingling and elevated BP.  L vertebral artery dissection.  CTA H/N with incidental finding of age indeterminate thrombosed dissection of L vert, which is asymptomatic

## 2022-08-12 NOTE — PHYSICAL THERAPY INITIAL EVALUATION ADULT - MODALITIES TREATMENT COMMENTS
Cranial Nerves II - XII: II: PERRLA; visual fields are full to confrontation III, IV, VI: EOMI. Vision H-Test: bilateral tracking and smooth pursuit grossly intact but R mild torsional nystamgus at end range (pt reported shes having retinal surgery); Convergence/Divergence: intact; Vision Quadrant Test: intact V: facial sensation intact to light touch V1-V3 b/l VII: no ptosis, no facial droop, symmetric eyebrow raise and closure VIII: hearing intact to finger rub b/l  XI: head turning and shoulder shrug intact b/l XII: tongue protrusion midline.

## 2022-08-12 NOTE — CONSULT NOTE ADULT - SUBJECTIVE AND OBJECTIVE BOX
72 year old female with a PMHx of HTN, HLD and GERD who presented with elevated blood pressure (SBP 160s at home).  In the ED, CT-Head showed no acute intracranial pathology except for partially empty sella turcica.  CTA-Head/Neck showed age indeterminate thrombosed dissection of the left vertebral artery.   Subsequent MRI-Brain showed findings highly suspicious for stenosis due to thrombus or dissection of the distal V3 left vertebral artery.     Today, patient is doing very well and feels back to baseline.  Patient states she checked her blood pressure in the middle of the night because she felt "jittery" and it was found to be in the 160s, which prompted her to present to the ED.  Patient checks her blood pressure multiple times per day and will delay her daily amlodipine dose based on the readings.  During the time of elevated blood pressure, she denies HA, vision changes, CP, palpitations or SOB.  PO intake has been normal.  Denies abdominal pain, nausea or vomiting.  No other complaints.     PMHx: as per HPI     PSHx: hiatal hernia repair      FHx: denies     SHx: denies alcohol, tobacco or illicit drug use     Allergies: PCN, nuts, pollen, statins      Medications:    -Meclizine 25mg q8hrs PRN   -Amlodipine 5mg daily   -Flonase 50mcg daily   -Vitamin B12   -Colesevelam Hydrochloride 2.25mg daily     Objective:   Vital Signs:   T(C): 37 (08-12-22 @ 13:20), Max: 37.1 (08-11-22 @ 15:44)  T(F): 98.6 (08-12-22 @ 13:20), Max: 98.7 (08-11-22 @ 15:44)  HR: 82 (08-12-22 @ 13:20) (58 - 100)  BP: 113/63 (08-12-22 @ 13:20) (113/63 - 151/72)  BP(mean): 79 (08-12-22 @ 13:20) (79 - 90)  RR: 17 (08-12-22 @ 13:20) (16 - 18)  SpO2: 98% (08-12-22 @ 13:20) (93% - 98%)    Physical Exam:  -Gen: NAD, resting in bed, pleasant  -HEENT: EOMI, PERRL, no scleral icterus, no JVD, no bruits  -CV: normal S1 and S2, no murmurs appreciated   -Lungs: CTABL, no wheezes or crackles, normal respiratory effort   -Ab: soft, NT, ND, normal BS  -Ext: no LE edema, no rashes  -Neuro: A&O x 3, no focal deficits     Labs:                           11.8   3.34  )-----------( 366      ( 12 Aug 2022 06:48 )             36.0     08-12    134<L>  |  99  |  9   ----------------------------<  90  3.9   |  25  |  0.54    Ca    10.0      12 Aug 2022 06:48  Phos  4.1     08-12  Mg     2.4     08-12    TPro  8.0  /  Alb  4.6  /  TBili  0.2  /  DBili  x   /  AST  21  /  ALT  17  /  AlkPhos  115  08-11    Medications  MEDICATIONS  (STANDING):  aspirin enteric coated 81 milliGRAM(s) Oral daily  enoxaparin Injectable 40 milliGRAM(s) SubCutaneous every 24 hours  lidocaine   4% Patch 1 Patch Transdermal every 24 hours    MEDICATIONS  (PRN):  acetaminophen     Tablet .. 650 milliGRAM(s) Oral every 6 hours PRN Mild Pain (1 - 3)

## 2022-08-12 NOTE — DISCHARGE NOTE PROVIDER - NSDCMRMEDTOKEN_GEN_ALL_CORE_FT
amLODIPine 5 mg oral tablet: 1 tab(s) orally once a day  Flonase 50 mcg/inh nasal spray:   Vitamin B12: orally once a day   amLODIPine 5 mg oral tablet: 1 tab(s) orally once a day  Eliquis 5 mg oral tablet: 1 tab(s) orally 2 times a day   Flonase 50 mcg/inh nasal spray:   Vitamin B12: orally once a day

## 2022-08-12 NOTE — DISCHARGE NOTE NURSING/CASE MANAGEMENT/SOCIAL WORK - NSDCPEFALRISK_GEN_ALL_CORE
For information on Fall & Injury Prevention, visit: [email or web address] OR  [email or web address] OR  https://www.cdc.gov/steadi/patient.html

## 2022-08-12 NOTE — DISCHARGE NOTE PROVIDER - CARE PROVIDER_API CALL
Camilo Kolb  [address] [place] [address], [address]  [place] [address]  Phone: [phone]  Fax: (   )    -  Scheduled Appointment: 08/26/2022 12:00 AM   Tammi Polo)  Neurology  [address] [address]  Phone: [phone]  Fax: [phone]  Scheduled Appointment: 08/26/2022 12:00 PM

## 2022-08-12 NOTE — DISCHARGE NOTE PROVIDER - HOSPITAL COURSE
Hospital course:  71YO L hand dominant female with PMH of HTN (on amlodipine 5mg OP), HLD, GERD presented to ER on 08/10 for elevated BP and L head discomfort and L scalp tingling. Per pt, always takes her BP multiple times a day and records them. Her BP is well controlled which ranges between SBP of 100-120's most of the time. On 08/10th evening @ around 11:30pm took her BP which was 135/89 and took her amlodipine 5mg po and rechecked her BP @ around 1am which was 163/98 and on rpt 169/93 which made pt concerned and came to ER. Pt states she has herniated disc and always has vague L sided discomfort which is unrelated to her BP elevation. States has been having intermittent L shoulder/neck and scalp discomfort for years which is relieved by tylenol and pain pacth(salonpas), also been going to physical therapy 1X week for herniated disc.  Pt also states that she receives trigger point injections ? L shoulder (by Dr. Celia juarez 1X month), pt admits to skipping appointments. While in ER, CTH and CTA H/N was done. 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 Pt denies any recent illness/neck trauma including blunt/ vigorous cough, recent chiropractor or spa visits. pt not a candidate for any intervention tPA/EVT 2/2 out of the window. MRA inconclusive of the age of thrombosed dissection and hence pt was admitted to stroke unit for monitoring.    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      Patient had the following workup done in house:    CT Head No Cont (08.11.22 @ 04:05)   IMPRESSION:  No hydrocephalus, midline shift, acute intracranial hemorrhage or   demarcated territorial infarct.  Partially empty sella turcica again noted.    CT Angio Head w/ IV Cont (08.11.22 @ 04:05)   IMPRESSION:  Negative CTA Head. No evidence of intracranial large vessel stenosis or  occlusion. No evidence of aneurysm or AVM.    CT Angio Neck w/ IV Cont (08.11.22 @ 04:05)   A patent left vertebral artery is seen emanating off the left subclavian   artery and ascending along the left transversarium foramen. At the   transversarium foramen at C5 there is noticeable decreased attenuation of   the left vertebral artery which becomes more pronounced at the   transversarium foramen of C4 at which point the left vertebral artery is   no longer visualized. These findings are suspicious for age indeterminate   thrombosed dissection. (Follow up MRA of the neck may provide further   clarification).    MR Head No Cont (08.11.22 @ 15:35)   No evidence of acute infarction.  Findings may represent a partially empty sella.    MR Angio Neck No Cont (08.11.22 @ 14:23)   Impression: Findings are highly suspicious for stenosis due to thrombus   or dissection of the distal V3 left vertebral artery. No evidence of   carotid stenosis.    MR Angio Head No Cont (08.11.22 @ 14:22)   Impression: Normal MRA of the intracranial circulation.    [X]labs  HbA1C: 5.2  TSH : pending  LDL : 118      Physical exam at discharge:  Discharge NIHSS    New medications on discharge: aspirin  Labs to be followed up: TSH  Imaging to be done as outpatient:  Further outpatient workup:   Hospital course:  71YO L hand dominant female with PMH of HTN (on amlodipine 5mg OP), HLD, GERD presented to ER on 08/10 for elevated BP and L head discomfort and L scalp tingling. Per pt, always takes her BP multiple times a day and records them. Her BP is well controlled which ranges between SBP of 100-120's most of the time. On 08/10th evening at around 11:30pm took her BP which was 135/89 and took her amlodipine 5mg po and rechecked her BP at around 1am which was 163/98 and on rpt 169/93 which made pt concerned and came to ER. Pt states she has herniated disc and always has vague L sided discomfort which is unrelated to her BP elevation. States has been having intermittent L shoulder/neck and scalp discomfort for years which is relieved by tylenol and pain pacth(salonpas), also been going to physical therapy 1X week for herniated disc. While in ER, CT imaging obtained with age indeterminate thrombosed dissection o L vertebral artery, which is asymptomatic on exam. MRA inconclusive of age of thrombosed dissection. In discussion between neurology, neurosurgery and neuroradiology, it was determined that ***. 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    Patient had the following workup done in house:    CT Head No Cont (08.11.22 @ 04:05)   IMPRESSION:  No hydrocephalus, midline shift, acute intracranial hemorrhage or   demarcated territorial infarct.  Partially empty sella turcica again noted.    CT Angio Head w/ IV Cont (08.11.22 @ 04:05)   IMPRESSION:  Negative CTA Head. No evidence of intracranial large vessel stenosis or  occlusion. No evidence of aneurysm or AVM.    CT Angio Neck w/ IV Cont (08.11.22 @ 04:05)   A patent left vertebral artery is seen emanating off the left subclavian   artery and ascending along the left transversarium foramen. At the   transversarium foramen at C5 there is noticeable decreased attenuation of   the left vertebral artery which becomes more pronounced at the   transversarium foramen of C4 at which point the left vertebral artery is   no longer visualized. These findings are suspicious for age indeterminate   thrombosed dissection. (Follow up MRA of the neck may provide further   clarification).    MR Head No Cont (08.11.22 @ 15:35)   No evidence of acute infarction.  Findings may represent a partially empty sella.    MR Angio Neck No Cont (08.11.22 @ 14:23)   Impression: Findings are highly suspicious for stenosis due to thrombus   or dissection of the distal V3 left vertebral artery. No evidence of   carotid stenosis.    MR Angio Head No Cont (08.11.22 @ 14:22)   Impression: Normal MRA of the intracranial circulation.    [X]labs  HbA1C: 5.2  TSH : pending  LDL : 118      Physical exam at discharge:  Discharge NIHSS    New medications on discharge: aspirin  Labs to be followed up: TSH  Imaging to be done as outpatient:  Further outpatient workup:   73YO L hand dominant female with PMH of HTN (on amlodipine 5mg OP), HLD, GERD presented to ER on 08/10 for elevated BP and L head discomfort and L scalp tingling. Per pt, always takes her BP multiple times a day and records them. Her BP is well controlled which ranges between SBP of 100-120's most of the time. On 08/10th evening at around 11:30pm took her BP which was 135/89 and took her amlodipine 5mg po and rechecked her BP at around 1am which was 163/98 and on rpt 169/93 which made pt concerned and came to ER. Pt states she has herniated disc and always has vague L sided discomfort which is unrelated to her BP elevation. States has been having intermittent L shoulder/neck and scalp discomfort for years which is relieved by tylenol and pain pacth(salonpas), also been going to physical therapy 1X week for herniated disc. While in ER, CT imaging obtained with age indeterminate thrombosed dissection o L vertebral artery, which is asymptomatic on exam. MRA inconclusive of age of thrombosed dissection. Will start patient on eliquis and plan for repeat MRA in 2 weeks.     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    Patient had the following workup done in house:    CT Head No Cont (08.11.22 @ 04:05)   IMPRESSION:  No hydrocephalus, midline shift, acute intracranial hemorrhage or   demarcated territorial infarct.  Partially empty sella turcica again noted.    CT Angio Head w/ IV Cont (08.11.22 @ 04:05)   IMPRESSION:  Negative CTA Head. No evidence of intracranial large vessel stenosis or  occlusion. No evidence of aneurysm or AVM.    CT Angio Neck w/ IV Cont (08.11.22 @ 04:05)   A patent left vertebral artery is seen emanating off the left subclavian   artery and ascending along the left transversarium foramen. At the   transversarium foramen at C5 there is noticeable decreased attenuation of   the left vertebral artery which becomes more pronounced at the   transversarium foramen of C4 at which point the left vertebral artery is   no longer visualized. These findings are suspicious for age indeterminate   thrombosed dissection. (Follow up MRA of the neck may provide further   clarification).    MR Head No Cont (08.11.22 @ 15:35)   No evidence of acute infarction.  Findings may represent a partially empty sella.    MR Angio Neck No Cont (08.11.22 @ 14:23)   Impression: Findings are highly suspicious for stenosis due to thrombus   or dissection of the distal V3 left vertebral artery. No evidence of   carotid stenosis.    MR Angio Head No Cont (08.11.22 @ 14:22)   Impression: Normal MRA of the intracranial circulation.    [X]labs  HbA1C: 5.2  TSH : pending  LDL : 118      Physical exam at discharge:  -Mental status: Awake, alert, oriented to person, place, and time. Speech is fluent with intact naming, repetition, and comprehension, no dysarthria. Recent and remote memory intact. Follows commands. Attention/concentration intact. Fund of knowledge appropriate.  -Cranial nerves:   II: Visual fields are full to confrontation.  III, IV, VI: Extraocular movements are intact without nystagmus. Pupils equally round and reactive to light  V:  Facial sensation V1-V3 equal and intact   VII: Face is symmetric with normal eye closure and smile  VIII: Hearing is bilaterally intact to finger rub  IX, X: Uvula is midline and soft palate rises symmetrically  XI: Head turning and shoulder shrug are intact.  XII: Tongue protrudes midline  Motor: Normal bulk and tone. No pronator drift. Strength bilateral upper extremity 5/5, bilateral lower extremities 5/5.  Rapid alternating movements intact and symmetric  Sensation: Intact to light touch bilaterally. No neglect or extinction on double simultaneous testing.  Coordination: No dysmetria on finger-to-nose and heel-to-shin bilaterally  Reflexes: Downgoing toes bilaterally   Gait: Narrow gait and steady    Discharge NIHSS: 0    New medications on discharge: eliquis 5mg BID   Further outpatient workup: repeat MRA head and neck in 2 weeks   73YO L hand dominant female with PMH of HTN (on amlodipine 5mg OP), HLD, GERD presented to ER on 08/10 for elevated BP and L head discomfort and L scalp tingling. Per pt, always takes her BP multiple times a day and records them. Her BP is well controlled which ranges between SBP of 100-120's most of the time. On 08/10th evening at around 11:30pm took her BP which was 135/89 and took her amlodipine 5mg po and rechecked her BP at around 1am which was 163/98 and on rpt 169/93 which made pt concerned and came to ER. Pt states she has herniated disc and always has vague L sided discomfort which is unrelated to her BP elevation. States has been having intermittent L shoulder/neck and scalp discomfort for years which is relieved by tylenol and pain pacth(salonpas), also been going to physical therapy 1X week for herniated disc. While in ER, CT imaging obtained with age indeterminate thrombosed dissection o L vertebral artery, which is asymptomatic on exam. MRA inconclusive of age of thrombosed dissection. Will start patient on eliquis and plan for repeat MRA in 2 weeks.     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    Patient had the following workup done in house:    CT Head No Cont (08.11.22 @ 04:05)   IMPRESSION:  No hydrocephalus, midline shift, acute intracranial hemorrhage or   demarcated territorial infarct.  Partially empty sella turcica again noted.    CT Angio Head w/ IV Cont (08.11.22 @ 04:05)   IMPRESSION:  Negative CTA Head. No evidence of intracranial large vessel stenosis or  occlusion. No evidence of aneurysm or AVM.    CT Angio Neck w/ IV Cont (08.11.22 @ 04:05)   A patent left vertebral artery is seen emanating off the left subclavian   artery and ascending along the left transversarium foramen. At the   transversarium foramen at C5 there is noticeable decreased attenuation of   the left vertebral artery which becomes more pronounced at the   transversarium foramen of C4 at which point the left vertebral artery is   no longer visualized. These findings are suspicious for age indeterminate   thrombosed dissection. (Follow up MRA of the neck may provide further   clarification).    MR Head No Cont (08.11.22 @ 15:35)   No evidence of acute infarction.  Findings may represent a partially empty sella.    MR Angio Neck No Cont (08.11.22 @ 14:23)   Impression: Findings are highly suspicious for stenosis due to thrombus   or dissection of the distal V3 left vertebral artery. No evidence of   carotid stenosis.    MR Angio Head No Cont (08.11.22 @ 14:22)   Impression: Normal MRA of the intracranial circulation.    [X]labs  HbA1C: 5.2  TSH : pending  LDL : 118      Physical exam at discharge:  -Mental status: Awake, alert, oriented to person, place, and time. Speech is fluent with intact naming, repetition, and comprehension, no dysarthria. Recent and remote memory intact. Follows commands. Attention/concentration intact. Fund of knowledge appropriate.  -Cranial nerves:   II: Visual fields are full to confrontation.  III, IV, VI: Extraocular movements are intact without nystagmus. Pupils equally round and reactive to light  V:  Facial sensation V1-V3 equal and intact   VII: Face is symmetric with normal eye closure and smile  VIII: Hearing is bilaterally intact   XI: Head turning and shoulder shrug are intact.  XII: Tongue protrudes midline  Motor: Normal bulk and tone. No pronator drift. Strength bilateral upper extremity 5/5, bilateral lower extremities 5/5.  Sensation: Intact to light touch bilaterally. No neglect or extinction on double simultaneous testing.  Coordination: No dysmetria on finger-to-nose bilaterally    Discharge NIHSS: 0    New medications on discharge: eliquis 5mg BID   Further outpatient workup: repeat MRA head and neck in 2 weeks

## 2022-08-12 NOTE — PHYSICAL THERAPY INITIAL EVALUATION ADULT - PERTINENT HX OF CURRENT PROBLEM, REHAB EVAL
73yo F L hand dominant Female with PMH of HTN, HLD, GERD presented to ER with elevated BP and L sided head discomfort and scalp tingling unrelated to her elevated BP. Pt states she gets L sided head discomfort and scalp tingling even when her BP is not high. 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 MRA brain and MRA H/N obtained which showed L vert thrombosed dissection, but unable to ascertain acute Vs chronic dissection. Pt admitted to stroke service for further monitoring.

## 2022-08-12 NOTE — DISCHARGE NOTE NURSING/CASE MANAGEMENT/SOCIAL WORK - PATIENT PORTAL LINK FT
You can access the FollowMyHealth Patient Portal offered by [facility] by registering at the following website: [email or web address]. By joining [email or web address]’s FollowMyHealth portal, you will also be able to view your health information using other applications (apps) compatible with our system.

## 2022-08-13 RX ORDER — APIXABAN 2.5 MG/1
1 TABLET, FILM COATED ORAL
Qty: 60 | Refills: 0
Start: 2022-08-13 | End: 2022-09-11

## 2022-08-16 ENCOUNTER — APPOINTMENT (OUTPATIENT)
Dept: OTOLARYNGOLOGY | Facility: CLINIC | Age: 72
End: 2022-08-16

## 2022-08-16 DIAGNOSIS — R13.10 DYSPHAGIA, UNSPECIFIED: ICD-10-CM

## 2022-08-16 PROCEDURE — 99213 OFFICE O/P EST LOW 20 MIN: CPT | Mod: 95

## 2022-08-16 NOTE — REASON FOR VISIT
[Home] : at home, [unfilled] , at the time of the visit. [Medical Office: ([facility])___] : at the medical office located in

## 2022-08-17 DIAGNOSIS — Z91.018 ALLERGY TO OTHER FOODS: ICD-10-CM

## 2022-08-17 DIAGNOSIS — Z88.0 ALLERGY STATUS TO PENICILLIN: ICD-10-CM

## 2022-08-17 DIAGNOSIS — I77.74 DISSECTION OF VERTEBRAL ARTERY: ICD-10-CM

## 2022-08-17 DIAGNOSIS — R51.9 HEADACHE, UNSPECIFIED: ICD-10-CM

## 2022-08-17 DIAGNOSIS — K21.9 GASTRO-ESOPHAGEAL REFLUX DISEASE WITHOUT ESOPHAGITIS: ICD-10-CM

## 2022-08-17 DIAGNOSIS — I10 ESSENTIAL (PRIMARY) HYPERTENSION: ICD-10-CM

## 2022-08-17 DIAGNOSIS — E78.5 HYPERLIPIDEMIA, UNSPECIFIED: ICD-10-CM

## 2022-08-17 NOTE — ASSESSMENT
[FreeTextEntry1] : 71 yo female who presents with dysphonia s/p esophageal surgery 5/21/21. On exam, her voice has improved and her left true vocal fold function is now fully recovered. She is swallowing well and is essentially asymptomatic. At this point she should continue to follow up with GI surgery and follow up with me as needed. She had an isolated episode of swallowing difficulty in February 2022 but has not had any recurrence since then. \par \par Today, she denies any episodes of dysphagia or swallowing discomfort.   previously in office functional endoscopic swallowing evaluation appear to be normal.  Patient has had intermittent improvement and then recurrence of dysphagia.  Ultimately barium swallow with esophageal follow-through was ordered and this was all normal and without any evidence of aspiration or penetration of foods across all consistencies.  At this time I am recommending aspiration precautions, sips of water between sips of solids.  Patient can now follow-up with me as needed.\par \par –Aspiration precautions\par – Alternating sips of water with solids\par – Follow-up with me as needed

## 2022-08-17 NOTE — HISTORY OF PRESENT ILLNESS
[de-identified] : 70 yo female who presents with dysphonia. She underwent partial fundoplication complicated by perforation and then esophagogastroduodenoscopy, Lap Robotic assisted mobilization of gastric conduit, Left cervical incision, esophagogastrectomy and placement of a J-tube for repair. Post operatively patient had dysphonia, sob, and breathy voice. 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 \par \par Pt now follows up after hospitalization for evaluation. Overall she notes that her voice is improving, though not back to normal. Her voice remains breathy, but high pitched. No issues chewing, eating or swallowing. She is not short of breath. She denies any ENT issues otherwise. \par -\par Update 9/14/21:\par Pt overall well and stable.  Her voice has improved.  She presents with throat discomfort with swallowing, feels food getting stuck in her throat.  She now has to eat 6 small meals a day.  Does not cough/choke/regurgitation on liquids or solids.  She denies weight loss.  She still drinks ensure to help with calories.  No breathing issues. She denies any other ENT complaints.\par -\par Update 12/14/21\par Overall stable and well.  She notes continued improvement in terms of voice and swallowing.  She feels back to baseline.  she continues to eat smaller meals, but no issues with chewing, eating swallowing, coughing or regurgitation of food.  she is not losing weight, and she is getting adequate nutrition.\par -\par Update 2/14/22\par Pt presents with swallowing concern.  Last night she finished a meal or rice, beans, sweet potato, chicken without issue.  After her meal she used her medication a powder which she puts in water and had a reaction where she felt it didn't pass.  This caused pain and discomfort.  Last night she did take another medication without issue.  This morning she drank coffee without any issues.  No coughing, choking or regurgitation of foods.  She presents for reval.  No new ENT issues otherwise. \par -\par Update 3/14/22\par Pt presents today with improvement of symptoms of difficulty swallowing. She does not have any difficulty swallowing or chewing foods/liquids since her isolated episode last month. She feels like she is getting adequate nutrition. Denies coughing, choking, or regurgitation. She denies any new ENT issues otherwise.  \par - [FreeTextEntry1] : 8/16/22\par No significant changes in symptoms.  Patient did complete modified barium swallow.  She presents today to review those results.

## 2022-08-17 NOTE — DATA REVIEWED
[de-identified] : -\par MBS 8/10/22\par   Impression:\par Patient is a 72-year-old female status post robotic assisted paraesophageal hernia repair which was complicated by an esophageal injury and subsequent transhiatal robotic assisted esophageal gastrectomy with anastomosis, and feeding jejunostomy tube placement on May 21, 2021.  On this present study oral and pharyngeal stages of swallowing are within normal limits with complete oral and pharyngeal clearance and no penetration/aspiration across all consistencies tested.\par \par   Recommendations:\par 1.  Continue regular diet with thin liquids and general aspiration precautions.\par 2.  Follow-up with Dr. Patel as needed

## 2022-08-26 ENCOUNTER — NON-APPOINTMENT (OUTPATIENT)
Age: 72
End: 2022-08-26

## 2022-08-26 ENCOUNTER — APPOINTMENT (OUTPATIENT)
Dept: NEUROLOGY | Facility: CLINIC | Age: 72
End: 2022-08-26

## 2022-08-26 VITALS
WEIGHT: 138 LBS | HEIGHT: 55 IN | DIASTOLIC BLOOD PRESSURE: 86 MMHG | OXYGEN SATURATION: 97 % | SYSTOLIC BLOOD PRESSURE: 125 MMHG | BODY MASS INDEX: 31.94 KG/M2 | HEART RATE: 68 BPM | TEMPERATURE: 97.8 F

## 2022-08-26 DIAGNOSIS — I77.74 DISSECTION OF VERTEBRAL ARTERY: ICD-10-CM

## 2022-08-26 PROCEDURE — 99215 OFFICE O/P EST HI 40 MIN: CPT

## 2022-08-26 RX ORDER — AMLODIPINE BESYLATE 5 MG/1
5 TABLET ORAL DAILY
Refills: 0 | Status: ACTIVE | COMMUNITY

## 2022-08-26 RX ORDER — ACETAMINOPHEN 500 MG/1
500 TABLET, COATED ORAL
Refills: 0 | Status: ACTIVE | COMMUNITY

## 2022-08-26 RX ORDER — COLESEVELAM HYDROCHLORIDE 3.75 G/1
3.75 POWDER, FOR SUSPENSION ORAL
Refills: 0 | Status: ACTIVE | COMMUNITY
Start: 2021-05-03

## 2022-08-26 RX ORDER — GABAPENTIN 100 MG/1
100 CAPSULE ORAL
Qty: 30 | Refills: 5 | Status: DISCONTINUED | COMMUNITY
Start: 2022-01-19 | End: 2022-08-26

## 2022-08-26 NOTE — PHYSICAL EXAM
[FreeTextEntry1] : -Mental status: Awake, alert, oriented to person, place, and time. Speech is fluent with intact naming, repetition, and comprehension, no dysarthria. Recent and remote memory intact. Follows commands. Attention/concentration intact. Fund of knowledge appropriate. \par -Cranial nerves: \par II: Visual fields are full to confrontation. \par III, IV, VI: Extraocular movements are intact without nystagmus. Pupils equally round and reactive to light \par V:  Facial sensation V1-V3 equal and intact \par VII: Face is symmetric with normal eye closure and smile \par VIII: Hearing is bilaterally intact \par XI: Head turning and shoulder shrug are intact. \par XII: Tongue protrudes midline \par Motor: Normal bulk and tone. No pronator drift. Strength bilateral upper extremity 5/5, bilateral lower extremities 5/5. \par Sensation: Intact to light touch bilaterally. No neglect or extinction on double simultaneous testing. \par Coordination: No dysmetria on finger-to-nose bilaterally

## 2022-08-26 NOTE — ASSESSMENT
[FreeTextEntry1] : Here for f/u of L vert dissection seen on MRA. Currently asymptomatic and her exam is normal. \par \par Plan: \par Will repeat MRA neck with dissection protocol \par Recommend she continue with Eliquis until repeat scan obtained. Thereafter can consider de-escalating to antiplatelet therapy\par As I will be out on leave after this week, she is recommended to call office within week of getting MRI to find out results of MRA and determine need to c/w Eliquis. Will also help arrange for her to have f/u with Dr. Polo w/i 1 month. \par In meantime she is recommended to avoid any neck manipulation especially PT. Discourage any chiropractors/ deep tissue or other massage/ or any similar activities. \par C/w BP monitoring.

## 2022-08-26 NOTE — HISTORY OF PRESENT ILLNESS
[FreeTextEntry1] : Pt is a 73yo L-hand dominant female with PMH of HTN (on amlodipine 5mg OP), HLD, GERD, who presents today for hospital f/u visit. She initially presented o the ER on 08/10 for elevated BP and L head discomfort and L scalp \par tingling. Per pt, always takes her BP multiple times a day and records them. Her BP is well controlled which ranges between SBP of 100-120's most of the time. On 08/10 in the evening her BPs were in the high 160s/90s which concerned her and prompted her to come to the ER. Of note, she has hx of herniated discs and chronic vague L sided discomfort which is unrelated to her BP elevation. States has been having intermittent L shoulder/neck and scalp discomfort for years which is relieved by tylenol and pain patch, also been going to physical therapy 1X week for herniated disc. 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 MRA inconclusive of age of thrombosed dissection. MR head with no evidence of stroke or other acute findings. She was d/c home on Eliquis 5mg BID. SInce d/c she has been doing well. She continues to have the same chronic neck pain d/t her herniated discs. She denies any neurological complaints including headache/ dizziness/ vision changes/ speech impairment/ focal or generalized weakness/ sensation changes/ gait disturbance. She is tolerating eliquis well w/o any bleeding concerns. She has been monitoring her BP at home and it is WNL- 120s-130s systolic. BP is 125/86 in office today. \par

## 2022-08-28 ENCOUNTER — EMERGENCY (EMERGENCY)
Facility: HOSPITAL | Age: 72
LOS: 1 days | Discharge: HOME | End: 2022-08-28
Attending: EMERGENCY MEDICINE | Admitting: EMERGENCY MEDICINE
Payer: MEDICARE

## 2022-08-28 VITALS
OXYGEN SATURATION: 96 % | RESPIRATION RATE: 16 BRPM | HEART RATE: 86 BPM | SYSTOLIC BLOOD PRESSURE: 144 MMHG | DIASTOLIC BLOOD PRESSURE: 79 MMHG | TEMPERATURE: 98 F | HEIGHT: 59 IN

## 2022-08-28 DIAGNOSIS — Z98.890 OTHER SPECIFIED POSTPROCEDURAL STATES: Chronic | ICD-10-CM

## 2022-08-28 DIAGNOSIS — E78.5 HYPERLIPIDEMIA, UNSPECIFIED: ICD-10-CM

## 2022-08-28 DIAGNOSIS — Z79.01 LONG TERM (CURRENT) USE OF ANTICOAGULANTS: ICD-10-CM

## 2022-08-28 DIAGNOSIS — K21.9 GASTRO-ESOPHAGEAL REFLUX DISEASE WITHOUT ESOPHAGITIS: ICD-10-CM

## 2022-08-28 DIAGNOSIS — Z88.0 ALLERGY STATUS TO PENICILLIN: ICD-10-CM

## 2022-08-28 DIAGNOSIS — Z91.018 ALLERGY TO OTHER FOODS: ICD-10-CM

## 2022-08-28 DIAGNOSIS — I10 ESSENTIAL (PRIMARY) HYPERTENSION: ICD-10-CM

## 2022-08-28 DIAGNOSIS — Z88.8 ALLERGY STATUS TO OTHER DRUGS, MEDICAMENTS AND BIOLOGICAL SUBSTANCES STATUS: ICD-10-CM

## 2022-08-28 PROCEDURE — 99284 EMERGENCY DEPT VISIT MOD MDM: CPT

## 2022-08-28 NOTE — ED ADULT TRIAGE NOTE - CCCP TRG CHIEF CMPLNT
31-year-old female here requesting IUD insertion, she does not know whether she wants a Mirena or ParaGard, had some depression symptoms and mood changes with Depo-Provera.  I discussed the ParaGard IUD has absolutely no hormone so would not cause any hormonal side effects.  Discussed that it likely would not have either any effect on her periods or sometimes they are a little bit heavier crampy ear.  I discussed Mirena IUD does contain progesterone which is contained in Depo-Provera I discussed the progesterone level she should experience with Mirena IUD are less than that with Depo-Provera but there is still some hormone and the IUD.  Discussed that Mirena IUD typically makes periods lighter and sometimes people will become amenorrheic.  After discussion patient wants a Mirena IUD.  We discussed usual side effects of IUD to include irregular bleeding and pelvic cramping for the first few weeks after insertion. We discussed more rare but more significant complications such as device expulsion/migration/perforation. Discussed perforation may require laparoscopy for removal. Discussed risks of bleeding, infection, and pain. We discussed the insertion procedure. All questions answered. She requests to proceed with insertion.  After informed consent was obtained and all questions answered, the patient was placed in dorsal lithotomy position. A vaginal speculum was placed. The cervix was prepped with betadine swabs x2. A single toothed tenaculum was placed on the anterior lip of the cervix. Mirena IUD lot #[id number], exp 8/2021, was placed to the appropriate depth. The tenaculum was removed and the tenaculum site was hemostatic. The strings were cut to 2-3 cm. The speculum was removed. The patient tolerated the procedure well.      The patient was counseled to check for her strings on a monthly basis. She will return in 3 months for IUD check.    
Lance Reyes is a 31 year old female presenting for an IUD insertion.  Currently using Nuvaring for contraception.  Pt is interested in Paragard or Mirena.  Pt has used Depo Provera in the past and had problems with depression while on it.  Pt is concerned that she will have depression with the Mirena since it is a similar hormone.  Pt declines pregnancy test today.  Denies known Latex allergy or symptoms of Latex sensitivity.       Mirena IUD NDC#62621-883-34  
hypertension

## 2022-08-28 NOTE — ED ADULT NURSE NOTE - OBJECTIVE STATEMENT
72y female c/o hypertension. pt had vertebral artery dissection on 8/12 and states she has been taking her blood pressure daily. pt noted a BP elevated to 160s today. rpts L sided neck pain that has remained unchanged since 8/12. pt on eliquis. Alert and oriented x 4. Patient is awake and alert. ambulating w/ steady gait. sensation intact bilaterally. no facial droop noted. denies fever/chills, CP, SOB, n/v/d, headache, numbness/tingling, unilateral weakness, vision changes. 20G L ac placed.

## 2022-08-28 NOTE — ED ADULT TRIAGE NOTE - CHIEF COMPLAINT QUOTE
Pt s/p vertebral artery dissection on 8/12 reporting hypertension to 160s systolically at home today. Denies new symptoms. Ambulating with steady gait, neuro intact.

## 2022-08-28 NOTE — ED ADULT TRIAGE NOTE - BANDS:
Medicine Hospitalist PA    Notified by EDHO RN about 1L noted on bladder scan after multiple attempts by 4 RN to straight cath patient.     Patient is a 89F with a PMH of CHF (EF reportedly 30% last year), HTN, HLD, hypothyroidism who presents to the ED for dyspnea, admitted for acute on chronic CHF.    Patient was seen for butler placement attempt without success. Patient will need urology consult in the AM for butler placement. Met with patient's daughter, Jaz and her cousin at bedside.  Discussed patient appears to be dying and with multiorgan system failure.  Discussed addition of opioids for dyspnea/pain relief.   called and family requesting hospice referral. PA Note    Pt with urinary retention from overnight. On report, bladder scan was obtained and showing >700cc urine.  I was informed that many attempts to insert Smith but to no avail.  This morning, Ativan 1mg IVP was given to the patient to relax her.  After positioning the patient with the assistance of other providers, 14F Smith Catheter was inserted into the urethra.   Retained dark urine flowed out.    Pt tolerated this insertion well.    Wendy Wren, PAC Called by nurse to report temp 91.3 (oral) and BP 99/56.  Patient is a 89F with a PMH of CHF (EF reportedly 30% last year), HTN, HLD, hypothyroidism who presents to the ED for dyspnea.      Vitals BP 99/56  HR 83  T 91.3 (oral)  O2 sat 93% (4L NC)  General: lethargic, minimal response to tactile stimuli, non-verbal  Lungs: diminished BS bilaterally  Cardiac: + S1, S2  Abdomen: distended, skin taut  : butler in place  Ext: + b/l LE edema    US Abdomen Complete (US Abdomen Complete .) (02.14.22 @ 12:48) >  1.  Large volume ascites with bilateral pleural effusions.  2.  Nodular contour of the liver suggesting cirrhosis.  3.  Thickened gallbladder wall with cholelithiasis. No other evidence of acute cholecystitis. Gallbladder wall thickening likely due to ascites, low albumin state, or fluid overload.  4.  Bilateral renal cysts.  5.  Large stone in the upper pole of the RIGHT kidney measuring 2.1 cm without evidence of hydronephrosis.  6.  Bilateral increased renal echogenicity may represent underlying medical renal disease.  7.  Collapsed bladder with Butler in place.    A/P: 89 y/o F with PMHx of CHF EF 30%, HTN, HLD and hypothyroidism admitted with acute on chronic CHF.  Patient also with MARINA (baseline creatinine unknown).  Patient now with hypothermia and hypotension.  -Will send blood & urine cultures  -Zosyn & vanco x 1 dose stat  -Consider ID consult in am  -As per RN patient has not had urine output in butler; Abdominal US shows collapsed bladder with butler in place; bladder scan shows 1398ml however that may be secondary to ascitic fluid; renal consult in am  -Abdominal US shows large volume ascitics and b/l pleural effusions; consider IR evaluation for drainage  -Will hold off on IVF now given CHF/ volume overload  -Will continue to closely monitor House- Medicine NP:    Asked by Diego RN for IV insertion. Multiple RNs unsuccesful after several attempts.   Patient is a 89y old  Female who presents with a chief complaint of dyspnea (15 Feb 2022 14:03)      T(F): 97 (02-15-22 @ 16:42)  HR: 105 (02-15-22 @ 16:42)  BP: 90/61 (02-15-22 @ 16:42)  RR: 20 (02-15-22 @ 16:42)  SpO2: 92% (02-15-22 @ 16:42)  Wt(kg): --    IV inserted to ADITHYA kang #[id number] via ultrasound guidance. + blood return, flushes well. Pt tolerated procedure well. Pt seen and examined   Pt still SOB, using accessory muscles to breath  Agree with plan from H&P   -continue IV lasix  -Ultrasound of abdomen  -as per Cardiology- close assessment of fluid balance, might need therapeutica drainage, continue to monitor closely Allergy;

## 2022-08-28 NOTE — ED ADULT TRIAGE NOTE - MEANS OF ARRIVAL
2021    Name: Devang Record : 1972 Sex: female  Age: 44 y.o. Subjective:  Chief Complaint   Patient presents with    Hypertension     3 month visit        Hypertension  Pertinent negatives include no chest pain, headaches, palpitations or shortness of breath. Other  Pertinent negatives include no abdominal pain, arthralgias, chest pain, congestion, coughing, diaphoresis, fatigue, headaches, joint swelling, myalgias, nausea, numbness, rash, sore throat, vomiting or weakness. Patient has been complaining of hot flashes with severe diaphoresis for little over a year. At first we thought it might be medication she was just started on her antihypertensives and diltiazem at the time but her cardiologist did not feel it was that. He felt might be secondary to menopausal state. She then saw her gynecologist Dr. Crow Falcon who did some blood work on her and indeed she is postmenopausal her [address] [place] [address] Street is 56.8 and her estradiol level is less than 5. All of this is consistent with postmenopausal state. However her TSH was low at 0.137. No previous history of Hashimoto's disease  Gynecologist did not want to start hormone replacement therapy until patient's thyroid was evaluated and treated    She was diagnosed as being hyperthyroid. She was started by Dr. Prosper De Los Santos on methimazole 5 mg a day. Her last TSH was elevated at about 13 so methimazole was discontinued. He was started on levothyroxine 50 mcg a day and her last TSH and free T4 were within normal limits although the free T4 was at the lower limits of normal. ..  . Patient also complains of feeling jittery inside. No weight loss. No palpitations but she is on a calcium channel blocker. Her blood pressures have been adequate. She is extremely fatigued and sleepy. She said she could sleep for 24 hours some time. She is always hungry and has gained weight.     Her heart rate was rapid so we discontinued her diltiazem and started her on atenolol 50 mg.  She says her heart rate is better . She is quite frustrated over her inability to lose weight. She exercises 6 to 7 days a week and watches her diet very carefully but despite this has not lost any weight. She is asking about help with losing weight. I recommend that she first see nutritionist to make sure that she is in a good diet and if all her blood work is normal and her thyroid is normal we could consider starting her on injectable semaglutide to help with weight loss. Was referred by Dr. Nestor Choi to Dr. Daryle Margarita who is helping her lose weight. She has lost about 12 pounds over the last 3 months. She had her Pap test and mammogram done by gynecologist at [address] [place] [address], [place] [facility] [place]. We will get results. ALT and AST were slightly elevated in May 2021. Her fasting blood sugar was borderline at 100. We will recheck. Patient has history of hypertension, nonrheumatic mitral valve regurgitation, depression, hyperlipidemia and arthritis. Vitamin D level was low at 24. Her vitamin D was increased to 2000 units daily. .  We will need to recheck vitamin D level on her next office visit. She sees psychiatrist who has her on Fetzima 120 mg daily. She was also started on buspirone because of her increased anxiety. This has helped her    I reviewed her report from cardiologist who felt that if she needed a low dose hormone replacement that she could take it. She quit smoking this year. She does not know when her last menstrual period was because she has an IUD    She finished up her COVID-19 series including a booster shot. She had her quadrivalent flu shot    She had problems with hip pain, this was the hip that was replaced. She saw Dr. Tomy Musa who felt that nothing could be done at this time, just weight loss and conservative therapy. In March 2021 she fell and injured her left shoulder.   She was seen by Dr. Josefina Latham who felt she had a left biceps rupture and rotator cuff dysfunction. She was given a prescription for physical therapy. She took meloxicam  and she feels much better. Review of Systems   Constitutional: Negative for appetite change, diaphoresis, fatigue and unexpected weight change. HENT: Negative for congestion, ear pain, facial swelling, rhinorrhea, sore throat, tinnitus and trouble swallowing. Eyes: Negative for photophobia, pain, discharge, itching and visual disturbance. Respiratory: Negative for cough, shortness of breath, wheezing and stridor. Cardiovascular: Negative for chest pain, palpitations and leg swelling. Gastrointestinal: Negative for abdominal pain, anal bleeding, blood in stool, constipation, diarrhea, nausea and vomiting. Endocrine: Negative for cold intolerance, heat intolerance, polydipsia, polyphagia and polyuria. Genitourinary: Negative for difficulty urinating, dysuria, flank pain, frequency, hematuria and urgency. Hot flashes   Musculoskeletal: Negative for arthralgias, gait problem, joint swelling and myalgias. Skin: Negative for color change, pallor and rash. Allergic/Immunologic: Negative for environmental allergies and food allergies. Neurological: Negative for dizziness, tremors, seizures, syncope, speech difficulty, weakness, light-headedness, numbness and headaches. Hematological: Negative for adenopathy. Does not bruise/bleed easily. Psychiatric/Behavioral: Negative for agitation, behavioral problems, confusion, sleep disturbance and suicidal ideas. The patient is nervous/anxious.            Current Outpatient Medications:     lisinopril (PRINIVIL;ZESTRIL) 10 MG tablet, Take 1 tablet by mouth daily, Disp: 90 tablet, Rfl: 3    meloxicam (MOBIC) 7.5 MG tablet, Take 1 tablet by mouth daily, Disp: 90 tablet, Rfl: 3    venlafaxine (EFFEXOR XR) 75 MG extended release capsule, Take 1 capsule by mouth daily, Disp: 90 capsule, Rfl: 1    atenolol (TENORMIN) 50 MG tablet, Take 1 tablet by mouth daily, Disp: 90 tablet, Rfl: 1    triamterene-hydroCHLOROthiazide (MAXZIDE-25) 37.5-25 MG per tablet, Take 1 tablet by mouth daily, Disp: 30 tablet, Rfl: 5    hydrOXYzine (VISTARIL) 25 MG capsule, TAKE 1 CAPSULE 2 TIMES DAILY AS NEEDED, Disp: , Rfl:     levothyroxine (SYNTHROID) 50 MCG tablet, Take 1 tablet by mouth daily, Disp: 30 tablet, Rfl: 11    vitamin D3 (CHOLECALCIFEROL) 25 MCG (1000 UT) TABS tablet, Take 2 tablets by mouth daily, Disp: 60 tablet, Rfl: 5    FETZIMA 120 MG CP24 extended release capsule, 120 mg daily , Disp: , Rfl: 0     Allergies   Allergen Reactions    Bupropion Hives    Eggs Or Egg-Derived Products Nausea Only        Past Medical History:   Diagnosis Date    Class 3 severe obesity due to excess calories without serious comorbidity with body mass index (BMI) of 40.0 to 44.9 in adult [facility])     Essential hypertension 2019    Gallstones     Hip arthritis     B/L BRAXTON    Hypothyroidism due to Hashimoto's thyroiditis     Mixed hyperlipidemia 2019    Non-rheumatic mitral regurgitation 2019       Health Maintenance Due   Topic Date Due    Hepatitis C screen  Never done    HIV screen  Never done    DTaP/Tdap/Td vaccine (1 - Tdap) Never done    Cervical cancer screen  Never done    Colon cancer screen colonoscopy  Never done    Shingles Vaccine (2 of 2) 2021    Annual Wellness Visit (AWV)  2021        Patient Active Problem List   Diagnosis    Non-rheumatic mitral regurgitation    Mixed hyperlipidemia    Essential hypertension    Depression    Obesity due to excess calories without serious comorbidity    Anxiety    Arthralgia of knee    Vitamin D deficiency    Hypothyroidism due to medication    Screen for colon cancer    Hyperglycemia    Weight gain    Elevated liver enzymes    Symptomatic cholelithiasis    Hypothyroidism due to Hashimoto's thyroiditis        Past Surgical History:   Procedure Laterality Date     SECTION      three times  CHOLECYSTECTOMY, LAPAROSCOPIC N/A 8/20/2021    LAPAROSCOPIC ROBOTIC ASSISTED CHOLECYSTECTOMY performed by Yelitza Veloz DO at [facility] OR    HIP ARTHROPLASTY Left         Family History   Problem Relation Age of Onset    Heart Disease Father         Social History     Tobacco Use    Smoking status: Former Smoker     Packs/day: 1.00     Years: 30.00     Pack years: 30.00     Start date: 1980    Smokeless tobacco: Never Used   Vaping Use    Vaping Use: Never used   Substance Use Topics    Alcohol use: Not Currently    Drug use: Never        Objective  Vitals:    12/09/21 0952   BP: 106/62   Pulse: 100   Temp: 97.6 °F (36.4 °C)   SpO2: 98%   Weight: 200 lb (90.7 kg)   Height: 4' 11.5\" (1.511 m)        Exam:  Physical Exam  Vitals reviewed. Constitutional:       General: She is not in acute distress. Appearance: She is well-developed. She is obese. She is not ill-appearing. HENT:      Head: Normocephalic. Right Ear: External ear normal.      Left Ear: External ear normal.   Eyes:      General: No scleral icterus. Right eye: No discharge. Left eye: No discharge. Extraocular Movements: Extraocular movements intact. Conjunctiva/sclera: Conjunctivae normal.      Pupils: Pupils are equal, round, and reactive to light. Neck:      Thyroid: No thyromegaly. Cardiovascular:      Rate and Rhythm: Normal rate and regular rhythm. Heart sounds: Normal heart sounds. No murmur heard. No friction rub. No gallop. Pulmonary:      Effort: Pulmonary effort is normal. No respiratory distress. Breath sounds: Normal breath sounds. No wheezing or rales. Chest:      Chest wall: No tenderness. Abdominal:      General: Bowel sounds are normal. There is no distension. Palpations: Abdomen is soft. There is no mass. Tenderness: There is no abdominal tenderness. There is no guarding or rebound. Musculoskeletal:         General: No tenderness or deformity.  Normal range of motion. Cervical back: Normal range of motion and neck supple. Lymphadenopathy:      Cervical: No cervical adenopathy. Skin:     General: Skin is warm and dry. Coloration: Skin is not pale. Findings: No erythema or rash. Neurological:      Mental Status: She is alert and oriented to person, place, and time. Cranial Nerves: No cranial nerve deficit. Sensory: No sensory deficit. Deep Tendon Reflexes: Reflexes normal.   Psychiatric:         Mood and Affect: Mood normal.         Behavior: Behavior normal.         Thought Content: Thought content normal.         Judgment: Judgment normal.          Last labs reviewed. ASSESSMENT & PLAN :   Problem List        Circulatory    Essential hypertension - Primary     Blood pressures are stable. Continue medications and monitor blood pressures at home. Call office if systolics are over 434 over diastolics over 90. Relevant Medications    triamterene-hydroCHLOROthiazide (MAXZIDE-25) 37.5-25 MG per tablet    lisinopril (PRINIVIL;ZESTRIL) 10 MG tablet       Endocrine    Hypothyroidism due to Hashimoto's thyroiditis       continue levothyroxine and follow-up with endocrinologist         Relevant Medications    levothyroxine (SYNTHROID) 50 MCG tablet       Other    Mixed hyperlipidemia       watch diet and if she continues to lose weight her lipids should improve. We will recheck her lipids         Relevant Medications    triamterene-hydroCHLOROthiazide (MAXZIDE-25) 37.5-25 MG per tablet    atenolol (TENORMIN) 50 MG tablet    lisinopril (PRINIVIL;ZESTRIL) 10 MG tablet    Other Relevant Orders    Lipid Panel    Obesity due to excess calories without serious comorbidity       follow-up with Dr. Gretel Sanchez.   He has her on venlafaxine 75 mg which helps suppress her appetite         Relevant Medications    venlafaxine (EFFEXOR XR) 75 MG extended release capsule    Arthralgia of knee    Relevant Medications    meloxicam (MOBIC) 7.5 MG tablet Vitamin D deficiency       continue supplemental vitamin D         Relevant Medications    vitamin D3 (CHOLECALCIFEROL) 25 MCG (1000 UT) TABS tablet    Screen for colon cancer       she did not receive her Cologuard kit so we will send again for 1. She promises to do it as soon as she gets it. She is at low to moderate risk for colon cancer. She has no symptoms, she has no family history of colon cancer or colon polyps. Relevant Orders    Cologuard (For External Results Only)    Elevated liver enzymes       check hepatic function panel         Relevant Orders    HEPATIC FUNCTION PANEL           Return in about 3 months (around 3/9/2022), or AWV and ov.        David Avery, DO  12/9/2021 ambulatory

## 2022-08-29 VITALS — DIASTOLIC BLOOD PRESSURE: 72 MMHG | SYSTOLIC BLOOD PRESSURE: 116 MMHG

## 2022-08-29 PROCEDURE — 99282 EMERGENCY DEPT VISIT SF MDM: CPT

## 2022-08-29 RX ORDER — ACETAMINOPHEN 500 MG
650 TABLET ORAL ONCE
Refills: 0 | Status: COMPLETED | OUTPATIENT
Start: 2022-08-29 | End: 2022-08-29

## 2022-08-29 RX ADMIN — Medication 650 MILLIGRAM(S): at 02:29

## 2022-08-29 NOTE — ED PROVIDER NOTE - OBJECTIVE STATEMENT
since discharge, doing well. has been taking medications as prescribed. followed up w neuro team on 8/26 w/o any issues at that time. was supposed to have rpt MR imaging at that time but did not due to insurance issues. has been checking her BP 2-3 times daily since leaving hospital. normally ranging in 120s -130s systolic. has had some ongoing left sided neck discomfort   tonight pt routinely took BP and found elevated to 160/90. 72 yr old female, history of htn, hld, GERD, recently found to have left verterbral artery dissection, presents to the Emergency Department with elevated blood pressure. Pt admitted from 8/11-8/12 after presentation for left sided headache and elevated blood pressure. CTA found age indeterminate thrombosed dissection of L vertebral artery. was admitted, started on eliquis, planned for repeat imaging as outpatient w Dr Polo.   Since discharge, doing well. has been taking medications as prescribed. followed up w neuro team on 8/26 w/o any issues at that time. was supposed to have rpt MR imaging at that time but did not due to insurance issues. has been checking her BP 2-3 times daily since leaving hospital. normally ranging in 120s -130s systolic. has had some ongoing left sided neck discomfort - unchanged from presentation and has been ongoing for a long time - attributed to known cervical problems. tonight pt routinely took BP and found elevated to 160/90 so pt got worried and came to ED. did not recheck after initial high reading.  no new injury / trauma / manipulation. no headache, vision changes, dizziness, n/v, extremity weakness / numbness / tingling.

## 2022-08-29 NOTE — ED PROVIDER NOTE - CLINICAL SUMMARY MEDICAL DECISION MAKING FREE TEXT BOX
pt w hx of htn, hld, gerd, recently diagnosed vertebral artery dissection (admitted 8/11/22 to neuro, started on eliquis). here after elevated blood pressure reading at home. 160s systolic. ongoing left neck pain. no other neurologic symptoms.   pt well appearing, sitting upright in bed. vitals stable - rpt BP on arrival 144/79. neurovascularly intact.  observed in ED and rpt blood pressure further down-trended to 116/72 which is pts baseline per her log.   no persistent elevations in blood pressure, no neurologic symptoms, normal neurologic exam  will dc pt w continued medication regimen and outpt neuro follow up as previously scheduled.    Discharge plan and return precautions d/w pt who verbalized understanding and agrees with plan. All questions answered. Vitals WNL. Ready for d/c. pt w hx of htn, hld, gerd, recently diagnosed vertebral artery dissection (admitted 8/11/22 to neuro, started on eliquis). here after elevated blood pressure reading at home. 160s systolic. ongoing left neck pain. no other neurologic symptoms.   pt well appearing, sitting upright in bed. vitals stable - rpt BP on arrival 144/79. neurovascularly intact.  observed in ED and rpt blood pressure further down-trended to 116/72 which is pts baseline per her log.  no intervention.  no persistent elevations in blood pressure, no neurologic symptoms, normal neurologic exam  will dc pt w continued medication regimen and outpt neuro follow up as previously scheduled.    Discharge plan and return precautions d/w pt who verbalized understanding and agrees with plan. All questions answered. Vitals WNL. Ready for d/c.

## 2022-08-29 NOTE — ED PROVIDER NOTE - NSFOLLOWUPINSTRUCTIONS_ED_ALL_ED_FT
You were seen in the Emergency Department today for elevated blood pressure readings.   Your blood pressure came down 144/79 --> 116/72 without interventions while in the Emergency Department.     Continue all medication as previously prescribed     Follow up with your Neurology team as previously planned. Be sure to arrange repeat MRI imaging through them.     Return to the Emergency Department for persistent, worsening, or new symptoms including headache, change in vision, focal weakness or numbness/tingling/paresthesias, difficulty speaking, difficulty walking, difficulty moving your arms or legs, any confusion, severe chest pain, shortness of breath, difficulty breathing, nausea/vomiting, excessive sweating, or any other serious concerns.

## 2022-08-29 NOTE — ED PROVIDER NOTE - NS ED ATTENDING STATEMENT MOD
This was a shared visit with the MALL[facility]. I reviewed and verified the documentation and independently performed the documented:

## 2022-08-29 NOTE — ED PROVIDER NOTE - PHYSICAL EXAMINATION
Constitutional : Well appearing, non-toxic, no acute distress. calm and cooperative. awake, alert, oriented to person, place, time/situation.  Head : head normocephalic, atraumatic  EENMT : eyes clear bilaterally, PERRL, EOMI. airway patent. moist mucous membranes. neck supple.  Cardiac : Normal rate, regular rhythm. No murmur appreciated, no LE edema.  Resp : Breath sounds clear and equal bilaterally. Respirations even and unlabored.   Gastro : abdomen soft, nontender, nondistended. no rebound or guarding. no CVAT.  MSK :  range of motion is not limited, no muscle or joint tenderness  Back : No evidence of trauma. No spinal or CVA tenderness.  Vasc : Extremities warm and well perfused. 2+ radial and DP pulses. cap refill <2 seconds  Neuro : A&Ox3, CNII-XII grossly intact. visual fields intact, no nystagmus, normal speech and word-finding. 5/5 motor in UE and LE, sensation intact throughout. ambulating with a steady gait, normal tandem gait. normal finger to nose. negative Romberg, no pronator drift   Skin : Skin normal color for race, warm, dry and intact. No evidence of rash.  Psych : Alert and oriented to person, place, time/situation. normal mood and affect. no apparent risk to self or others.

## 2022-08-29 NOTE — ED PROVIDER NOTE - PATIENT PORTAL LINK FT
You can access the FollowMyHealth Patient Portal offered by [facility] by registering at the following website: [email or web address]. By joining [facility]’s FollowMyHealth portal, you will also be able to view your health information using other applications (apps) compatible with our system.

## 2022-09-01 ENCOUNTER — RESULT REVIEW (OUTPATIENT)
Age: 72
End: 2022-09-01

## 2022-09-07 ENCOUNTER — APPOINTMENT (OUTPATIENT)
Dept: MRI IMAGING | Facility: HOSPITAL | Age: 72
End: 2022-09-07

## 2022-09-08 ENCOUNTER — APPOINTMENT (OUTPATIENT)
Dept: MRI IMAGING | Facility: HOSPITAL | Age: 72
End: 2022-09-08

## 2022-09-08 ENCOUNTER — EMERGENCY (EMERGENCY)
Facility: HOSPITAL | Age: 72
LOS: 1 days | Discharge: HOME | End: 2022-09-08
Attending: EMERGENCY MEDICINE | Admitting: EMERGENCY MEDICINE
Payer: MEDICARE

## 2022-09-08 ENCOUNTER — OUTPATIENT (OUTPATIENT)
Dept: OUTPATIENT SERVICES | Facility: HOSPITAL | Age: 72
LOS: 1 days | End: 2022-09-08
Payer: MEDICARE

## 2022-09-08 VITALS
SYSTOLIC BLOOD PRESSURE: 15 MMHG | TEMPERATURE: 98 F | OXYGEN SATURATION: 98 % | DIASTOLIC BLOOD PRESSURE: 81 MMHG | HEIGHT: 59 IN | HEART RATE: 81 BPM | RESPIRATION RATE: 16 BRPM | WEIGHT: 138.89 LBS

## 2022-09-08 DIAGNOSIS — Z98.890 OTHER SPECIFIED POSTPROCEDURAL STATES: Chronic | ICD-10-CM

## 2022-09-08 DIAGNOSIS — I10 ESSENTIAL (PRIMARY) HYPERTENSION: ICD-10-CM

## 2022-09-08 DIAGNOSIS — M54.2 CERVICALGIA: ICD-10-CM

## 2022-09-08 DIAGNOSIS — Z79.01 LONG TERM (CURRENT) USE OF ANTICOAGULANTS: ICD-10-CM

## 2022-09-08 DIAGNOSIS — Z88.0 ALLERGY STATUS TO PENICILLIN: ICD-10-CM

## 2022-09-08 DIAGNOSIS — Z91.018 ALLERGY TO OTHER FOODS: ICD-10-CM

## 2022-09-08 DIAGNOSIS — K21.9 GASTRO-ESOPHAGEAL REFLUX DISEASE WITHOUT ESOPHAGITIS: ICD-10-CM

## 2022-09-08 DIAGNOSIS — E78.5 HYPERLIPIDEMIA, UNSPECIFIED: ICD-10-CM

## 2022-09-08 DIAGNOSIS — Z91.048 OTHER NONMEDICINAL SUBSTANCE ALLERGY STATUS: ICD-10-CM

## 2022-09-08 DIAGNOSIS — G89.29 OTHER CHRONIC PAIN: ICD-10-CM

## 2022-09-08 DIAGNOSIS — Z88.8 ALLERGY STATUS TO OTHER DRUGS, MEDICAMENTS AND BIOLOGICAL SUBSTANCES: ICD-10-CM

## 2022-09-08 DIAGNOSIS — I77.74 DISSECTION OF VERTEBRAL ARTERY: ICD-10-CM

## 2022-09-08 PROBLEM — J98.6 ELEVATED HEMIDIAPHRAGM: Status: ACTIVE | Noted: 2022-09-08

## 2022-09-08 PROCEDURE — 99283 EMERGENCY DEPT VISIT LOW MDM: CPT

## 2022-09-08 PROCEDURE — 99284 EMERGENCY DEPT VISIT MOD MDM: CPT

## 2022-09-08 PROCEDURE — 70547 MR ANGIOGRAPHY NECK W/O DYE: CPT

## 2022-09-08 PROCEDURE — 70547 MR ANGIOGRAPHY NECK W/O DYE: CPT | Mod: 26

## 2022-09-08 RX ORDER — ACETAMINOPHEN 500 MG
650 TABLET ORAL ONCE
Refills: 0 | Status: COMPLETED | OUTPATIENT
Start: 2022-09-08 | End: 2022-09-08

## 2022-09-08 RX ADMIN — Medication 650 MILLIGRAM(S): at 23:44

## 2022-09-08 NOTE — ED ADULT TRIAGE NOTE - CHIEF COMPLAINT QUOTE
Pt, with recent diagnosis of vertebral artery dissection,  presents to ER c/o HTN. Pt reports taking B/P at home, 150/80.

## 2022-09-08 NOTE — ED ADULT TRIAGE NOTE - NS ED NURSE AMBULANCES
Discharge Summary - Pediatrics  Pushpa booker MRN: [id number]  Unit/Bed#: [facility] 883-97 Encounter: [id number]    Admission Date:    Admission Orders (From admission, onward)     Ordered        05/03/22 1631  Place in Observation  Once                      Discharge Date: 5/5/2022  Diagnosis:  Bronchiolitis    Medical Problems             Resolved Problems  Date Reviewed: 5/3/2022    None                Procedures Performed: No orders of the defined types were placed in this encounter  Hospital Course:  Patient was admitted to the pediatric unit and briefly required oxygen, was maintained on room air for approximately 24 hours prior to discharge  Patient was found to be RSV positive, had normal oral intake and normal wet diapers  Will follow up with pediatrician in 2 days and  Physical Exam:        General:  Alert, no acute distress  Head:  Normocephalic, atraumatic   Nares: Congestion  Mouth:  Moist mucous membranes  Cardiovascular: Regular rate and rhythm, no murmurs  Respiratory:  No wheezing/rales/rhonci  Normal work of breathing without retractions or nasal flaring  GI:  Abdomen soft, nondistended nontender  Musculoskeletal: No joint swelling or edema  Neuro : no focal deficits, moving all extremities  Skin:  Negative for rash    Significant Findings, Care, Treatment and Services Provided: RSV positive    Complications: None    Condition at Discharge: good         Discharge instructions/Information to patient and family:   See after visit summary for information provided to patient and family  Provisions for Follow-Up Care:  See after visit summary for information related to follow-up care and any pertinent home health orders  Disposition: Home    Discharge Statement   I spent 20   minutes discharging the patient  This time was spent on the day of discharge  I had direct contact with the patient on the day of discharge   Additional documentation is required if more than 30 minutes were spent on discharge  Discharge Medications:  See after visit summary for reconciled discharge medications provided to patient and family  [facility]

## 2022-09-09 ENCOUNTER — APPOINTMENT (OUTPATIENT)
Dept: THORACIC SURGERY | Facility: CLINIC | Age: 72
End: 2022-09-09

## 2022-09-09 VITALS
SYSTOLIC BLOOD PRESSURE: 127 MMHG | HEART RATE: 71 BPM | RESPIRATION RATE: 17 BRPM | DIASTOLIC BLOOD PRESSURE: 80 MMHG | OXYGEN SATURATION: 99 % | TEMPERATURE: 98 F

## 2022-09-09 DIAGNOSIS — J98.6 DISORDERS OF DIAPHRAGM: ICD-10-CM

## 2022-09-09 NOTE — ED PROVIDER NOTE - CARE PROVIDER_API CALL
Tammi Polo)  Neurology  [address] [address]  Phone: [phone]  Fax: [phone]  Follow Up Time:

## 2022-09-09 NOTE — ED PROVIDER NOTE - CLINICAL SUMMARY MEDICAL DECISION MAKING FREE TEXT BOX
73 yo female in the ER concerned that her BP is elevated. pt has h/o HTN, HLD, GERD, recently was diagnosed left vertebral artery dissection. Pt has been following with Dr. Polo.. Today  pt checked her BP few times and it was elevated despite her taking all medications as prescribed. No worsening of HA, no n/v, dizziness or lightheadedness. Pt is well appearing and has unremarkable exam. BP while in the ER is much lower. pt reassured , d/c home stable. returned precautions discussed.

## 2022-09-09 NOTE — ED PROVIDER NOTE - PATIENT PORTAL LINK FT
You can access the FollowMyHealth Patient Portal offered by [facility] by registering at the following website: [email or web address]. By joining [facility]’s FollowMyHealth portal, you will also be able to view your health information using other applications (apps) compatible with our system.

## 2022-09-09 NOTE — ED PROVIDER NOTE - NSFOLLOWUPINSTRUCTIONS_ED_ALL_ED_FT
Managing Your Hypertension      Hypertension, also called high blood pressure, is when the force of the blood pressing against the walls of the arteries is too strong. Arteries are blood vessels that carry blood from your heart throughout your body. Hypertension forces the heart to work harder to pump blood and may cause the arteries to become narrow or stiff.      Understanding blood pressure readings    Your personal target blood pressure may vary depending on your medical conditions, your age, and other factors. A blood pressure reading includes a higher number over a lower number. Ideally, your blood pressure should be below 120/80. You should know that:  •The first, or top, number is called the systolic pressure. It is a measure of the pressure in your arteries as your heart beats.      •The second, or bottom number, is called the diastolic pressure. It is a measure of the pressure in your arteries as the heart relaxes.      Blood pressure is classified into four stages. Based on your blood pressure reading, your health care provider may use the following stages to determine what type of treatment you need, if any. Systolic pressure and diastolic pressure are measured in a unit called mmHg.    Normal    •Systolic pressure: below 120.      •Diastolic pressure: below 80.      Elevated    •Systolic pressure: 120–129.      •Diastolic pressure: below 80.      Hypertension stage 1    •Systolic pressure: 130–139.      •Diastolic pressure: 80–89.      Hypertension stage 2    •Systolic pressure: 140 or above.      •Diastolic pressure: 90 or above.        How can this condition affect me?    Managing your hypertension is an important responsibility. Over time, hypertension can damage the arteries and decrease blood flow to important parts of the body, including the brain, heart, and kidneys. Having untreated or uncontrolled hypertension can lead to:  •A heart attack.      •A stroke.      •A weakened blood vessel (aneurysm).      •Heart failure.      •Kidney damage.      •Eye damage.      •Metabolic syndrome.      •Memory and concentration problems.      •Vascular dementia.        What actions can I take to manage this condition?    Hypertension can be managed by making lifestyle changes and possibly by taking medicines. Your health care provider will help you make a plan to bring your blood pressure within a normal range.      Nutrition    •Eat a diet that is high in fiber and potassium, and low in salt (sodium), added sugar, and fat. An example eating plan is called the Dietary Approaches to Stop Hypertension (DASH) diet. To eat this way:  •Eat plenty of fresh fruits and vegetables. Try to fill one-half of your plate at each meal with fruits and vegetables.      •Eat whole grains, such as whole-wheat pasta, brown rice, or whole-grain bread. Fill about one-fourth of your plate with whole grains.      •Eat low-fat dairy products.      •Avoid fatty cuts of meat, processed or cured meats, and poultry with skin. Fill about one-fourth of your plate with lean proteins such as fish, chicken without skin, beans, eggs, and tofu.      •Avoid pre-made and processed foods. These tend to be higher in sodium, added sugar, and fat.        •Reduce your daily sodium intake. Most people with hypertension should eat less than 1,500 mg of sodium a day.        Lifestyle      •Work with your health care provider to maintain a healthy body weight or to lose weight. Ask what an ideal weight is for you.      •Get at least 30 minutes of exercise that causes your heart to beat faster (aerobic exercise) most days of the week. Activities may include walking, swimming, or biking.      •Include exercise to strengthen your muscles (resistance exercise), such as weight lifting, as part of your weekly exercise routine. Try to do these types of exercises for 30 minutes at least 3 days a week.      • Do not use any products that contain nicotine or tobacco, such as cigarettes, e-cigarettes, and chewing tobacco. If you need help quitting, ask your health care provider.      •Control any long-term (chronic) conditions you have, such as high cholesterol or diabetes.      •Identify your sources of stress and find ways to manage stress. This may include meditation, deep breathing, or making time for fun activities.      Alcohol use   • Do not drink alcohol if:  •Your health care provider tells you not to drink.      •You are pregnant, may be pregnant, or are planning to become pregnant.      •If you drink alcohol:•Limit how much you use to:  •0–1 drink a day for women.      •0–2 drinks a day for men.        •Be aware of how much alcohol is in your drink. In the [place] one drink equals one 12 oz bottle of beer (355 mL), one 5 oz glass of wine (148 mL), or one 1½ oz glass of hard liquor (44 mL).        Medicines     Your health care provider may prescribe medicine if lifestyle changes are not enough to get your blood pressure under control and if:  •Your systolic blood pressure is 130 or higher.      •Your diastolic blood pressure is 80 or higher.      Take medicines only as told by your health care provider. Follow the directions carefully. Blood pressure medicines must be taken as told by your health care provider. The medicine does not work as well when you skip doses. Skipping doses also puts you at risk for problems.      Monitoring    Before you monitor your blood pressure:  •Do not smoke, drink caffeinated beverages, or exercise within 30 minutes before taking a measurement.      •Use the bathroom and empty your bladder (urinate).      •Sit quietly for at least 5 minutes before taking measurements.      Monitor your blood pressure at home as told by your health care provider. To do this:  •Sit with your back straight and supported.      •Place your feet flat on the floor. Do not cross your legs.      •Support your arm on a flat surface, such as a table. Make sure your upper arm is at heart level.      •Each time you measure, take two or three readings one minute apart and record the results.      You may also need to have your blood pressure checked regularly by your health care provider.    General information    •Talk with your health care provider about your diet, exercise habits, and other lifestyle factors that may be contributing to hypertension.      •Review all the medicines you take with your health care provider because there may be side effects or interactions.      •Keep all visits as told by your health care provider. Your health care provider can help you create and adjust your plan for managing your high blood pressure.        Where to find more information    •National Heart, Lung, and Blood [place]: www.nhlbi.nih.gov      •American Heart Association: www.heart.org        Contact a health care provider if:    •You think you are having a reaction to medicines you have taken.      •You have repeated (recurrent) headaches.      •You feel dizzy.      •You have swelling in your ankles.      •You have trouble with your vision.        Get help right away if:    •You develop a severe headache or confusion.      •You have unusual weakness or numbness, or you feel faint.      •You have severe pain in your chest or abdomen.      •You vomit repeatedly.      •You have trouble breathing.      These symptoms may represent a serious problem that is an emergency. Do not wait to see if the symptoms will go away. Get medical help right away. Call your local emergency services (911 in the [place] Do not drive yourself to the hospital.       Summary    •Hypertension is when the force of blood pumping through your arteries is too strong. If this condition is not controlled, it may put you at risk for serious complications.      •Your personal target blood pressure may vary depending on your medical conditions, your age, and other factors. For most people, a normal blood pressure is less than 120/80.      •Hypertension is managed by lifestyle changes, medicines, or both.      •Lifestyle changes to help manage hypertension include losing weight, eating a healthy, low-sodium diet, exercising more, stopping smoking, and limiting alcohol.      This information is not intended to replace advice given to you by your health care provider. Make sure you discuss any questions you have with your health care provider.

## 2022-09-09 NOTE — ED PROVIDER NOTE - OBJECTIVE STATEMENT
72 yr old female, history of htn, hld, GERD, recently found to have left verterbral artery dissection, presents to the Emergency Department with elevated blood pressure. Pt admitted from 8/11-8/12 after presentation for left sided headache and elevated blood pressure. CTA found age indeterminate thrombosed dissection of L vertebral artery. was admitted, started on eliquis, planned for repeat imaging as outpatient w Dr Polo.   Since discharge, doing well. has been taking medications as prescribed. followed up w neuro team on 8/26 w/o any issues at that time. was supposed to have rpt MR imaging at that time but did not due to insurance issues. has been checking her BP 2-3 times daily since leaving hospital. normally ranging in 120s -130s systolic. has had some ongoing left sided neck discomfort - unchanged from presentation and has been ongoing for a long time - attributed to known cervical problems. tonight pt routinely took BP and found elevated to 187/90 so pt got worried and came to ED.   no new injury / trauma / manipulation. no headache, vision changes, dizziness, n/v, extremity weakness / numbness / tingling.

## 2022-09-09 NOTE — ED PROVIDER NOTE - ATTENDING APP SHARED VISIT CONTRIBUTION OF CARE
vitals wnl, exam as above.   chronic unchanged pain. no neuro/systemic symptoms.   given tylenol.  Pt now asymptomatic, normal BP.   Discussed importance of outpt follow up and return precautions. Clinically no indication for further emergent ED workup or hospitalization at this time. Stable for dc, outpt f/u.

## 2022-09-12 ENCOUNTER — EMERGENCY (EMERGENCY)
Facility: HOSPITAL | Age: 72
LOS: 1 days | Discharge: HOME | End: 2022-09-12
Attending: EMERGENCY MEDICINE | Admitting: EMERGENCY MEDICINE
Payer: MEDICARE

## 2022-09-12 VITALS
DIASTOLIC BLOOD PRESSURE: 85 MMHG | WEIGHT: 138.89 LBS | HEART RATE: 79 BPM | HEIGHT: 59 IN | SYSTOLIC BLOOD PRESSURE: 151 MMHG | OXYGEN SATURATION: 97 % | TEMPERATURE: 98 F | RESPIRATION RATE: 16 BRPM

## 2022-09-12 VITALS — SYSTOLIC BLOOD PRESSURE: 120 MMHG | DIASTOLIC BLOOD PRESSURE: 70 MMHG

## 2022-09-12 DIAGNOSIS — Z98.890 OTHER SPECIFIED POSTPROCEDURAL STATES: Chronic | ICD-10-CM

## 2022-09-12 PROCEDURE — 99283 EMERGENCY DEPT VISIT LOW MDM: CPT

## 2022-09-12 PROCEDURE — 99282 EMERGENCY DEPT VISIT SF MDM: CPT

## 2022-09-12 NOTE — ED PROVIDER NOTE - CLINICAL SUMMARY MEDICAL DECISION MAKING FREE TEXT BOX
71 y/o F pt with PMHx of HTN and dissection presents to ED worried about elevated BP. No concerning symptoms or signs, repeat BP in ED was 120/70. Pt advised to only check BP once or twice a day. Pt has appointment with PMD tomorrow.

## 2022-09-12 NOTE — ED ADULT TRIAGE NOTE - WEIGHT IN KG
Saturation: Site=PA (Pulmonary Artery) , O2=54.3 %, Hgb=14.8 gm/dl, Condition=Condition 1. Used in calculation. 63

## 2022-09-12 NOTE — ED PROVIDER NOTE - OBJECTIVE STATEMENT
71 y/o F pt with PMhx of HTN (on Amlodipine 5mg once a day) and recent vertebral artery dissection presents to ED c/o HTN today. Pt took her BP multiple times today and was concerned as it got increasingly higher each time; highest measurement was 150/70. Pt took her regular dose of Amlodipine today. She denies severe headache, chest pain, shortness of breath, or any other acute complaints.

## 2022-09-12 NOTE — ED ADULT TRIAGE NOTE - ARRIVAL INFO ADDITIONAL COMMENTS
Seen and dc in [facility] er thursday for similar chief complaint. Per patient, saw pcp Friday without change in medications.

## 2022-09-12 NOTE — ED PROVIDER NOTE - PATIENT PORTAL LINK FT
You can access the FollowMyHealth Patient Portal offered by [facility] by registering at the following website: [email or web address]. By joining [facility]’s FollowMyHealth portal, you will also be able to view your health information using other applications (apps) compatible with our system.

## 2022-09-12 NOTE — ED PROVIDER NOTE - NSFOLLOWUPINSTRUCTIONS_ED_ALL_ED_FT
Take your blood pressure medication at the same time everyday.  Take your blood pressure morning and night.  Follow up with your PMD tomorrow.  Return to ED with worsening symptoms or other concerns.        Hypertension, Adult      High blood pressure (hypertension) is when the force of blood pumping through the arteries is too strong. The arteries are the blood vessels that carry blood from the heart throughout the body. Hypertension forces the heart to work harder to pump blood and may cause arteries to become narrow or stiff. Untreated or uncontrolled hypertension can cause a heart attack, heart failure, a stroke, kidney disease, and other problems.    A blood pressure reading consists of a higher number over a lower number. Ideally, your blood pressure should be below 120/80. The first ("top") number is called the systolic pressure. It is a measure of the pressure in your arteries as your heart beats. The second ("bottom") number is called the diastolic pressure. It is a measure of the pressure in your arteries as the heart relaxes.      What are the causes?    The exact cause of this condition is not known. There are some conditions that result in or are related to high blood pressure.      What increases the risk?    Some risk factors for high blood pressure are under your control. The following factors may make you more likely to develop this condition:  •Smoking.      •Having type 2 diabetes mellitus, high cholesterol, or both.      •Not getting enough exercise or physical activity.      •Being overweight.      •Having too much fat, sugar, calories, or salt (sodium) in your diet.      •Drinking too much alcohol.      Some risk factors for high blood pressure may be difficult or impossible to change. Some of these factors include:  •Having chronic kidney disease.      •Having a family history of high blood pressure.      •Age. Risk increases with age.      •Race. You may be at higher risk if you are .      •Gender. Men are at higher risk than women before age 45. After age 65, women are at higher risk than men.      •Having obstructive sleep apnea.      •Stress.        What are the signs or symptoms?    High blood pressure may not cause symptoms. Very high blood pressure (hypertensive crisis) may cause:  •Headache.      •Anxiety.      •Shortness of breath.      •Nosebleed.      •Nausea and vomiting.      •Vision changes.      •Severe chest pain.      •Seizures.        How is this diagnosed?    This condition is diagnosed by measuring your blood pressure while you are seated, with your arm resting on a flat surface, your legs uncrossed, and your feet flat on the floor. The cuff of the blood pressure monitor will be placed directly against the skin of your upper arm at the level of your heart. It should be measured at least twice using the same arm. Certain conditions can cause a difference in blood pressure between your right and left arms.    Certain factors can cause blood pressure readings to be lower or higher than normal for a short period of time:  •When your blood pressure is higher when you are in a health care provider's office than when you are at home, this is called white coat hypertension. Most people with this condition do not need medicines.      •When your blood pressure is higher at home than when you are in a health care provider's office, this is called masked hypertension. Most people with this condition may need medicines to control blood pressure.      If you have a high blood pressure reading during one visit or you have normal blood pressure with other risk factors, you may be asked to:  •Return on a different day to have your blood pressure checked again.      •Monitor your blood pressure at home for 1 week or longer.      If you are diagnosed with hypertension, you may have other blood or imaging tests to help your health care provider understand your overall risk for other conditions.      How is this treated?    This condition is treated by making healthy lifestyle changes, such as eating healthy foods, exercising more, and reducing your alcohol intake. Your health care provider may prescribe medicine if lifestyle changes are not enough to get your blood pressure under control, and if:  •Your systolic blood pressure is above 130.      •Your diastolic blood pressure is above 80.      Your personal target blood pressure may vary depending on your medical conditions, your age, and other factors.      Follow these instructions at home:      Eating and drinking    •Eat a diet that is high in fiber and potassium, and low in sodium, added sugar, and fat. An example eating plan is called the DASH (Dietary Approaches to Stop Hypertension) diet. To eat this way:  •Eat plenty of fresh fruits and vegetables. Try to fill one half of your plate at each meal with fruits and vegetables.      •Eat whole grains, such as whole-wheat pasta, brown rice, or whole-grain bread. Fill about one fourth of your plate with whole grains.      •Eat or drink low-fat dairy products, such as skim milk or low-fat yogurt.      •Avoid fatty cuts of meat, processed or cured meats, and poultry with skin. Fill about one fourth of your plate with lean proteins, such as fish, chicken without skin, beans, eggs, or tofu.      •Avoid pre-made and processed foods. These tend to be higher in sodium, added sugar, and fat.        •Reduce your daily sodium intake. Most people with hypertension should eat less than 1,500 mg of sodium a day.    • Do not drink alcohol if:  •Your health care provider tells you not to drink.      •You are pregnant, may be pregnant, or are planning to become pregnant.      •If you drink alcohol:•Limit how much you use to:  •0–1 drink a day for women.      •0–2 drinks a day for men.        •Be aware of how much alcohol is in your drink. In the [place] one drink equals one 12 oz bottle of beer (355 mL), one 5 oz glass of wine (148 mL), or one 1½ oz glass of hard liquor (44 mL).          Lifestyle      •Work with your health care provider to maintain a healthy body weight or to lose weight. Ask what an ideal weight is for you.      •Get at least 30 minutes of exercise most days of the week. Activities may include walking, swimming, or biking.      •Include exercise to strengthen your muscles (resistance exercise), such as Pilates or lifting weights, as part of your weekly exercise routine. Try to do these types of exercises for 30 minutes at least 3 days a week.      • Do not use any products that contain nicotine or tobacco, such as cigarettes, e-cigarettes, and chewing tobacco. If you need help quitting, ask your health care provider.      •Monitor your blood pressure at home as told by your health care provider.      •Keep all follow-up visits as told by your health care provider. This is important.      Medicines     •Take over-the-counter and prescription medicines only as told by your health care provider. Follow directions carefully. Blood pressure medicines must be taken as prescribed.      • Do not skip doses of blood pressure medicine. Doing this puts you at risk for problems and can make the medicine less effective.      •Ask your health care provider about side effects or reactions to medicines that you should watch for.        Contact a health care provider if you:    •Think you are having a reaction to a medicine you are taking.      •Have headaches that keep coming back (recurring).      •Feel dizzy.      •Have swelling in your ankles.      •Have trouble with your vision.        Get help right away if you:    •Develop a severe headache or confusion.      •Have unusual weakness or numbness.      •Feel faint.      •Have severe pain in your chest or abdomen.      •Vomit repeatedly.      •Have trouble breathing.        Summary    •Hypertension is when the force of blood pumping through your arteries is too strong. If this condition is not controlled, it may put you at risk for serious complications.      •Your personal target blood pressure may vary depending on your medical conditions, your age, and other factors. For most people, a normal blood pressure is less than 120/80.      •Hypertension is treated with lifestyle changes, medicines, or a combination of both. Lifestyle changes include losing weight, eating a healthy, low-sodium diet, exercising more, and limiting alcohol.      This information is not intended to replace advice given to you by your health care provider. Make sure you discuss any questions you have with your health care provider.      Document Revised: 08/28/2019 Document Reviewed: 08/28/2019    [employer] Patient Education © 2022 [employer]

## 2022-09-12 NOTE — ED ADULT TRIAGE NOTE - CHIEF COMPLAINT QUOTE
Patient presents to ER with chief complaints of hypertension. Per pt, systolic bp was in the 160s today.

## 2022-09-16 ENCOUNTER — APPOINTMENT (OUTPATIENT)
Dept: THORACIC SURGERY | Facility: CLINIC | Age: 72
End: 2022-09-16

## 2022-09-16 DIAGNOSIS — I10 ESSENTIAL (PRIMARY) HYPERTENSION: ICD-10-CM

## 2022-09-16 DIAGNOSIS — Z91.018 ALLERGY TO OTHER FOODS: ICD-10-CM

## 2022-09-16 DIAGNOSIS — Z88.8 ALLERGY STATUS TO OTHER DRUGS, MEDICAMENTS AND BIOLOGICAL SUBSTANCES STATUS: ICD-10-CM

## 2022-09-16 DIAGNOSIS — Z79.01 LONG TERM (CURRENT) USE OF ANTICOAGULANTS: ICD-10-CM

## 2022-09-16 DIAGNOSIS — E78.5 HYPERLIPIDEMIA, UNSPECIFIED: ICD-10-CM

## 2022-09-16 DIAGNOSIS — Z98.890 OTHER SPECIFIED POSTPROCEDURAL STATES: ICD-10-CM

## 2022-09-16 DIAGNOSIS — Z88.0 ALLERGY STATUS TO PENICILLIN: ICD-10-CM

## 2022-09-23 ENCOUNTER — APPOINTMENT (OUTPATIENT)
Dept: THORACIC SURGERY | Facility: CLINIC | Age: 72
End: 2022-09-23

## 2022-09-30 ENCOUNTER — APPOINTMENT (OUTPATIENT)
Dept: THORACIC SURGERY | Facility: CLINIC | Age: 72
End: 2022-09-30

## 2022-09-30 ENCOUNTER — OUTPATIENT (OUTPATIENT)
Dept: OUTPATIENT SERVICES | Facility: HOSPITAL | Age: 72
LOS: 1 days | End: 2022-09-30
Payer: MEDICARE

## 2022-09-30 VITALS
HEIGHT: 55 IN | BODY MASS INDEX: 31.7 KG/M2 | HEART RATE: 87 BPM | RESPIRATION RATE: 17 BRPM | SYSTOLIC BLOOD PRESSURE: 159 MMHG | DIASTOLIC BLOOD PRESSURE: 94 MMHG | WEIGHT: 137 LBS | TEMPERATURE: 97.5 F | OXYGEN SATURATION: 98 %

## 2022-09-30 DIAGNOSIS — Z98.890 OTHER SPECIFIED POSTPROCEDURAL STATES: ICD-10-CM

## 2022-09-30 DIAGNOSIS — Z98.890 OTHER SPECIFIED POSTPROCEDURAL STATES: Chronic | ICD-10-CM

## 2022-09-30 DIAGNOSIS — Z90.49 OTHER SPECIFIED POSTPROCEDURAL STATES: ICD-10-CM

## 2022-09-30 PROCEDURE — 99213 OFFICE O/P EST LOW 20 MIN: CPT

## 2022-09-30 PROCEDURE — 71046 X-RAY EXAM CHEST 2 VIEWS: CPT | Mod: 26

## 2022-09-30 PROCEDURE — 71046 X-RAY EXAM CHEST 2 VIEWS: CPT

## 2022-09-30 NOTE — PHYSICAL EXAM

## 2022-10-04 ENCOUNTER — EMERGENCY (EMERGENCY)
Facility: HOSPITAL | Age: 72
LOS: 1 days | Discharge: HOME | End: 2022-10-04
Attending: EMERGENCY MEDICINE | Admitting: EMERGENCY MEDICINE
Payer: MEDICARE

## 2022-10-04 VITALS
OXYGEN SATURATION: 97 % | RESPIRATION RATE: 18 BRPM | DIASTOLIC BLOOD PRESSURE: 77 MMHG | HEART RATE: 74 BPM | SYSTOLIC BLOOD PRESSURE: 129 MMHG

## 2022-10-04 VITALS
HEART RATE: 97 BPM | TEMPERATURE: 98 F | SYSTOLIC BLOOD PRESSURE: 146 MMHG | OXYGEN SATURATION: 97 % | WEIGHT: 138.89 LBS | HEIGHT: 59 IN | DIASTOLIC BLOOD PRESSURE: 94 MMHG | RESPIRATION RATE: 16 BRPM

## 2022-10-04 DIAGNOSIS — I10 ESSENTIAL (PRIMARY) HYPERTENSION: ICD-10-CM

## 2022-10-04 DIAGNOSIS — E78.5 HYPERLIPIDEMIA, UNSPECIFIED: ICD-10-CM

## 2022-10-04 DIAGNOSIS — Z88.0 ALLERGY STATUS TO PENICILLIN: ICD-10-CM

## 2022-10-04 DIAGNOSIS — I77.74 DISSECTION OF VERTEBRAL ARTERY: ICD-10-CM

## 2022-10-04 DIAGNOSIS — Z88.8 ALLERGY STATUS TO OTHER DRUGS, MEDICAMENTS AND BIOLOGICAL SUBSTANCES STATUS: ICD-10-CM

## 2022-10-04 DIAGNOSIS — Z98.890 OTHER SPECIFIED POSTPROCEDURAL STATES: Chronic | ICD-10-CM

## 2022-10-04 DIAGNOSIS — K21.9 GASTRO-ESOPHAGEAL REFLUX DISEASE WITHOUT ESOPHAGITIS: ICD-10-CM

## 2022-10-04 DIAGNOSIS — Z91.048 OTHER NONMEDICINAL SUBSTANCE ALLERGY STATUS: ICD-10-CM

## 2022-10-04 DIAGNOSIS — Z87.19 PERSONAL HISTORY OF OTHER DISEASES OF THE DIGESTIVE SYSTEM: ICD-10-CM

## 2022-10-04 DIAGNOSIS — Z20.822 CONTACT WITH AND (SUSPECTED) EXPOSURE TO COVID-19: ICD-10-CM

## 2022-10-04 DIAGNOSIS — M51.9 UNSPECIFIED THORACIC, THORACOLUMBAR AND LUMBOSACRAL INTERVERTEBRAL DISC DISORDER: ICD-10-CM

## 2022-10-04 DIAGNOSIS — M25.512 PAIN IN LEFT SHOULDER: ICD-10-CM

## 2022-10-04 DIAGNOSIS — Z79.01 LONG TERM (CURRENT) USE OF ANTICOAGULANTS: ICD-10-CM

## 2022-10-04 DIAGNOSIS — Z91.018 ALLERGY TO OTHER FOODS: ICD-10-CM

## 2022-10-04 LAB
ALBUMIN SERPL ELPH-MCNC: 4.4 G/DL — SIGNIFICANT CHANGE UP (ref 3.3–5)
ALP SERPL-CCNC: 114 U/L — SIGNIFICANT CHANGE UP (ref 40–120)
ALT FLD-CCNC: 29 U/L — SIGNIFICANT CHANGE UP (ref 10–45)
ANION GAP SERPL CALC-SCNC: 11 MMOL/L — SIGNIFICANT CHANGE UP (ref 5–17)
APTT BLD: 32.6 SEC — SIGNIFICANT CHANGE UP (ref 27.5–35.5)
AST SERPL-CCNC: 28 U/L — SIGNIFICANT CHANGE UP (ref 10–40)
BASOPHILS # BLD AUTO: 0.01 K/UL — SIGNIFICANT CHANGE UP (ref 0–0.2)
BASOPHILS NFR BLD AUTO: 0.2 % — SIGNIFICANT CHANGE UP (ref 0–2)
BILIRUB SERPL-MCNC: 0.2 MG/DL — SIGNIFICANT CHANGE UP (ref 0.2–1.2)
BUN SERPL-MCNC: 15 MG/DL — SIGNIFICANT CHANGE UP (ref 7–23)
CALCIUM SERPL-MCNC: 9.9 MG/DL — SIGNIFICANT CHANGE UP (ref 8.4–10.5)
CHLORIDE SERPL-SCNC: 96 MMOL/L — SIGNIFICANT CHANGE UP (ref 96–108)
CO2 SERPL-SCNC: 26 MMOL/L — SIGNIFICANT CHANGE UP (ref 22–31)
CREAT SERPL-MCNC: 0.55 MG/DL — SIGNIFICANT CHANGE UP (ref 0.5–1.3)
EGFR: 96 ML/MIN/1.73M2 — SIGNIFICANT CHANGE UP
EOSINOPHIL # BLD AUTO: 0.06 K/UL — SIGNIFICANT CHANGE UP (ref 0–0.5)
EOSINOPHIL NFR BLD AUTO: 1.2 % — SIGNIFICANT CHANGE UP (ref 0–6)
GLUCOSE SERPL-MCNC: 107 MG/DL — HIGH (ref 70–99)
HCT VFR BLD CALC: 36.9 % — SIGNIFICANT CHANGE UP (ref 34.5–45)
HGB BLD-MCNC: 12.3 G/DL — SIGNIFICANT CHANGE UP (ref 11.5–15.5)
IMM GRANULOCYTES NFR BLD AUTO: 0.2 % — SIGNIFICANT CHANGE UP (ref 0–0.9)
INR BLD: 1.15 — SIGNIFICANT CHANGE UP (ref 0.88–1.16)
LYMPHOCYTES # BLD AUTO: 1.16 K/UL — SIGNIFICANT CHANGE UP (ref 1–3.3)
LYMPHOCYTES # BLD AUTO: 24 % — SIGNIFICANT CHANGE UP (ref 13–44)
MCHC RBC-ENTMCNC: 30.3 PG — SIGNIFICANT CHANGE UP (ref 27–34)
MCHC RBC-ENTMCNC: 33.3 GM/DL — SIGNIFICANT CHANGE UP (ref 32–36)
MCV RBC AUTO: 90.9 FL — SIGNIFICANT CHANGE UP (ref 80–100)
MONOCYTES # BLD AUTO: 0.47 K/UL — SIGNIFICANT CHANGE UP (ref 0–0.9)
MONOCYTES NFR BLD AUTO: 9.7 % — SIGNIFICANT CHANGE UP (ref 2–14)
NEUTROPHILS # BLD AUTO: 3.12 K/UL — SIGNIFICANT CHANGE UP (ref 1.8–7.4)
NEUTROPHILS NFR BLD AUTO: 64.7 % — SIGNIFICANT CHANGE UP (ref 43–77)
NRBC # BLD: 0 /100 WBCS — SIGNIFICANT CHANGE UP (ref 0–0)
PLATELET # BLD AUTO: 372 K/UL — SIGNIFICANT CHANGE UP (ref 150–400)
POTASSIUM SERPL-MCNC: 4.3 MMOL/L — SIGNIFICANT CHANGE UP (ref 3.5–5.3)
POTASSIUM SERPL-SCNC: 4.3 MMOL/L — SIGNIFICANT CHANGE UP (ref 3.5–5.3)
PROT SERPL-MCNC: 7.3 G/DL — SIGNIFICANT CHANGE UP (ref 6–8.3)
PROTHROM AB SERPL-ACNC: 13.7 SEC — HIGH (ref 10.5–13.4)
RBC # BLD: 4.06 M/UL — SIGNIFICANT CHANGE UP (ref 3.8–5.2)
RBC # FLD: 13.7 % — SIGNIFICANT CHANGE UP (ref 10.3–14.5)
SARS-COV-2 RNA SPEC QL NAA+PROBE: NEGATIVE — SIGNIFICANT CHANGE UP
SODIUM SERPL-SCNC: 133 MMOL/L — LOW (ref 135–145)
WBC # BLD: 4.83 K/UL — SIGNIFICANT CHANGE UP (ref 3.8–10.5)
WBC # FLD AUTO: 4.83 K/UL — SIGNIFICANT CHANGE UP (ref 3.8–10.5)

## 2022-10-04 PROCEDURE — 99285 EMERGENCY DEPT VISIT HI MDM: CPT

## 2022-10-04 PROCEDURE — 93010 ELECTROCARDIOGRAM REPORT: CPT

## 2022-10-04 RX ORDER — TRAMADOL HYDROCHLORIDE 50 MG/1
50 TABLET ORAL ONCE
Refills: 0 | Status: DISCONTINUED | OUTPATIENT
Start: 2022-10-04 | End: 2022-10-04

## 2022-10-04 RX ORDER — ACETAMINOPHEN 500 MG
975 TABLET ORAL ONCE
Refills: 0 | Status: COMPLETED | OUTPATIENT
Start: 2022-10-04 | End: 2022-10-04

## 2022-10-04 RX ADMIN — Medication 975 MILLIGRAM(S): at 23:10

## 2022-10-04 RX ADMIN — Medication 975 MILLIGRAM(S): at 22:38

## 2022-10-04 RX ADMIN — TRAMADOL HYDROCHLORIDE 50 MILLIGRAM(S): 50 TABLET ORAL at 23:58

## 2022-10-04 NOTE — HISTORY OF PRESENT ILLNESS
[FreeTextEntry1] : 72 year old female with PMHx HTN, HLD (myalgias with statins), and para-esophageal hernia resulting in GERD and esophageal dysmotility.  \par She suffered esophageal perforation during the paraesophageal hernia repair surgery (general surgery) on 5/21/21.  She underwent an EGD, laparoscopic robotic assisted mobilization and preparation of gastric conduit, left cervical incision, esophagogastrectomy, placement of a jejunostomy tube on 05/21/2021 with Dr. Alberto.  Pt developed dysphagia postoperatively, was found to have a left sided vocal fold paralysis and ENT, Speech/Swallow consulted.\par \par During her visit with Dr. Perla in Dec 2021, she was found to have an elevated right hemidiaphragm, was suggested to have barium esophagram, and EGD with possible botox injection by Dr. Sams, which was not done. \par She presents today for follow up checkup with a CXR. 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 follow up with Kennedi Nye. \par \par CXR on 03/18/22\par - Status post esophagogastrectomy, air and fluid-filled bowel and the mediastinum. \par - Right basilar opacity/pleural effusion.\par - Increased elevation of the right hemidiaphragm. \par - Left basilar focal atelectasis. \par \par Xray Esophagram on 08/10/22\par - status post esophagogastrectomy with probable gastric pull-through without complication.

## 2022-10-04 NOTE — ASSESSMENT
[FreeTextEntry1] : 72 year old female with PMHx HTN, HLD (myalgias with statins), and para-esophageal hernia resulting in GERD and esophageal dysmotility.  \par She suffered esophageal perforation during the paraesophageal hernia repair surgery (general surgery) on 5/21/21.  She underwent an EGD, laparoscopic robotic assisted mobilization and preparation of gastric conduit, left cervical incision, esophagogastrectomy, placement of a jejunostomy tube on 05/21/2021 with Dr. Alberto.  Pt developed dysphagia postoperatively, was found to have a left sided vocal fold paralysis and ENT, Speech/Swallow consulted.\par \par During her visit with Dr. Perla in Dec 2021, she was found to have an elevated right hemidiaphragm, was suggested to have barium esophagram, and EGD with possible botox injection by Dr. Sams, which was not done. \par She presents today for follow up checkup with a CXR. 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 follow up with Kennedi Nye. \par \par CXR on 03/18/22\par - Status post esophagogastrectomy, air and fluid-filled bowel and the mediastinum. \par - Right basilar opacity/pleural effusion.\par - Increased elevation of the right hemidiaphragm. \par - Left basilar focal atelectasis. \par \par Xray Esophagram on 08/10/22\par - status post esophagogastrectomy with probable gastric pull-through without complication.\par \par CXR completed today and was reviewed and without evidence of pneumothorax, pleural effusion or atelectasis. \par \par Patient admits to feeling well. She is tolerating a regular diet well. She admits to occasional reflux and is resolved after taking Mylanta. Will plan to follow up in one year for reassessment. \par \par Plan:\par 1. Follow up in one year for reassessment of symptoms \par \par I, EVER TOSCANO , am scribing for and in the presence of PARAG ALBERTO the following sections: history of present illness, past medical/family/surgical/family/social history, review of systems, vital signs, physical exam, and disposition.\par \par I PARAG ALBERTO, personally performed the service described in the documentation, reviewed the documentation recorded by the scribe in my presence and it accurately and completely records my words and actions. \par

## 2022-10-04 NOTE — ED PROVIDER NOTE - PROGRESS NOTE DETAILS
s/p tylenol and home gabapentin (pt w/ full capacity initially but w/ persistent pain. will do labs/cta neck. pt reports improved pain s/p tramadol. Director - pt received from Dr Mitchell.  VS, labs reviewed.  Pt resting comfortably.  Pt pending cta read. rosemary - CTA w/o acute findings. pt feeling better after tramadol.  ok for dc - continue w home meds and f/u w neurology    All results reviewed with the patient verbally. Discharge plan and return precautions d/w pt who verbalized understanding and agrees with plan. All questions answered. Vitals WNL. Ready for d/c.

## 2022-10-04 NOTE — ED PROVIDER NOTE - PATIENT PORTAL LINK FT
You can access the FollowMyHealth Patient Portal offered by [facility] by registering at the following website: [email or web address]. By joining [employer]’s FollowMyHealth portal, you will also be able to view your health information using other applications (apps) compatible with our system.

## 2022-10-04 NOTE — ED ADULT TRIAGE NOTE - CHIEF COMPLAINT QUOTE
Pt reports HTN at home 160s/90s. Pt sates she took amlodipine pta. Pt denies any dizziness/headache, no N/V, no CP/SOB. Pt reports herniated disc to upper back and c/o pain at this time.

## 2022-10-04 NOTE — ED PROVIDER NOTE - NSFOLLOWUPINSTRUCTIONS_ED_ALL_ED_FT
You were seen in the Emergency Department for high blood pressure.   Your work-up was reassuring to rule out an acute medical emergency    Continue all blood thinners and blood pressure medications as prescribed.   Take tramadol as needed for break - through / severe pain.     Follow up with NEUROLOGY and your PMD within one week.    Return to the Emergency Department for persistent, worsening or new symptoms including, sudden severe headache, change in vision, syncope or loss of consciousness, a facial droop, weakness of one side of your body or one of your extremities, uncontrollable nausea and vomiting, chest pain, shortness of breath, if you cannot keep down food/liquid, if your fever is very high and cannot be controlled by over the counter medication, if you pass out or lose consciousness, feel palpitations, chest pain, or shortness of breath, or if you have any other serious concerns.

## 2022-10-04 NOTE — ED PROVIDER NOTE - PHYSICAL EXAMINATION
CONST: nontoxic NAD speaking in full sentences  HEAD: atraumatic  EYES: conjunctivae clear  ENT: mmm  NECK: supple/FROM, +ttp overlying L trapezius region  CARD: rrr no murmurs  CHEST: ctab no r/r/w  ABD: soft, nd, nttp, no rebound/guarding  EXT: FROM, symmetric distal pulses intact  SKIN: warm, dry, no rash, cap refill <2sec  NEURO: a+ox3, no facial asymmetry, no aphasia, PERRL, EOMI, CN II-XII intact, 5/5 strength x4, gross sensation intact x4, normal gait

## 2022-10-04 NOTE — ED PROVIDER NOTE - SHIFT CHANGE DETAILS
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 bp improved s/p pain control. labs wnl. s/p cta neck.    dispo: pending cta neck

## 2022-10-04 NOTE — ED PROVIDER NOTE - OBJECTIVE STATEMENT
72F htn on amlodipine 5qd, hld, gerd, recently hospitalized (8/11/22-8/12/22) for L vertebral artery dissection discharged on eliquis, 3 subsequent ED visits for elevated sbp 140-150s (8/29/22, 9/9/22, 9/12/22), now c/o recurrent elevated sbp 160s improved to 140s s/p amlodipine 2.5. pt states bp this AM was wnl but sometimes will go up when her chronic "herniated cervical disc" pain starts up (located superior L scapula/trapezius region). no fever/chills, no ha/dizziness, no difficulty ambulating, no extremity numbness/paresthesia/weakness, no photophobia/vision changes, no uri/cough, no cp/sob, no abd pain/n/v, no rash, no trauma, no etoh-dpt/ivdu.     neuro: olivier rivera

## 2022-10-04 NOTE — ED ADULT NURSE NOTE - OBJECTIVE STATEMENT
pt c/o htn to 180s systolic today, also c/o R neck pain radiating to back. pmh herniated disc.  on eliquis. denies fever, cp, sob, n/v, headache, dizziness, blurred vision, weakness.

## 2022-10-04 NOTE — ED PROVIDER NOTE - CLINICAL SUMMARY MEDICAL DECISION MAKING FREE TEXT BOX
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 bp improved s/p pain control. labs wnl. s/p cta neck. s/o'd overnight team stable pending cta neck -- anticipate dc home if no acute abnl.

## 2022-10-05 PROCEDURE — 80053 COMPREHEN METABOLIC PANEL: CPT

## 2022-10-05 PROCEDURE — 85025 COMPLETE CBC W/AUTO DIFF WBC: CPT

## 2022-10-05 PROCEDURE — G1004: CPT

## 2022-10-05 PROCEDURE — 87635 SARS-COV-2 COVID-19 AMP PRB: CPT

## 2022-10-05 PROCEDURE — 70498 CT ANGIOGRAPHY NECK: CPT | Mod: MG

## 2022-10-05 PROCEDURE — 36415 COLL VENOUS BLD VENIPUNCTURE: CPT

## 2022-10-05 PROCEDURE — 85730 THROMBOPLASTIN TIME PARTIAL: CPT

## 2022-10-05 PROCEDURE — 85610 PROTHROMBIN TIME: CPT

## 2022-10-05 PROCEDURE — 70498 CT ANGIOGRAPHY NECK: CPT | Mod: 26,MG

## 2022-10-05 PROCEDURE — 93005 ELECTROCARDIOGRAM TRACING: CPT

## 2022-10-05 PROCEDURE — 99285 EMERGENCY DEPT VISIT HI MDM: CPT | Mod: 25

## 2022-10-05 RX ORDER — TRAMADOL HYDROCHLORIDE 50 MG/1
1 TABLET ORAL
Qty: 12 | Refills: 0
Start: 2022-10-05 | End: 2022-10-07

## 2022-10-05 RX ORDER — TRAMADOL HYDROCHLORIDE 50 MG/1
25 TABLET ORAL ONCE
Refills: 0 | Status: DISCONTINUED | OUTPATIENT
Start: 2022-10-05 | End: 2022-10-05

## 2022-10-05 RX ADMIN — TRAMADOL HYDROCHLORIDE 25 MILLIGRAM(S): 50 TABLET ORAL at 05:52

## 2022-10-07 ENCOUNTER — EMERGENCY (EMERGENCY)
Facility: HOSPITAL | Age: 72
LOS: 1 days | Discharge: HOME | End: 2022-10-07
Attending: EMERGENCY MEDICINE | Admitting: EMERGENCY MEDICINE
Payer: MEDICARE

## 2022-10-07 VITALS
DIASTOLIC BLOOD PRESSURE: 86 MMHG | HEIGHT: 59 IN | WEIGHT: 139.99 LBS | OXYGEN SATURATION: 98 % | HEART RATE: 86 BPM | RESPIRATION RATE: 16 BRPM | TEMPERATURE: 98 F | SYSTOLIC BLOOD PRESSURE: 148 MMHG

## 2022-10-07 DIAGNOSIS — Z98.890 OTHER SPECIFIED POSTPROCEDURAL STATES: Chronic | ICD-10-CM

## 2022-10-07 PROCEDURE — 93010 ELECTROCARDIOGRAM REPORT: CPT

## 2022-10-07 PROCEDURE — 99285 EMERGENCY DEPT VISIT HI MDM: CPT

## 2022-10-07 PROCEDURE — 99284 EMERGENCY DEPT VISIT MOD MDM: CPT

## 2022-10-07 PROCEDURE — 93005 ELECTROCARDIOGRAM TRACING: CPT

## 2022-10-07 NOTE — ED ADULT TRIAGE NOTE - CHIEF COMPLAINT QUOTE
lightheadedness after taking tramadol at 930 pm tonight , pt stated not sure  of she took tramadol earlier .

## 2022-10-07 NOTE — ED ADULT NURSE NOTE - OBJECTIVE STATEMENT
Received ambulatory with steady gait BIBEMS with chief complaints of lightheadedness. Patient states might have accidentally double dose on Tramadol prescribed for pain. Patient reports taking medication at around 2130 but unsure if drank Tramadol prior that.     Patient AOX4, speaking full sentences.  +PERRLA.  Patient denies chest pain, shortness of breath, difficulty breathing and any form of distress not noted. Resps even and nonlabored. Moves all extremities. No obvious trauma/injury/deformity noted. Patient oriented to ED area. All needs attended. POC reviewed. Purposeful proactive hourly rounding in progress.

## 2022-10-07 NOTE — ED ADULT NURSE NOTE - SUICIDE SCREENING DEPRESSION
60 yo male with PMHx of afib on coumadin, DM2, ESRD on HD MWF, HLD, HTN, CVA, who presented w/ weakness and fall secondary to acute hypoxic respiratory failure secondary to SARS-CoV-2 pneumonia, s/p intubation on 1/4/2021, s/p tracheostomy and PEG insertion on 1/18/2021.     #Acute hypoxemic respiratory failure   #Covid-19 PNA  #Superimposed bacterial PNA   - s/p intubation, now trach & PEG  - s/p Azithromycin discontinued on 1/8/2021, s/p Cefepime discontinued on 1/11/2021  - s/p Dexamethasone 6mg IV once daily (1/1/2021) x10 days  - Covid antibodies: negative --> s/p 2 units of Convalescent plasma ( 1/8/2021 and 1/12/2021)  - s/p tocilizumab 400mg IV x1 on 1/8/2021  - s/p lucinda and vanc   - continue with Klonopin, decrease to 0.5 q12hrs and methadone 5mg po once daily  - Repeat Duplex negative for DVT  - Patient remains on ventilator AC mode with settings: Tv: 450mL, RR:24 bpm, PEEp:5mmHg, FiO2:50%, Peak pressure: 20mmHg, Plateau pressure: 17mmHg     # Acute on top of chronic anemia  - Hb drop on s/p 1 unit of PRBC  - No evidence of bleed. Patient is hemodynamically stable  - Hb on 2/5/2021: 7.6 ( stable)  - Keep Active type and screen, transfuse if Hb <7  - CT abdomen pelvis: Negative for retroperitoneal bleed  - Will switch PPI from q12hrs to po once daily given no active bleed ( Stable hemoglobin, no melena, will follow up)    #Dislodged PEG tube:   -s/p replacement by surgery resident    #Paroxysmal atrial fibrillation  - Metoprolol tartrate 12.5mg po q12hrs    - Resume anticoagulation with heparin as per Pulm/crit team  - Warfarin bridge ?   - Given the patient's status and condition, risks and benefits of AC should be reconsidered    #Leukocytosis- Resolved  #Sacral ulcer, heel ulcers   - Multifactorial (Possible overlying infection, ulcers, steroids)   - s/p debridement w/ burn 1/21/21  - wound culture- moderate e. faecium  - ID following: S/P daptomycin 8 mg/kg q 48 hours (please give after dialysis on HD days) finished on 2/1  - local wound care  - wound care per burn     #Ileus- resolved   - s/p PEG 1/18  - xray 10/19 w/ gasseous distention of stomach  - PEG was connected to suction, feeds were held  - Xray 10/21 w/o evidence of obstruction, restarted feeds    #Upper extremity edema R>L  - arterial and venous duplex- negative   - loosened restraints    #ESRD on HD   - EPO (retacrit) 8000 units IV push once weekly  - nephro following, f/u recs  - next HD likely 2/2/2021  -Iron studies on 2/4/2021:   a. Serum iron: 33   b. TIBC: 212  c. % iron Saturation: 16%   d. Transferrin Serum: 181  --> May benefit from iron repletion, to follow up.     #NSTEMI  - Type II MI, demand ischemia, likely secondary to hypoxia secondary to COVID-19 pneumonia, resolved.  - Aspirin 81mg po once daily   - Atorvastatin 20mg po once daily  - CT brain no IC bleed or concerns of IC bleed     #Hypothyroidism  - Continue with levothyroxine 100mcg po once daily     #Altered Mental Status- resolved  - EEG diffuse slowing, but no seizure activity, check the report above  - CT brain shows ventriculomegaly (check full report), no IC bleed, possible old infarct.    Diet: Restart PEG feeds  DVT ppx: Restart heparin gtt  GI ppx: pantoprazole 40mg po once daily Negative

## 2022-10-08 VITALS
OXYGEN SATURATION: 98 % | RESPIRATION RATE: 16 BRPM | HEART RATE: 80 BPM | DIASTOLIC BLOOD PRESSURE: 90 MMHG | SYSTOLIC BLOOD PRESSURE: 137 MMHG | TEMPERATURE: 98 F

## 2022-10-08 NOTE — ED PROVIDER NOTE - CLINICAL SUMMARY MEDICAL DECISION MAKING FREE TEXT BOX
73 y/o F with a PMHx of HTN, HLD, GERD with transient unwell drugged feeling after accidental tramadol overdose. Pt appears well now and drug affects mostly worn off. Will observe for a short time and give food and fluids. When pt feels ready will d/c home. Advised pt to buy timer caps to help reduce risk of overdose.

## 2022-10-08 NOTE — ED PROVIDER NOTE - PATIENT PORTAL LINK FT
You can access the FollowMyHealth Patient Portal offered by [facility] by registering at the following website: [email or web address]. By joining [employer]’s FollowMyHealth portal, you will also be able to view your health information using other applications (apps) compatible with our system.

## 2022-10-08 NOTE — ED PROVIDER NOTE - OBJECTIVE STATEMENT
73 y/o F with a PMHx of HTN, HLD, GERD, reports hx of chronic arthritis pain as she took her Tramadol this evening around 8pm and then accidently took a 2nd one for a total of 100mg. Pt reports she felt off slightly lightheaded as though she was drugged and she called 911 and felt nervous. Pt denies any falls, numbness, focal weakness, CP, palpitations or SOB. Otherwise pt felt well recently.

## 2022-10-08 NOTE — ED PROVIDER NOTE - NS ED SCRIBE STATEMENT
ED General





- General


Chief Complaint: Flu Symptoms


Stated Complaint: SORE THROAT


Time Seen by Provider: 03/18/20 13:44


Mode of Arrival: Ambulatory


Information source: Patient


TRAVEL OUTSIDE OF THE [place] IN LAST 30 DAYS: No





- HPI


Onset: Other - since Friday of last week


Onset/Duration: Gradual


Quality of pain: Achy


Severity: Moderate


Pain Level: 2


Associated symptoms: Body/muscle aches, Productive cough, Rhinnorhea, Sore 

throat


Exacerbated by: Denies


Relieved by: Denies


Similar symptoms previously: No


Recently seen / treated by doctor: No


Notes: 





42 year old female with a history of HTN, Migraines, Anxiety here for a 

productive cough, sore throat, runny nose since last Friday. The patient says 

people at her place of work have tested positive for Strep Throat. The patient 

denies known sick contacts with Influenza or COVID 19. The patient would like to

be tested for Strep Throat and the Flu. The patient denies high fevers, chills, 

sweats, nausea, vomiting, abdominal pains, chest pains, shortness of breath. The

patient has not been eating or drinking much over the last several days.





- Related Data


Allergies/Adverse Reactions: 


                                        





No Known Allergies Allergy (Verified 08/08/18 09:18)


   








Home Medications: htn





Past Medical History





- General


Information source: Patient





- Social History


Smoking Status: Current Every Day Smoker


Cigarette use (# per day): Yes - 10 cigarettes a day


Chew tobacco use (# tins/day): No


Frequency of alcohol use: Heavy


Drug Abuse: Marijuana


Occupation: 


Lives with: Alone


Family History: DM, Hypertension


Patient has suicidal ideation: No


Patient has homicidal ideation: No





- Past Medical History


Cardiac Medical History: Reports: Hx Hypertension


Pulmonary Medical History: Reports: None


EENT Medical History: Reports: None


Neurological Medical History: Reports: Hx Migraine


Endocrine Medical History: Reports: None


Renal/ Medical History: Reports: Hx Ectopic Pregnancy


Malignancy Medical History: Reports: None


GI Medical History: Reports: None


Musculoskeletal Medical History: Reports Hx Musculoskeletal Deformity, Reports 

Hx Musculoskeletal Trauma


Skin Medical History: Reports None


Psychiatric Medical History: Reports: Hx Anxiety, Hx Depression


Traumatic Medical History: Reports: Hx Fractures


Infectious Medical History: Reports: None


Surgical Hx: Negative


Past Surgical History: Reports: None





- Immunizations


Immunizations up to date: No


Hx Diphtheria, Pertussis, Tetanus Vaccination: No - 2008





Review of Systems





- Review of Systems


Constitutional: No symptoms reported


EENT: Nose discharge, Throat pain


Cardiovascular: No symptoms reported


Respiratory: Cough


Gastrointestinal: No symptoms reported


Genitourinary: No symptoms reported


Female Genitourinary: No symptoms reported


Musculoskeletal: No symptoms reported


Skin: No symptoms reported


Hematologic/Lymphatic: No symptoms reported


Neurological/Psychological: No symptoms reported


-: Yes All other systems reviewed and negative





Physical Exam





- Vital signs


Vitals: 


                                        











Temp Pulse Resp BP Pulse Ox


 


 98.1 F   131 H  18   198/136 H  97 


 


 03/18/20 13:33  03/18/20 13:33  03/18/20 13:33  03/18/20 13:33  03/18/20 13:33














- Notes


Notes: 





GENERAL: Well-appearing, well-nourished and in no acute distress.


HEAD: Atraumatic, normocephalic.


EYES: Pupils equal round and reactive to light, extraocular movements intact, 

sclera anicteric, conjunctiva are normal.


ENT: Nares patent, oropharynx clear but erythematous without exudates.  Moist 

mucous membranes.


NECK: Normal range of motion, supple without lymphadenopathy or JVD.


LUNGS: Breath sounds clear to auscultation bilaterally and equal.  No wheezes 

rales or rhonchi.


HEART: Regular rate and rhythm without murmurs, rubs or gallops.


ABDOMEN: Soft, nontender, normoactive bowel sounds.  No guarding, no rebound.  

No masses appreciated.


EXTREMITIES: Normal range of motion, no pitting or edema.  No clubbing or 

cyanosis.


NEUROLOGICAL: Cranial nerves II through XII grossly intact.  Normal speech, 

normal gait.


PSYCH: Normal mood, normal affect.


SKIN: Warm, Dry, normal turgor, no rashes or lesions noted.





Course





- Re-evaluation


Re-evalutation: 





03/18/20 16:14


The patient is here for cough, sore throat, runny nose, and congestion since 

Friday. The patient tested negative for Influenza and Strep Throat. The patient 

is low risk for COVID19 based on symptoms, no known sick contacts, and no recent

travel. Patient told to follow up with her PCP and to go to the drive through 

testing clinic if she develops COVID19 symptoms. The patient was hypertensive 

and tachycardic on ER arrival likely due to not taking her medications and poor 

PO intake.. Patient was given fluids and Labetolol with improvement of her vital

signs. Patient tells me she has not been compliant with her blood pressure 

medications.








- Vital Signs


Vital signs: 


                                        











Temp Pulse Resp BP Pulse Ox


 


 98.1 F   131 H  18   188/120 H  97 


 


 03/18/20 13:33  03/18/20 13:33  03/18/20 13:33  03/18/20 16:01  03/18/20 13:33














- Laboratory


Result Diagrams: 


                                 03/18/20 14:10





                                 03/18/20 14:10


Laboratory results interpreted by me: 


                                        











  03/18/20 03/18/20 03/18/20





  14:00 14:10 14:10


 


Hgb   15.6 H 


 


MCV   101 H 


 


MCH   35.2 H 


 


RDW   14.6 H 


 


Sodium    134.7 L


 


Potassium    5.1 H


 


BUN    6 L


 


AST    240 H


 


Alkaline Phosphatase    142 H


 


Urine Protein  30 H  


 


Leukocyte Esterase Rfl  TRACE H  














- Diagnostic Test


Radiology reviewed: Image reviewed, Reports reviewed





Discharge





- Discharge


Clinical Impression: 


Upper respiratory infection


Qualifiers:


 URI type: unspecified viral URI Qualified Code(s): J06.9 - Acute upper 

respiratory infection, unspecified





Condition: Stable


Disposition: HOME, SELF-CARE


Instructions:  Upper Respiratory Illness ([facility])


Additional Instructions: 


Drink plenty of fluids in the days to come. Use Tylenol and Motrin as needed for

pains and fevers. You tested negative for Influenza and Strep Throat and your 

chest xray shows no pneumonia, bronchitis, or other infectious process. Follow 

up with your primary care doctor if symptoms persist. Attending

## 2022-10-10 DIAGNOSIS — Z88.0 ALLERGY STATUS TO PENICILLIN: ICD-10-CM

## 2022-10-10 DIAGNOSIS — Z88.8 ALLERGY STATUS TO OTHER DRUGS, MEDICAMENTS AND BIOLOGICAL SUBSTANCES STATUS: ICD-10-CM

## 2022-10-10 DIAGNOSIS — R42 DIZZINESS AND GIDDINESS: ICD-10-CM
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
## 2022-10-10 DIAGNOSIS — E78.5 HYPERLIPIDEMIA, UNSPECIFIED: ICD-10-CM

## 2022-10-10 DIAGNOSIS — K21.9 GASTRO-ESOPHAGEAL REFLUX DISEASE WITHOUT ESOPHAGITIS: ICD-10-CM

## 2022-10-10 DIAGNOSIS — Z91.018 ALLERGY TO OTHER FOODS: ICD-10-CM
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
## 2022-10-10 DIAGNOSIS — M19.90 UNSPECIFIED OSTEOARTHRITIS, UNSPECIFIED SITE: ICD-10-CM

## 2022-10-10 DIAGNOSIS — Z79.01 LONG TERM (CURRENT) USE OF ANTICOAGULANTS: ICD-10-CM

## 2022-10-10 DIAGNOSIS — I10 ESSENTIAL (PRIMARY) HYPERTENSION: ICD-10-CM

## 2022-10-10 DIAGNOSIS — T40.421A POISONING BY TRAMADOL, ACCIDENTAL (UNINTENTIONAL), INITIAL ENCOUNTER: ICD-10-CM

## 2022-10-13 ENCOUNTER — NON-APPOINTMENT (OUTPATIENT)
Age: 72
End: 2022-10-13

## 2022-10-20 NOTE — ED PROVIDER NOTE - NS ED MD DISPO DISCHARGE
Please follow-up with urology and primary care as discussed, please return to the ER for any concerning reason.  
Home

## 2022-10-26 ENCOUNTER — APPOINTMENT (OUTPATIENT)
Dept: NEUROLOGY | Facility: CLINIC | Age: 72
End: 2022-10-26

## 2022-10-26 VITALS
HEART RATE: 88 BPM | HEIGHT: 55 IN | WEIGHT: 135 LBS | OXYGEN SATURATION: 98 % | BODY MASS INDEX: 31.24 KG/M2 | DIASTOLIC BLOOD PRESSURE: 74 MMHG | SYSTOLIC BLOOD PRESSURE: 109 MMHG | TEMPERATURE: 98.1 F

## 2022-10-26 PROCEDURE — 99214 OFFICE O/P EST MOD 30 MIN: CPT

## 2022-10-26 RX ORDER — APIXABAN 5 MG/1
5 TABLET, FILM COATED ORAL
Qty: 60 | Refills: 3 | Status: DISCONTINUED | COMMUNITY
End: 2022-10-26

## 2022-10-26 RX ORDER — FLUTICASONE PROPIONATE 50 UG/1
50 SPRAY, METERED NASAL
Qty: 16 | Refills: 0 | Status: ACTIVE | COMMUNITY
Start: 2022-07-05

## 2022-10-26 RX ORDER — ASPIRIN 81 MG/1
81 TABLET ORAL
Qty: 30 | Refills: 5 | Status: DISCONTINUED | COMMUNITY
Start: 2022-10-26 | End: 2022-10-26

## 2022-10-26 RX ORDER — APIXABAN 5 MG/1
5 TABLET, FILM COATED ORAL
Qty: 90 | Refills: 1 | Status: ACTIVE | COMMUNITY
Start: 2022-10-26 | End: 1900-01-01

## 2022-10-26 NOTE — ASSESSMENT
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 Polo\par -Continue Eliquis 5 mg BID for now\par -Follow up with Dr. Polo for dissection work up\par \par 2) Neck pain\par -Likely due to degenerative disease\par -Continue gabapentin 100 mg PRN

## 2022-10-26 NOTE — HISTORY OF PRESENT ILLNESS
[FreeTextEntry1] : Since last visit with MERARI Page she completed a follow up MRA of her neck which showed recanalization of the left vertebral artery.  She remains on Eliquis 5 mg BID without side effects.  Also takes gabapentin 100 mg QHS intermittently for neck pain -- does not like to take it at the same time as she takes Tylenol for her left shoulder pain.

## 2022-10-26 NOTE — DATA REVIEWED
[de-identified] : MRA neck 9/1/2022\par IMPRESSION: Limited exam secondary to motion. There appears to be recanalization of the left vertebral artery without wall hematoma.

## 2022-11-14 ENCOUNTER — EMERGENCY (EMERGENCY)
Facility: HOSPITAL | Age: 72
LOS: 1 days | Discharge: HOME | End: 2022-11-14
Attending: EMERGENCY MEDICINE | Admitting: EMERGENCY MEDICINE
Payer: MEDICARE

## 2022-11-14 VITALS
SYSTOLIC BLOOD PRESSURE: 151 MMHG | DIASTOLIC BLOOD PRESSURE: 85 MMHG | HEART RATE: 84 BPM | RESPIRATION RATE: 16 BRPM | OXYGEN SATURATION: 97 %

## 2022-11-14 VITALS
WEIGHT: 138.01 LBS | DIASTOLIC BLOOD PRESSURE: 77 MMHG | SYSTOLIC BLOOD PRESSURE: 121 MMHG | RESPIRATION RATE: 18 BRPM | HEART RATE: 80 BPM | OXYGEN SATURATION: 99 % | TEMPERATURE: 98 F | HEIGHT: 59 IN

## 2022-11-14 DIAGNOSIS — Z98.890 OTHER SPECIFIED POSTPROCEDURAL STATES: Chronic | ICD-10-CM

## 2022-11-14 PROCEDURE — 99284 EMERGENCY DEPT VISIT MOD MDM: CPT

## 2022-11-14 PROCEDURE — 93005 ELECTROCARDIOGRAM TRACING: CPT

## 2022-11-14 PROCEDURE — 99283 EMERGENCY DEPT VISIT LOW MDM: CPT

## 2022-11-14 PROCEDURE — 93010 ELECTROCARDIOGRAM REPORT: CPT

## 2022-11-14 NOTE — ED PROVIDER NOTE - CLINICAL SUMMARY MEDICAL DECISION MAKING FREE TEXT BOX
htn- bp well controlled- asx- similar visits for same in past- no med jose indicated- reassurance- cont home jerry of extra 2.5 mg norvasc prn

## 2022-11-14 NOTE — ED PROVIDER NOTE - CPE EDP SKIN NORM
I returned the patient's call to discuss her Lasix dose  I will keep her on that dose  I did recommend to the patient that she monitor her intake of salt  In addition she should wear compression garments  Her feet should be elevated when sitting  If her swelling continues after the above interventions she should call make an appointment 
She said Agueda Saunders gave her this  Furosemide (LASIX), 20 mg tablet   The water pill isn't working  Can she take something else?   Her socks leave marks on her legs    [place] [employer] [employer], [id number]
normal...

## 2022-11-14 NOTE — ED PROVIDER NOTE - PATIENT PORTAL LINK FT
You can access the FollowMyHealth Patient Portal offered by [facility] by registering at the following website: [email or web address]. By joining [employer]’s FollowMyHealth portal, you will also be able to view your health information using other applications (apps) compatible with our system.

## 2022-11-14 NOTE — ED ADULT NURSE NOTE - CADM POA CENTRAL LINE
Documentation in the medical record in the  medical record indicates this has  transaminitis likely due to statin vs sepsis. Statin was held.  Infectious workup was negative. Please clarify if the  transaminitis was likely;   due to;    Acute/ subacute liver insufficiency due to statin   Acute/ subacute liver insufficiency due to SIRS  acute/ subacute liver insufficiency due to statin or SIRS  Other ( please specify condition)
No

## 2022-11-14 NOTE — ED PROVIDER NOTE - NSFOLLOWUPINSTRUCTIONS_ED_ALL_ED_FT
Hypertension, Adult      High blood pressure (hypertension) is when the force of blood pumping through the arteries is too strong. The arteries are the blood vessels that carry blood from the heart throughout the body. Hypertension forces the heart to work harder to pump blood and may cause arteries to become narrow or stiff. Untreated or uncontrolled hypertension can cause a heart attack, heart failure, a stroke, kidney disease, and other problems.    A blood pressure reading consists of a higher number over a lower number. Ideally, your blood pressure should be below 120/80. The first ("top") number is called the systolic pressure. It is a measure of the pressure in your arteries as your heart beats. The second ("bottom") number is called the diastolic pressure. It is a measure of the pressure in your arteries as the heart relaxes.      What are the causes?    The exact cause of this condition is not known. There are some conditions that result in or are related to high blood pressure.      What increases the risk?    Some risk factors for high blood pressure are under your control. The following factors may make you more likely to develop this condition:  •Smoking.      •Having type 2 diabetes mellitus, high cholesterol, or both.      •Not getting enough exercise or physical activity.      •Being overweight.      •Having too much fat, sugar, calories, or salt (sodium) in your diet.      •Drinking too much alcohol.      Some risk factors for high blood pressure may be difficult or impossible to change. Some of these factors include:  •Having chronic kidney disease.      •Having a family history of high blood pressure.      •Age. Risk increases with age.      •Race. You may be at higher risk if you are .      •Gender. Men are at higher risk than women before age 45. After age 65, women are at higher risk than men.      •Having obstructive sleep apnea.      •Stress.        What are the signs or symptoms?    High blood pressure may not cause symptoms. Very high blood pressure (hypertensive crisis) may cause:  •Headache.      •Anxiety.      •Shortness of breath.      •Nosebleed.      •Nausea and vomiting.      •Vision changes.      •Severe chest pain.      •Seizures.        How is this diagnosed?    This condition is diagnosed by measuring your blood pressure while you are seated, with your arm resting on a flat surface, your legs uncrossed, and your feet flat on the floor. The cuff of the blood pressure monitor will be placed directly against the skin of your upper arm at the level of your heart. It should be measured at least twice using the same arm. Certain conditions can cause a difference in blood pressure between your right and left arms.    Certain factors can cause blood pressure readings to be lower or higher than normal for a short period of time:  •When your blood pressure is higher when you are in a health care provider's office than when you are at home, this is called white coat hypertension. Most people with this condition do not need medicines.      •When your blood pressure is higher at home than when you are in a health care provider's office, this is called masked hypertension. Most people with this condition may need medicines to control blood pressure.      If you have a high blood pressure reading during one visit or you have normal blood pressure with other risk factors, you may be asked to:  •Return on a different day to have your blood pressure checked again.      •Monitor your blood pressure at home for 1 week or longer.      If you are diagnosed with hypertension, you may have other blood or imaging tests to help your health care provider understand your overall risk for other conditions.      How is this treated?    This condition is treated by making healthy lifestyle changes, such as eating healthy foods, exercising more, and reducing your alcohol intake. Your health care provider may prescribe medicine if lifestyle changes are not enough to get your blood pressure under control, and if:  •Your systolic blood pressure is above 130.      •Your diastolic blood pressure is above 80.      Your personal target blood pressure may vary depending on your medical conditions, your age, and other factors.      Follow these instructions at home:      Eating and drinking    •Eat a diet that is high in fiber and potassium, and low in sodium, added sugar, and fat. An example eating plan is called the DASH (Dietary Approaches to Stop Hypertension) diet. To eat this way:  •Eat plenty of fresh fruits and vegetables. Try to fill one half of your plate at each meal with fruits and vegetables.      •Eat whole grains, such as whole-wheat pasta, brown rice, or whole-grain bread. Fill about one fourth of your plate with whole grains.      •Eat or drink low-fat dairy products, such as skim milk or low-fat yogurt.      •Avoid fatty cuts of meat, processed or cured meats, and poultry with skin. Fill about one fourth of your plate with lean proteins, such as fish, chicken without skin, beans, eggs, or tofu.      •Avoid pre-made and processed foods. These tend to be higher in sodium, added sugar, and fat.        •Reduce your daily sodium intake. Most people with hypertension should eat less than 1,500 mg of sodium a day.    • Do not drink alcohol if:  •Your health care provider tells you not to drink.      •You are pregnant, may be pregnant, or are planning to become pregnant.      •If you drink alcohol:•Limit how much you use to:  •0–1 drink a day for women.      •0–2 drinks a day for men.        •Be aware of how much alcohol is in your drink. In the [place] one drink equals one 12 oz bottle of beer (355 mL), one 5 oz glass of wine (148 mL), or one 1½ oz glass of hard liquor (44 mL).          Lifestyle      •Work with your health care provider to maintain a healthy body weight or to lose weight. Ask what an ideal weight is for you.      •Get at least 30 minutes of exercise most days of the week. Activities may include walking, swimming, or biking.      •Include exercise to strengthen your muscles (resistance exercise), such as Pilates or lifting weights, as part of your weekly exercise routine. Try to do these types of exercises for 30 minutes at least 3 days a week.      • Do not use any products that contain nicotine or tobacco, such as cigarettes, e-cigarettes, and chewing tobacco. If you need help quitting, ask your health care provider.      •Monitor your blood pressure at home as told by your health care provider.      •Keep all follow-up visits as told by your health care provider. This is important.      Medicines     •Take over-the-counter and prescription medicines only as told by your health care provider. Follow directions carefully. Blood pressure medicines must be taken as prescribed.      • Do not skip doses of blood pressure medicine. Doing this puts you at risk for problems and can make the medicine less effective.      •Ask your health care provider about side effects or reactions to medicines that you should watch for.        Contact a health care provider if you:    •Think you are having a reaction to a medicine you are taking.      •Have headaches that keep coming back (recurring).      •Feel dizzy.      •Have swelling in your ankles.      •Have trouble with your vision.        Get help right away if you:    •Develop a severe headache or confusion.      •Have unusual weakness or numbness.      •Feel faint.      •Have severe pain in your chest or abdomen.      •Vomit repeatedly.      •Have trouble breathing.        Summary    •Hypertension is when the force of blood pumping through your arteries is too strong. If this condition is not controlled, it may put you at risk for serious complications.      •Your personal target blood pressure may vary depending on your medical conditions, your age, and other factors. For most people, a normal blood pressure is less than 120/80.      •Hypertension is treated with lifestyle changes, medicines, or a combination of both. Lifestyle changes include losing weight, eating a healthy, low-sodium diet, exercising more, and limiting alcohol.      This information is not intended to replace advice given to you by your health care provider. Make sure you discuss any questions you have with your health care provider.      Document Revised: 08/28/2019 Document Reviewed: 08/28/2019    Elsevier Patient Education © 2022 [employer]

## 2022-11-14 NOTE — ED ADULT NURSE NOTE - OBJECTIVE STATEMENT
Pt is 72F c/o chronic L sided neck pain and "my pressure is up." Pt endorses hx of herniated disc in neck. No numbness/tingling in extremities, no loss of bowel/bladder control, no headache, no chest pain, no SOB.

## 2022-11-14 NOTE — ED PROVIDER NOTE - OBJECTIVE STATEMENT
73 y/o F with a PMHx of HTN, HLD, GERD, 71 y/o F with a PMHx of HTN, HLD, GERD- was worried as her BP went up to 150- felt dizzy/ha- took addt; 2.5 bp improved- int chronic itching on hydroxyinosine- took 1-2 tabs this nehal- renan po no complaints  gradual onset ha earlier- frontal- completely resolved  no cp no sob  no sig exac/allev factors

## 2022-11-18 DIAGNOSIS — Z88.0 ALLERGY STATUS TO PENICILLIN: ICD-10-CM

## 2022-11-18 DIAGNOSIS — E78.5 HYPERLIPIDEMIA, UNSPECIFIED: ICD-10-CM

## 2022-11-18 DIAGNOSIS — Z87.19 PERSONAL HISTORY OF OTHER DISEASES OF THE DIGESTIVE SYSTEM: ICD-10-CM

## 2022-11-18 DIAGNOSIS — Z79.01 LONG TERM (CURRENT) USE OF ANTICOAGULANTS: ICD-10-CM

## 2022-11-18 DIAGNOSIS — R03.0 ELEVATED BLOOD-PRESSURE READING, WITHOUT DIAGNOSIS OF HYPERTENSION: ICD-10-CM

## 2022-11-18 DIAGNOSIS — K21.9 GASTRO-ESOPHAGEAL REFLUX DISEASE WITHOUT ESOPHAGITIS: ICD-10-CM

## 2022-11-18 DIAGNOSIS — Z88.8 ALLERGY STATUS TO OTHER DRUGS, MEDICAMENTS AND BIOLOGICAL SUBSTANCES: ICD-10-CM

## 2022-11-18 DIAGNOSIS — I10 ESSENTIAL (PRIMARY) HYPERTENSION: ICD-10-CM

## 2022-11-18 DIAGNOSIS — Z91.018 ALLERGY TO OTHER FOODS: ICD-10-CM

## 2022-11-22 ENCOUNTER — NON-APPOINTMENT (OUTPATIENT)
Age: 72
End: 2022-11-22

## 2023-01-09 ENCOUNTER — APPOINTMENT (OUTPATIENT)
Dept: OTOLARYNGOLOGY | Facility: CLINIC | Age: 73
End: 2023-01-09
Payer: MEDICARE

## 2023-01-09 VITALS
BODY MASS INDEX: 27.82 KG/M2 | HEART RATE: 83 BPM | WEIGHT: 138 LBS | HEIGHT: 59 IN | SYSTOLIC BLOOD PRESSURE: 118 MMHG | DIASTOLIC BLOOD PRESSURE: 67 MMHG | TEMPERATURE: 97.5 F

## 2023-01-09 DIAGNOSIS — R09.81 NASAL CONGESTION: ICD-10-CM

## 2023-01-09 PROCEDURE — 99213 OFFICE O/P EST LOW 20 MIN: CPT

## 2023-01-09 NOTE — ASSESSMENT
[FreeTextEntry1] : 71 yo female who presents with dysphonia s/p esophageal surgery 5/21/21. On exam, her voice has improved and her left true vocal fold function is now fully recovered. She is swallowing well and is essentially asymptomatic. At this point she should continue to follow up with GI surgery and follow up with me as needed. She had an isolated episode of swallowing difficulty in February 2022 but has not had any recurrence since then. \par \par Today, she denies any episodes of dysphagia or swallowing discomfort.   previously in office functional endoscopic swallowing evaluation appear to be normal.  Patient has had intermittent improvement and then recurrence of dysphagia.  Ultimately barium swallow with esophageal follow-through was ordered and this was all normal and without any evidence of aspiration or penetration of foods across all consistencies.  At this time I am recommending aspiration precautions, sips of water between sips of solids.    These precautions were again reiterated\par \par   Today patient also complains of some mild nasal congestion.  Physical exam findings were normal.  I am recommending nasal saline irrigation as needed followed by Flonase as needed.  Follow-up with me as needed.\par \par – Nasal saline, nasal steroids as needed\par – Aspiration precautions\par – Alternating sips of water with solids\par – Follow-up with me as needed

## 2023-01-09 NOTE — HISTORY OF PRESENT ILLNESS
[de-identified] : 70 yo female who presents with dysphonia. She underwent partial fundoplication complicated by perforation and then esophagogastroduodenoscopy, Lap Robotic assisted mobilization of gastric conduit, Left cervical incision, esophagogastrectomy and placement of a J-tube for repair. Post operatively patient had dysphonia, sob, and breathy voice. 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 \par \par Pt now follows up after hospitalization for evaluation. Overall she notes that her voice is improving, though not back to normal. Her voice remains breathy, but high pitched. No issues chewing, eating or swallowing. She is not short of breath. She denies any ENT issues otherwise. \par -\par Update 9/14/21:\par Pt overall well and stable.  Her voice has improved.  She presents with throat discomfort with swallowing, feels food getting stuck in her throat.  She now has to eat 6 small meals a day.  Does not cough/choke/regurgitation on liquids or solids.  She denies weight loss.  She still drinks ensure to help with calories.  No breathing issues. She denies any other ENT complaints.\par -\par Update 12/14/21\par Overall stable and well.  She notes continued improvement in terms of voice and swallowing.  She feels back to baseline.  she continues to eat smaller meals, but no issues with chewing, eating swallowing, coughing or regurgitation of food.  she is not losing weight, and she is getting adequate nutrition.\par -\par Update 2/14/22\par Pt presents with swallowing concern.  Last night she finished a meal or rice, beans, sweet potato, chicken without issue.  After her meal she used her medication a powder which she puts in water and had a reaction where she felt it didn't pass.  This caused pain and discomfort.  Last night she did take another medication without issue.  This morning she drank coffee without any issues.  No coughing, choking or regurgitation of foods.  She presents for reval.  No new ENT issues otherwise. \par -\par Update 3/14/22\par Pt presents today with improvement of symptoms of difficulty swallowing. She does not have any difficulty swallowing or chewing foods/liquids since her isolated episode last month. She feels like she is getting adequate nutrition. Denies coughing, choking, or regurgitation. She denies any new ENT issues otherwise.  \par - [FreeTextEntry1] : 8/16/22\par No significant changes in symptoms.  Patient did complete modified barium swallow.  She presents today to review those results.\par -\par  1/9/2023\par  patient mostly doing well.  Notes continued improvement and stability in terms of swallowing.  Denies any voice issues at this time.  Today she presents with some mild nasal congestion that is been happening intermittently.  She does use Flonase intermittently.  She denies any ear, nose, throat symptoms otherwise.

## 2023-01-13 NOTE — ED ADULT NURSE NOTE - IS THE PATIENT ABLE TO BE SCREENED?
Orders for admission, patient is aware of plan and ready to go upstairs. Any questions, please call ED RN [phone]    Vaccinated?   Type of COVID test sent:rapid  COVID Suspicion level: Low      Titratable drug(s) infusing:  Rate:none    LOC at time of transport:x4    Other pertinent information:    CIWA score=na  NIH score=na Yes

## 2023-01-31 ENCOUNTER — APPOINTMENT (OUTPATIENT)
Dept: NEUROLOGY | Facility: CLINIC | Age: 73
End: 2023-01-31

## 2023-02-08 ENCOUNTER — APPOINTMENT (OUTPATIENT)
Dept: NEUROLOGY | Facility: CLINIC | Age: 73
End: 2023-02-08

## 2023-02-20 NOTE — PROGRESS NOTE ADULT - ASSESSMENT
70 y/o female with PMH HTN, HLD (myalgias with statins), and paraesophageal hernia resulting in mild GERD and esophageal dysmotility. She was evaluated as an outpatient by general surgery for elective repair and on 5/21 presented for her RA repair of paraesophageal hernia with partial  fundoplication. Intraop course complicated by esophageal perforation for which Dr. Alberto and Dr. Pinto were consulted for urgently. EGD was preformed and tear was not amenable to primary repair. They proceeded with an uncomplicated transhiatal esophagectomy. Post operatively she was brought to the CTICU extubated. Trickle feeds were started POD2 and she was transferred to [facility].     ContinueTF  Agree with IV Abx  Further medical management per Thoracic Sx  General surgery following  No Residual Tumor Seen Histology Text: There were no malignant cells seen in the sections examined.

## 2023-03-02 ENCOUNTER — APPOINTMENT (OUTPATIENT)
Dept: ENDOCRINOLOGY | Facility: CLINIC | Age: 73
End: 2023-03-02
Payer: MEDICARE

## 2023-03-02 VITALS
HEART RATE: 84 BPM | HEIGHT: 59 IN | SYSTOLIC BLOOD PRESSURE: 133 MMHG | OXYGEN SATURATION: 98 % | DIASTOLIC BLOOD PRESSURE: 83 MMHG | BODY MASS INDEX: 26.66 KG/M2 | TEMPERATURE: 98 F | WEIGHT: 132.25 LBS

## 2023-03-02 DIAGNOSIS — Z13.820 ENCOUNTER FOR SCREENING FOR OSTEOPOROSIS: ICD-10-CM

## 2023-03-02 PROCEDURE — 99204 OFFICE O/P NEW MOD 45 MIN: CPT

## 2023-03-02 NOTE — PHYSICAL EXAM
[Alert] : alert [No Acute Distress] : no acute distress [EOMI] : extra ocular movement intact [No Respiratory Distress] : no respiratory distress [Clear to Auscultation] : lungs were clear to auscultation bilaterally [Normal Bowel Sounds] : normal bowel sounds [Not Tender] : non-tender [Soft] : abdomen soft [No Clubbing, Cyanosis] : no clubbing  or cyanosis of the fingernails [Abdominal Striae] : no abdominal striae [Acanthosis Nigricans] : no acanthosis nigricans [Hirsutism] : no hirsutism [Normal Affect] : the affect was normal [Normal Mood] : the mood was normal [de-identified] : Surgical scar from hernia repair [de-identified] : surgical scar from hernia repair Additional Notes: Patient to use Rogaine 5% BID, and take oral Biotin QD

## 2023-03-02 NOTE — ASSESSMENT
.I reviewed the H&P, I examined the patient, and there are no changes in the patient's condition.    [FreeTextEntry1] : Patient is a 72 yo woman who does not know why she was referred her today.  Patient brings no referrals nor were any paperwork sent from her PCP office.\par \par 1. Adrenal adenoma\par -while it is unclear as to why exactly she is here.  Scanned records from October 2020 shows a right incidental adenoma measuring 1.4 cm in greatest dimension. This has gone unevaluated for three years. She is without symptoms\par -we can check metanephrines. She has no evidence of Cushing's disease and her BP is controlled on single agent amlodipine\par -patient, during the visit, had a copy of CT scan from [facility] in 202 as well which revealed a right myelolipoma\par -we can repeat adrenal imaging now. If stable, no need for further follow up\par \par 2. Osteoporosis\par -no recent DXA\par -referral for DXA given today\par \par If adenoma is non-functional and stable with no osteoporosis on DXA, follow up with PCP. Return to endocrine as needed

## 2023-03-02 NOTE — REASON FOR VISIT
[Initial Evaluation] : an initial evaluation [Adrenal Evaluation/Adrenal Disorder] : adrenal evaluation/adrenal disorder [FreeTextEntry2] : Dr. Megha Headley

## 2023-03-02 NOTE — CONSULT LETTER
[Dear  ___] : Dear  [unfilled], [Consult Letter:] : I had the pleasure of evaluating your patient, [unfilled]. [Please see my note below.] : Please see my note below. [Consult Closing:] : Thank you very much for allowing me to participate in the care of this patient.  If you have any questions, please do not hesitate to contact me. [Sincerely,] : Sincerely, [FreeTextEntry3] : Maria Del Rosario Saxena MD

## 2023-03-02 NOTE — HISTORY OF PRESENT ILLNESS
[FreeTextEntry1] : Patient is a 74 yo woman here for adrenal consultation.  States she was coming here for excess peeing\par \par Patient does not know why she is here today.  States she feels well.  Patient reported she had neck pain and on MRA, there was recanalization of the left vertebral artery. Denies any stroke history.  Started on Eliquis for that.\par On imaging from 2020 she was found to have a 1.2 cm right adrenal adenoma with evidence of macroscopic fat consistent with a myelolipoma.  Left adrenal gland is mildly thickened with no nodule\par On one medicine/amlodipine for blood pressure control\par Patient is active and [facility]  \par \par Reports bone density was done a long time ago, no recent records

## 2023-03-02 NOTE — REVIEW OF SYSTEMS
[Fatigue] : no fatigue [Decreased Appetite] : appetite not decreased [Dysphagia] : no dysphagia [Dysphonia] : no dysphonia [Chest Pain] : no chest pain [Palpitations] : no palpitations [Shortness Of Breath] : no shortness of breath [Nausea] : no nausea [Constipation] : no constipation [Vomiting] : no vomiting [Diarrhea] : no diarrhea

## 2023-03-03 ENCOUNTER — NON-APPOINTMENT (OUTPATIENT)
Age: 73
End: 2023-03-03

## 2023-03-03 LAB — ALDOSTERONE SERUM: 15.1 NG/DL

## 2023-03-08 LAB
METANEPHRINE, PL: 18.9 PG/ML
NORMETANEPHRINE, PL: 221.2 PG/ML

## 2023-03-13 LAB — RENIN ACTIVITY, PLASMA: 0.4 NG/ML/HR

## 2023-03-21 NOTE — ED ADULT TRIAGE NOTE - NS ED TRIAGE AVPU SCALE
Addended by: MARIELLE SALINAS on: 3/21/2023 02:31 PM     Modules accepted: Orders    
Alert-The patient is alert, awake and responds to voice. The patient is oriented to time, place, and person. The triage nurse is able to obtain subjective information.

## 2023-03-22 ENCOUNTER — APPOINTMENT (OUTPATIENT)
Dept: RADIOLOGY | Facility: HOSPITAL | Age: 73
End: 2023-03-22
Payer: MEDICARE

## 2023-03-22 ENCOUNTER — OUTPATIENT (OUTPATIENT)
Dept: OUTPATIENT SERVICES | Facility: HOSPITAL | Age: 73
LOS: 1 days | End: 2023-03-22
Payer: MEDICARE

## 2023-03-22 DIAGNOSIS — Z98.890 OTHER SPECIFIED POSTPROCEDURAL STATES: Chronic | ICD-10-CM

## 2023-03-22 PROCEDURE — 77080 DXA BONE DENSITY AXIAL: CPT

## 2023-03-22 PROCEDURE — 77080 DXA BONE DENSITY AXIAL: CPT | Mod: 26

## 2023-03-25 ENCOUNTER — APPOINTMENT (OUTPATIENT)
Dept: CT IMAGING | Facility: HOSPITAL | Age: 73
End: 2023-03-25
Payer: MEDICARE

## 2023-03-25 ENCOUNTER — OUTPATIENT (OUTPATIENT)
Dept: OUTPATIENT SERVICES | Facility: HOSPITAL | Age: 73
LOS: 1 days | End: 2023-03-25
Payer: MEDICARE

## 2023-03-25 ENCOUNTER — RESULT REVIEW (OUTPATIENT)
Age: 73
End: 2023-03-25

## 2023-03-25 DIAGNOSIS — Z98.890 OTHER SPECIFIED POSTPROCEDURAL STATES: Chronic | ICD-10-CM

## 2023-03-25 PROCEDURE — 74150 CT ABDOMEN W/O CONTRAST: CPT | Mod: 26

## 2023-03-25 PROCEDURE — 74150 CT ABDOMEN W/O CONTRAST: CPT

## 2023-03-29 ENCOUNTER — NON-APPOINTMENT (OUTPATIENT)
Age: 73
End: 2023-03-29

## 2023-04-06 ENCOUNTER — APPOINTMENT (OUTPATIENT)
Dept: AFTER HOURS CARE | Facility: EMERGENCY ROOM | Age: 73
End: 2023-04-06
Payer: MEDICARE

## 2023-04-06 DIAGNOSIS — I10 ESSENTIAL (PRIMARY) HYPERTENSION: ICD-10-CM

## 2023-04-06 PROCEDURE — 99442: CPT | Mod: 95

## 2023-04-21 NOTE — PLAN
[FreeTextEntry1] : Continue BP medication as prescribed. f/u PCP. Log BP. Red flags for emergency medical attention disclosed and acknowledged.

## 2023-04-21 NOTE — HISTORY OF PRESENT ILLNESS
[Verbal consent obtained from patient] : the patient, [unfilled] [FreeTextEntry8] : 73F p/w HTN. Readings this AM: 134/90, 142/89, 133/84. PMH of HTN on Amlodipine Complaint with medications. No chest pain, SOB, or headache. Pt has appointment with PCP on May 25th. PT takes blood pressure every morning before taking her medications.\par \par ([facility] Telephone)

## 2023-04-27 ENCOUNTER — EMERGENCY (EMERGENCY)
Facility: HOSPITAL | Age: 73
LOS: 1 days | Discharge: HOME | End: 2023-04-27
Admitting: EMERGENCY MEDICINE
Payer: MEDICARE

## 2023-04-27 VITALS
DIASTOLIC BLOOD PRESSURE: 76 MMHG | SYSTOLIC BLOOD PRESSURE: 126 MMHG | OXYGEN SATURATION: 100 % | HEART RATE: 72 BPM | TEMPERATURE: 98 F | RESPIRATION RATE: 18 BRPM

## 2023-04-27 VITALS
DIASTOLIC BLOOD PRESSURE: 88 MMHG | HEART RATE: 87 BPM | RESPIRATION RATE: 19 BRPM | TEMPERATURE: 98 F | OXYGEN SATURATION: 97 % | WEIGHT: 134.92 LBS | HEIGHT: 59 IN | SYSTOLIC BLOOD PRESSURE: 142 MMHG

## 2023-04-27 DIAGNOSIS — Z88.0 ALLERGY STATUS TO PENICILLIN: ICD-10-CM

## 2023-04-27 DIAGNOSIS — Z98.890 OTHER SPECIFIED POSTPROCEDURAL STATES: Chronic | ICD-10-CM

## 2023-04-27 DIAGNOSIS — Z91.048 OTHER NONMEDICINAL SUBSTANCE ALLERGY STATUS: ICD-10-CM

## 2023-04-27 DIAGNOSIS — K21.9 GASTRO-ESOPHAGEAL REFLUX DISEASE WITHOUT ESOPHAGITIS: ICD-10-CM

## 2023-04-27 DIAGNOSIS — K44.9 DIAPHRAGMATIC HERNIA WITHOUT OBSTRUCTION OR GANGRENE: ICD-10-CM

## 2023-04-27 DIAGNOSIS — E78.5 HYPERLIPIDEMIA, UNSPECIFIED: ICD-10-CM

## 2023-04-27 DIAGNOSIS — Z98.890 OTHER SPECIFIED POSTPROCEDURAL STATES: ICD-10-CM

## 2023-04-27 DIAGNOSIS — I10 ESSENTIAL (PRIMARY) HYPERTENSION: ICD-10-CM

## 2023-04-27 DIAGNOSIS — Z91.018 ALLERGY TO OTHER FOODS: ICD-10-CM

## 2023-04-27 DIAGNOSIS — Z79.01 LONG TERM (CURRENT) USE OF ANTICOAGULANTS: ICD-10-CM

## 2023-04-27 DIAGNOSIS — R03.0 ELEVATED BLOOD-PRESSURE READING, WITHOUT DIAGNOSIS OF HYPERTENSION: ICD-10-CM

## 2023-04-27 LAB
ANION GAP SERPL CALC-SCNC: 8 MMOL/L — SIGNIFICANT CHANGE UP (ref 5–17)
BASOPHILS # BLD AUTO: 0.02 K/UL — SIGNIFICANT CHANGE UP (ref 0–0.2)
BASOPHILS NFR BLD AUTO: 0.5 % — SIGNIFICANT CHANGE UP (ref 0–2)
BUN SERPL-MCNC: 15 MG/DL — SIGNIFICANT CHANGE UP (ref 7–23)
CALCIUM SERPL-MCNC: 9.7 MG/DL — SIGNIFICANT CHANGE UP (ref 8.4–10.5)
CHLORIDE SERPL-SCNC: 100 MMOL/L — SIGNIFICANT CHANGE UP (ref 96–108)
CO2 SERPL-SCNC: 26 MMOL/L — SIGNIFICANT CHANGE UP (ref 22–31)
CREAT SERPL-MCNC: 0.53 MG/DL — SIGNIFICANT CHANGE UP (ref 0.5–1.3)
EGFR: 96 ML/MIN/1.73M2 — SIGNIFICANT CHANGE UP
EOSINOPHIL # BLD AUTO: 0.02 K/UL — SIGNIFICANT CHANGE UP (ref 0–0.5)
EOSINOPHIL NFR BLD AUTO: 0.5 % — SIGNIFICANT CHANGE UP (ref 0–6)
GLUCOSE SERPL-MCNC: 106 MG/DL — HIGH (ref 70–99)
HCT VFR BLD CALC: 35.8 % — SIGNIFICANT CHANGE UP (ref 34.5–45)
HGB BLD-MCNC: 11.7 G/DL — SIGNIFICANT CHANGE UP (ref 11.5–15.5)
IMM GRANULOCYTES NFR BLD AUTO: 0.3 % — SIGNIFICANT CHANGE UP (ref 0–0.9)
LYMPHOCYTES # BLD AUTO: 1.13 K/UL — SIGNIFICANT CHANGE UP (ref 1–3.3)
LYMPHOCYTES # BLD AUTO: 30.5 % — SIGNIFICANT CHANGE UP (ref 13–44)
MAGNESIUM SERPL-MCNC: 2.4 MG/DL — SIGNIFICANT CHANGE UP (ref 1.6–2.6)
MCHC RBC-ENTMCNC: 30.1 PG — SIGNIFICANT CHANGE UP (ref 27–34)
MCHC RBC-ENTMCNC: 32.7 GM/DL — SIGNIFICANT CHANGE UP (ref 32–36)
MCV RBC AUTO: 92 FL — SIGNIFICANT CHANGE UP (ref 80–100)
MONOCYTES # BLD AUTO: 0.37 K/UL — SIGNIFICANT CHANGE UP (ref 0–0.9)
MONOCYTES NFR BLD AUTO: 10 % — SIGNIFICANT CHANGE UP (ref 2–14)
NEUTROPHILS # BLD AUTO: 2.15 K/UL — SIGNIFICANT CHANGE UP (ref 1.8–7.4)
NEUTROPHILS NFR BLD AUTO: 58.2 % — SIGNIFICANT CHANGE UP (ref 43–77)
NRBC # BLD: 0 /100 WBCS — SIGNIFICANT CHANGE UP (ref 0–0)
PLATELET # BLD AUTO: 398 K/UL — SIGNIFICANT CHANGE UP (ref 150–400)
POTASSIUM SERPL-MCNC: 3.9 MMOL/L — SIGNIFICANT CHANGE UP (ref 3.5–5.3)
POTASSIUM SERPL-SCNC: 3.9 MMOL/L — SIGNIFICANT CHANGE UP (ref 3.5–5.3)
RBC # BLD: 3.89 M/UL — SIGNIFICANT CHANGE UP (ref 3.8–5.2)
RBC # FLD: 13 % — SIGNIFICANT CHANGE UP (ref 10.3–14.5)
SODIUM SERPL-SCNC: 134 MMOL/L — LOW (ref 135–145)
TROPONIN T SERPL-MCNC: <0.01 NG/ML — SIGNIFICANT CHANGE UP (ref 0–0.01)
WBC # BLD: 3.7 K/UL — LOW (ref 3.8–10.5)
WBC # FLD AUTO: 3.7 K/UL — LOW (ref 3.8–10.5)

## 2023-04-27 PROCEDURE — 99285 EMERGENCY DEPT VISIT HI MDM: CPT

## 2023-04-27 PROCEDURE — 36415 COLL VENOUS BLD VENIPUNCTURE: CPT

## 2023-04-27 PROCEDURE — 84484 ASSAY OF TROPONIN QUANT: CPT

## 2023-04-27 PROCEDURE — 99283 EMERGENCY DEPT VISIT LOW MDM: CPT | Mod: 25

## 2023-04-27 PROCEDURE — 80048 BASIC METABOLIC PNL TOTAL CA: CPT

## 2023-04-27 PROCEDURE — 93005 ELECTROCARDIOGRAM TRACING: CPT

## 2023-04-27 PROCEDURE — 85025 COMPLETE CBC W/AUTO DIFF WBC: CPT

## 2023-04-27 PROCEDURE — 83735 ASSAY OF MAGNESIUM: CPT

## 2023-04-27 NOTE — ED PROVIDER NOTE - PATIENT PORTAL LINK FT
You can access the FollowMyHealth Patient Portal offered by [facility] by registering at the following website: [email or web address]. By joining [employer]’s FollowMyHealth portal, you will also be able to view your health information using other applications (apps) compatible with our system.

## 2023-04-27 NOTE — ED PROVIDER NOTE - CLINICAL SUMMARY MEDICAL DECISION MAKING FREE TEXT BOX
74 yo female with h/o HTN in the ER c/o elevated BP tonight. Pt states she couldn't sleep tonight feeling "hot and funny"?  Pt repeatedly was checking her BP and it was higher than her usual and pt called EMS.  Denies sore throat, nasal congestion, cough, SOB, CP, abdominal pain or back pain, denies HA, dizziness, vision changes.  Pt well appearing, in NAD, BP in the triage-142/88, pt not tachycardiac and not hypoxic. ECG-HR-59, sinus chris, no acute st-t wave elevation, no ischemic changes.  ER focused exam- unremarkable, including Neuro: Alert and oriented x 3, face symmetric and speech fluent. Strength 5/5 x 4 ext and symmetric, nml gross motor movement, nml gait. No focal deficits noted. will check labs and continue monitor BP. anticipate d/c home if normal labs and pt feels better.

## 2023-04-27 NOTE — ED PROVIDER NOTE - PHYSICAL EXAMINATION
Constitutional: awake and alert, in no acute distress  HEENT: head normocephalic and atraumatic. moist mucous membranes  Eyes: extraocular movements intact, normal conjunctiva  Neck: supple, normal ROM  Cardiovascular: regular rate , no r/g/m  Pulmonary: no respiratory distress, CTA b/l  Gastrointestinal: abdomen flat and nondistended, NT  Skin: warm, dry, normal for ethnicity, no rash  Musculoskeletal: no edema, no deformity, NROM, good pulses,   Neurological: oriented x4, no focal neurologic deficit.   Psychiatric: calm and cooperative, no SI/HI

## 2023-04-27 NOTE — ED ADULT TRIAGE NOTE - PAIN: PRESENCE, MLM
LMTCB  
LMTCB  
Message filed due to no call back.  
Patient mom would like someone to call her back phone number is [phone]  
denies pain/discomfort (Rating = 0)

## 2023-04-27 NOTE — ED PROVIDER NOTE - NSFOLLOWUPINSTRUCTIONS_ED_ALL_ED_FT
Continue all your medications as prescribed and follow up with your PMD in a few days for re-evalution.     Managing Your Hypertension  Hypertension, also called high blood pressure, is when the force of the blood pressing against the walls of the arteries is too strong. Arteries are blood vessels that carry blood from your heart throughout your body. Hypertension forces the heart to work harder to pump blood and may cause the arteries to become narrow or stiff.    Understanding blood pressure readings  A blood pressure reading includes a higher number over a lower number:  The first, or top, number is called the systolic pressure. It is a measure of the pressure in your arteries as your heart beats.  The second, or bottom number, is called the diastolic pressure. It is a measure of the pressure in your arteries as the heart relaxes.  For most people, a normal blood pressure is below 120/80. Your personal target blood pressure may vary depending on your medical conditions, your age, and other factors.    Blood pressure is classified into four stages. Based on your blood pressure reading, your health care provider may use the following stages to determine what type of treatment you need, if any. Systolic pressure and diastolic pressure are measured in a unit called millimeters of mercury (mmHg).    Normal    Systolic pressure: below 120.  Diastolic pressure: below 80.  Elevated    Systolic pressure: 120–129.  Diastolic pressure: below 80.  Hypertension stage 1    Systolic pressure: 130–139.  Diastolic pressure: 80–89.  Hypertension stage 2    Systolic pressure: 140 or above.  Diastolic pressure: 90 or above.  How can this condition affect me?  Managing your hypertension is very important. Over time, hypertension can damage the arteries and decrease blood flow to parts of the body, including the brain, heart, and kidneys. Having untreated or uncontrolled hypertension can lead to:  A heart attack.  A stroke.  A weakened blood vessel (aneurysm).  Heart failure.  Kidney damage.  Eye damage.  Memory and concentration problems.  Vascular dementia.  What actions can I take to manage this condition?  Hypertension can be managed by making lifestyle changes and possibly by taking medicines. Your health care provider will help you make a plan to bring your blood pressure within a normal range. You may be referred for counseling on a healthy diet and physical activity.    Nutrition    A plate with examples of foods in a healthy diet.  Eat a diet that is high in fiber and potassium, and low in salt (sodium), added sugar, and fat. An example eating plan is called the DASH diet. DASH stands for Dietary Approaches to Stop Hypertension. To eat this way:  Eat plenty of fresh fruits and vegetables. Try to fill one-half of your plate at each meal with fruits and vegetables.  Eat whole grains, such as whole-wheat pasta, brown rice, or whole-grain bread. Fill about one-fourth of your plate with whole grains.  Eat low-fat dairy products.  Avoid fatty cuts of meat, processed or cured meats, and poultry with skin. Fill about one-fourth of your plate with lean proteins such as fish, chicken without skin, beans, eggs, and tofu.  Avoid pre-made and processed foods. These tend to be higher in sodium, added sugar, and fat.  Reduce your daily sodium intake. Many people with hypertension should eat less than 1,500 mg of sodium a day.  Lifestyle    A person riding a bicycle wearing a safety helmet.  Work with your health care provider to maintain a healthy body weight or to lose weight. Ask what an ideal weight is for you.  Get at least 30 minutes of exercise that causes your heart to beat faster (aerobic exercise) most days of the week. Activities may include walking, swimming, or biking.  Include exercise to strengthen your muscles (resistance exercise), such as weight lifting, as part of your weekly exercise routine. Try to do these types of exercises for 30 minutes at least 3 days a week.  Do not use any products that contain nicotine or tobacco. These products include cigarettes, chewing tobacco, and vaping devices, such as e-cigarettes. If you need help quitting, ask your health care provider.  Control any long-term (chronic) conditions you have, such as high cholesterol or diabetes.  Identify your sources of stress and find ways to manage stress. This may include meditation, deep breathing, or making time for fun activities.  Alcohol use    Do not drink alcohol if:  Your health care provider tells you not to drink.  You are pregnant, may be pregnant, or are planning to become pregnant.  If you drink alcohol:  Limit how much you have to:  0–1 drink a day for women.  0–2 drinks a day for men.  Know how much alcohol is in your drink. In the [place] one drink equals one 12 oz bottle of beer (355 mL), one 5 oz glass of wine (148 mL), or one 1½ oz glass of hard liquor (44 mL).  Medicines    Your health care provider may prescribe medicine if lifestyle changes are not enough to get your blood pressure under control and if:  Your systolic blood pressure is 130 or higher.  Your diastolic blood pressure is 80 or higher.  Take medicines only as told by your health care provider. Follow the directions carefully. Blood pressure medicines must be taken as told by your health care provider. The medicine does not work as well when you skip doses. Skipping doses also puts you at risk for problems.    Monitoring    A person checking his or her blood pressure.   Before you monitor your blood pressure:  Do not smoke, drink caffeinated beverages, or exercise within 30 minutes before taking a measurement.  Use the bathroom and empty your bladder (urinate).  Sit quietly for at least 5 minutes before taking measurements.  Monitor your blood pressure at home as told by your health care provider. To do this:  Sit with your back straight and supported.  Place your feet flat on the floor. Do not cross your legs.  Support your arm on a flat surface, such as a table. Make sure your upper arm is at heart level.  Each time you measure, take two or three readings one minute apart and record the results.  You may also need to have your blood pressure checked regularly by your health care provider.    General information    Talk with your health care provider about your diet, exercise habits, and other lifestyle factors that may be contributing to hypertension.  Review all the medicines you take with your health care provider because there may be side effects or interactions.  Keep all follow-up visits. Your health care provider can help you create and adjust your plan for managing your high blood pressure.  Where to find more information  National Heart, Lung, and Blood [place]: www.nhlbi.nih.gov  American Heart Association: www.heart.org  Contact a health care provider if:  You think you are having a reaction to medicines you have taken.  You have repeated (recurrent) headaches.  You feel dizzy.  You have swelling in your ankles.  You have trouble with your vision.  Get help right away if:  You develop a severe headache or confusion.  You have unusual weakness or numbness, or you feel faint.  You have severe pain in your chest or abdomen.  You vomit repeatedly.  You have trouble breathing.  These symptoms may be an emergency. Get help right away. Call 911.  Do not wait to see if the symptoms will go away.  Do not drive yourself to the hospital.  Summary  Hypertension is when the force of blood pumping through your arteries is too strong. If this condition is not controlled, it may put you at risk for serious complications.  Your personal target blood pressure may vary depending on your medical conditions, your age, and other factors. For most people, a normal blood pressure is less than 120/80.  Hypertension is managed by lifestyle changes, medicines, or both.  Lifestyle changes to help manage hypertension include losing weight, eating a healthy, low-sodium diet, exercising more, stopping smoking, and limiting alcohol.  This information is not intended to replace advice given to you by your health care provider. Make sure you discuss any questions you have with your health care provider.    How to Take Your Blood Pressure  Blood pressure is a measurement of how strongly your blood is pressing against the walls of your arteries. Arteries are blood vessels that carry blood from your heart throughout your body. Your health care provider takes your blood pressure at each office visit. You can also take your own blood pressure at home with a blood pressure monitor.    You may need to take your own blood pressure to:  Confirm a diagnosis of high blood pressure (hypertension).  Monitor your blood pressure over time.  Make sure your blood pressure medicine is working.  Supplies needed:  Blood pressure monitor.  A chair to sit in. This should be a chair where you can sit upright with your back supported. Do not sit on a soft couch or an armchair.  Table or desk.  Small notebook and pencil or pen.  How to prepare  To get the most accurate reading, avoid the following for 30 minutes before you check your blood pressure:  Drinking caffeine.  Drinking alcohol.  Eating.  Smoking.  Exercising.  Five minutes before you check your blood pressure:  Use the bathroom and urinate so that you have an empty bladder.  Sit quietly in a chair. Do not talk.  How to take your blood pressure  A person checking his blood pressure with a monitor and cuff.  To check your blood pressure, follow the instructions in the manual that came with your blood pressure monitor. If you have a digital blood pressure monitor, the instructions may be as follows:  Sit up straight in a chair.  Place your feet on the floor. Do not cross your ankles or legs.  Rest your left arm at the level of your heart on a table or desk or on the arm of a chair.  Pull up your shirt sleeve.  Wrap the blood pressure cuff around the upper part of your left arm, 1 inch (2.5 cm) above your elbow. It is best to wrap the cuff around bare skin.  Fit the cuff snugly, but not too tightly, around your arm. You should be able to place only one finger between the cuff and your arm.  Position the cord so that it rests in the bend of your elbow.  Press the power button.  Sit quietly while the cuff inflates and deflates.  Read the digital reading on the monitor screen and write the numbers down (record them) in a notebook.  Wait 2–3 minutes, then repeat the steps, starting at step 1.  What does my blood pressure reading mean?  A blood pressure reading consists of a higher number over a lower number. Ideally, your blood pressure should be below 120/80. The first ("top") number is called the systolic pressure. It is a measure of the pressure in your arteries as your heart beats. The second ("bottom") number is called the diastolic pressure. It is a measure of the pressure in your arteries as the heart relaxes.    Blood pressure is classified into four stages. The following are the stages for adults who do not have a short-term serious illness or a chronic condition. Systolic pressure and diastolic pressure are measured in a unit called mm Hg (millimeters of mercury).    Normal    Systolic pressure: below 120.  Diastolic pressure: below 80.  Elevated    Systolic pressure: 120–129.  Diastolic pressure: below 80.  Hypertension stage 1    Systolic pressure: 130–139.  Diastolic pressure: 80–89.  Hypertension stage 2    Systolic pressure: 140 or above.  Diastolic pressure: 90 or above.  You can have elevated blood pressure or hypertension even if only the systolic or only the diastolic number in your reading is higher than normal.    Follow these instructions at home:  Medicines    Take over-the-counter and prescription medicines only as told by your health care provider.  Tell your health care provider if you are having any side effects from blood pressure medicine.  General instructions    Check your blood pressure as often as recommended by your health care provider.  Check your blood pressure at the same time every day.  Take your monitor to the next appointment with your health care provider to make sure that:  You are using it correctly.  It provides accurate readings.  Understand what your goal blood pressure numbers are.  Keep all follow-up visits. This is important.  General tips    Your health care provider can suggest a reliable monitor that will meet your needs. There are several types of home blood pressure monitors.  Choose a monitor that has an arm cuff. Do not choose a monitor that measures your blood pressure from your wrist or finger.  Choose a cuff that wraps snugly, not too tight or too loose, around your upper arm. You should be able to fit only one finger between your arm and the cuff.  You can buy a blood pressure monitor at most [employer] or online.  Where to find more information  American Heart Association: www.heart.org    Contact a health care provider if:  Your blood pressure is consistently high.  Your blood pressure is suddenly low.  Get help right away if:  Your systolic blood pressure is higher than 180.  Your diastolic blood pressure is higher than 120.  These symptoms may be an emergency. Get help right away. Call 911.  Do not wait to see if the symptoms will go away.  Do not drive yourself to the hospital.  Summary  Blood pressure is a measurement of how strongly your blood is pressing against the walls of your arteries.  A blood pressure reading consists of a higher number over a lower number. Ideally, your blood pressure should be below 120/80.  Check your blood pressure at the same time every day.  Avoid caffeine, alcohol, smoking, and exercise for 30 minutes prior to checking your blood pressure. These agents can affect the accuracy of the blood pressure reading.  This information is not intended to replace advice given to you by your health care provider. Make sure you discuss any questions you have with your health care provider.

## 2023-04-27 NOTE — ED ADULT TRIAGE NOTE - CHIEF COMPLAINT QUOTE
Pt bibems due to waking up with chills. "I woke up with chills so I took my pressure and it was 150 over something so I called the ambulance." Pt denies CP, HA, coughing, SOB. Denies any new pain.

## 2023-04-27 NOTE — ED ADULT NURSE REASSESSMENT NOTE - NSFALLRSKASSESSDT_ED_ALL_ED
27-Apr-2023 06:47 Full range of motion of upper and lower extremities, no joint tenderness/swelling.

## 2023-04-27 NOTE — ED ADULT NURSE NOTE - OBJECTIVE STATEMENT
Pt states, "I woke up with chills and I took my blood pressure and it was high." States SBP was 150's and that is high for her.

## 2023-04-27 NOTE — ED PROVIDER NOTE - OBJECTIVE STATEMENT
74 yo female with h/o HTN in the ER c/o elevated BP tonight. Pt states she couldn't sleep tonight feeling "hot and funny"?  Pt repeatedly was checking her BP and it was higher than her usual and pt called EMS. 74 yo female with h/o HTN in the ER c/o elevated BP tonight. Pt states she couldn't sleep tonight feeling "hot and funny"?  Pt repeatedly was checking her BP and it was higher than her usual and pt called EMS.  Denies sore throat, nasal congestion, cough, SOB, CP, abdominal pain or back pain, denies HA, dizziness, vision changes.

## 2023-05-11 ENCOUNTER — NON-APPOINTMENT (OUTPATIENT)
Age: 73
End: 2023-05-11

## 2023-05-13 NOTE — ED PROVIDER NOTE - NS ED MD DISPO DISCHARGE CCDA
is okay.  She came for follow-up.    PAST MEDICAL HISTORY:  Reviewed on EMR, unchanged.  She has history of COPD, she had a nasty accident and she had a massive back surgery, postmenopausal, inability to ambulate.    CURRENT MEDICATIONS:  Reviewed on EMR, unchanged.  She is on gabapentin, methocarbamol, metoprolol, omeprazole.    ALLERGIES:  Reviewed on EMR, unchanged.  Penicillin.    SOCIAL HISTORY:  Reviewed on EMR, unchanged.  She quit smoking years ago.  No history of alcohol or drug abuse.  Occupation, she works at factory.  She is single, three children.    FAMILY HISTORY:  Reviewed on EMR, unchanged.  Mother had pulmonary fibrosis, colon cancer.  Father had emphysema.    IMMUNIZATION:  I will give tetanus shot today.  I am going to ______ pneumonia shot.  She will have shingles shot in October.    REVIEW OF SYSTEMS:  She never had a heart attack.  No stroke.  No diabetes.  No cancer.  All 12 systems reviewed and pertaining covered in history and physical.    PHYSICAL EXAMINATION  VITAL SIGNS:  As recorded and reviewed from EMR.  EYES:  The patient's sclerae white.  The pupils were equal and round.  ENT:  The patient's external ears were normal and the otoscopic examination was negative.  NECK:  Supple.  There were no palpable masses.  Thyroid was not enlarged and there were no carotid bruits.  RESPIRATORY:  The patient had normal inspirations and expirations.  The breath sounds were equal bilaterally and clear to auscultation.  CARDIOVASCULAR:  The patient had S1 normal, split S2 without obvious rubs, clicks, or murmurs.  GASTROINTESTINAL:  There was no hepatosplenomegaly.  There were no palpable masses and no inguinal nodes.  LYMPHATIC:  The patient had no  "axillary, groin, or lymphadenopathy.  MUSCULOSKELETAL:  The patient had normal gait.  The joints appeared to be normal without evidence of deformity.  Grossly the muscles had good range of motion and were without effusion.  EXTREMITIES:  There was no evidence of peripheral edema.  NEUROLOGIC:  The patient had normal cranial nerves.  The reflexes, sensory, and motor examination were grossly within normal limits.  PSYCHIATRIC:  The patient had normal judgment and insight.  The patient was oriented to person, place, and time, and had no obvious mood defect including depression, anxiety, and/or agitation.  BREAST:  Deferred by the patient.  VAGINAL:  Deferred by the patient.  RECTAL:  Deferred by the patient.  SKIN:  Moles and freckles.    LAB WORK:  Laboratory testing discussed.    ASSESSMENT AND PLAN:  Essentially normal physical except:  Tetanus shot given.  Complete blood work ordered.  EKG performed, okay.  We will set up colonoscopy and upper endoscopy.  Gynecologic.  Regular with the Pap test, mammogram.  She will do Pap test with Dr. Rosalia Diego.  Bone density okay.  Mammogram last year.  I shall see her in a week after testing.  Continue to follow.  She will see  skin team.  Disable person parking permit given.    Kindly review this note in conjunction with EMR.   Subjective   Patient ID: Linda Barba is a 61 y.o. female who presents for Annual Exam (cpe).      HPI    Review of Systems    Objective   /74   Ht 1.727 m (5' 8\")   Wt 81.6 kg (179 lb 12.8 oz)   BMI 27.34 kg/m²       Physical Exam    Assessment/Plan   Problem List Items Addressed This Visit    None        " Patient/Caregiver provided printed discharge information.

## 2023-05-16 NOTE — ED PROVIDER NOTE - DISCHARGE DATE
19-Aug-2019
Render In Strict Bullet Format?: No
Detail Level: Zone
Plan: Use TAC once to twice a day as needed for itching. Will re evaluate at next visit.

## 2023-06-05 ENCOUNTER — NON-APPOINTMENT (OUTPATIENT)
Age: 73
End: 2023-06-05

## 2023-06-21 ENCOUNTER — APPOINTMENT (OUTPATIENT)
Dept: NEUROLOGY | Facility: CLINIC | Age: 73
End: 2023-06-21

## 2023-07-06 NOTE — DIETITIAN INITIAL EVALUATION ADULT. - REASON INDICATOR FOR ASSESSMENT
nini cnt Dupixent Counseling: I discussed with the patient the risks of dupilumab including but not limited to eye inflammation and irritation, cold sores, injection site reactions, allergic reactions and increased risk of parasitic infection. The patient understands that monitoring is required and they must alert us or the primary physician if symptoms of infection or other concerning signs are noted.

## 2023-07-19 NOTE — ED ADULT NURSE NOTE - PRIMARY CARE PROVIDER
not affiliated Complex Repair And Z Plasty Text: The defect edges were debeveled with a #15 scalpel blade.  The primary defect was closed partially with a complex linear closure.  Given the location of the remaining defect, shape of the defect and the proximity to free margins a Z plasty was deemed most appropriate for complete closure of the defect.  Using a sterile surgical marker, an appropriate advancement flap was drawn incorporating the defect and placing the expected incisions within the relaxed skin tension lines where possible. The area thus outlined was incised deep to adipose tissue with a #15 scalpel blade. The skin margins were undermined to an appropriate distance in all directions utilizing iris scissors and carried over to close the primary defect.

## 2023-09-19 ENCOUNTER — EMERGENCY (EMERGENCY)
Facility: HOSPITAL | Age: 73
LOS: 1 days | Discharge: HOME | End: 2023-09-19
Attending: EMERGENCY MEDICINE | Admitting: EMERGENCY MEDICINE
Payer: MEDICARE

## 2023-09-19 VITALS
DIASTOLIC BLOOD PRESSURE: 78 MMHG | HEIGHT: 60 IN | OXYGEN SATURATION: 97 % | SYSTOLIC BLOOD PRESSURE: 147 MMHG | RESPIRATION RATE: 16 BRPM | WEIGHT: 132.06 LBS | HEART RATE: 70 BPM | TEMPERATURE: 98 F

## 2023-09-19 DIAGNOSIS — Z88.0 ALLERGY STATUS TO PENICILLIN: ICD-10-CM

## 2023-09-19 DIAGNOSIS — Z79.01 LONG TERM (CURRENT) USE OF ANTICOAGULANTS: ICD-10-CM

## 2023-09-19 DIAGNOSIS — I10 ESSENTIAL (PRIMARY) HYPERTENSION: ICD-10-CM

## 2023-09-19 DIAGNOSIS — E78.5 HYPERLIPIDEMIA, UNSPECIFIED: ICD-10-CM

## 2023-09-19 DIAGNOSIS — Z98.890 OTHER SPECIFIED POSTPROCEDURAL STATES: Chronic | ICD-10-CM

## 2023-09-19 DIAGNOSIS — K59.00 CONSTIPATION, UNSPECIFIED: ICD-10-CM

## 2023-09-19 DIAGNOSIS — R03.0 ELEVATED BLOOD-PRESSURE READING, WITHOUT DIAGNOSIS OF HYPERTENSION: ICD-10-CM

## 2023-09-19 DIAGNOSIS — Z91.018 ALLERGY TO OTHER FOODS: ICD-10-CM

## 2023-09-19 DIAGNOSIS — Z91.09 OTHER ALLERGY STATUS, OTHER THAN TO DRUGS AND BIOLOGICAL SUBSTANCES: ICD-10-CM

## 2023-09-19 PROCEDURE — 99283 EMERGENCY DEPT VISIT LOW MDM: CPT

## 2023-09-19 PROCEDURE — 99282 EMERGENCY DEPT VISIT SF MDM: CPT

## 2023-09-19 NOTE — ED PROVIDER NOTE - OBJECTIVE STATEMENT
72 y/o F, PMHx of HTN on Amlodipine, HLD, gastric reflux, Diaphragmatic hernia without obstruction now presenting to the ED w/ high blood pressure at home this morning. BP was 150/80. Pt is asymptomatic in ED. Pt denies chest pain, leg swelling, SOB, HA, dizziness, and blurred vision. Pt f/u with Dr. Headley for her blood pressure. 72 y/o F, PMHx of HTN on Amlodipine, HLD, gastric reflux, Diaphragmatic hernia without obstruction now presenting to the ED w/ high blood pressure at home this morning. BP was 150/80.  Pt has been checking near hourly for days.  Pt is asymptomatic in ED. Pt denies chest pain, leg swelling, SOB, HA, dizziness, and blurred vision. Pt f/u with Dr. Headley for her blood pressure.

## 2023-09-19 NOTE — ED PROVIDER NOTE - PATIENT PORTAL LINK FT
You can access the FollowMyHealth Patient Portal offered by [facility] by registering at the following website: [email or web address]. By joining [employer]’s FollowMyHealth portal, you will also be able to view your health information using other applications (apps) compatible with our system.

## 2023-09-19 NOTE — ED ADULT NURSE NOTE - OBJECTIVE STATEMENT
Pt arrived to ED c/o high blood pressure. Pt reports BP at home 150/80. Hx of HTN. dneies cp, sob, HA, n/v/d, dizziness or blurry vision

## 2023-09-19 NOTE — ED ADULT TRIAGE NOTE - NS ED NURSE AMBULANCES
[facility] Hospital Medicine  Progress Note    Patient Name: Lizz Noguera  MRN: [id number]  Patient Class: IP- Inpatient   Admission Date: 7/7/2018  Length of Stay: 5 days  Attending Physician: David Barnes MD  Primary Care Provider: Joseluis Doe MD (Inactive)        Subjective:     Principal Problem:Diverticulitis of large intestine with abscess without bleeding    HPI:  Lizz Noguera is a 57 y.o. female patient with a PMHx of HIV, CVA with right sided residual weakness, HLP, depression, who presented to the ER for suprapubic abd pain which onset suddenly this AM. Associated sxs included constipation. Patient denies any n/v/d, hematochezia, dysuria, hematuria, difficulty urinating, frequency, fever, chills, and all other sxs at this time. Pt denies any history of abdominal surgeries. She still has her appendix. CT scan showed uncomplicated diverticulitis of the sigmoid without abscess. Blood cultures pending. In ER, Temp 103.7, glucose 148, lactic acid 2.5. She is admitted with diverticulitis on IV flagyl and IV cipro.     Hospital Course:  Ms Noguera is a 57 year old female with PMHx HIV, CVA, HLP who presented to complaints of lower abdominal pain. Associated symptoms include constipation. CT scan of the abdomen showed uncomplicated diverticulitis of the sigmoid without abscess. She is currently on Cipro IV and Flagyl IV. She received Senna-docusate and Miralax on yesterday with no BM results. She continues to complain of constipation. Will give mag citrate X 1 and monitor.    7/9/2018- Patient seen and examined today. She is not having abdomen pain and is not constipation. Patient not able to eat regular diet this afternoon. Vital signs stable. Continuing Cipro IV and Flagyl IV.     07/10/18 -- Fever overnight of 101.4. Blood cultures (+) gram negative rods, BACTEROIDES VULGATUS. Susceptibilities pending. Pt currently receiving IV Zosyn and Cipro. Diet advanced, tolerated without  "abdominal pain, N/V. Pt reports, "my stomach is bigger than normal, I can barely see my belly button." Mild distension noted. Will obtain abdominal US to assess further.    07/11/18 -- Due to fever and abdominal distension yesterday obtained repeat CT. CT abdomen/pelvis with contrast showed anterior pelvic abscess measuring 8.5 cm with several foci of free air adjacent to the sigmoid colon in the mesentery and liver likely from perforated diverticulum. General surgery consulted and recommended ct guided abscess drainage to be performed by IR. Antibiotics changed to IV Zosyn only. Repeat blood cultures with NGTD.     07/12/18 -- S/p ct guided drainage of abscess, cultures sent which are currently pending. Has 2 JOSE drains placed by IR. Max temp of 101.2 overnight. Repeat blood cultures NGTD. Pt has been started on clear liquid diet to due repeat abdominal xray results this afternoon -- prominent loops of small bowel are seen throughout the abdomen. May be related to bowel dysfunction secondary to diverticular abscess. General surgery following.       Review of Systems   Constitutional: Positive for activity change and fever.   HENT: Negative.    Eyes: Negative.    Respiratory: Negative for apnea, cough, choking, chest tightness, shortness of breath, wheezing and stridor.    Cardiovascular: Negative for chest pain, palpitations and leg swelling.   Gastrointestinal: Positive for abdominal distention, abdominal pain and diarrhea. Negative for constipation, nausea and vomiting.   Endocrine: Negative.    Genitourinary: Negative.    Musculoskeletal: Negative.    Skin: Negative.    Allergic/Immunologic: Negative.    Neurological: Negative for dizziness, tremors, seizures, syncope, facial asymmetry, speech difficulty, weakness, light-headedness, numbness and headaches.   Hematological: Negative.    Psychiatric/Behavioral: Negative.      Objective:     Vital Signs (Most Recent):  Temp: 99.9 °F (37.7 °C) (07/12/18 1549)  Pulse: " [facility] 99 (07/12/18 1549)  Resp: 16 (07/12/18 1549)  BP: 137/66 (07/12/18 1549)  SpO2: 96 % (07/12/18 1549) Vital Signs (24h Range):  Temp:  [99 °F (37.2 °C)-101.2 °F (38.4 °C)] 99.9 °F (37.7 °C)  Pulse:  [] 99  Resp:  [16-28] 16  SpO2:  [94 %-98 %] 96 %  BP: (135-159)/(63-73) 137/66     Weight: 63 kg (138 lb 14.2 oz)  Body mass index is 23.11 kg/m².    Intake/Output Summary (Last 24 hours) at 07/12/18 1735  Last data filed at 07/12/18 1532   Gross per 24 hour   Intake          3076.67 ml   Output            217.5 ml   Net          2859.17 ml      Physical Exam   Constitutional: She is oriented to person, place, and time. She appears well-developed. She is cooperative. She is easily aroused. No distress.   HENT:   Head: Normocephalic and atraumatic.   Eyes: EOM are normal.   Neck: Normal range of motion. Neck supple.   Cardiovascular: Normal rate, regular rhythm, normal heart sounds and intact distal pulses.    No murmur heard.  Pulmonary/Chest: Effort normal and breath sounds normal. No respiratory distress. She has no wheezes. She has no rales. She exhibits no tenderness.   Abdominal: Soft. Bowel sounds are normal. She exhibits no distension. There is no tenderness. There is no rigidity, no rebound, no guarding and no CVA tenderness.   Abdominal dressing C/D/I  JOSE drains x 2 -- clear yellow output   Genitourinary:   Genitourinary Comments: Deferred   Musculoskeletal: Normal range of motion.   Neurological: She is alert, oriented to person, place, and time and easily aroused. No sensory deficit.   Skin: Skin is warm and dry. Capillary refill takes less than 2 seconds.   Psychiatric: She has a normal mood and affect. Her behavior is normal. Judgment and thought content normal.   Nursing note and vitals reviewed.       Significant Labs:   CBC:   Recent Labs  Lab 07/11/18  0437 07/12/18  0454   WBC 7.88 8.11   HGB 9.0* 8.3*   HCT 26.6* 24.9*    183     CMP:   Recent Labs  Lab 07/11/18  0437 07/12/18  0454   NA  137 136   K 3.5 3.1*    109   CO2 19* 20*    77   BUN 5* 5*   CREATININE 0.6 0.7   CALCIUM 9.4 8.9   PROT 5.9* 5.5*   ALBUMIN 2.4* 2.3*   BILITOT 1.4* 1.3*   ALKPHOS 119 111   AST 24 24   ALT 10 8*   ANIONGAP 8 7*   EGFRNONAA >60 >60       Significant Imaging:   Imaging Results          CT Abdomen Pelvis With Contrast (Final result)  Result time 07/07/18 11:30:11    Final result by Arpan Del Castillo MD (07/07/18 11:30:11)                 Impression:      1.  Significant thickening of the mid sigmoid colon associated with diverticula, with surrounding edema.  Associated ascites in the cul-de-sac and around the liver.  Findings are concerning for uncomplicated diverticulitis of the sigmoid colon.  Negative for free air or focal drainable fluid collection.    2. Negative for acute process involving the abdomen or pelvis otherwise.  Normal appendix.  Negative for renal stone disease or hydronephrosis.    3.  Moderate to severe multifactorial spinal canal stenosis at L4/L5 and L5/S1.  Clinical correlation is advised.    4.  Avascular necrosis changes to the left greater than right hip joints.  Clinical correlation is advised.    5.  Cholelithiasis.  Small fat filled umbilical hernia.  Other nonemergent findings as described above.    All CT scans at this facility used dose modulation, iterative reconstruction, and/or weight based dosing when appropriate to reduce radiation dose to as low as reasonably achievable.      Electronically signed by: Arpan Del Castillo MD  Date:    07/07/2018  Time:    11:30             Narrative:    EXAMINATION:  CT ABDOMEN PELVIS WITH CONTRAST    CLINICAL HISTORY:  Lower abdominal pain;    TECHNIQUE:  Axial images through the abdomen and pelvis were obtained with the use of IV contrast.  Sagittal and coronal reconstructions are provided for review.  Oral contrast was not utilized.    COMPARISON:  None    FINDINGS:  No comparison studies are available.    LUNG BASES:   Lung bases are  clear.  Negative for pleural or pericardial effusions. The distal esophagus is normal.    ABDOMEN: Gallstones.  The liver, spleen and gallbladder otherwise appear normal.  The pancreas is unremarkable.  Kidneys and adrenal glands are normal.    Negative for adenopathy noted within the abdomen or pelvis.  Vascular calcifications are present without aneurysmal changes. Portal vein is patent.    The stomach and small bowel appear normal.  There is significant edema within sigmoid colon with associated diverticula.  With moderate surrounding edema.  Mild to moderate ascites in the cul-de-sac and mild ascites around the liver.  The rest of the colon appears normal.  A normal appendix.    PELVIS: The urinary bladder is unremarkable.    The female pelvic organs are normal. There are pelvic phleboliths.    No significant osseous abnormality is identified.  Broad-based disc bulge at L4/L5 and L5/S1 with moderate to severe multifactorial spinal canal stenosis.  There are significant degenerative changes a changes of the femoral heads.  Osteopenia.    Negative for groin adenopathy.    Tiny fat filled umbilical hernia.  The abdominal wall is otherwise intact.                               X-Ray Chest AP Portable (Final result)  Result time 07/07/18 10:18:44    Final result by Arpan Del Castillo MD (07/07/18 10:18:44)                 Impression:      1.  Negative for acute process involving the chest, considering there are lower lung volumes on today's study.    2.  Stable findings as noted above.      Electronically signed by: Arpan Del Castillo MD  Date:    07/07/2018  Time:    10:18             Narrative:    EXAMINATION:  XR CHEST AP PORTABLE    CLINICAL HISTORY:  Sepsis;    COMPARISON:  2 June 2014    FINDINGS:  EKG leads overlie the chest.  Mildly low lung volumes.  The lungs are otherwise clear.  The cardiac silhouette size is normal. The trachea is midline and the mediastinal width is normal. Negative for focal infiltrate,  effusion or pneumothorax. Pulmonary vasculature is normal. Negative for osseous abnormalities. Tortuous aorta.  Convex left curvature of the lower thoracic spine.  Stable calcifications within the central portions of both humeri, possibly bone infarctions.                              Assessment/Plan:      * Diverticulitis of large intestine with abscess without bleeding    - Initial CT scan upon admit showed diverticulitis without abscess. Pt was treated for diverticulitis initially with IV Cipro and Flagyl  - Blood cultures with gram negative bacteremia, BACTEROIDES VULGATUS. Normally susceptible to Flagyl and Zosyn  - Repeat blood cultures with NGTD  - Due to fever (101.4) and abdominal distension yesterday, 07/10/18, obtained CT chest/abdomen/pelvis with contrast which showed anterior pelvic abscess measuring 8.5 cm with several foci of free air adjacent to the sigmoid colon in the mesentery and liver likely from perforated diverticulum  - General surgery consulted and recommended ct guided abscess drainage -- to be performed by IR -- JOSE drains x 2. Cultures pending, follow  - Continue Zosyn          Perforated diverticulum    - Due to fever (101.4) and abdominal distension, CT abdomen/pelvis with contrast showed anterior pelvic abscess measuring 8.5 cm with several foci of free air adjacent to the sigmoid colon in the mesentery and liver likely from perforated diverticulum  - General surgery consulted and recommended ct guided abscess drainage  - IR performed abscess drainage on 07/11/18 -- has JOSE drains x 2  - Cultures pending -- follow          Gram-negative bacteremia    - Blood cultures with gram negative bacteremia, BACTEROIDES VULGATUS. Normally susceptible to Zosyn or Flagyl  - Cipro/Flagyl discontinued today due to CT findings. Pt had a total of 4 days of Flagyl before discontinuation   - Continue IV Zosyn  - Repeat blood cultures with NGTD -- appears to have resolved          Pneumonia due to  "infectious organism    - CT chest/abdomen/pelvis with contrast showed multilobar infiltrates with small right effusion concerning for pneumonia  - CXR showed no infiltrates and CT abdomen/pelvis with contrast negative for infiltrates at lung bases -- both obtained on admit  - Currently afebrile, no WBC count. Hx of HIV, will obtain CD 4 count. Will continue IV Zosyn for now  - Repeat blood cultures with NGTD     07/12/18  - Max temp of 101.2 overnight. Repeat blood cultures NGTD. Add Vancomycin, pharmacy to dose          Hypokalemia    - K+ 3.1  - KCL 30 mEq PO x 1  - Repeat BMP in AM          HIV (human immunodeficiency virus infection)    - Sees Dr. Doe at [facility] outpatient  - Pt unsure of last CD4 count but was told "it was good."  - CD 4 count 339  - Continue home medications          Tobacco abuse    - Smoking cessation counseling  - Nicotine patch              VTE Risk Mitigation         Ordered     IP VTE LOW RISK PATIENT  Once      07/07/18 1357     Place sequential compression device  Until discontinued      07/07/18 1357              ALIS Barton  Department of Hospital Medicine   [facility]  "

## 2023-09-19 NOTE — ED PROVIDER NOTE - NSFOLLOWUPINSTRUCTIONS_ED_ALL_ED_FT
Hypertension    Hypertension, commonly called high blood pressure, is when the force of blood pumping through your arteries is too strong. Hypertension forces your heart to work harder to pump blood. Your arteries may become narrow or stiff. Having untreated or uncontrolled hypertension for a long period of time can cause heart attack, stroke, kidney disease, and other problems. If started on a medication, take exactly as prescribed by your health care professional. Maintain a healthy lifestyle and follow up with your primary care physician.    SEEK IMMEDIATE MEDICAL CARE IF YOU HAVE ANY OF THE FOLLOWING SYMPTOMS: severe headache, confusion, chest pain, abdominal pain, vomiting, or shortness of breath.     Follow up with Dr. Loredo this week.

## 2023-09-19 NOTE — ED PROVIDER NOTE - PHYSICAL EXAMINATION
CONSTITUTIONAL:  Well appearing, well nourished, awake, alert, oriented to person, place, time/situation and in no apparent distress.  HENMT:  Head is atraumatic normocephalic.  External ears normal and TMs visualized and normal.  No nasal secretions or epistaxis.  Posterior pharynx normal and airway is patent.  No tonsillar enlargement or exudates.  Uvula midline.  Tongue is normal.  Mucous membranes are moist.  No angioedema and no stridor.  Neck is supple without edema or adenopathy.  No carotid bruits.  No midline c-spine tenderness.  Normal voice.  Tolerating secretions.  FROM and no meningeal signs.  EYES:  Clear bilaterally, pupils equal, round and reactive to light, approximately 4-6 mm bilateral pupils size.  CARDIAC:  Normal rate, regular rhythm.  Heart sounds S1, S2.  No murmurs, rubs or gallops.  RESPIRATORY:  Lungs are clear bilaterally with good aeration.  No respiratory distress.  Non-tachypneic and non-labored.  No accessory muscle use.  Speaking full sentences.  No bony chest wall tenderness or crepitation palpated.  GASTROENTEROLOGY:  Belly is soft and non-tender in all four quadrants.  There is no rebound or guarding.  Negative Cee's sign. Bowel sounds auscultated in four quadrants and normal.  No hernias or masses palpable.  Abdominal wall skin in normal appearing, intact and atraumatic.  No pulsatile abdominal mass or bruits present.  No abdominal distention or suprapubic fullness present.  GENITOURINARY:  Absent CVAT bilaterally.  No suprapubic tenderness to palpation.  MUSCULOSKELETAL:  Spine appears normal, range of motion is not limited, no muscle or joint tenderness.  No extremity edema, tenderness, or evidence of trauma.  Bilateral radial, femoral, popliteal, dorsalis pedis, and posterior tibial pulses present, strong, 2+.  Cap refill is <2 seconds in all fingers and toes bilaterally.  All compartments soft and non-tender.  NEUROLOGICAL:  Patient is alert, oriented x person, place and time.  Cranial nerves 2-12 are intact.  Normal gait and speech.  Cerebellar testing normal:  negative Romberg, normal coordination and normal finger to nose, heal to shin and rapid alternating movements.  Normal proprioception and sensory exam.  No pronator drift.  5/5 bl upper extremity and lower extremity strength.  SKIN:  Spine appears normal, range of motion is not limited, no muscle or joint tenderness

## 2023-09-19 NOTE — ED ADULT TRIAGE NOTE - CHIEF COMPLAINT QUOTE
Pt presents to ED c/o high blood pressure readings at home. BP was 150/80 at home. hx of HTN on amlodipine. denies chest pain, shortness of breath, headache, dizziness, blurred vision.

## 2023-09-19 NOTE — ED PROVIDER NOTE - CLINICAL SUMMARY MEDICAL DECISION MAKING FREE TEXT BOX
72 y/o F, PMHx of HTN on Amlodipine, now here after high blood pressure at home (150/80). Pt is asymptomatic w/ mild HTN. No sign of end organ injury. Will DC and advise pt to continue anti HTN medication and have her f/u with Dr. Headley (PCP).

## 2023-09-19 NOTE — ED ADULT NURSE NOTE - NSFALLUNIVINTERV_ED_ALL_ED
Bed/Stretcher in lowest position, wheels locked, appropriate side rails in place/Call bell, personal items and telephone in reach/Instruct patient to call for assistance before getting out of bed/chair/stretcher/Non-slip footwear applied when patient is off stretcher/[place] to call system/Physically safe environment - no spills, clutter or unnecessary equipment/Purposeful proactive rounding/Room/bathroom lighting operational, light cord in reach

## 2023-09-22 ENCOUNTER — APPOINTMENT (OUTPATIENT)
Dept: THORACIC SURGERY | Facility: CLINIC | Age: 73
End: 2023-09-22

## 2023-09-25 NOTE — ED PROVIDER NOTE - TIMING
----- Message from Anastasia Newberry MD sent at 9/25/2023  2:30 PM CDT -----  There is no mammographic evidence of malignancy. A 1 year screening mammogram is recommended.    intermittent

## 2023-10-06 ENCOUNTER — APPOINTMENT (OUTPATIENT)
Dept: THORACIC SURGERY | Facility: CLINIC | Age: 73
End: 2023-10-06

## 2023-10-15 NOTE — ED ADULT NURSE NOTE - NSHOSCREENINGQ1_ED_ALL_ED
Double your Lasix dose until you follow-up with your PCP on Wednesday, I would take one of the pills in the morning and a second 1 at night. Return to the ED for any worsening swelling, chest pain or shortness of breath, or any other concerning signs or symptoms. No

## 2023-10-27 ENCOUNTER — APPOINTMENT (OUTPATIENT)
Dept: THORACIC SURGERY | Facility: CLINIC | Age: 73
End: 2023-10-27

## 2023-11-03 ENCOUNTER — APPOINTMENT (OUTPATIENT)
Dept: THORACIC SURGERY | Facility: CLINIC | Age: 73
End: 2023-11-03

## 2023-11-08 ENCOUNTER — NON-APPOINTMENT (OUTPATIENT)
Age: 73
End: 2023-11-08

## 2023-11-17 ENCOUNTER — APPOINTMENT (OUTPATIENT)
Dept: THORACIC SURGERY | Facility: CLINIC | Age: 73
End: 2023-11-17

## 2024-03-05 ENCOUNTER — APPOINTMENT (OUTPATIENT)
Dept: ENDOCRINOLOGY | Facility: CLINIC | Age: 74
End: 2024-03-05

## 2024-03-20 NOTE — ED PROVIDER NOTE - CPE EDP ENMT NORM
The patient is Stable - Low risk of patient condition declining or worsening    Shift Goals  Clinical Goals: monitor HR  Patient Goals: go home  Family Goals: jonah    Progress made toward(s) clinical / shift goals:    Problem: Care Map:  Day 3 Optimal Outcome for the Heart Failure Patient  Goal: Day 3:  Optimal Care of the heart failure patient  Outcome: Progressing  Intervention: Start Heart Failure education booklet, provide writing utensils and notepad for questions  Note: HF booklet given to pt along with writing utensils.   Intervention: For patient's with heart failure exacerbation, identify precipitant (diet, med compliance, etc.) and direct education towards lifestyle changes to prevent exacerbations.  Note: HF exacerbation precipitants discussed with pt. Pt verbalizes understanding.   Intervention: Instruct patient to write down weights on symptom tracker daily  Note: Pt educated on importance of daily weights and documentation along with symptoms.   Intervention: Ensure daily BMP is ordered by provider  Note: Daily labs ordered and drawn.   Intervention: Daily weight documented.  Use stand up scale if patient able. Compare to previous weight  Note: Daily weight documented. Weight of 65kg on stand up scale.   Intervention: Assess and document 2 hour post diuretic output  Note: Output documented post administration of diuretic.   Intervention: Document intake and output per shift.  Communicate with care team if volume status is improving, stalled or worsening.  Note: Intake and output documented throughout the shift.   Intervention: Educate patient on HF topics (Medication, weight, worsening signs and symptoms, low salt diet, activity) and document in Patient Education  Note: Pt educated on HF topics including diet, medications, weights, and activity. Pt verbalizes understanding.           normal...

## 2024-04-19 ENCOUNTER — APPOINTMENT (OUTPATIENT)
Dept: ENDOCRINOLOGY | Facility: CLINIC | Age: 74
End: 2024-04-19
Payer: MEDICARE

## 2024-04-19 VITALS
OXYGEN SATURATION: 97 % | BODY MASS INDEX: 27.82 KG/M2 | TEMPERATURE: 98 F | HEIGHT: 59 IN | HEART RATE: 84 BPM | SYSTOLIC BLOOD PRESSURE: 127 MMHG | DIASTOLIC BLOOD PRESSURE: 76 MMHG | WEIGHT: 138 LBS

## 2024-04-19 DIAGNOSIS — D35.01 BENIGN NEOPLASM OF RIGHT ADRENAL GLAND: ICD-10-CM

## 2024-04-19 PROCEDURE — 99214 OFFICE O/P EST MOD 30 MIN: CPT

## 2024-04-19 PROCEDURE — 36415 COLL VENOUS BLD VENIPUNCTURE: CPT

## 2024-04-19 RX ORDER — GABAPENTIN 100 MG/1
100 CAPSULE ORAL TWICE DAILY
Qty: 60 | Refills: 5 | Status: DISCONTINUED | COMMUNITY
End: 2024-04-19

## 2024-04-22 LAB
ALDOSTERONE SERUM: 8.2 NG/DL
ANION GAP SERPL CALC-SCNC: 17 MMOL/L
BUN SERPL-MCNC: 22 MG/DL
CALCIUM SERPL-MCNC: 10.4 MG/DL
CHLORIDE SERPL-SCNC: 102 MMOL/L
CO2 SERPL-SCNC: 21 MMOL/L
CREAT SERPL-MCNC: 0.72 MG/DL
EGFR: 87 ML/MIN/1.73M2
GLUCOSE SERPL-MCNC: 98 MG/DL
POTASSIUM SERPL-SCNC: 4.3 MMOL/L
SODIUM SERPL-SCNC: 140 MMOL/L

## 2024-04-26 LAB
METANEPHRINE, PL: 32.8 PG/ML
NORMETANEPHRINE, PL: 150.8 PG/ML
RENIN ACTIVITY, PLASMA: 0.61 NG/ML/HR

## 2024-07-25 ENCOUNTER — NON-APPOINTMENT (OUTPATIENT)
Age: 74
End: 2024-07-25

## 2024-07-30 NOTE — ED ADULT TRIAGE NOTE - MODE OF ARRIVAL
Medicare Wellness Visit  Plan for Preventive Care    A good way for you to stay healthy is to use preventive care.  Medicare covers many services that can help you stay healthy.* The goal of these services is to find any health problems as quickly as possible. Finding problems early can help make them easier to treat.  Your personal plan below lists the services you may need and when they are due.      Health Maintenance Summary     Traditional Medicare- Medicare Wellness Visit (Yearly)  Due since 7/1/2024    Abdominal Aortic Aneurysm (AAA) Screening (Once)  Postponed until 7/31/2024    COVID-19 Vaccine (7 - 2023-24 season)  Postponed until 7/31/2024    DTaP/Tdap/Td Vaccine (2 - Td or Tdap)  Postponed until 7/26/2025    Influenza Vaccine (1)  Next due on 9/1/2024    Depression Screening (Yearly)  Next due on 7/27/2025    Colorectal Cancer Risk - Colonoscopy (Every 3 Years)  Next due on 11/22/2025    Hepatitis C Screening   Completed    Pneumococcal Vaccine 65+   Completed    Shingles Vaccine   Completed    Meningococcal Vaccine   Aged Out    Hepatitis B Vaccine (For Physician/APC Discussion)   Aged Out    HPV Vaccine   Aged Out    Colorectal Cancer Screen   Discontinued           Preventive Care for Women and Men    Heart Screenings (Cardiovascular):  Blood tests are used to check your cholesterol, lipid and triglyceride levels. High levels can increase your risk for heart disease and stroke. High levels can be treated with medications, diet and exercise. Lowering your levels can help keep your heart and blood vessels healthy.  Your provider will order these tests if they are needed.    An ultrasound is done to see if you have an abdominal aortic aneurysm (AAA).  This is an enlargement of one of the main blood vessels that delivers blood to the body.   In the [place], 9,000 deaths are caused by AAA.  You may not even know you have this problem and as many as 1 in 3 people will have a serious problem if it is  not treated.  Early diagnosis allows for more effective treatment and cure.  If you have a family history of AAA or are a male age 65-75 who has smoked, you are at higher risk of an AAA.  Your provider can order this test, if needed.    Colorectal Screening:  There are many tests that are used to check for cancer of your colon and rectum. You and your provider should discuss what test is best for you and when to have it done.  Options include:  Screening Colonoscopy: exam of the entire colon, seen through a flexible lighted tube.  Flexible Sigmoidoscopy: exam of the last third (sigmoid portion) of the colon and rectum, seen through a flexible lighted tube.  Cologuard DNA stool test: a sample of your stool is used to screen for cancer and unseen blood in your stool.  Fecal Occult Blood Test: a sample of your stool is studied to find any unseen blood    Flu Shot:  An immunization that helps to prevent influenza (the flu). You should get this every year. The best time to get the shot is in the fall.    Pneumococcal Shot:  Vaccines help prevent pneumococcal disease, which is any type of illness caused by Streptococcus pneumoniae bacteria. There are two kinds of pneumococcal vaccines available in the [place]:   Pneumococcal conjugate vaccines (PCV20 or Omcqmeu55®)  Pneumococcal polysaccharide vaccine (PPSV23 or Huhteedck23®)  For those who have never received any pneumococcal conjugate vaccine, CDC recommends PVC20 for adults 65 years or older and adults 19 through 64 years old with certain medical conditions or risk factors.   For those who have previously received PCV13, this should be followed by a dose of PPSV23.     Hepatitis B Shot:  An immunization that helps to protect people from getting Hepatitis B. Hepatitis B is a virus that spreads through contact with infected blood or body fluids. Many people with the virus do not have symptoms.  The virus can lead to serious problems, such as liver disease. Some  people are at higher risk than others. Your doctor will tell you if you need this shot.     Diabetes Screening:  A test to measure sugar (glucose) in your blood is called a fasting blood sugar. Fasting means you cannot have food or drink for at least 8 hours before the test. This test can detect diabetes long before you may notice symptoms.    Glaucoma Screening:  Glaucoma screening is performed by your eye doctor. The test measures the fluid pressure inside your eyes to determine if you have glaucoma.     Hepatitis C Screening:  A blood test to see if you have the hepatitis C virus.  Hepatitis C attacks the liver and is a major cause of chronic liver disease.  Medicare will cover a single screening for all adults born between 1945 & 1965, or high risk patients (people who have injected illegal drugs or people who have had blood transfusions).  High risk patients who continue to inject illegal drugs can be screened for Hepatitis C every year.    Smoking and Tobacco-Use Cessation Counseling:  Tobacco is the single greatest cause of disease and early death in our country today. Medication and counseling together can increase a person’s chance of quitting for good.   Medicare covers two quitting attempts per year, with four counseling sessions per attempt (eight sessions in a 12 month period)    Preventive Screening tests for Women    Screening Mammograms and Breast Exams:  An x-ray of your breasts to check for breast cancer before you or your doctor may be able to feel it.  If breast cancer is found early it can usually be treated with success.    Pelvic Exams and Pap Tests:  An exam to check for cervical and vaginal cancer. A Pap test is a lab test in which cells are taken from your cervix and sent to the lab to look for signs of cervical cancer. If cancer of the cervix is found early, chances for a cure are good. Testing can generally end at age 65, or if a woman has a hysterectomy for a benign condition. Your  provider may recommend more frequent testing if certain abnormal results are found.    Bone Mass Measurements:  A painless x-ray of your bone density to see if you are at risk for a broken bone. Bone density refers to the thickness of bones or how tightly the bone tissue is packed.    Preventive Screening tests for Men    Prostate Screening:  Should you have a prostate cancer test (PSA)?  It is up to you to decide if you want a prostate cancer test. Talk to your clinician to find out if the test is right for you.  Things for you to consider and talk about should include:  Benefits and harms of the test  Your family history  How your race/ethnicity may influence the test  If the test may impact other medical conditions you have  Your values on screenings and treatments    *Medicare pays for many preventive services to keep you healthy. For some of these services, you might have to pay a deductible, coinsurance, and / or copayment.  The amounts vary depending on the type of services you need and the kind of Medicare health plan you have.    For further details on screenings offered by Medicare please visit: https://www.medicare.gov/coverage/preventive-screening-services      [facility]

## 2024-11-19 NOTE — ED ADULT NURSE NOTE - NS PRO AD ANY ON CHART
Detail Level: Detailed Quality 226: Preventive Care And Screening: Tobacco Use: Screening And Cessation Intervention: Patient screened for tobacco use and is an ex/non-smoker Quality 130: Documentation Of Current Medications In The Medical Record: Current Medications Documented Quality 358: Patient-Centered Surgical Risk Assessment And Communication: Documentation of patient-specific risk assessment with a risk calculator based on multi-institutional clinical data, the specific risk calculator used, and communication of risk assessment from risk calculator with the patient or family. Yes

## 2024-12-10 NOTE — ED ADULT NURSE NOTE - WILL THE PATIENT ACCEPT THE PFIZER COVID-19 VACCINE IF ELIGIBLE AND IT IS AVAILABLE?
Remote Patient Order Discontinued    Received request from Asif Hernandez RN   to discontinue order for remote patient monitoring of CHF, Diabetes, and HTN and order completed.     
No

## 2025-01-28 ENCOUNTER — RX RENEWAL (OUTPATIENT)
Age: 75
End: 2025-01-28

## 2025-02-26 NOTE — ED ADULT NURSE NOTE - NEURO BEHAVIOR
Detail Level: Zone
Render In Strict Bullet Format?: No
Modify Regimen: Increase spironolactone 50 mg tablet QD to spironolactone 100 mg tablet QD
calm

## 2025-03-06 NOTE — ED ADULT NURSE NOTE - DOES PATIENT HAVE ADVANCE DIRECTIVE
Hospital Medicine Discharge Summary    Patient ID: Chiquita Little      Patient's PCP: Elo Griffin MD    Admit Date: 2/27/2025     Discharge Date: 3/3/2025      Admitting Provider: Zunilda Strauss Jr., MD     Discharge Provider: Bailee Martinez MD     Discharge Diagnoses:       Active Hospital Problems    Diagnosis     Near syncope [R55]        The patient was seen and examined on day of discharge and this discharge summary is in conjunction with any daily progress note from day of discharge.    Hospital Course:   Chiquita Little is a 72 y.o. female with pmh of arthritis, diabetes mellitus with retinopathy, hypertension, depression who presents with Near syncope.  And KATHE along with hypokalemia and hypomagnesemia  Electrolyte was replaced.  Patient was treated with IV hydration which improved her kidney function back to baseline.  Patient had a cardiac echo which showed ejection fraction of 58%.  Patient was feeling much improved and would like to be discharged back to home.  Patient is follow with PCP in 3 to 5 days or sooner if needed  Patient is to return to the ED if symptoms return          Physical Exam Performed:     BP (!) 166/66   Pulse 78   Temp 98.3 °F (36.8 °C) (Oral)   Resp 16   Ht 1.626 m (5' 4\")   Wt 86.7 kg (191 lb 2.2 oz)   SpO2 97%   BMI 32.81 kg/m²       General appearance:  No apparent distress,  cooperative.  HEENT:  Normal cephalic, atraumatic without obvious deformity. Pupils equal, round, and reactive to light.    Neck: Supple, with full range of motion. Trachea midline.  Respiratory:  Normal respiratory effort. Clear to auscultation,   Cardiovascular:  Regular rate and rhythm   Abdomen: Soft, non-tender, non-distended   Musculoskeletal:  No edema bilaterally.  Full range of motion without deformity.  Skin: Skin color, texture, turgor normal.  No rashes or lesions.  Neurologic:  Neurovascularly intact   Psychiatric:  Alert and oriented  Capillary Refill: Brisk,< 3 seconds  No

## 2025-04-15 NOTE — ED ADULT NURSE NOTE - COVID-19 ORDERING FACILITY
In an effort to ensure that our patients LiveWell, a Team Member has reviewed your chart and identified an opportunity to provide the best care possible. An attempt was made to discuss or schedule due or overdue Preventive or Chronic Condition care.Care Gaps identified: Wellness Visits.    The Outcome was Contact was made, care gap was discussed - see further documentation.   Type of Appointment needed: Medicare Wellness Visit    Already completed on 4/7/25  Name: ABBY BAEZ    ### Patient Details  YOB: 1945  MRN: [id number]    ### Encounter Details  Arrival Date: N/A  Discharge Date: N/A  Encounter ID: [id number]    ### Related interaction  [facility] [place] Annual Wellness Outreach (Annual Well[place] Outreach ([place])) ([email or web address])    ### Questions     Question 1   Annual Wellness   If you would like to speak with someone now to schedule your wellness visit, press 1; if you would like us to call you back later to schedule your wellness visit, press 2; if you would prefer not to schedule an appointment at this time, please press 3, or if you've already scheduled a wellness visit this year press 4.   Transfer Now (VOICE) (Issue Panel: Annual Wellness Live Transfer )    ### Required Interventions and Feedback     Call Status         Call Status List:     Inbound Call (edited by RENNY on 04/15/2025 11:42 AM CDT)     Medicare Wellness Visit Scheduling         Unable to schedule Medicare Wellness Visit appointment :     Appointment already completed/ scheduled (edited by RENNY on 04/15/2025 11:42 AM CDT)  
[facility]